# Patient Record
Sex: FEMALE | Race: WHITE | NOT HISPANIC OR LATINO | Employment: UNEMPLOYED | URBAN - METROPOLITAN AREA
[De-identification: names, ages, dates, MRNs, and addresses within clinical notes are randomized per-mention and may not be internally consistent; named-entity substitution may affect disease eponyms.]

---

## 2023-05-30 PROBLEM — G47.33 OSA (OBSTRUCTIVE SLEEP APNEA): Status: ACTIVE | Noted: 2023-05-30

## 2023-05-30 PROBLEM — E55.9 VITAMIN D DEFICIENCY: Status: ACTIVE | Noted: 2023-05-30

## 2023-05-30 PROBLEM — E03.9 HYPOTHYROIDISM: Status: ACTIVE | Noted: 2023-05-30

## 2023-05-30 PROBLEM — J44.9 COPD (CHRONIC OBSTRUCTIVE PULMONARY DISEASE) (MULTI): Status: ACTIVE | Noted: 2023-05-30

## 2023-05-30 PROBLEM — E78.00 HYPERCHOLESTEREMIA: Status: ACTIVE | Noted: 2023-05-30

## 2023-05-30 PROBLEM — I10 BENIGN ESSENTIAL HYPERTENSION: Status: ACTIVE | Noted: 2023-05-30

## 2023-05-30 PROBLEM — E87.6 HYPOKALEMIA: Status: ACTIVE | Noted: 2023-05-30

## 2023-05-30 PROBLEM — F41.8 DEPRESSION WITH ANXIETY: Status: ACTIVE | Noted: 2023-05-30

## 2023-05-30 NOTE — PROGRESS NOTES
Subjective   Patient ID:   Hortensia Rodriguez is a 57 y.o. female who presents for Establish Care.  HPI  New patient here today to establish care with myself.  Previous PCP: Denise Burnett  Last seen: Sep 2022  Last OV reviewed.    Has been out of all medications for the past 6 months.    Hypothyroidism:  Taking Synthroid.  Last checked Sep 2022.  DUE for labs.    COPD:  Taking albuterol and Symbicort.  Breathing is stable.    EMMA:  Does not use a CPAP.  States sleep is stable.    HTN:  Taking Metoprolol.  BP today is 174/103.  Denies dizziness, headaches.    HLD:  Taking Atorvastatin.  Last checked Sep 2022 and was high.  DUE for labs.    Depression/anxiety:  Taking Celexa.  Denies SI/HI.    Vit D deficiency:  Last checked Sep 2022.    Health maintenance:  Smoking: Former smoker.  Mammogram (40-75): DUE  Labs: Sep 2022. DUE for TSH, lipid  Colonoscopy (50-75): DUE - interested in cologuard.  Influenza:    Review of Systems  12 point review of systems negative unless stated above in HPI    Vitals:    06/01/23 1255   BP: (!) 174/103   Pulse: 101   Resp: 18   SpO2: 94%       Physical Exam  General: Alert and oriented, well nourished, no acute distress.  Lungs: Clear to auscultation, non-labored respiration.  Heart: Normal rate, regular rhythm, no murmur, gallop or edema.  Neurologic: Awake, alert, and oriented X3, CN II-XII intact.  Psychiatric: Cooperative, appropriate mood and affect.    Assessment/Plan   It was nice meeting you!  I have ordered some labs to be done as soon as you can.  We will call you with the results.  I have ordered you a mammogram to be done as soon as you are able.  We will call the results.  I have also ordered cologuard.  I have refilled your medications.  We will see what your BP is next visit.  Call with questions or concerns.    Fu 1 month  Diagnoses and all orders for this visit:  Benign essential hypertension  -     amLODIPine (Norvasc) 10 mg tablet; Take 1 tablet (10 mg) by mouth  once daily.  -     metoprolol succinate XL (Toprol-XL) 25 mg 24 hr tablet; Take 1 tablet (25 mg) by mouth once daily. Do not crush or chew.  Chronic obstructive pulmonary disease, unspecified COPD type (CMS/HCC)  -     Symbicort 160-4.5 mcg/actuation inhaler; Inhale 2 puffs 2 times a day.  Depression with anxiety  -     citalopram (CeleXA) 10 mg tablet; Take 1 tablet (10 mg) by mouth once daily.  Hypercholesteremia  -     Lipid Panel; Future  -     atorvastatin (Lipitor) 80 mg tablet; Take 1 tablet (80 mg) by mouth once daily.  Hypokalemia  Hypothyroidism, unspecified type  -     TSH with reflex to Free T4 if abnormal; Future  EMMA (obstructive sleep apnea)  Vitamin D deficiency  -     cholecalciferol (Vitamin D3) 25 MCG (1000 UT) tablet; Take 1 tablet (25 mcg) by mouth once daily.  Visit for screening mammogram  -     BI mammo bilateral screening tomosynthesis; Future  Colon cancer screening  -     Cologuard® colon cancer screening; Future

## 2023-06-01 ENCOUNTER — LAB (OUTPATIENT)
Dept: LAB | Facility: LAB | Age: 58
End: 2023-06-01
Payer: COMMERCIAL

## 2023-06-01 ENCOUNTER — OFFICE VISIT (OUTPATIENT)
Dept: PRIMARY CARE | Facility: CLINIC | Age: 58
End: 2023-06-01
Payer: COMMERCIAL

## 2023-06-01 VITALS
WEIGHT: 188.2 LBS | RESPIRATION RATE: 18 BRPM | BODY MASS INDEX: 30.25 KG/M2 | SYSTOLIC BLOOD PRESSURE: 174 MMHG | OXYGEN SATURATION: 94 % | HEIGHT: 66 IN | HEART RATE: 101 BPM | DIASTOLIC BLOOD PRESSURE: 103 MMHG

## 2023-06-01 DIAGNOSIS — Z12.31 VISIT FOR SCREENING MAMMOGRAM: ICD-10-CM

## 2023-06-01 DIAGNOSIS — E03.9 HYPOTHYROIDISM, UNSPECIFIED TYPE: ICD-10-CM

## 2023-06-01 DIAGNOSIS — Z12.11 COLON CANCER SCREENING: ICD-10-CM

## 2023-06-01 DIAGNOSIS — E78.00 HYPERCHOLESTEREMIA: ICD-10-CM

## 2023-06-01 DIAGNOSIS — G47.33 OSA (OBSTRUCTIVE SLEEP APNEA): ICD-10-CM

## 2023-06-01 DIAGNOSIS — E55.9 VITAMIN D DEFICIENCY: ICD-10-CM

## 2023-06-01 DIAGNOSIS — E87.6 HYPOKALEMIA: ICD-10-CM

## 2023-06-01 DIAGNOSIS — J44.9 CHRONIC OBSTRUCTIVE PULMONARY DISEASE, UNSPECIFIED COPD TYPE (MULTI): ICD-10-CM

## 2023-06-01 DIAGNOSIS — F41.8 DEPRESSION WITH ANXIETY: ICD-10-CM

## 2023-06-01 DIAGNOSIS — I10 BENIGN ESSENTIAL HYPERTENSION: Primary | ICD-10-CM

## 2023-06-01 LAB
CHOLESTEROL (MG/DL) IN SER/PLAS: 237 MG/DL (ref 0–199)
CHOLESTEROL IN HDL (MG/DL) IN SER/PLAS: 52.2 MG/DL
CHOLESTEROL/HDL RATIO: 4.5
LDL: 170 MG/DL (ref 0–99)
THYROTROPIN (MIU/L) IN SER/PLAS BY DETECTION LIMIT <= 0.05 MIU/L: 3.73 MIU/L (ref 0.44–3.98)
TRIGLYCERIDE (MG/DL) IN SER/PLAS: 75 MG/DL (ref 0–149)
VLDL: 15 MG/DL (ref 0–40)

## 2023-06-01 PROCEDURE — 3077F SYST BP >= 140 MM HG: CPT | Performed by: PHYSICIAN ASSISTANT

## 2023-06-01 PROCEDURE — 3080F DIAST BP >= 90 MM HG: CPT | Performed by: PHYSICIAN ASSISTANT

## 2023-06-01 PROCEDURE — 36415 COLL VENOUS BLD VENIPUNCTURE: CPT

## 2023-06-01 PROCEDURE — 84443 ASSAY THYROID STIM HORMONE: CPT

## 2023-06-01 PROCEDURE — 80061 LIPID PANEL: CPT

## 2023-06-01 PROCEDURE — 80053 COMPREHEN METABOLIC PANEL: CPT

## 2023-06-01 PROCEDURE — 1036F TOBACCO NON-USER: CPT | Performed by: PHYSICIAN ASSISTANT

## 2023-06-01 PROCEDURE — 99203 OFFICE O/P NEW LOW 30 MIN: CPT | Performed by: PHYSICIAN ASSISTANT

## 2023-06-01 RX ORDER — AMLODIPINE BESYLATE 10 MG/1
10 TABLET ORAL DAILY
Qty: 90 TABLET | Refills: 1 | Status: SHIPPED | OUTPATIENT
Start: 2023-06-01 | End: 2024-02-15 | Stop reason: SDUPTHER

## 2023-06-01 RX ORDER — BUDESONIDE AND FORMOTEROL FUMARATE DIHYDRATE 160; 4.5 UG/1; UG/1
2 AEROSOL RESPIRATORY (INHALATION) 2 TIMES DAILY
Qty: 1 EACH | Refills: 2 | Status: SHIPPED | OUTPATIENT
Start: 2023-06-01 | End: 2024-02-15 | Stop reason: SDUPTHER

## 2023-06-01 RX ORDER — METOPROLOL SUCCINATE 25 MG/1
25 TABLET, EXTENDED RELEASE ORAL DAILY
COMMUNITY
End: 2023-06-01 | Stop reason: SDUPTHER

## 2023-06-01 RX ORDER — CHOLECALCIFEROL (VITAMIN D3) 25 MCG
25 TABLET ORAL DAILY
Qty: 90 TABLET | Refills: 1 | Status: SHIPPED | OUTPATIENT
Start: 2023-06-01 | End: 2023-11-28

## 2023-06-01 RX ORDER — IBUPROFEN 600 MG/1
TABLET ORAL
COMMUNITY
Start: 2019-08-07

## 2023-06-01 RX ORDER — ATORVASTATIN CALCIUM 80 MG/1
80 TABLET, FILM COATED ORAL DAILY
Qty: 90 TABLET | Refills: 1 | Status: SHIPPED | OUTPATIENT
Start: 2023-06-01 | End: 2024-02-15 | Stop reason: SDUPTHER

## 2023-06-01 RX ORDER — ATORVASTATIN CALCIUM 80 MG/1
1 TABLET, FILM COATED ORAL DAILY
COMMUNITY
Start: 2015-08-31 | End: 2023-06-01 | Stop reason: SDUPTHER

## 2023-06-01 RX ORDER — AMLODIPINE BESYLATE 10 MG/1
10 TABLET ORAL DAILY
COMMUNITY
End: 2023-06-01 | Stop reason: SDUPTHER

## 2023-06-01 RX ORDER — CHOLECALCIFEROL (VITAMIN D3) 25 MCG
25 TABLET ORAL DAILY
COMMUNITY
End: 2023-06-01 | Stop reason: SDUPTHER

## 2023-06-01 RX ORDER — BUDESONIDE AND FORMOTEROL FUMARATE DIHYDRATE 160; 4.5 UG/1; UG/1
2 AEROSOL RESPIRATORY (INHALATION) 2 TIMES DAILY
COMMUNITY
Start: 2015-09-17 | End: 2023-06-01 | Stop reason: SDUPTHER

## 2023-06-01 RX ORDER — METOPROLOL SUCCINATE 25 MG/1
25 TABLET, EXTENDED RELEASE ORAL DAILY
Qty: 90 TABLET | Refills: 1 | Status: SHIPPED | OUTPATIENT
Start: 2023-06-01 | End: 2024-02-05 | Stop reason: HOSPADM

## 2023-06-01 RX ORDER — CITALOPRAM 10 MG/1
10 TABLET ORAL DAILY
Qty: 90 TABLET | Refills: 1 | Status: SHIPPED | OUTPATIENT
Start: 2023-06-01 | End: 2024-02-15 | Stop reason: SDUPTHER

## 2023-06-01 RX ORDER — CITALOPRAM 10 MG/1
1 TABLET ORAL DAILY
COMMUNITY
Start: 2016-03-16 | End: 2023-06-01 | Stop reason: SDUPTHER

## 2023-06-01 ASSESSMENT — PAIN SCALES - GENERAL: PAINLEVEL: 6

## 2023-06-02 DIAGNOSIS — E78.00 HYPERCHOLESTEREMIA: ICD-10-CM

## 2023-06-02 DIAGNOSIS — E87.6 HYPOKALEMIA: ICD-10-CM

## 2023-06-02 LAB
ALANINE AMINOTRANSFERASE (SGPT) (U/L) IN SER/PLAS: 6 U/L (ref 7–45)
ALBUMIN (G/DL) IN SER/PLAS: 4.5 G/DL (ref 3.4–5)
ALKALINE PHOSPHATASE (U/L) IN SER/PLAS: 94 U/L (ref 33–110)
ANION GAP IN SER/PLAS: 20 MMOL/L (ref 10–20)
ASPARTATE AMINOTRANSFERASE (SGOT) (U/L) IN SER/PLAS: 12 U/L (ref 9–39)
BILIRUBIN TOTAL (MG/DL) IN SER/PLAS: 0.5 MG/DL (ref 0–1.2)
CALCIUM (MG/DL) IN SER/PLAS: 9.3 MG/DL (ref 8.6–10.3)
CARBON DIOXIDE, TOTAL (MMOL/L) IN SER/PLAS: 22 MMOL/L (ref 21–32)
CHLORIDE (MMOL/L) IN SER/PLAS: 103 MMOL/L (ref 98–107)
CREATININE (MG/DL) IN SER/PLAS: 0.7 MG/DL (ref 0.5–1.05)
GFR FEMALE: >90 ML/MIN/1.73M2
GLUCOSE (MG/DL) IN SER/PLAS: 101 MG/DL (ref 74–99)
POTASSIUM (MMOL/L) IN SER/PLAS: 3 MMOL/L (ref 3.5–5.3)
PROTEIN TOTAL: 7 G/DL (ref 6.4–8.2)
SODIUM (MMOL/L) IN SER/PLAS: 142 MMOL/L (ref 136–145)
UREA NITROGEN (MG/DL) IN SER/PLAS: 9 MG/DL (ref 6–23)

## 2023-06-02 RX ORDER — EZETIMIBE 10 MG/1
10 TABLET ORAL DAILY
Qty: 90 TABLET | Refills: 0 | Status: SHIPPED | OUTPATIENT
Start: 2023-06-02 | End: 2024-02-05 | Stop reason: HOSPADM

## 2023-06-06 DIAGNOSIS — E87.6 HYPOKALEMIA: ICD-10-CM

## 2023-06-06 RX ORDER — POTASSIUM CHLORIDE 1500 MG/1
20 TABLET, EXTENDED RELEASE ORAL DAILY
Qty: 90 TABLET | Refills: 0 | Status: SHIPPED | OUTPATIENT
Start: 2023-06-06 | End: 2024-02-05 | Stop reason: HOSPADM

## 2023-06-28 NOTE — PROGRESS NOTES
Subjective   Patient ID:   Hortensia Rodriguez is a 57 y.o. female who presents for No chief complaint on file..  HPI  Had been out of all medications for the past 6 months last visit.    Hypothyroidism:  Taking Synthroid.  Last checked June 2023.    Hypokalemia:  Seen in June 2023.  Was started on a potassium supplement.  DUE for re-check.    COPD:  Taking albuterol and Symbicort.  Breathing is stable.    EMMA:  Does not use a CPAP.  States sleep is stable.    HTN:  Taking Metoprolol.  BP today is  Denies dizziness, headaches.    HLD:  Taking Atorvastatin now.  Last checked June 2023 and was high.    Depression/anxiety:  Taking Celexa.  Denies SI/HI.    Vit D deficiency:  Last checked Sep 2022.    Health maintenance:  Smoking: Former smoker.  Mammogram (40-75): DUE - ordered last visit.  Labs: June 2023. DUE for CMP  Colonoscopy (50-75): DUE - interested in cologuard - ordered last visit.  Influenza:    Review of Systems  12 point review of systems negative unless stated above in HPI    There were no vitals filed for this visit.      Physical Exam  General: Alert and oriented, well nourished, no acute distress.  Lungs: Clear to auscultation, non-labored respiration.  Heart: Normal rate, regular rhythm, no murmur, gallop or edema.  Neurologic: Awake, alert, and oriented X3, CN II-XII intact.  Psychiatric: Cooperative, appropriate mood and affect.    Assessment/Plan     I have ordered some labs to be done as soon as you can.  We will call you with the results.  I have ordered you a mammogram to be done as soon as you are able.  We will call the results.  I have also ordered cologuard.  I have refilled your medications.  We will see what your BP is next visit.    Diagnoses and all orders for this visit:  Benign essential hypertension  Chronic obstructive pulmonary disease, unspecified COPD type (CMS/HCC)  Depression with anxiety  Hypercholesteremia  Hypokalemia  Hypothyroidism, unspecified type  EMMA (obstructive sleep  apnea)  Vitamin D deficiency

## 2023-07-06 ENCOUNTER — DOCUMENTATION (OUTPATIENT)
Dept: PRIMARY CARE | Facility: CLINIC | Age: 58
End: 2023-07-06

## 2023-07-06 ENCOUNTER — PATIENT OUTREACH (OUTPATIENT)
Dept: PRIMARY CARE | Facility: CLINIC | Age: 58
End: 2023-07-06

## 2023-07-06 ENCOUNTER — APPOINTMENT (OUTPATIENT)
Dept: PRIMARY CARE | Facility: CLINIC | Age: 58
End: 2023-07-06
Payer: COMMERCIAL

## 2023-07-06 NOTE — PROGRESS NOTES
Discharge Facility: [facility]  Discharge Diagnosis:   Admission Date: 1 July 23  Discharge Date: 5 July 23    PCP Appointment Date: Tasked to PCP office for scheduling.   Specialist Appointment Date: Suggested pt follow up with Urology within 1 wk of discharge.   Hospital Encounter and Summary: Linked    No contact on 5 July 23 discharge. Will attempt contact again in 2 wks

## 2023-07-11 ENCOUNTER — TELEPHONE (OUTPATIENT)
Dept: PRIMARY CARE | Facility: CLINIC | Age: 58
End: 2023-07-11
Payer: COMMERCIAL

## 2023-07-11 NOTE — TELEPHONE ENCOUNTER
----- Message from Ryan Mitchell RN sent at 7/6/2023  1:14 PM EDT -----  Regarding: unsuccessful discharge outreach after 2 attempts.  Discharge facility: [facility] diagnosis: Pyelonephritis    Date of discharge: 5 July 23       Unsuccessful attempts x2 to reach patient for PCP Follow-up  Please have office staff reach out to patient and schedule an appointment within 7-13 days from discharge date.

## 2023-07-17 PROBLEM — N20.0 KIDNEY STONE: Status: ACTIVE | Noted: 2023-07-17

## 2023-07-17 PROBLEM — N12 PYELONEPHRITIS: Status: ACTIVE | Noted: 2023-07-17

## 2023-07-18 ENCOUNTER — PATIENT OUTREACH (OUTPATIENT)
Dept: PRIMARY CARE | Facility: CLINIC | Age: 58
End: 2023-07-18
Payer: COMMERCIAL

## 2023-07-24 ENCOUNTER — OFFICE VISIT (OUTPATIENT)
Dept: PRIMARY CARE | Facility: CLINIC | Age: 58
End: 2023-07-24
Payer: COMMERCIAL

## 2023-07-24 VITALS
RESPIRATION RATE: 18 BRPM | DIASTOLIC BLOOD PRESSURE: 92 MMHG | OXYGEN SATURATION: 96 % | SYSTOLIC BLOOD PRESSURE: 141 MMHG | HEART RATE: 111 BPM | WEIGHT: 171.8 LBS | BODY MASS INDEX: 27.61 KG/M2 | HEIGHT: 66 IN

## 2023-07-24 DIAGNOSIS — R11.0 NAUSEA: ICD-10-CM

## 2023-07-24 DIAGNOSIS — E03.9 HYPOTHYROIDISM, UNSPECIFIED TYPE: ICD-10-CM

## 2023-07-24 DIAGNOSIS — G47.33 OSA (OBSTRUCTIVE SLEEP APNEA): ICD-10-CM

## 2023-07-24 DIAGNOSIS — E87.6 HYPOKALEMIA: ICD-10-CM

## 2023-07-24 DIAGNOSIS — N12 PYELONEPHRITIS: Primary | ICD-10-CM

## 2023-07-24 DIAGNOSIS — R63.0 DECREASED APPETITE: ICD-10-CM

## 2023-07-24 DIAGNOSIS — J44.9 CHRONIC OBSTRUCTIVE PULMONARY DISEASE, UNSPECIFIED COPD TYPE (MULTI): ICD-10-CM

## 2023-07-24 DIAGNOSIS — N20.0 KIDNEY STONE: ICD-10-CM

## 2023-07-24 DIAGNOSIS — I10 BENIGN ESSENTIAL HYPERTENSION: ICD-10-CM

## 2023-07-24 DIAGNOSIS — E78.00 HYPERCHOLESTEREMIA: ICD-10-CM

## 2023-07-24 DIAGNOSIS — F41.8 DEPRESSION WITH ANXIETY: ICD-10-CM

## 2023-07-24 PROBLEM — D72.829 LEUKOCYTOSIS: Status: ACTIVE | Noted: 2023-07-24

## 2023-07-24 PROBLEM — J44.1 COPD EXACERBATION (MULTI): Status: ACTIVE | Noted: 2023-05-30

## 2023-07-24 PROBLEM — E83.42 HYPOMAGNESEMIA: Status: ACTIVE | Noted: 2023-07-24

## 2023-07-24 PROBLEM — A41.9 SEPSIS (MULTI): Status: ACTIVE | Noted: 2023-07-24

## 2023-07-24 PROBLEM — M16.9 OSTEOARTHRITIS OF HIP: Status: ACTIVE | Noted: 2023-07-24

## 2023-07-24 PROBLEM — R00.0 SINUS TACHYCARDIA: Status: ACTIVE | Noted: 2023-07-24

## 2023-07-24 PROBLEM — R73.9 HYPERGLYCEMIA: Status: ACTIVE | Noted: 2023-07-24

## 2023-07-24 PROBLEM — R03.0 ELEVATED BLOOD PRESSURE READING: Status: ACTIVE | Noted: 2023-07-24

## 2023-07-24 PROBLEM — K62.89 RECTAL MASS: Status: ACTIVE | Noted: 2023-07-24

## 2023-07-24 PROBLEM — R10.9 FLANK PAIN: Status: RESOLVED | Noted: 2023-07-24 | Resolved: 2023-07-24

## 2023-07-24 PROBLEM — R10.9 ABDOMINAL PAIN: Status: RESOLVED | Noted: 2023-07-24 | Resolved: 2023-07-24

## 2023-07-24 PROBLEM — R09.02 HYPOXIA: Status: ACTIVE | Noted: 2023-07-24

## 2023-07-24 PROBLEM — R06.02 EXERTIONAL SHORTNESS OF BREATH: Status: ACTIVE | Noted: 2023-07-24

## 2023-07-24 PROBLEM — M25.551 RIGHT HIP PAIN: Status: RESOLVED | Noted: 2023-07-24 | Resolved: 2023-07-24

## 2023-07-24 PROBLEM — N20.1 LEFT URETERAL STONE: Status: ACTIVE | Noted: 2023-07-24

## 2023-07-24 PROBLEM — R11.2 NAUSEA AND VOMITING: Status: ACTIVE | Noted: 2023-07-24

## 2023-07-24 PROBLEM — N39.0 COMPLICATED UTI (URINARY TRACT INFECTION): Status: RESOLVED | Noted: 2023-07-24 | Resolved: 2023-07-24

## 2023-07-24 PROBLEM — F51.04 CHRONIC INSOMNIA: Status: ACTIVE | Noted: 2023-07-24

## 2023-07-24 PROCEDURE — 3080F DIAST BP >= 90 MM HG: CPT | Performed by: PHYSICIAN ASSISTANT

## 2023-07-24 PROCEDURE — 3008F BODY MASS INDEX DOCD: CPT | Performed by: PHYSICIAN ASSISTANT

## 2023-07-24 PROCEDURE — 3077F SYST BP >= 140 MM HG: CPT | Performed by: PHYSICIAN ASSISTANT

## 2023-07-24 PROCEDURE — 99214 OFFICE O/P EST MOD 30 MIN: CPT | Performed by: PHYSICIAN ASSISTANT

## 2023-07-24 PROCEDURE — 1036F TOBACCO NON-USER: CPT | Performed by: PHYSICIAN ASSISTANT

## 2023-07-24 RX ORDER — ALBUTEROL SULFATE 90 UG/1
AEROSOL, METERED RESPIRATORY (INHALATION)
COMMUNITY
Start: 2022-03-18 | End: 2024-02-15 | Stop reason: SDUPTHER

## 2023-07-24 RX ORDER — ONDANSETRON 4 MG/1
4 TABLET, FILM COATED ORAL EVERY 8 HOURS PRN
Qty: 21 TABLET | Refills: 0 | Status: SHIPPED | OUTPATIENT
Start: 2023-07-24 | End: 2023-07-31

## 2023-07-24 ASSESSMENT — PAIN SCALES - GENERAL: PAINLEVEL: 0-NO PAIN

## 2023-07-28 ENCOUNTER — PATIENT OUTREACH (OUTPATIENT)
Dept: PRIMARY CARE | Facility: CLINIC | Age: 58
End: 2023-07-28
Payer: COMMERCIAL

## 2023-07-28 NOTE — PROGRESS NOTES
Discharge Facility: [facility]  Discharge Diagnosis: L flank pain, UTI, anorexia  Admission Date: 24 July 23  Discharge Date: 27 July 23    PCP Appointment Date: Tasked to PCP office for appt  Specialist Appointment Date: N/A  Hospital Encounter and Summary: Linked    No contact made on discharge outreach attempt. Tasked to office for scheduling. Will attempt outreach again in 2 wks.

## 2023-08-08 ENCOUNTER — PATIENT OUTREACH (OUTPATIENT)
Dept: PRIMARY CARE | Facility: CLINIC | Age: 58
End: 2023-08-08
Payer: COMMERCIAL

## 2023-09-25 ENCOUNTER — HOSPITAL ENCOUNTER (OUTPATIENT)
Dept: DATA CONVERSION | Facility: HOSPITAL | Age: 58
End: 2023-09-25
Attending: RADIOLOGY | Admitting: RADIOLOGY
Payer: COMMERCIAL

## 2023-09-25 DIAGNOSIS — K62.89 OTHER SPECIFIED DISEASES OF ANUS AND RECTUM: ICD-10-CM

## 2023-10-02 PROBLEM — M25.551 RIGHT HIP PAIN: Status: ACTIVE | Noted: 2023-07-24

## 2023-10-02 PROBLEM — M19.90 ARTHRITIS: Status: ACTIVE | Noted: 2023-10-02

## 2023-10-02 RX ORDER — LEVOTHYROXINE SODIUM 50 UG/1
50 TABLET ORAL DAILY
COMMUNITY
End: 2024-02-15 | Stop reason: ALTCHOICE

## 2023-10-04 NOTE — PROGRESS NOTES
No chief complaint on file.      History Of Present Illness    Subjective   Hortensia Rodriguez is a 57 yo female was referred by  Yani Beck CNP  for evaluation of a rectal mass. She was admitted 7/2023 for a kidney stone and at that time she had a stent placed. CT a/p was performed demonstrating persistent severe eccentric rectal wall thickening along the anterior wall of the rectum and distal sigmoid colon similar compared to prior. She then underwent colonoscopy which demonstrating a likely malignant mass in the distal rectum/anus. Area was tattooed and biopsies were obtained. Pathology demonstrating superficial fragments of tubulovillous adenoma. MRI rectum was performed and MRI based staging T3bN0.     She has lost about 23 pounds in the last few months. She lives closest to [facility]. Moving her bowels twice daily. Stools have been more loose in the last few months. Quit smoking years ago.     CEA 7/27/23: 2.4    CT chest 7/26/23: Multiple pulmonary nodules in bilateral lungs measuring in the range of 2-6 mm as described above. Most of these pulmonary nodules were identified on prior CT dating back to 2022 except the 6 mm subpleural pulmonary nodule in the right lower lobe (axial image [id number]). These are indeterminate. Recommend attention on follow-up imaging. 2. Mild-to-moderate atherosclerotic vascular calcifications. 3. Stable bilateral adrenal nodules.    CT a/p 7/2023: Left-sided ureteral stent noted in appropriate position. Mild left renal pelvic caliectasis with a 9 mm calculus in the left renal pelvis previously measuring 1.1 cm. Single additional 2 mm nonobstructing right renal calculus. 2. Persistent severe eccentric rectal wall thickening along the anterior wall of the rectum and distal sigmoid colon similar compared to prior. Findings presumably secondary to rectal carcinoma. Recommend further evaluation with colonoscopy. 3. Stable 4.5 cm slightly complex cyst in the anterior aspect of the  left kidney similar compared to prior.    MRI rectum 9/29/23: There is a circular/semi circular mass starting at the anorectal junction abutting the internal sphincter and appears 2.8 cm from the anal verge extending for about 6 cm in craniocaudal dimension and involving the muscularis propria with possible 3 mm 9 o'clock extramural/perirectal extension with at least 3 mm distance from the nearest mesorectal fascia. Questionable right perirectal extramural vascular involvement. Tumor is noted below the level of the peritoneal reflections. Findings are consistent with the known rectal neoplasm. 2. No enlarged mesorectal or pelvic suspicious lymph nodes (Few tiny mesorectal lymph nodes noted). No pelvic organ involvement. MRI based staging T3bN0.     Colonoscopy 7/26/2023 ([facility]): Palpable rectal mass found on digital rectal exam. - Likely malignant tumor in the distal rectum/anus. Biopsied. Tattooed. - One 6 mm polyp in the rectum, removed with a cold snare. Resected and retrieved. - Two 4 to 5 mm polyps in the proximal transverse colon, removed with a cold snare. Resected and retrieved. - One 5 mm polyp at the ileocecal valve, removed with a cold snare. Resected and retrieved.     Past Medical History  Past Medical History:   Diagnosis Date    Abdominal pain 07/24/2023    Abnormal electrocardiogram (ECG) (EKG) 12/16/2015    Abnormal ECG    Anxiety disorder, unspecified 08/31/2015    Anxiety    Complicated UTI (urinary tract infection) 07/24/2023    Encounter for preprocedural cardiovascular examination 10/25/2015    Pre-operative cardiovascular examination    Flank pain 07/24/2023    Hypomagnesemia 10/14/2015    Hypomagnesemia    Personal history of other diseases of the circulatory system 08/31/2015    History of chronic ischemic heart disease    Personal history of other diseases of the female genital tract 10/28/2015    History of ovarian cyst    Personal history of other diseases of urinary system 01/04/2016     History of hematuria    Personal history of other diseases of urinary system 10/16/2015    History of kidney disease    Personal history of other specified conditions 10/16/2015    History of pelvic mass    Personal history of other specified conditions 09/30/2015    History of fatigue    Personal history of pneumonia (recurrent) 02/26/2019    History of pneumonia    Right hip pain 07/24/2023    Strain of muscle, fascia and tendon of abdomen, initial encounter 09/17/2015    Abdominal muscle strain       Surgical History  Past Surgical History:   Procedure Laterality Date    APPENDECTOMY  08/31/2015    Appendectomy    FOOT SURGERY  08/31/2015    Foot Surgery        Social History  She reports that she has quit smoking. Her smoking use included cigarettes. She has never used smokeless tobacco. She reports that she does not drink alcohol and does not use drugs.    Family History  Family History   Problem Relation Name Age of Onset    No Known Problems Mother      No Known Problems Father          Allergies  Ace inhibitors and Lisinopril    Home Medications  Prior to Admission medications    Medication Sig Start Date End Date Taking? Authorizing Provider   acetaminophen (Tylenol) 325 mg tablet 2 TAB(S) ORALLY EVERY 4 HOURS, AS NEEDED, TEMP GREATER THAN OR EQUAL TO 38.0 C 7/5/23 7/4/24  Tracie Alejo MD   albuterol (ProAir HFA) 90 mcg/actuation inhaler Inhale. 3/18/22   Historical Provider, MD   amLODIPine (Norvasc) 10 mg tablet Take 1 tablet (10 mg) by mouth once daily. 6/1/23 11/28/23  Chris Pearce PA-C   atorvastatin (Lipitor) 80 mg tablet Take 1 tablet (80 mg) by mouth once daily. 6/1/23 11/28/23  Chris Pearce PA-C   cefuroxime (Ceftin) 250 mg tablet TAKE 2 TABLETS BY MOUTH ONCE DAILY 7/5/23 7/4/24  Tracie Alejo MD   cholecalciferol (Vitamin D3) 25 MCG (1000 UT) tablet Take 1 tablet (25 mcg) by mouth once daily. 6/1/23 11/28/23  Chris Pearce PA-C   citalopram (CeleXA) 10 mg tablet Take  1 tablet (10 mg) by mouth once daily. 6/1/23 11/28/23  Chris Pearce PA-C   ezetimibe (Zetia) 10 mg tablet Take 1 tablet (10 mg) by mouth once daily. 6/2/23 11/29/23  Ewelina Love MD   ibuprofen 600 mg tablet Take by mouth. 8/7/19   Historical Provider, MD   levothyroxine (Synthroid, Levoxyl) 50 mcg tablet Take 1 tablet (50 mcg) by mouth once daily.    Historical Provider, MD   metoprolol succinate XL (Toprol-XL) 25 mg 24 hr tablet Take 1 tablet (25 mg) by mouth once daily. Do not crush or chew. 6/1/23 11/28/23  Chris Pearce PA-C   potassium chloride CR (K-Tab) 20 mEq ER tablet Take 1 tablet (20 mEq) by mouth once daily. Do not crush, chew, or split. 6/6/23 6/5/24  Ewelina Love MD   Symbicort 160-4.5 mcg/actuation inhaler Inhale 2 puffs 2 times a day. 6/1/23   Chris Pearce PA-C   tamsulosin (Flomax) 0.4 mg 24 hr capsule TAKE 1 CAPSULE BY MOUTH ONCE DAILY 7/5/23 7/4/24  Tracie Alejo MD       Review of Systems   All other systems reviewed and are negative.       Physical Exam  Abdominal:      General: Abdomen is flat.      Tenderness: There is no abdominal tenderness.   Genitourinary:     Rectum: Mass present.     LUCRECIA - normal tone, mass anterior based, just above sphincter at about 3cm from verge; about 50% of wall, tethered    General    Date/Time: 10/5/2023 10:40 AM    Performed by: Sierra Flores MD  Authorized by: Sierra Flores MD    Consent:     Consent obtained:  Written    Consent given by:  Patient    Risks, benefits, and alternatives were discussed: yes      Risks discussed:  Bleeding, infection and pain  Universal protocol:     Procedure explained and questions answered to patient or proxy's satisfaction: yes      Patient identity confirmed:  Verbally with patient  Indications:     Indications:  Rectal mass  Sedation:     Sedation type:  None  Anesthesia:     Anesthesia method:  None  Post-procedure details:     Procedure completion:  Tolerated well, no  immediate complications         Last Recorded Vitals  There were no vitals taken for this visit.    Relevant Results                    Lab Review  Lab Results   Component Value Date    AST 10 07/27/2023    ALT 8 07/27/2023    ALKPHOS 71 07/27/2023    PROT 5.6 (L) 07/27/2023    ALBUMIN 3.1 (L) 07/27/2023       ASSESSMENT & PLAN    New diagnosis of likely rectal cancer -- we discussed that her biopsy of adenoma likely represents sampling error in the context of T3 disease on imaging and that an EUA with biopsy is recommended to further pathologically characterize the lesion.  She understands the rationale.   However, in the meantime, she will also be referred to medical oncology and radiation oncology in anticipation of a multidisciplinary approach.   Thank you for allowing me to participate in the care of this patient. Please do not hesitate to be in touch.     Best,  Sierra Nayak MD

## 2023-10-05 ENCOUNTER — OFFICE VISIT (OUTPATIENT)
Dept: SURGERY | Facility: CLINIC | Age: 58
End: 2023-10-05
Payer: COMMERCIAL

## 2023-10-05 VITALS
WEIGHT: 178 LBS | HEART RATE: 109 BPM | SYSTOLIC BLOOD PRESSURE: 127 MMHG | BODY MASS INDEX: 28.61 KG/M2 | HEIGHT: 66 IN | DIASTOLIC BLOOD PRESSURE: 87 MMHG

## 2023-10-05 DIAGNOSIS — K62.89 RECTAL MASS: Primary | ICD-10-CM

## 2023-10-05 PROCEDURE — 1036F TOBACCO NON-USER: CPT | Performed by: SURGERY

## 2023-10-05 PROCEDURE — 3008F BODY MASS INDEX DOCD: CPT | Performed by: SURGERY

## 2023-10-05 PROCEDURE — 99205 OFFICE O/P NEW HI 60 MIN: CPT | Performed by: SURGERY

## 2023-10-05 PROCEDURE — 3074F SYST BP LT 130 MM HG: CPT | Performed by: SURGERY

## 2023-10-05 PROCEDURE — 3079F DIAST BP 80-89 MM HG: CPT | Performed by: SURGERY

## 2023-10-23 ENCOUNTER — TELEPHONE (OUTPATIENT)
Dept: SURGERY | Facility: CLINIC | Age: 58
End: 2023-10-23
Payer: COMMERCIAL

## 2023-10-23 NOTE — TELEPHONE ENCOUNTER
I called and left the patient a third message asking her to call back to get scheduled for an EUA with Dr. Nayak. I reiterated in my message that it is very important. Message also sent to patient via [employer].

## 2023-12-21 ENCOUNTER — APPOINTMENT (OUTPATIENT)
Dept: SURGERY | Facility: CLINIC | Age: 58
End: 2023-12-21
Payer: COMMERCIAL

## 2024-01-05 ENCOUNTER — DOCUMENTATION (OUTPATIENT)
Dept: HEMATOLOGY/ONCOLOGY | Facility: HOSPITAL | Age: 59
End: 2024-01-05
Payer: COMMERCIAL

## 2024-01-05 NOTE — PROGRESS NOTES
In trying to track down patient to schedule an appointment with med onc, I have called all available phone numbers and left messages without return response. Dr. Nayak's office has also done so. Therefore, I have messaged patient's PCP office to see if they have any additional information. NAN Lowe

## 2024-01-11 ENCOUNTER — LAB (OUTPATIENT)
Dept: LAB | Facility: LAB | Age: 59
End: 2024-01-11
Payer: COMMERCIAL

## 2024-01-11 ENCOUNTER — OFFICE VISIT (OUTPATIENT)
Dept: SURGERY | Facility: CLINIC | Age: 59
End: 2024-01-11
Payer: COMMERCIAL

## 2024-01-11 ENCOUNTER — PREP FOR PROCEDURE (OUTPATIENT)
Dept: POSTOP/PACU | Facility: HOSPITAL | Age: 59
End: 2024-01-11

## 2024-01-11 VITALS
BODY MASS INDEX: 27.38 KG/M2 | HEART RATE: 130 BPM | RESPIRATION RATE: 16 BRPM | DIASTOLIC BLOOD PRESSURE: 121 MMHG | SYSTOLIC BLOOD PRESSURE: 166 MMHG | WEIGHT: 170.4 LBS | HEIGHT: 66 IN

## 2024-01-11 DIAGNOSIS — K62.89 RECTAL MASS: ICD-10-CM

## 2024-01-11 DIAGNOSIS — K62.89 RECTAL MASS: Primary | ICD-10-CM

## 2024-01-11 DIAGNOSIS — K57.92 DIVERTICULITIS: ICD-10-CM

## 2024-01-11 LAB
ALBUMIN SERPL BCP-MCNC: 4.3 G/DL (ref 3.4–5)
ANION GAP SERPL CALC-SCNC: 17 MMOL/L (ref 10–20)
APPEARANCE UR: ABNORMAL
BILIRUB UR STRIP.AUTO-MCNC: NEGATIVE MG/DL
BUN SERPL-MCNC: 12 MG/DL (ref 6–23)
CALCIUM SERPL-MCNC: 9.9 MG/DL (ref 8.6–10.6)
CHLORIDE SERPL-SCNC: 94 MMOL/L (ref 98–107)
CO2 SERPL-SCNC: 34 MMOL/L (ref 21–32)
COLOR UR: YELLOW
CREAT SERPL-MCNC: 0.85 MG/DL (ref 0.5–1.05)
EGFRCR SERPLBLD CKD-EPI 2021: 79 ML/MIN/1.73M*2
ERYTHROCYTE [DISTWIDTH] IN BLOOD BY AUTOMATED COUNT: 15.5 % (ref 11.5–14.5)
GLUCOSE SERPL-MCNC: 109 MG/DL (ref 74–99)
GLUCOSE UR STRIP.AUTO-MCNC: NEGATIVE MG/DL
HCT VFR BLD AUTO: 43 % (ref 36–46)
HGB BLD-MCNC: 14 G/DL (ref 12–16)
KETONES UR STRIP.AUTO-MCNC: NEGATIVE MG/DL
LEUKOCYTE ESTERASE UR QL STRIP.AUTO: ABNORMAL
MCH RBC QN AUTO: 28.2 PG (ref 26–34)
MCHC RBC AUTO-ENTMCNC: 32.6 G/DL (ref 32–36)
MCV RBC AUTO: 87 FL (ref 80–100)
NITRITE UR QL STRIP.AUTO: NEGATIVE
NRBC BLD-RTO: 0 /100 WBCS (ref 0–0)
PH UR STRIP.AUTO: 7 [PH]
PHOSPHATE SERPL-MCNC: 2.9 MG/DL (ref 2.5–4.9)
PLATELET # BLD AUTO: 555 X10*3/UL (ref 150–450)
POTASSIUM SERPL-SCNC: 3 MMOL/L (ref 3.5–5.3)
PROT UR STRIP.AUTO-MCNC: ABNORMAL MG/DL
RBC # BLD AUTO: 4.96 X10*6/UL (ref 4–5.2)
RBC # UR STRIP.AUTO: ABNORMAL /UL
RBC #/AREA URNS AUTO: >20 /HPF
SODIUM SERPL-SCNC: 142 MMOL/L (ref 136–145)
SP GR UR STRIP.AUTO: 1.02
UROBILINOGEN UR STRIP.AUTO-MCNC: <2 MG/DL
WBC # BLD AUTO: 13.2 X10*3/UL (ref 4.4–11.3)
WBC #/AREA URNS AUTO: >50 /HPF
WBC CLUMPS #/AREA URNS AUTO: ABNORMAL /HPF
YEAST BUDDING #/AREA UR COMP ASSIST: PRESENT /HPF

## 2024-01-11 PROCEDURE — 3077F SYST BP >= 140 MM HG: CPT | Performed by: SURGERY

## 2024-01-11 PROCEDURE — 3008F BODY MASS INDEX DOCD: CPT | Performed by: SURGERY

## 2024-01-11 PROCEDURE — 3080F DIAST BP >= 90 MM HG: CPT | Performed by: SURGERY

## 2024-01-11 PROCEDURE — 36415 COLL VENOUS BLD VENIPUNCTURE: CPT

## 2024-01-11 PROCEDURE — 85027 COMPLETE CBC AUTOMATED: CPT

## 2024-01-11 PROCEDURE — 87186 SC STD MICRODIL/AGAR DIL: CPT

## 2024-01-11 PROCEDURE — 1036F TOBACCO NON-USER: CPT | Performed by: SURGERY

## 2024-01-11 PROCEDURE — 81001 URINALYSIS AUTO W/SCOPE: CPT

## 2024-01-11 PROCEDURE — 87181 SC STD AGAR DILUTION PER AGT: CPT

## 2024-01-11 PROCEDURE — 87086 URINE CULTURE/COLONY COUNT: CPT

## 2024-01-11 PROCEDURE — 99214 OFFICE O/P EST MOD 30 MIN: CPT | Performed by: SURGERY

## 2024-01-11 PROCEDURE — 80069 RENAL FUNCTION PANEL: CPT

## 2024-01-11 NOTE — H&P (VIEW-ONLY)
No chief complaint on file.      History Of Present Illness  Hortensia Rodriguez is a 58 y.o. female presenting with newly diagnosed rectal mass.     Subjective   Hortensia Rodriguez was referred by Yani Beck CNP  for evaluation of a rectal mass. She was admitted 7/2023 for a kidney stone and at that time she had a stent placed. CT a/p was performed demonstrating persistent severe eccentric rectal wall thickening along the anterior wall of the rectum and distal sigmoid colon similar compared to prior. She then underwent colonoscopy which demonstrating a likely malignant mass in the distal rectum/anus. Area was tattooed and biopsies were obtained. Pathology demonstrating superficial fragments of tubulovillous adenoma. MRI rectum was performed and MRI based staging T3bN0.      She was last seen 10/5/23 and the plan was for EUA with biopsies of rectal mass. Operating room time was obtained and she was scheduled for 10/23/23. The office attempted to reach her multiple times and did not hear back from her. She was also not able to be reached by the oncologist and missed her appointment with them on 1/5/24.     She is having mucus drainage. She is taking tums as needed. She is having dysuria and frequency.     CEA 7/27/23: 2.4     CT chest 7/26/23: Multiple pulmonary nodules in bilateral lungs measuring in the range of 2-6 mm as described above. Most of these pulmonary nodules were identified on prior CT dating back to 2022 except the 6 mm subpleural pulmonary nodule in the right lower lobe (axial image [id number]). These are indeterminate. Recommend attention on follow-up imaging. 2. Mild-to-moderate atherosclerotic vascular calcifications. 3. Stable bilateral adrenal nodules.     CT a/p 7/2023: Left-sided ureteral stent noted in appropriate position. Mild left renal pelvic caliectasis with a 9 mm calculus in the left renal pelvis previously measuring 1.1 cm. Single additional 2 mm nonobstructing right renal  calculus. 2. Persistent severe eccentric rectal wall thickening along the anterior wall of the rectum and distal sigmoid colon similar compared to prior. Findings presumably secondary to rectal carcinoma. Recommend further evaluation with colonoscopy. 3. Stable 4.5 cm slightly complex cyst in the anterior aspect of the left kidney similar compared to prior.     MRI rectum 9/29/23: There is a circular/semi circular mass starting at the anorectal junction abutting the internal sphincter and appears 2.8 cm from the anal verge extending for about 6 cm in craniocaudal dimension and involving the muscularis propria with possible 3 mm 9 o'clock extramural/perirectal extension with at least 3 mm distance from the nearest mesorectal fascia. Questionable right perirectal extramural vascular involvement. Tumor is noted below the level of the peritoneal reflections. Findings are consistent with the known rectal neoplasm. 2. No enlarged mesorectal or pelvic suspicious lymph nodes (Few tiny mesorectal lymph nodes noted). No pelvic organ involvement. MRI based staging T3bN0.      Colonoscopy 7/26/2023 ([facility]): Palpable rectal mass found on digital rectal exam. - Likely malignant tumor in the distal rectum/anus. Biopsied. Tattooed. - One 6 mm polyp in the rectum, removed with a cold snare. Resected and retrieved. - Two 4 to 5 mm polyps in the proximal transverse colon, removed with a cold snare. Resected and retrieved. - One 5 mm polyp at the ileocecal valve, removed with a cold snare. Resected and retrieved.    Past Medical History  Past Medical History:   Diagnosis Date    Abdominal pain 07/24/2023    Abnormal electrocardiogram (ECG) (EKG) 12/16/2015    Abnormal ECG    Anxiety disorder, unspecified 08/31/2015    Anxiety    Complicated UTI (urinary tract infection) 07/24/2023    Encounter for preprocedural cardiovascular examination 10/25/2015    Pre-operative cardiovascular examination    Flank pain 07/24/2023     Hypomagnesemia 10/14/2015    Hypomagnesemia    Personal history of other diseases of the circulatory system 08/31/2015    History of chronic ischemic heart disease    Personal history of other diseases of the female genital tract 10/28/2015    History of ovarian cyst    Personal history of other diseases of urinary system 01/04/2016    History of hematuria    Personal history of other diseases of urinary system 10/16/2015    History of kidney disease    Personal history of other specified conditions 10/16/2015    History of pelvic mass    Personal history of other specified conditions 09/30/2015    History of fatigue    Personal history of pneumonia (recurrent) 02/26/2019    History of pneumonia    Right hip pain 07/24/2023    Strain of muscle, fascia and tendon of abdomen, initial encounter 09/17/2015    Abdominal muscle strain       Surgical History  Past Surgical History:   Procedure Laterality Date    APPENDECTOMY  08/31/2015    Appendectomy    FOOT SURGERY  08/31/2015    Foot Surgery        Social History  She reports that she has quit smoking. Her smoking use included cigarettes. She has never used smokeless tobacco. She reports that she does not drink alcohol and does not use drugs.    Family History  Family History   Problem Relation Name Age of Onset    No Known Problems Mother      No Known Problems Father          Allergies  Ace inhibitors and Lisinopril    Home Medications  Prior to Admission medications    Medication Sig Start Date End Date Taking? Authorizing Provider   acetaminophen (Tylenol) 325 mg tablet 2 TAB(S) ORALLY EVERY 4 HOURS, AS NEEDED, TEMP GREATER THAN OR EQUAL TO 38.0 C 7/5/23 7/4/24 Yes Tracie Alejo MD   albuterol (ProAir HFA) 90 mcg/actuation inhaler Inhale. 3/18/22  Yes Historical Provider, MD   ibuprofen 600 mg tablet Take by mouth. 8/7/19  Yes Historical Provider, MD   levothyroxine (Synthroid, Levoxyl) 50 mcg tablet Take 1 tablet (50 mcg) by mouth once daily.   Yes  Historical Provider, MD   potassium chloride CR (K-Tab) 20 mEq ER tablet Take 1 tablet (20 mEq) by mouth once daily. Do not crush, chew, or split. 6/6/23 6/5/24 Yes Ewelina Love MD   Symbicort 160-4.5 mcg/actuation inhaler Inhale 2 puffs 2 times a day. 6/1/23  Yes Chris Pearce PA-C   amLODIPine (Norvasc) 10 mg tablet Take 1 tablet (10 mg) by mouth once daily. 6/1/23 11/28/23  Chris Pearce PA-C   atorvastatin (Lipitor) 80 mg tablet Take 1 tablet (80 mg) by mouth once daily. 6/1/23 11/28/23  Chris Pearce PA-C   cefuroxime (Ceftin) 250 mg tablet TAKE 2 TABLETS BY MOUTH ONCE DAILY  Patient not taking: Reported on 10/5/2023 7/5/23 7/4/24  Tracie Alejo MD   citalopram (CeleXA) 10 mg tablet Take 1 tablet (10 mg) by mouth once daily. 6/1/23 11/28/23  Chris Pearce PA-C   ezetimibe (Zetia) 10 mg tablet Take 1 tablet (10 mg) by mouth once daily. 6/2/23 11/29/23  Ewelina Love MD   metoprolol succinate XL (Toprol-XL) 25 mg 24 hr tablet Take 1 tablet (25 mg) by mouth once daily. Do not crush or chew. 6/1/23 11/28/23  Chris Pearce PA-C   tamsulosin (Flomax) 0.4 mg 24 hr capsule TAKE 1 CAPSULE BY MOUTH ONCE DAILY  Patient not taking: Reported on 10/5/2023 7/5/23 7/4/24  Tracie Alejo MD       Review of Systems     Physical Exam    Abdomen soft, NT/ND     Last Recorded Vitals  There were no vitals taken for this visit.        ASSESSMENT & PLAN    We discussed barriers to care, reasons for not answering phone messages, etc    I want to repeat her CT scan given a change in bowel habits. We rescheduled her for the operating room on 2/5. Due to transportation concerns, she can only come on a Monday.     Labs today.     Discussed my concerns about delaying her care. We verified that we have the correct phone number and that she knows how to check her voice mail, and has our office number.     Also made a urology referral at her request.     We discussed the diagnosis and pathophysiology of rectal  cancer, the staging tests, and different treatment options (Surgery, chemotherapy, radiation or combinations of those three modalities) depending on the cancer stage.  We discussed locally advanced versus early rectal cancers and the role of neoadjuvant therapy, including the possibility of a complete clinical response. The case will be discussed at [facility] once workup is complete.  Patient education materials were provided. All questions were answered. We will speak again after the staging tests are completed.    The plan was reviewed with the patient and all questions were answered.     Thank you for allowing me to participate in the care of this patient. Please do not hesitate to be in touch.     Best,  Sierra Nayak MD

## 2024-01-12 ENCOUNTER — TELEPHONE (OUTPATIENT)
Dept: SURGERY | Facility: CLINIC | Age: 59
End: 2024-01-12
Payer: COMMERCIAL

## 2024-01-12 ENCOUNTER — SOCIAL WORK (OUTPATIENT)
Dept: CASE MANAGEMENT | Facility: HOSPITAL | Age: 59
End: 2024-01-12
Payer: COMMERCIAL

## 2024-01-12 DIAGNOSIS — N39.0 URINARY TRACT INFECTION WITHOUT HEMATURIA, SITE UNSPECIFIED: ICD-10-CM

## 2024-01-12 LAB — HOLD SPECIMEN: NORMAL

## 2024-01-12 RX ORDER — SULFAMETHOXAZOLE AND TRIMETHOPRIM 800; 160 MG/1; MG/1
1 TABLET ORAL 2 TIMES DAILY
Qty: 20 TABLET | Refills: 0 | Status: SHIPPED | OUTPATIENT
Start: 2024-01-12 | End: 2024-01-22

## 2024-01-12 NOTE — TELEPHONE ENCOUNTER
I again attempted to reach Hortensia but she did not answer. I left her a message asking her to give us a call back regarding her recent labs and I provided her with my direct number.

## 2024-01-12 NOTE — TELEPHONE ENCOUNTER
I attempted to reach Hortensia regarding her labs yesterday but she did not answer. I advised her in the message that her urinalysis did show that she has a UTI and that Dr. Nayak would like to treat her with antibiotics and I asked that she call back and provided her with my direct line.

## 2024-01-12 NOTE — PROGRESS NOTES
SW was consulted by ANAIS Cheatham RN with Dr. Nayak to assist patient with transportation. Patient is a 58 year old female with dx rectal mass. Patient lives in [place], [place] and identified her spouse is not supportive. Patient does not drive. After Chart review patient has [employer] insurance and will explore with patient if she has a transportation benefit. SW called patient and left message today to offer assistance.

## 2024-01-14 LAB
BACTERIA UR CULT: ABNORMAL
BACTERIA UR CULT: ABNORMAL

## 2024-01-15 ENCOUNTER — HOSPITAL ENCOUNTER (OUTPATIENT)
Dept: RADIOLOGY | Facility: HOSPITAL | Age: 59
Discharge: HOME | End: 2024-01-15
Payer: COMMERCIAL

## 2024-01-15 DIAGNOSIS — K62.89 RECTAL MASS: ICD-10-CM

## 2024-01-15 PROCEDURE — 2550000001 HC RX 255 CONTRASTS: Mod: SE | Performed by: SURGERY

## 2024-01-15 PROCEDURE — 74177 CT ABD & PELVIS W/CONTRAST: CPT | Performed by: RADIOLOGY

## 2024-01-15 PROCEDURE — 71260 CT THORAX DX C+: CPT | Performed by: RADIOLOGY

## 2024-01-15 PROCEDURE — 71260 CT THORAX DX C+: CPT

## 2024-01-15 RX ADMIN — IOHEXOL 75 ML: 350 INJECTION, SOLUTION INTRAVENOUS at 14:49

## 2024-01-16 ENCOUNTER — DOCUMENTATION (OUTPATIENT)
Dept: SURGERY | Facility: CLINIC | Age: 59
End: 2024-01-16
Payer: COMMERCIAL

## 2024-01-16 ENCOUNTER — TELEPHONE (OUTPATIENT)
Dept: SURGERY | Facility: CLINIC | Age: 59
End: 2024-01-16
Payer: COMMERCIAL

## 2024-01-16 NOTE — TELEPHONE ENCOUNTER
Left message to call back for CT scan results, to discuss lab findings, gilmar. Provided office number.     We attempted to reach her 3x last week and again today.

## 2024-01-31 ENCOUNTER — TELEPHONE (OUTPATIENT)
Dept: PRIMARY CARE | Facility: CLINIC | Age: 59
End: 2024-01-31
Payer: COMMERCIAL

## 2024-01-31 DIAGNOSIS — N39.0 URINARY TRACT INFECTION WITHOUT HEMATURIA, SITE UNSPECIFIED: ICD-10-CM

## 2024-01-31 RX ORDER — CIPROFLOXACIN 250 MG/1
250 TABLET, FILM COATED ORAL 2 TIMES DAILY
Qty: 20 TABLET | Refills: 0 | Status: SHIPPED | OUTPATIENT
Start: 2024-01-31 | End: 2024-02-15 | Stop reason: ALTCHOICE

## 2024-01-31 NOTE — TELEPHONE ENCOUNTER
Patient's heart rate this AM consistently 140s-150s, /79.  Dr. Smith made aware.  New rate for amiodarone drip ordered.   Pt called has uti and is going for a colon rectal surgery on Monday, was advised to see pcp for abx    Discussed with Dr. Peter start cipro 500mg bid x 10 days   Follow up in feb

## 2024-02-01 NOTE — PROGRESS NOTES
Subjective   Patient ID:   Hortensia Rodriguez is a 57 y.o. female who presents for Hospital Follow-up.  [facility] [place]  Hospital follow up:  Admitted from 7/1-7/5.  Discharge note reviewed.  Was diagnosed with pyelonephritis and kidney stone.  Underwent a cystoscopy and ureteral stent placement.  Was given Ceftin and will follow up with urology.  Not doing well still.  Having urinary frequency and is not able to make it to the bathroom.    Acute:  Symptoms x 1 week.  Nausea.  Decreased appetite.  Can hardly eat.  Weight loss.  Denies abdominal pain.    Hypothyroidism:  Taking Synthroid.  Last checked June 2023.    Hypokalemia:  Seen in June 2023.  Was started on a potassium supplement.    COPD:  Taking albuterol and Symbicort.  Breathing is stable.    EMMA:  Does not use a CPAP.  States sleep is stable.    HTN:  Taking Metoprolol.  BP today is 141/92.  Denies dizziness, headaches.    HLD:  Taking Atorvastatin now.  Last checked June 2023 and was high.    Depression/anxiety:  Taking Celexa.  Denies SI/HI.    Vit D deficiency:  Last checked Sep 2022.    Health maintenance:  Smoking: Former smoker.  Mammogram (40-75): DUE - ordered last visit.  Labs: June 2023.  Colonoscopy (50-75): DUE - interested in cologuard - ordered last visit.  Influenza:    Review of Systems  12 point review of systems negative unless stated above in HPI    Vitals:    07/24/23 1151   BP: (!) 141/92   Pulse: (!) 111   Resp: 18   SpO2: 96%     Physical Exam  General: Alert and oriented, well nourished, no acute distress.  Lungs: Clear to auscultation, non-labored respiration.  Heart: Normal rate, regular rhythm, no murmur, gallop or edema.  Neurologic: Awake, alert, and oriented X3, CN II-XII intact.  Psychiatric: Cooperative, appropriate mood and affect.    Assessment/Plan   After lengthy discussion with patient, we agreed it was best for her to go to the ED.  She most likely needs an abdominal CT and lab workup.  Pt prefers not to wait as an  outpatient.  I did send in Zofran for her for now.  We will follow up after the ED visit.  If symptoms persist or worsen despite current plan of care, please contact your healthcare provider for further evaluation.  Patient instructed to contact the office if there are any questions regarding their care or treatment.   [facility] [phone].  [facility] [phone].    Fu soon  Diagnoses and all orders for this visit:  Pyelonephritis  Kidney stone  Benign essential hypertension  Chronic obstructive pulmonary disease, unspecified COPD type (CMS/HCC)  Depression with anxiety  Hypercholesteremia  Hypothyroidism, unspecified type  Hypokalemia  EMMA (obstructive sleep apnea)  Decreased appetite  Nausea  -     ondansetron (Zofran) 4 mg tablet; Take 1 tablet (4 mg) by mouth every 8 hours if needed for nausea or vomiting for up to 7 days.     Bed in lowest position, wheels locked, appropriate side rails in place/Call bell, personal items and telephone in reach/Instruct patient to call for assistance before getting out of bed or chair/Non-slip footwear when patient is out of bed/[place] to call system/Physically safe environment - no spills, clutter or unnecessary equipment/Purposeful Proactive Rounding/Room/bathroom lighting operational, light cord in reach

## 2024-02-05 ENCOUNTER — ANESTHESIA EVENT (OUTPATIENT)
Dept: OPERATING ROOM | Facility: HOSPITAL | Age: 59
End: 2024-02-05
Payer: COMMERCIAL

## 2024-02-05 ENCOUNTER — ANESTHESIA (OUTPATIENT)
Dept: OPERATING ROOM | Facility: HOSPITAL | Age: 59
End: 2024-02-05
Payer: COMMERCIAL

## 2024-02-05 ENCOUNTER — HOSPITAL ENCOUNTER (OUTPATIENT)
Facility: HOSPITAL | Age: 59
Setting detail: SURGERY ADMIT
Discharge: HOME | End: 2024-02-05
Attending: SURGERY | Admitting: SURGERY
Payer: COMMERCIAL

## 2024-02-05 VITALS
HEIGHT: 66 IN | WEIGHT: 166.89 LBS | TEMPERATURE: 98.1 F | HEART RATE: 105 BPM | RESPIRATION RATE: 16 BRPM | BODY MASS INDEX: 26.82 KG/M2 | SYSTOLIC BLOOD PRESSURE: 138 MMHG | OXYGEN SATURATION: 95 % | DIASTOLIC BLOOD PRESSURE: 71 MMHG

## 2024-02-05 DIAGNOSIS — K62.89 RECTAL MASS: ICD-10-CM

## 2024-02-05 PROCEDURE — 3600000007 HC OR TIME - EACH INCREMENTAL 1 MINUTE - PROCEDURE LEVEL TWO: Performed by: SURGERY

## 2024-02-05 PROCEDURE — 7100000001 HC RECOVERY ROOM TIME - INITIAL BASE CHARGE: Performed by: SURGERY

## 2024-02-05 PROCEDURE — 2500000004 HC RX 250 GENERAL PHARMACY W/ HCPCS (ALT 636 FOR OP/ED): Mod: SE | Performed by: STUDENT IN AN ORGANIZED HEALTH CARE EDUCATION/TRAINING PROGRAM

## 2024-02-05 PROCEDURE — 88305 TISSUE EXAM BY PATHOLOGIST: CPT | Mod: TC,SUR | Performed by: SURGERY

## 2024-02-05 PROCEDURE — 88305 TISSUE EXAM BY PATHOLOGIST: CPT | Performed by: PATHOLOGY

## 2024-02-05 PROCEDURE — 7100000002 HC RECOVERY ROOM TIME - EACH INCREMENTAL 1 MINUTE: Performed by: SURGERY

## 2024-02-05 PROCEDURE — 3700000001 HC GENERAL ANESTHESIA TIME - INITIAL BASE CHARGE: Performed by: SURGERY

## 2024-02-05 PROCEDURE — 45990 SURG DX EXAM ANORECTAL: CPT | Performed by: SURGERY

## 2024-02-05 PROCEDURE — 2500000001 HC RX 250 WO HCPCS SELF ADMINISTERED DRUGS (ALT 637 FOR MEDICARE OP): Mod: SE | Performed by: STUDENT IN AN ORGANIZED HEALTH CARE EDUCATION/TRAINING PROGRAM

## 2024-02-05 PROCEDURE — 7100000009 HC PHASE TWO TIME - INITIAL BASE CHARGE: Performed by: SURGERY

## 2024-02-05 PROCEDURE — 7100000010 HC PHASE TWO TIME - EACH INCREMENTAL 1 MINUTE: Performed by: SURGERY

## 2024-02-05 PROCEDURE — 3600000002 HC OR TIME - INITIAL BASE CHARGE - PROCEDURE LEVEL TWO: Performed by: SURGERY

## 2024-02-05 PROCEDURE — A45330 PR SIGMOIDOSCOPY,DIAGNOSTIC: Performed by: ANESTHESIOLOGY

## 2024-02-05 PROCEDURE — 3700000002 HC GENERAL ANESTHESIA TIME - EACH INCREMENTAL 1 MINUTE: Performed by: SURGERY

## 2024-02-05 PROCEDURE — 2720000007 HC OR 272 NO HCPCS: Performed by: SURGERY

## 2024-02-05 RX ORDER — PROPOFOL 10 MG/ML
INJECTION, EMULSION INTRAVENOUS AS NEEDED
Status: DISCONTINUED | OUTPATIENT
Start: 2024-02-05 | End: 2024-02-05

## 2024-02-05 RX ORDER — OXYCODONE HYDROCHLORIDE 5 MG/1
5 TABLET ORAL EVERY 6 HOURS PRN
Qty: 15 TABLET | Refills: 0 | Status: SHIPPED | OUTPATIENT
Start: 2024-02-05

## 2024-02-05 RX ORDER — HYDROMORPHONE HYDROCHLORIDE 1 MG/ML
0.5 INJECTION, SOLUTION INTRAMUSCULAR; INTRAVENOUS; SUBCUTANEOUS EVERY 5 MIN PRN
Status: DISCONTINUED | OUTPATIENT
Start: 2024-02-05 | End: 2024-02-05 | Stop reason: HOSPADM

## 2024-02-05 RX ORDER — MIDAZOLAM HYDROCHLORIDE 1 MG/ML
INJECTION INTRAMUSCULAR; INTRAVENOUS CONTINUOUS PRN
Status: DISCONTINUED | OUTPATIENT
Start: 2024-02-05 | End: 2024-02-05

## 2024-02-05 RX ORDER — OXYCODONE HYDROCHLORIDE 5 MG/1
5 TABLET ORAL EVERY 4 HOURS PRN
Status: DISCONTINUED | OUTPATIENT
Start: 2024-02-05 | End: 2024-02-05 | Stop reason: HOSPADM

## 2024-02-05 RX ORDER — SODIUM CHLORIDE, SODIUM LACTATE, POTASSIUM CHLORIDE, CALCIUM CHLORIDE 600; 310; 30; 20 MG/100ML; MG/100ML; MG/100ML; MG/100ML
100 INJECTION, SOLUTION INTRAVENOUS CONTINUOUS
Status: DISCONTINUED | OUTPATIENT
Start: 2024-02-05 | End: 2024-02-05 | Stop reason: HOSPADM

## 2024-02-05 RX ORDER — FENTANYL CITRATE 50 UG/ML
INJECTION, SOLUTION INTRAMUSCULAR; INTRAVENOUS CONTINUOUS PRN
Status: DISCONTINUED | OUTPATIENT
Start: 2024-02-05 | End: 2024-02-05

## 2024-02-05 RX ORDER — ONDANSETRON HYDROCHLORIDE 2 MG/ML
4 INJECTION, SOLUTION INTRAVENOUS ONCE AS NEEDED
Status: COMPLETED | OUTPATIENT
Start: 2024-02-05 | End: 2024-02-05

## 2024-02-05 RX ORDER — FENTANYL CITRATE 50 UG/ML
INJECTION, SOLUTION INTRAMUSCULAR; INTRAVENOUS AS NEEDED
Status: DISCONTINUED | OUTPATIENT
Start: 2024-02-05 | End: 2024-02-05

## 2024-02-05 RX ORDER — HYDROMORPHONE HYDROCHLORIDE 1 MG/ML
0.2 INJECTION, SOLUTION INTRAMUSCULAR; INTRAVENOUS; SUBCUTANEOUS EVERY 5 MIN PRN
Status: DISCONTINUED | OUTPATIENT
Start: 2024-02-05 | End: 2024-02-05 | Stop reason: HOSPADM

## 2024-02-05 RX ORDER — SODIUM CHLORIDE, SODIUM LACTATE, POTASSIUM CHLORIDE, CALCIUM CHLORIDE 600; 310; 30; 20 MG/100ML; MG/100ML; MG/100ML; MG/100ML
INJECTION, SOLUTION INTRAVENOUS CONTINUOUS PRN
Status: DISCONTINUED | OUTPATIENT
Start: 2024-02-05 | End: 2024-02-05

## 2024-02-05 RX ORDER — MIDAZOLAM HYDROCHLORIDE 1 MG/ML
INJECTION, SOLUTION INTRAMUSCULAR; INTRAVENOUS AS NEEDED
Status: DISCONTINUED | OUTPATIENT
Start: 2024-02-05 | End: 2024-02-05

## 2024-02-05 RX ORDER — LIDOCAINE HYDROCHLORIDE 10 MG/ML
0.1 INJECTION INFILTRATION; PERINEURAL ONCE
Status: DISCONTINUED | OUTPATIENT
Start: 2024-02-05 | End: 2024-02-05 | Stop reason: HOSPADM

## 2024-02-05 RX ORDER — PROPOFOL 10 MG/ML
INJECTION, EMULSION INTRAVENOUS CONTINUOUS PRN
Status: DISCONTINUED | OUTPATIENT
Start: 2024-02-05 | End: 2024-02-05

## 2024-02-05 RX ADMIN — HYDROMORPHONE HYDROCHLORIDE 0.5 MG: 1 INJECTION, SOLUTION INTRAMUSCULAR; INTRAVENOUS; SUBCUTANEOUS at 15:32

## 2024-02-05 RX ADMIN — PROPOFOL 40 MG: 10 INJECTION, EMULSION INTRAVENOUS at 14:44

## 2024-02-05 RX ADMIN — SODIUM CHLORIDE, POTASSIUM CHLORIDE, SODIUM LACTATE AND CALCIUM CHLORIDE: 600; 310; 30; 20 INJECTION, SOLUTION INTRAVENOUS at 14:35

## 2024-02-05 RX ADMIN — OXYCODONE HYDROCHLORIDE 5 MG: 5 TABLET ORAL at 15:47

## 2024-02-05 RX ADMIN — MIDAZOLAM 1 MG: 1 INJECTION INTRAMUSCULAR; INTRAVENOUS at 14:34

## 2024-02-05 RX ADMIN — PROPOFOL 120 MCG/KG/MIN: 10 INJECTION, EMULSION INTRAVENOUS at 14:36

## 2024-02-05 RX ADMIN — HYDROMORPHONE HYDROCHLORIDE 0.5 MG: 1 INJECTION, SOLUTION INTRAMUSCULAR; INTRAVENOUS; SUBCUTANEOUS at 15:27

## 2024-02-05 RX ADMIN — FENTANYL CITRATE 100 MCG: 50 INJECTION, SOLUTION INTRAMUSCULAR; INTRAVENOUS at 14:45

## 2024-02-05 RX ADMIN — ONDANSETRON 4 MG: 2 INJECTION INTRAMUSCULAR; INTRAVENOUS at 17:19

## 2024-02-05 RX ADMIN — MIDAZOLAM 1 MG: 1 INJECTION INTRAMUSCULAR; INTRAVENOUS at 14:43

## 2024-02-05 RX ADMIN — PROPOFOL 40 MG: 10 INJECTION, EMULSION INTRAVENOUS at 14:36

## 2024-02-05 ASSESSMENT — PAIN - FUNCTIONAL ASSESSMENT
PAIN_FUNCTIONAL_ASSESSMENT: 0-10

## 2024-02-05 ASSESSMENT — PAIN SCALES - GENERAL
PAINLEVEL_OUTOF10: 10 - WORST POSSIBLE PAIN
PAINLEVEL_OUTOF10: 4
PAINLEVEL_OUTOF10: 3
PAINLEVEL_OUTOF10: 3
PAINLEVEL_OUTOF10: 10 - WORST POSSIBLE PAIN
PAINLEVEL_OUTOF10: 2
PAINLEVEL_OUTOF10: 8
PAINLEVEL_OUTOF10: 7
PAINLEVEL_OUTOF10: 3
PAINLEVEL_OUTOF10: 0 - NO PAIN
PAINLEVEL_OUTOF10: 4
PAINLEVEL_OUTOF10: 6

## 2024-02-05 ASSESSMENT — COLUMBIA-SUICIDE SEVERITY RATING SCALE - C-SSRS
2. HAVE YOU ACTUALLY HAD ANY THOUGHTS OF KILLING YOURSELF?: NO
6. HAVE YOU EVER DONE ANYTHING, STARTED TO DO ANYTHING, OR PREPARED TO DO ANYTHING TO END YOUR LIFE?: NO
1. IN THE PAST MONTH, HAVE YOU WISHED YOU WERE DEAD OR WISHED YOU COULD GO TO SLEEP AND NOT WAKE UP?: NO

## 2024-02-05 NOTE — DISCHARGE SUMMARY
Discharge Diagnosis  Rectal mass    Issues Requiring Follow-Up  Acute changes in clinical status.     Test Results Pending At Discharge  Pending Labs       Order Current Status    Surgical Pathology Exam In process            Hospital Course  Hortensia Rodriguez is a 58 y.o. female with newly diagnosed rectal mass. Scheduled today for sigmpidscopy to follow my joint case with Dr Miles. Patient to be discharged today 2/5/24.    Pertinent Physical Exam At Time of Discharge  Physical Exam  Abdominal:      General: Abdomen is flat.      Tenderness: There is no abdominal tenderness.   Genitourinary:     Rectum: Mass present.   Home Medications     Medication List      START taking these medications     oxyCODONE 5 mg immediate release tablet; Commonly known as: Roxicodone;   Take 1 tablet (5 mg) by mouth every 6 hours if needed for severe pain (7 -   10).     CONTINUE taking these medications     acetaminophen 325 mg tablet; Commonly known as: Tylenol; 2 TAB(S) ORALLY   EVERY 4 HOURS, AS NEEDED, TEMP GREATER THAN OR EQUAL TO 38.0 C   amLODIPine 10 mg tablet; Commonly known as: Norvasc; Take 1 tablet (10   mg) by mouth once daily.   atorvastatin 80 mg tablet; Commonly known as: Lipitor; Take 1 tablet (80   mg) by mouth once daily.   cefuroxime 250 mg tablet; Commonly known as: Ceftin; TAKE 2 TABLETS BY   MOUTH ONCE DAILY   ciprofloxacin 250 mg tablet; Commonly known as: Cipro; Take 1 tablet   (250 mg) by mouth 2 times a day for 10 days.   citalopram 10 mg tablet; Commonly known as: CeleXA; Take 1 tablet (10   mg) by mouth once daily.   ibuprofen 600 mg tablet   levothyroxine 50 mcg tablet; Commonly known as: Synthroid, Levoxyl   ProAir HFA 90 mcg/actuation inhaler; Generic drug: albuterol   Symbicort 160-4.5 mcg/actuation inhaler; Generic drug:   budesonide-formoteroL; Inhale 2 puffs 2 times a day.   tamsulosin 0.4 mg 24 hr capsule; Commonly known as: Flomax; TAKE 1   CAPSULE BY MOUTH ONCE DAILY     STOP taking these  medications     ezetimibe 10 mg tablet; Commonly known as: Zetia   metoprolol succinate XL 25 mg 24 hr tablet; Commonly known as: Toprol-XL   potassium chloride CR 20 mEq ER tablet; Commonly known as: Klor-Con M20       Outpatient Follow-Up  Future Appointments   Date Time Provider Department Center   2/15/2024 10:45 AM Ewelina Love MD [id number] [facility]       Miguel Sutherland MD

## 2024-02-05 NOTE — ANESTHESIA PREPROCEDURE EVALUATION
Patient: Hortensia Rodriguez    Procedure Information       Date/Time: 02/05/24 1510    Procedure: Sigmoidoscopy - To follow my joint case with Dr Miles - he and I discussed and no other cases that  day    Location: [facility] OR  / [facility] OR    Surgeons: Sierra Flores MD          ALLERGIES:  Allergies   Allergen Reactions    Ace Inhibitors Other    Lisinopril Other        MEDICAL HISTORY:  Past Medical History:   Diagnosis Date    Abdominal pain 07/24/2023    Abnormal electrocardiogram (ECG) (EKG) 12/16/2015    Abnormal ECG    Anxiety disorder, unspecified 08/31/2015    Anxiety    Complicated UTI (urinary tract infection) 07/24/2023    Encounter for preprocedural cardiovascular examination 10/25/2015    Pre-operative cardiovascular examination    Flank pain 07/24/2023    Hypomagnesemia 10/14/2015    Hypomagnesemia    Personal history of other diseases of the circulatory system 08/31/2015    History of chronic ischemic heart disease    Personal history of other diseases of the female genital tract 10/28/2015    History of ovarian cyst    Personal history of other diseases of urinary system 01/04/2016    History of hematuria    Personal history of other diseases of urinary system 10/16/2015    History of kidney disease    Personal history of other specified conditions 10/16/2015    History of pelvic mass    Personal history of other specified conditions 09/30/2015    History of fatigue    Personal history of pneumonia (recurrent) 02/26/2019    History of pneumonia    Right hip pain 07/24/2023    Strain of muscle, fascia and tendon of abdomen, initial encounter 09/17/2015    Abdominal muscle strain        Relevant Problems   Cardiovascular   (+) Benign essential hypertension   (+) Hypercholesteremia      Endocrine   (+) Hypothyroidism      /Renal   (+) Left nephrolithiasis   (+) Pyelonephritis      Neuro/Psych   (+) Depression with anxiety      Pulmonary   (+) COPD (chronic obstructive  "pulmonary disease) (CMS/HCC)   (+) Exertional shortness of breath   (+) EMMA (obstructive sleep apnea)      Musculoskeletal   (+) Osteoarthritis of hip      Infectious Disease   (+) Pyelonephritis      Other   (+) Arthritis        SURGICAL HISTORY:  Past Surgical History:   Procedure Laterality Date    APPENDECTOMY  08/31/2015    Appendectomy    FOOT SURGERY  08/31/2015    Foot Surgery        VITALS:      2/5/2024     2:15 PM 1/11/2024    11:02 AM 10/5/2023     9:49 AM   Vitals   Systolic 165 166 127   Diastolic 93 121 87   Heart Rate 123 130 109   Temp 36.1 °C (97 °F)     Resp 18 16    Height (in) 1.68 m (5' 6.14\") 1.676 m (5' 6\") 1.676 m (5' 6\")   Weight (lb) 166.89 170.4 178   BMI 26.82 kg/m2 27.5 kg/m2 28.73 kg/m2   BSA (m2) 1.88 m2 1.9 m2 1.94 m2   Visit Report  Report Report       LABS:   BMP   Lab Results   Component Value Date    GLUCOSE 109 (H) 01/11/2024    CALCIUM 9.9 01/11/2024     01/11/2024    K 3.0 (L) 01/11/2024    CO2 34 (H) 01/11/2024    CL 94 (L) 01/11/2024    BUN 12 01/11/2024    CREATININE 0.85 01/11/2024   , LFT   Lab Results   Component Value Date    ALT 8 07/27/2023    AST 10 07/27/2023    ALKPHOS 71 07/27/2023    BILITOT 0.4 07/27/2023   , CBC  Lab Results   Component Value Date    WBC 13.2 (H) 01/11/2024    HGB 14.0 01/11/2024    HCT 43.0 01/11/2024    MCV 87 01/11/2024     (H) 01/11/2024          , Coags   Lab Results   Component Value Date/Time    PROTIME 13.9 (H) 03/18/2022 1340    INR 1.2 (H) 03/18/2022 1340      , A1C No results found for: \"HGBA1C\"    IMAGES:  EKG        No results found for this or any previous visit (from the past 4464 hour(s)).   , ECHO       No results found for this or any previous visit from the past 730 days.    , CARDIAC CATH      [facility], CXR     No results found for this or any previous visit from the past 730 days.    , CT Head/Neck    [facility], CT Chest        CT chest abdomen pelvis w IV contrast 01/15/2024    Narrative  Interpreted By:  " Unique Colunga,  STUDY:  CT CHEST ABDOMEN PELVIS W IV CONTRAST;  1/15/2024 2:48 pm    INDICATION:  Signs/Symptoms:Rectal mass.    COMPARISON:  Chest dated 07/26/2023; CT abdomen pelvis dated 07/24/2023    ACCESSION NUMBER(S):  [id number]    ORDERING CLINICIAN:  EMMANUEL FOSTER    TECHNIQUE:  CT of the chest, abdomen, and pelvis was performed following the  intravenous administration 75 mL Omnipaque 350. Sagittal and coronal  reconstructions were generated.    FINDINGS:  CHEST:    LUNG/PLEURA/LARGE AIRWAYS:  There is a 15 mm left upper lobe nodule. This may be related to the  previous 2.5 mm nodule.    There is a 5 mm nodule at the right hemidiaphragm on image 217/305.  There was not obvious previously. Other 2 mm nodules are present  bilaterally.    There is no consolidation or pleural fluid.    VESSELS:  There are atherosclerotic changes of the aorta the ascending thoracic  aorta is slightly enlarged measuring 42 mm. The main pulmonary artery  and cava are unremarkable.    HEART:  The heart is normal in size. There are dense coronary artery  calcifications.    MEDIASTINUM AND DARRIAN:  There are small mediastinal lymph nodes.    CHEST WALL AND LOWER NECK:  The thyroid as visualized is not enlarged.    There is no gross bony abnormality. There is an 8th right rib  fracture.    ABDOMEN:    LIVER:  Unremarkable    BILE DUCTS:  Unremarkable    GALLBLADDER:  There are no obvious gallstones.    PANCREAS:  Unremarkable    SPLEEN:  Unremarkable    ADRENAL GLANDS:  The adrenals are nodular. There are similar to the prior exam    KIDNEYS AND URETERS:  Right kidney is normal in size and appearance. There is a 11 mm  calculus in the left kidney.    There is a double-J stent the left. There is hydronephrosis.    PELVIS:    BLADDER:  The bladder is nearly empty.    REPRODUCTIVE ORGANS:  The uterus and left ovary are apparent.    BOWEL:  There is no significant bowel distention.    There is a large rectal mass measuring 72 x  51 centimeters.    VESSELS:  There is a calcifications. The cava is unremarkable.    PERITONEUM/RETROPERITONEUM/LYMPH NODES:  There is no free air or free fluid.    There are prominent lymph nodes in the retroperitoneum on the left at  the level of the renal pelvis.    BONE AND SOFT TISSUE:  There are degenerative changes of the spine. There are degenerative  changes hips.    COMPARISON OF FINDINGS:  There appears to been an increase in the size of the rectal mass.    Impression  CHEST:  15 mm left upper lobe nodule that may have previously measured 2.5 mm.    5 mm nodule at the right lung base.    Coronary artery calcifications.    ABDOMEN-PELVIS:  Large rectal mass appears slightly larger than on the prior exam.    11 mm left renal calculus. It appears slightly larger on the prior  exam. It is changed in position.    Left retroperitoneal lymphadenopathy. Left hydronephrosis and  hydroureter with a double-J.    Signed by: Unique Colunga 1/15/2024 3:28 PM  Dictation workstation:   [id number]   , CT Abdomin       CT abdomen pelvis w IV contrast 07/01/2023    Narrative  Interpreted By:  ANABELLA HERBERT MD  MRN: [id number]  Patient Name: POLY PARSON    STUDY:  CT ABDOMEN AND PELVIS W IV CONTRAST;  7/1/2023 11:11 am    INDICATION:  Abdominal pain, leukocytosis, nausea and vomiting, left flank pain,  rule out diverticulitis and/or ureteral lithiasis and/or  pyelonephritis. .    COMPARISON:  CT abdomen pelvis dated 10/05/2015. CT PA dated 03/18/2022.    ACCESSION NUMBER(S):  [id number]    ORDERING CLINICIAN:  CLYDE STRICKLAND    TECHNIQUE:  CT of the abdomen and pelvis was performed following administration  of intravenous contrast medium. Standard contiguous axial images were  obtained at 3 mm slice thickness through the abdomen and pelvis.  Coronal and sagittal reconstructions at 3 mm slice thickness were  performed.    A total of 75 mL Omnipaque 350 intravenous contrast was administered.    FINDINGS:  LOWER  CHEST:  Lung bases are clear. Unchanged 2-3 mm subpleural nodules within the  right lower lobe (series 204, image 17 and 21). Additional unchanged  3 mm nodule within the right lower lobe (image 7).    ABDOMEN:    LIVER:  The liver is normal in size without evidence of focal liver lesions.    BILE DUCTS:  The intrahepatic and extrahepatic ducts are not dilated.    GALLBLADDER:  Questionable tiny dependent densities within the gallbladder lumen,  possibly representing tiny calculi. The gallbladder is otherwise  unremarkable.    PANCREAS:  The pancreas appears unremarkable without evidence of ductal  dilatation or masses.    SPLEEN:  The spleen is normal in size without focal lesions.    ADRENAL GLANDS:  Unchanged nodularity of the adrenal glands bilaterally with possible  small adrenal adenomas.    KIDNEYS AND URETERS:  There is mild asymmetric enlargement of the left kidney with delayed  nephrogram and 11 mm diameter obstructing left renal calculus at the  ureteropelvic junction. There is moderate to marked urothelial  thickening with moderate left pelvicaliectasis. Surrounding left  perinephric and periureteral fat stranding is evident. Exophytic 4.6  cm diameter cyst associated with the upper pole of the left kidney  again noted. The right kidney is normal in size and enhancement with  elements of cortical renal scarring. Suspected nonobstructing 2-3 mm  calculus within the lower pole the right kidney. No right-sided  hydronephrosis or hydroureter.    PELVIS:    BLADDER:  Within normal limits.    REPRODUCTIVE ORGANS:  Uterus and ovaries are within normal limits.    BOWEL:  There is concern for potential enhancing, exophytic mass lesion  associated with the rectum along the anterior aspect and extending  along the lateral side walls measuring up to 2.8 cm in thickness  (series 201, image 114). No evidence of bowel obstruction. Sigmoid  predominant colonic diverticulosis. Stomach is unremarkable. Prior  appendectomy  suspected with surgical clips within the pericecal  region.    VESSELS:  Normal caliber abdominal aorta. Moderate to severe atherosclerotic  calcification of the abdominal aorta and common iliac arteries.    PERITONEUM/RETROPERITONEUM/LYMPH NODES:  No ascites or free air, no fluid collection.  There are a few  reactive appearing left retroperitoneal lymph nodes suggested.    BONES AND ABDOMINAL WALL:  Diffusely decreased bone mineralization. No acute osseous  abnormality. Severe osteoarthrosis of the right greater than left  hips. Degenerative changes of the lower lumbar spine. The abdominal  wall soft tissues appear normal.    Impression  1. Obstructing 11 mm diameter left renal calculus at the  ureteropelvic junction with moderate left pelvicaliectasis and  associated urothelial thickening. Delayed left nephrogram with mild  asymmetric enlargement of the left kidney and surrounding fat  stranding.  2. Concern for potential exophytic enhancing mass associated with the  rectum measuring up to 2.8 cm in thickness. Recommend correlation  with direct visualization to exclude underlying colonic neoplasm.  3. Moderate to severe atherosclerosis.  4. Additional incidental/chronic findings as above.    SOCIAL:  Social History     Tobacco Use   Smoking Status Former    Types: Cigarettes   Smokeless Tobacco Never      Social History     Substance and Sexual Activity   Alcohol Use Never      Social History     Substance and Sexual Activity   Drug Use Never        NPO STATUS:  NPO/Void Status  Carbohydrate Drink Given Prior to Surgery? : N  Date of Last Liquid: 02/04/24  Time of Last Liquid: 1900  Date of Last Solid: 02/04/24  Time of Last Solid: 1900  Last Intake Type: Clear fluids  Time of Last Void: 1417        Clinical Areas Reviewed:   Tobacco  Allergies  Meds  Problems  Med Hx  Surg Hx  OB Status    Fam Hx  Soc Hx      PHYSICAL EXAM    Anesthesia Plan    History of general anesthesia?: yes  History of  complications of general anesthesia?: no    ASA 3     MAC       Plan discussed with resident.

## 2024-02-06 ASSESSMENT — PAIN SCALES - GENERAL: PAIN_LEVEL: 2

## 2024-02-06 NOTE — OP NOTE
Sigmoidoscopy Operative Note     Date: 2024  OR Location: [facility] OR    Name: Hortensia Rodriguez, : 1965, Age: 58 y.o., MRN: [id number], Sex: female    Diagnosis  Pre-op Diagnosis     * Rectal mass [K62.89] Post-op Diagnosis     * Rectal mass [K62.89]     Procedures  Sigmoidoscopy  34319 - FL SIGMOIDOSCOPY FLX DX W/COLLJ SPEC BR/WA IF PFRMD      Surgeons      * Sierra Flores - Primary    Resident/Fellow/Other Assistant:  Surgeon(s) and Role:    Procedure Summary  Anesthesia: General  ASA: III  Anesthesia Staff: Anesthesiologist: Syed Toledo MD  Anesthesia Resident: Maty Pichardo MD  Estimated Blood Loss: 20mL  Intra-op Medications:   Administrations occurring from 1510 to 1615 on 24:   Medication Name Total Dose   HYDROmorphone (Dilaudid) injection 0.5 mg 1 mg   oxyCODONE (Roxicodone) immediate release tablet 5 mg 5 mg              Anesthesia Record               Intraprocedure I/O Totals          Intake    Propofol Drip 0.00 mL    The total shown is the total volume documented since Anesthesia Start was filed.    LR infusion 200.00 mL    Total Intake 200 mL          Specimen:   ID Type Source Tests Collected by Time   1 : RECTAL LESION Tissue RECTUM BIOPSY SURGICAL PATHOLOGY EXAM Sierra Flores MD 2024 1456        Staff:   Circulator: Nick Hu RN  Relief Circulator: Jazmine Simpson RN  Relief Scrub: Sherman Tsai RN  Scrub Person: Nubia Mitchell RN         Drains and/or Catheters: * None in log *    Tourniquet Times:         Implants:     Findings: near circumferential rectal lesion extending from dentate line to approximately 8cm    Indications: Hortensia Rodriguez is an 58 y.o. female who is having surgery for Rectal mass [K62.89].     The patient was seen in the preoperative area. The risks, benefits, complications, treatment options, non-operative alternatives, expected recovery and outcomes were discussed with the patient. The  possibilities of reaction to medication, pulmonary aspiration, injury to surrounding structures, bleeding, recurrent infection, the need for additional procedures, failure to diagnose a condition, and creating a complication requiring transfusion or operation were discussed with the patient. The patient concurred with the proposed plan, giving informed consent.  The site of surgery was properly noted/marked if necessary per policy. The patient has been actively warmed in preoperative area. Preoperative antibiotics are not indicated. Venous thrombosis prophylaxis have been ordered including bilateral sequential compression devices    Procedure Details:   Procedure:   Informed consent was obtained including discussion of risks, benefits, and alternatives. The patient was brought to the operating room and placed supine. Sequential compression devices were placed. Huddle was completed in accordance with hospital procedures. Anesthesia was induced and LMA was placed. The patient was placed in lithotomy with all pressure points padded. The area was prepped and draped in the usual fashion. Time out was completed.     Perineal and digital rectal exam were performed. There was a mass that was nonobstructing, protruding, soft. It occupied about 75% of the wall, excluding the posterior midline. There was some tethering. The lesion spanned from the dentate line to at least 8cm from the anal verge. There was extensive thin, clear mucin that was suctioned. The flexible sigmoidoscope was introduced and confirmed the same. Above the lesion, the rectosigmoid was normal. The lesion was adenomatous appearing.   Lighted [place] Mixon was used. Several large biopsies were taken using electrocautery. Hemostasis was assured. Hemostatic packing was placed.     Complications:  None; patient tolerated the procedure well.    Disposition: PACU - hemodynamically stable.  Condition: stable         Additional Details:     Attending Attestation: MEGHNA  qualified resident physician was not available.    Sierra Flores  Phone Number: [phone]

## 2024-02-06 NOTE — ANESTHESIA POSTPROCEDURE EVALUATION
Patient: Hortensia Rodriguez    Procedure Summary       Date: 02/05/24 Room / Location: [facility] OR 22 / [facility]    Anesthesia Start: 1436 Anesthesia Stop: 1532    Procedure: Sigmoidoscopy Diagnosis:       Rectal mass      (Rectal mass [K62.89])    Surgeons: Sierra Flores MD Responsible Provider: Syed Toledo MD    Anesthesia Type: MAC ASA Status: 3            Anesthesia Type: MAC    Vitals Value Taken Time   /72 02/05/24 1615   Temp 36.4 °C (97.5 °F) 02/05/24 1630   Pulse 97 02/05/24 1630   Resp 10 02/05/24 1630   SpO2 96 % 02/05/24 1630       Anesthesia Post Evaluation    Patient location during evaluation: bedside  Patient participation: complete - patient participated  Level of consciousness: awake  Pain score: 2  Pain management: adequate  Airway patency: patent  Cardiovascular status: acceptable  Respiratory status: acceptable  Hydration status: acceptable  Postoperative Nausea and Vomiting: none        No notable events documented.

## 2024-02-12 LAB
LABORATORY COMMENT REPORT: NORMAL
PATH REPORT.FINAL DX SPEC: NORMAL
PATH REPORT.GROSS SPEC: NORMAL
PATH REPORT.RELEVANT HX SPEC: NORMAL
PATH REPORT.TOTAL CANCER: NORMAL

## 2024-02-15 ENCOUNTER — OFFICE VISIT (OUTPATIENT)
Dept: PRIMARY CARE | Facility: CLINIC | Age: 59
End: 2024-02-15
Payer: COMMERCIAL

## 2024-02-15 VITALS
SYSTOLIC BLOOD PRESSURE: 176 MMHG | WEIGHT: 167 LBS | OXYGEN SATURATION: 92 % | RESPIRATION RATE: 20 BRPM | BODY MASS INDEX: 26.84 KG/M2 | DIASTOLIC BLOOD PRESSURE: 115 MMHG | HEART RATE: 100 BPM | HEIGHT: 66 IN

## 2024-02-15 DIAGNOSIS — N95.1 POST MENOPAUSAL SYNDROME: ICD-10-CM

## 2024-02-15 DIAGNOSIS — J44.9 CHRONIC OBSTRUCTIVE PULMONARY DISEASE, UNSPECIFIED COPD TYPE (MULTI): ICD-10-CM

## 2024-02-15 DIAGNOSIS — I10 BENIGN ESSENTIAL HYPERTENSION: ICD-10-CM

## 2024-02-15 DIAGNOSIS — E78.00 HYPERCHOLESTEREMIA: ICD-10-CM

## 2024-02-15 DIAGNOSIS — Z00.00 HEALTHCARE MAINTENANCE: ICD-10-CM

## 2024-02-15 DIAGNOSIS — K62.89 RECTAL MASS: Primary | ICD-10-CM

## 2024-02-15 DIAGNOSIS — F41.8 DEPRESSION WITH ANXIETY: ICD-10-CM

## 2024-02-15 DIAGNOSIS — Z12.31 VISIT FOR SCREENING MAMMOGRAM: ICD-10-CM

## 2024-02-15 PROCEDURE — 3080F DIAST BP >= 90 MM HG: CPT | Performed by: INTERNAL MEDICINE

## 2024-02-15 PROCEDURE — 3008F BODY MASS INDEX DOCD: CPT | Performed by: INTERNAL MEDICINE

## 2024-02-15 PROCEDURE — 1036F TOBACCO NON-USER: CPT | Performed by: INTERNAL MEDICINE

## 2024-02-15 PROCEDURE — 3077F SYST BP >= 140 MM HG: CPT | Performed by: INTERNAL MEDICINE

## 2024-02-15 PROCEDURE — 99214 OFFICE O/P EST MOD 30 MIN: CPT | Performed by: INTERNAL MEDICINE

## 2024-02-15 RX ORDER — EZETIMIBE 10 MG/1
10 TABLET ORAL DAILY
COMMUNITY

## 2024-02-15 RX ORDER — AMLODIPINE BESYLATE 10 MG/1
10 TABLET ORAL DAILY
Qty: 90 TABLET | Refills: 1 | Status: SHIPPED | OUTPATIENT
Start: 2024-02-15 | End: 2024-08-13

## 2024-02-15 RX ORDER — ATORVASTATIN CALCIUM 80 MG/1
80 TABLET, FILM COATED ORAL DAILY
Qty: 90 TABLET | Refills: 0 | Status: SHIPPED | OUTPATIENT
Start: 2024-02-15 | End: 2024-08-13

## 2024-02-15 RX ORDER — EZETIMIBE 10 MG/1
10 TABLET ORAL DAILY
Status: CANCELLED | OUTPATIENT
Start: 2024-02-15

## 2024-02-15 RX ORDER — ALBUTEROL SULFATE 90 UG/1
1 AEROSOL, METERED RESPIRATORY (INHALATION) EVERY 6 HOURS PRN
Qty: 18 G | Refills: 0 | Status: SHIPPED | OUTPATIENT
Start: 2024-02-15

## 2024-02-15 RX ORDER — BUDESONIDE AND FORMOTEROL FUMARATE DIHYDRATE 160; 4.5 UG/1; UG/1
2 AEROSOL RESPIRATORY (INHALATION) 2 TIMES DAILY
Qty: 10.2 G | Refills: 1 | Status: SHIPPED | OUTPATIENT
Start: 2024-02-15 | End: 2024-02-19 | Stop reason: SDUPTHER

## 2024-02-15 RX ORDER — CITALOPRAM 10 MG/1
10 TABLET ORAL DAILY
Qty: 90 TABLET | Refills: 1 | Status: SHIPPED | OUTPATIENT
Start: 2024-02-15 | End: 2024-08-13

## 2024-02-15 ASSESSMENT — ENCOUNTER SYMPTOMS
NERVOUS/ANXIOUS: 1
CONSTITUTIONAL NEGATIVE: 1
RESPIRATORY NEGATIVE: 1
CARDIOVASCULAR NEGATIVE: 1

## 2024-02-15 ASSESSMENT — PATIENT HEALTH QUESTIONNAIRE - PHQ9
SUM OF ALL RESPONSES TO PHQ9 QUESTIONS 1 AND 2: 0
2. FEELING DOWN, DEPRESSED OR HOPELESS: NOT AT ALL
1. LITTLE INTEREST OR PLEASURE IN DOING THINGS: NOT AT ALL

## 2024-02-15 NOTE — PROGRESS NOTES
Patient ID:   Hortensia Rodriguez is a 58 y.o. female with PMH remarkable for Hypothyroidism, hypokalemia, COPD, EMMA, HLD, Depression/anxiety, VIT D deficiency, rectal mass s/p KS SIGMOIDOSCOPY FLX DX W/COLLJ SPEC BR/WA IF PFRMD who presents to the office today for follow-up after her sigmoidoscopy    HPI  Here for followup after colon surgery- she had outpatient surgery for a rectal mass and she is very anxious about her pathology results, she got mychart recently and does not understand the medical terminology.  Pathology results shows  INAL DIAGNOSIS   RECTAL LESION, BIOPSY:  - FRAGMENTS OF VILLOUS ADENOMA.   Bowl movements seem mucousy  She does have some urinary incontinence at times. Strong smell of urine in exam room.  Blood pressure elevated during visit- she did admit she had run out of medications so she has not been taking them. Advised compliance with medications  Follow-up with Dr. Nayak in regards to her recent surgery    HEALTH MAINTENANCE: FOLLOW UP  Smoking:  Mammogram (40-75): 2020- ordered 06/23 not scheduled- new order placed today  Pap smear (21-65): 2016?  Labs: CBC 01/24, BMP, TSH, 07/23 Lipid 06/23- labs ordered today  Colonoscopy (45-75): 07/2023- palpable rectal mass Sigmoidoscopy 02/24   Lung cancer screening (55-80 + 30 pack year + smoking/quit in last 15 years):   DEXA (65+, q 2 years): DUE- scheduled today.    SOCIAL HISTORY:  Social History     Tobacco Use    Smoking status: Former     Types: Cigarettes    Smokeless tobacco: Never   Vaping Use    Vaping Use: Never used   Substance Use Topics    Alcohol use: Never    Drug use: Never       IMMUNIZATIONS:  Immunization History   Administered Date(s) Administered    Flu vaccine (IIV4), preservative free *Check age/dose* 01/30/2019    Influenza, seasonal, injectable 09/14/2015, 10/18/2016    Influenza, seasonal, injectable, preservative free 09/11/2015    Pneumococcal polysaccharide vaccine, 23-valent, age 2 years and older (PNEUMOVAX  23) 08/08/2014, 09/11/2015, 09/14/2015, 01/30/2019       Social History     Tobacco Use    Smoking status: Former     Types: Cigarettes    Smokeless tobacco: Never   Substance Use Topics    Alcohol use: Never        Review of Systems   Constitutional: Negative.    HENT: Negative.     Respiratory: Negative.     Cardiovascular: Negative.    Psychiatric/Behavioral:  The patient is nervous/anxious.         Nervous/anxious about test results       VITAL SIGNS:  Vitals:    02/15/24 1052   BP: (!) 176/115   Pulse: 100   Resp: 20   SpO2: 92%       Visit Vitals  OB Status Postmenopausal   Smoking Status Former       ALLERGIES:  Allergies   Allergen Reactions    Ace Inhibitors Other    Lisinopril Other        Physical Exam  Constitutional:       Comments: Smells strongly of urine and appears unkempt   HENT:      Head: Normocephalic.   Cardiovascular:      Pulses: Normal pulses.      Heart sounds: Normal heart sounds.   Pulmonary:      Effort: Pulmonary effort is normal.      Breath sounds: Normal breath sounds.   Neurological:      General: No focal deficit present.      Mental Status: She is alert and oriented to person, place, and time.         MEDICATIONS:  Current Outpatient Medications   Medication Instructions    acetaminophen (Tylenol) 325 mg tablet 2 TAB(S) ORALLY EVERY 4 HOURS, AS NEEDED, TEMP GREATER THAN OR EQUAL TO 38.0 C    albuterol (ProAir HFA) 90 mcg/actuation inhaler inhalation    amLODIPine (NORVASC) 10 mg, oral, Daily    atorvastatin (LIPITOR) 80 mg, oral, Daily    cefuroxime (Ceftin) 250 mg tablet TAKE 2 TABLETS BY MOUTH ONCE DAILY    citalopram (CELEXA) 10 mg, oral, Daily    ibuprofen 600 mg tablet oral    levothyroxine (SYNTHROID, LEVOXYL) 50 mcg, oral, Daily    oxyCODONE (ROXICODONE) 5 mg, oral, Every 6 hours PRN    Symbicort 160-4.5 mcg/actuation inhaler 2 puffs, inhalation, 2 times daily    tamsulosin (Flomax) 0.4 mg 24 hr capsule TAKE 1 CAPSULE BY MOUTH ONCE DAILY        RECENT LABS:  Lab Results    Component Value Date    WBC 13.2 (H) 01/11/2024    HGB 14.0 01/11/2024    HCT 43.0 01/11/2024     (H) 01/11/2024    CHOL 237 (H) 06/01/2023    TRIG 75 06/01/2023    HDL 52.2 06/01/2023    ALT 8 07/27/2023    AST 10 07/27/2023     01/11/2024    K 3.0 (L) 01/11/2024    CL 94 (L) 01/11/2024    CREATININE 0.85 01/11/2024    BUN 12 01/11/2024    CO2 34 (H) 01/11/2024    TSH 2.61 07/25/2023    INR 1.2 (H) 03/18/2022       ASSESSMENT AND PLAN:  Assessment/Plan   Diagnoses and all orders for this visit:  Rectal mass  -Discussed pathology results with patient today but [facility]  -     Full code  -     CBC and Auto Differential; Future  -     Comprehensive metabolic panel; Future  -     Lipid panel; Future  -     Tsh With Reflex To Free T4 If Abnormal; Future  -     Vitamin B12; Future  -     Vitamin D 25-Hydroxy,Total (for eval of Vitamin D levels); Future  Benign essential hypertension  -     amLODIPine (Norvasc) 10 mg tablet; Take 1 tablet (10 mg) by mouth once daily.  -      BP in office today is 176/115  -      Advised compliance with medications.  Hypercholesteremia  -     atorvastatin (Lipitor) 80 mg tablet; Take 1 tablet (80 mg) by mouth once daily.  -Lipid panel ordered today.  Depression with anxiety  -     citalopram (CeleXA) 10 mg tablet; Take 1 tablet (10 mg) by mouth once daily.  Chronic obstructive pulmonary disease, unspecified COPD type (CMS/[facility])  -     albuterol (ProAir HFA) 90 mcg/actuation inhaler; Inhale 1 puff every 6 hours if needed for wheezing.  -     budesonide-formoteroL (Symbicort) 160-4.5 mcg/actuation inhaler; Inhale 2 puffs 2 times a day.  Visit for screening mammogram  -     BI mammo bilateral screening tomosynthesis; Future  Post menopausal syndrome  -     XR DEXA bone density; Future    Follow up in four months      --------------------  Written by Miranda Jacob LPN, acting as a scribe for Dr. Peter. This note  accurately reflects the work and decisions made by Dr. Peter.     I, Dr. Peter, attest all medical record entries made by the scribe were under my direction and were personally dictated by me. I have reviewed the chart and agree that the record accurately reflects my performance of the history, physical exam, and assessment and plan.

## 2024-02-15 NOTE — PATIENT INSTRUCTIONS
Hi it was nice seeing you today.    I have placed orders for your Dexa, Mammogram, and lab work. Please get these done and we will call you with your results.    Your blood pressure was elevated at your visit. It is important to be compliant with your medications. Please call the office when you need renewals. Please monitor your blood pressure and call the office if it is still elevated after restarting your medication.     Followup with the surgeon regarding your rectal mass.    Followup in office in four months.

## 2024-02-19 DIAGNOSIS — J44.9 CHRONIC OBSTRUCTIVE PULMONARY DISEASE, UNSPECIFIED COPD TYPE (MULTI): ICD-10-CM

## 2024-02-19 RX ORDER — BUDESONIDE AND FORMOTEROL FUMARATE DIHYDRATE 160; 4.5 UG/1; UG/1
2 AEROSOL RESPIRATORY (INHALATION) 2 TIMES DAILY
Qty: 10.2 G | Refills: 1 | Status: SHIPPED | OUTPATIENT
Start: 2024-02-19

## 2024-02-20 ENCOUNTER — TELEPHONE (OUTPATIENT)
Dept: SURGERY | Facility: HOSPITAL | Age: 59
End: 2024-02-20
Payer: COMMERCIAL

## 2024-02-20 DIAGNOSIS — K62.89 RECTAL MASS: ICD-10-CM

## 2024-02-21 ENCOUNTER — TUMOR BOARD CONFERENCE (OUTPATIENT)
Dept: HEMATOLOGY/ONCOLOGY | Facility: HOSPITAL | Age: 59
End: 2024-02-21
Payer: COMMERCIAL

## 2024-02-21 NOTE — TUMOR BOARD NOTE
Tumor Board Documentation    Hortensia Rodriguez was presented by VANITA Mario at our Tumor Board on 2/21/2024, which included representatives from  .    Hortensia currently presents as   with history of the following treatments:  .    Additionally, we reviewed previous medical and familial history, history of present illness, and recent lab results along with all available histopathologic and imaging studies. The tumor board considered available treatment options and made the following recommendations:       The following procedures/referrals were also placed: No orders of the defined types were placed in this encounter.        Clinical Trial Status:       National site-specific guidelines were discussed with respect to the case.     Background  Low rectal mass  Path shows adenoma but suspicious for cancer and would need an APR      Question- review MRI and recommendations.    T3bN0 with small extramural extension.    15 mm Left upper lobe nodule that is increased from July 2023.       Path  Villous adenoma  No high grade dysplasia    Plan  Biopsy the lung lesion  If negative, then go back to OR for better sample of rectal mass.

## 2024-02-22 ENCOUNTER — TELEPHONE (OUTPATIENT)
Dept: SURGERY | Facility: HOSPITAL | Age: 59
End: 2024-02-22
Payer: COMMERCIAL

## 2024-02-28 ENCOUNTER — TELEPHONE (OUTPATIENT)
Dept: SURGERY | Facility: HOSPITAL | Age: 59
End: 2024-02-28
Payer: COMMERCIAL

## 2024-02-28 NOTE — TELEPHONE ENCOUNTER
Left another message for Ms Rodriguez to call back, emphasizing importance of following up    Will send certified letter

## 2024-03-12 ENCOUNTER — APPOINTMENT (OUTPATIENT)
Dept: RADIOLOGY | Facility: HOSPITAL | Age: 59
End: 2024-03-12
Payer: COMMERCIAL

## 2024-03-22 ENCOUNTER — LAB (OUTPATIENT)
Dept: LAB | Facility: LAB | Age: 59
End: 2024-03-22
Payer: COMMERCIAL

## 2024-03-22 ENCOUNTER — TELEPHONE (OUTPATIENT)
Dept: PRIMARY CARE | Facility: CLINIC | Age: 59
End: 2024-03-22

## 2024-03-22 ENCOUNTER — HOSPITAL ENCOUNTER (OUTPATIENT)
Dept: RADIOLOGY | Facility: HOSPITAL | Age: 59
Discharge: HOME | End: 2024-03-22
Payer: COMMERCIAL

## 2024-03-22 VITALS — BODY MASS INDEX: 26.95 KG/M2 | HEIGHT: 66 IN

## 2024-03-22 DIAGNOSIS — M25.551 BILATERAL HIP PAIN: Primary | ICD-10-CM

## 2024-03-22 DIAGNOSIS — Z00.00 HEALTHCARE MAINTENANCE: ICD-10-CM

## 2024-03-22 DIAGNOSIS — M25.552 BILATERAL HIP PAIN: Primary | ICD-10-CM

## 2024-03-22 DIAGNOSIS — Z12.31 VISIT FOR SCREENING MAMMOGRAM: ICD-10-CM

## 2024-03-22 LAB
25(OH)D3 SERPL-MCNC: 29 NG/ML (ref 30–100)
ALBUMIN SERPL BCP-MCNC: 4.7 G/DL (ref 3.4–5)
ALP SERPL-CCNC: 118 U/L (ref 33–136)
ALT SERPL W P-5'-P-CCNC: 13 U/L (ref 7–45)
ANION GAP SERPL CALC-SCNC: 16 MMOL/L (ref 10–20)
AST SERPL W P-5'-P-CCNC: 14 U/L (ref 9–39)
BASOPHILS # BLD AUTO: 0.04 X10*3/UL (ref 0–0.1)
BASOPHILS NFR BLD AUTO: 0.4 %
BILIRUB SERPL-MCNC: 0.5 MG/DL (ref 0–1.2)
BUN SERPL-MCNC: 23 MG/DL (ref 6–23)
CALCIUM SERPL-MCNC: 10.3 MG/DL (ref 8.6–10.3)
CHLORIDE SERPL-SCNC: 99 MMOL/L (ref 98–107)
CHOLEST SERPL-MCNC: 144 MG/DL (ref 0–199)
CHOLESTEROL/HDL RATIO: 2.4
CO2 SERPL-SCNC: 29 MMOL/L (ref 21–32)
CREAT SERPL-MCNC: 0.78 MG/DL (ref 0.5–1.05)
EGFRCR SERPLBLD CKD-EPI 2021: 87 ML/MIN/1.73M*2
EOSINOPHIL # BLD AUTO: 0.06 X10*3/UL (ref 0–0.7)
EOSINOPHIL NFR BLD AUTO: 0.6 %
ERYTHROCYTE [DISTWIDTH] IN BLOOD BY AUTOMATED COUNT: 15.8 % (ref 11.5–14.5)
GLUCOSE SERPL-MCNC: 129 MG/DL (ref 74–99)
HCT VFR BLD AUTO: 45.7 % (ref 36–46)
HDLC SERPL-MCNC: 60.3 MG/DL
HGB BLD-MCNC: 15.2 G/DL (ref 12–16)
IMM GRANULOCYTES # BLD AUTO: 0.02 X10*3/UL (ref 0–0.7)
IMM GRANULOCYTES NFR BLD AUTO: 0.2 % (ref 0–0.9)
LDLC SERPL CALC-MCNC: 69 MG/DL
LYMPHOCYTES # BLD AUTO: 2.77 X10*3/UL (ref 1.2–4.8)
LYMPHOCYTES NFR BLD AUTO: 29.2 %
MCH RBC QN AUTO: 29.2 PG (ref 26–34)
MCHC RBC AUTO-ENTMCNC: 33.3 G/DL (ref 32–36)
MCV RBC AUTO: 88 FL (ref 80–100)
MONOCYTES # BLD AUTO: 0.56 X10*3/UL (ref 0.1–1)
MONOCYTES NFR BLD AUTO: 5.9 %
NEUTROPHILS # BLD AUTO: 6.03 X10*3/UL (ref 1.2–7.7)
NEUTROPHILS NFR BLD AUTO: 63.7 %
NON HDL CHOLESTEROL: 84 MG/DL (ref 0–149)
NRBC BLD-RTO: 0 /100 WBCS (ref 0–0)
PLATELET # BLD AUTO: 373 X10*3/UL (ref 150–450)
POTASSIUM SERPL-SCNC: 3 MMOL/L (ref 3.5–5.3)
PROT SERPL-MCNC: 8 G/DL (ref 6.4–8.2)
RBC # BLD AUTO: 5.21 X10*6/UL (ref 4–5.2)
SODIUM SERPL-SCNC: 141 MMOL/L (ref 136–145)
T4 FREE SERPL-MCNC: 1.03 NG/DL (ref 0.61–1.12)
TRIGL SERPL-MCNC: 72 MG/DL (ref 0–149)
TSH SERPL-ACNC: 6.26 MIU/L (ref 0.44–3.98)
VIT B12 SERPL-MCNC: 413 PG/ML (ref 211–911)
VLDL: 14 MG/DL (ref 0–40)
WBC # BLD AUTO: 9.5 X10*3/UL (ref 4.4–11.3)

## 2024-03-22 PROCEDURE — 84439 ASSAY OF FREE THYROXINE: CPT

## 2024-03-22 PROCEDURE — 84443 ASSAY THYROID STIM HORMONE: CPT

## 2024-03-22 PROCEDURE — 77067 SCR MAMMO BI INCL CAD: CPT | Performed by: RADIOLOGY

## 2024-03-22 PROCEDURE — 85025 COMPLETE CBC W/AUTO DIFF WBC: CPT

## 2024-03-22 PROCEDURE — 77063 BREAST TOMOSYNTHESIS BI: CPT | Performed by: RADIOLOGY

## 2024-03-22 PROCEDURE — 82306 VITAMIN D 25 HYDROXY: CPT

## 2024-03-22 PROCEDURE — 82607 VITAMIN B-12: CPT

## 2024-03-22 PROCEDURE — 80053 COMPREHEN METABOLIC PANEL: CPT

## 2024-03-22 PROCEDURE — 36415 COLL VENOUS BLD VENIPUNCTURE: CPT

## 2024-03-22 PROCEDURE — 80061 LIPID PANEL: CPT

## 2024-03-22 PROCEDURE — 77067 SCR MAMMO BI INCL CAD: CPT

## 2024-03-22 RX ORDER — PREDNISONE 20 MG/1
40 TABLET ORAL DAILY
Qty: 10 TABLET | Refills: 0 | Status: SHIPPED | OUTPATIENT
Start: 2024-03-22 | End: 2024-03-27

## 2024-03-22 RX ORDER — MELOXICAM 15 MG/1
15 TABLET ORAL DAILY
Qty: 30 TABLET | Refills: 0 | Status: SHIPPED | OUTPATIENT
Start: 2024-03-22 | End: 2025-03-22

## 2024-03-22 NOTE — TELEPHONE ENCOUNTER
Req pain medicine for hip and leg pain, djd   Difficulty walking     Allergy to lisinopril and ace inhibitors

## 2024-03-25 DIAGNOSIS — E03.9 HYPOTHYROIDISM, UNSPECIFIED TYPE: ICD-10-CM

## 2024-03-25 DIAGNOSIS — E87.6 HYPOKALEMIA: Primary | ICD-10-CM

## 2024-03-25 RX ORDER — LEVOTHYROXINE SODIUM 25 UG/1
25 TABLET ORAL DAILY
Qty: 90 TABLET | Refills: 0 | Status: SHIPPED | OUTPATIENT
Start: 2024-03-25 | End: 2025-03-25

## 2024-03-25 RX ORDER — POTASSIUM CHLORIDE 20 MEQ/1
20 TABLET, EXTENDED RELEASE ORAL EVERY OTHER DAY
Qty: 15 TABLET | Refills: 0 | Status: SHIPPED | OUTPATIENT
Start: 2024-03-25 | End: 2024-04-24

## 2024-06-18 ENCOUNTER — APPOINTMENT (OUTPATIENT)
Dept: PRIMARY CARE | Facility: CLINIC | Age: 59
End: 2024-06-18
Payer: COMMERCIAL

## 2024-06-28 DIAGNOSIS — E03.9 HYPOTHYROIDISM, UNSPECIFIED TYPE: ICD-10-CM

## 2024-07-01 RX ORDER — LEVOTHYROXINE SODIUM 25 UG/1
25 TABLET ORAL DAILY
Qty: 90 TABLET | Refills: 0 | Status: SHIPPED | OUTPATIENT
Start: 2024-07-01

## 2024-09-18 ENCOUNTER — APPOINTMENT (OUTPATIENT)
Dept: RADIOLOGY | Facility: HOSPITAL | Age: 59
End: 2024-09-18
Payer: COMMERCIAL

## 2024-09-18 ENCOUNTER — HOSPITAL ENCOUNTER (INPATIENT)
Facility: HOSPITAL | Age: 59
End: 2024-09-18
Attending: STUDENT IN AN ORGANIZED HEALTH CARE EDUCATION/TRAINING PROGRAM | Admitting: INTERNAL MEDICINE
Payer: COMMERCIAL

## 2024-09-18 ENCOUNTER — APPOINTMENT (OUTPATIENT)
Dept: CARDIOLOGY | Facility: HOSPITAL | Age: 59
End: 2024-09-18
Payer: COMMERCIAL

## 2024-09-18 DIAGNOSIS — R19.7 DIARRHEA, UNSPECIFIED TYPE: ICD-10-CM

## 2024-09-18 DIAGNOSIS — J96.01 ACUTE HYPOXIC RESPIRATORY FAILURE (MULTI): Primary | ICD-10-CM

## 2024-09-18 DIAGNOSIS — N17.9 AKI (ACUTE KIDNEY INJURY) (CMS-HCC): ICD-10-CM

## 2024-09-18 DIAGNOSIS — R63.4 UNINTENTIONAL WEIGHT LOSS: ICD-10-CM

## 2024-09-18 DIAGNOSIS — J96.01 ACUTE RESPIRATORY FAILURE WITH HYPOXIA (MULTI): ICD-10-CM

## 2024-09-18 DIAGNOSIS — E87.6 HYPOKALEMIA: ICD-10-CM

## 2024-09-18 DIAGNOSIS — J44.1 COPD EXACERBATION (MULTI): ICD-10-CM

## 2024-09-18 DIAGNOSIS — I10 BENIGN ESSENTIAL HYPERTENSION: ICD-10-CM

## 2024-09-18 DIAGNOSIS — U07.1 COVID: ICD-10-CM

## 2024-09-18 LAB
ALBUMIN SERPL BCP-MCNC: 4.7 G/DL (ref 3.4–5)
ALP SERPL-CCNC: 107 U/L (ref 33–136)
ALT SERPL W P-5'-P-CCNC: 9 U/L (ref 7–45)
ANION GAP BLDV CALCULATED.4IONS-SCNC: 23 MMOL/L (ref 10–25)
ANION GAP BLDV CALCULATED.4IONS-SCNC: 24 MMOL/L (ref 10–25)
ANION GAP SERPL CALC-SCNC: 32 MMOL/L (ref 10–20)
AST SERPL W P-5'-P-CCNC: 12 U/L (ref 9–39)
BASE EXCESS BLDV CALC-SCNC: -5.1 MMOL/L (ref -2–3)
BASE EXCESS BLDV CALC-SCNC: -5.4 MMOL/L (ref -2–3)
BASOPHILS # BLD AUTO: 0.01 X10*3/UL (ref 0–0.1)
BASOPHILS NFR BLD AUTO: 0.1 %
BILIRUB SERPL-MCNC: 0.8 MG/DL (ref 0–1.2)
BNP SERPL-MCNC: 38 PG/ML (ref 0–99)
BODY TEMPERATURE: ABNORMAL
BODY TEMPERATURE: ABNORMAL
BUN SERPL-MCNC: 106 MG/DL (ref 6–23)
CA-I BLDV-SCNC: 0.96 MMOL/L (ref 1.1–1.33)
CA-I BLDV-SCNC: 1 MMOL/L (ref 1.1–1.33)
CALCIUM SERPL-MCNC: 9.4 MG/DL (ref 8.6–10.3)
CARDIAC TROPONIN I PNL SERPL HS: 6 NG/L (ref 0–13)
CARDIAC TROPONIN I PNL SERPL HS: 7 NG/L (ref 0–13)
CHLORIDE BLDV-SCNC: 81 MMOL/L (ref 98–107)
CHLORIDE BLDV-SCNC: 82 MMOL/L (ref 98–107)
CHLORIDE SERPL-SCNC: 77 MMOL/L (ref 98–107)
CO2 SERPL-SCNC: 18 MMOL/L (ref 21–32)
CREAT SERPL-MCNC: 2.64 MG/DL (ref 0.5–1.05)
EGFRCR SERPLBLD CKD-EPI 2021: 20 ML/MIN/1.73M*2
EOSINOPHIL # BLD AUTO: 0 X10*3/UL (ref 0–0.7)
EOSINOPHIL NFR BLD AUTO: 0 %
ERYTHROCYTE [DISTWIDTH] IN BLOOD BY AUTOMATED COUNT: 13.5 % (ref 11.5–14.5)
GLUCOSE BLDV-MCNC: 115 MG/DL (ref 74–99)
GLUCOSE BLDV-MCNC: 117 MG/DL (ref 74–99)
GLUCOSE SERPL-MCNC: 112 MG/DL (ref 74–99)
HCO3 BLDV-SCNC: 17.1 MMOL/L (ref 22–26)
HCO3 BLDV-SCNC: 17.7 MMOL/L (ref 22–26)
HCT VFR BLD AUTO: 50.9 % (ref 36–46)
HCT VFR BLD EST: 53 % (ref 36–46)
HCT VFR BLD EST: 56 % (ref 36–46)
HGB BLD-MCNC: 18.3 G/DL (ref 12–16)
HGB BLDV-MCNC: 17.8 G/DL (ref 12–16)
HGB BLDV-MCNC: 18.6 G/DL (ref 12–16)
IMM GRANULOCYTES # BLD AUTO: 0.04 X10*3/UL (ref 0–0.7)
IMM GRANULOCYTES NFR BLD AUTO: 0.4 % (ref 0–0.9)
INHALED O2 CONCENTRATION: 44 %
INHALED O2 CONCENTRATION: 44 %
INR PPP: 1 (ref 0.9–1.1)
LACTATE BLDV-SCNC: 2.2 MMOL/L (ref 0.4–2)
LACTATE BLDV-SCNC: 2.5 MMOL/L (ref 0.4–2)
LACTATE BLDV-SCNC: 2.5 MMOL/L (ref 0.4–2)
LACTATE SERPL-SCNC: 1.9 MMOL/L (ref 0.4–2)
LYMPHOCYTES # BLD AUTO: 1.78 X10*3/UL (ref 1.2–4.8)
LYMPHOCYTES NFR BLD AUTO: 17.7 %
MAGNESIUM SERPL-MCNC: 3.24 MG/DL (ref 1.6–2.4)
MCH RBC QN AUTO: 30 PG (ref 26–34)
MCHC RBC AUTO-ENTMCNC: 36 G/DL (ref 32–36)
MCV RBC AUTO: 83 FL (ref 80–100)
MONOCYTES # BLD AUTO: 0.61 X10*3/UL (ref 0.1–1)
MONOCYTES NFR BLD AUTO: 6.1 %
NEUTROPHILS # BLD AUTO: 7.63 X10*3/UL (ref 1.2–7.7)
NEUTROPHILS NFR BLD AUTO: 75.7 %
NRBC BLD-RTO: 0 /100 WBCS (ref 0–0)
OXYHGB MFR BLDV: 73.1 % (ref 45–75)
OXYHGB MFR BLDV: 80.4 % (ref 45–75)
PCO2 BLDV: 27 MM HG (ref 41–51)
PCO2 BLDV: 28 MM HG (ref 41–51)
PH BLDV: 7.41 PH (ref 7.33–7.43)
PH BLDV: 7.41 PH (ref 7.33–7.43)
PLATELET # BLD AUTO: 340 X10*3/UL (ref 150–450)
PO2 BLDV: 47 MM HG (ref 35–45)
PO2 BLDV: 52 MM HG (ref 35–45)
POTASSIUM BLDV-SCNC: 2.4 MMOL/L (ref 3.5–5.3)
POTASSIUM BLDV-SCNC: 2.6 MMOL/L (ref 3.5–5.3)
POTASSIUM SERPL-SCNC: 2.8 MMOL/L (ref 3.5–5.3)
PROT SERPL-MCNC: 8.6 G/DL (ref 6.4–8.2)
PROTHROMBIN TIME: 11.2 SECONDS (ref 9.8–12.8)
RBC # BLD AUTO: 6.1 X10*6/UL (ref 4–5.2)
SAO2 % BLDV: 75 % (ref 45–75)
SAO2 % BLDV: 83 % (ref 45–75)
SARS-COV-2 RNA RESP QL NAA+PROBE: DETECTED
SODIUM BLDV-SCNC: 119 MMOL/L (ref 136–145)
SODIUM BLDV-SCNC: 120 MMOL/L (ref 136–145)
SODIUM SERPL-SCNC: 124 MMOL/L (ref 136–145)
WBC # BLD AUTO: 10.1 X10*3/UL (ref 4.4–11.3)

## 2024-09-18 PROCEDURE — 9420000001 HC RT PATIENT EDUCATION 5 MIN

## 2024-09-18 PROCEDURE — 36415 COLL VENOUS BLD VENIPUNCTURE: CPT | Performed by: HEALTH CARE PROVIDER

## 2024-09-18 PROCEDURE — 85610 PROTHROMBIN TIME: CPT | Performed by: HEALTH CARE PROVIDER

## 2024-09-18 PROCEDURE — 71250 CT THORAX DX C-: CPT

## 2024-09-18 PROCEDURE — 94640 AIRWAY INHALATION TREATMENT: CPT

## 2024-09-18 PROCEDURE — 2020000001 HC ICU ROOM DAILY: Mod: IPSPLIT

## 2024-09-18 PROCEDURE — 83605 ASSAY OF LACTIC ACID: CPT | Performed by: HEALTH CARE PROVIDER

## 2024-09-18 PROCEDURE — 84132 ASSAY OF SERUM POTASSIUM: CPT | Performed by: STUDENT IN AN ORGANIZED HEALTH CARE EDUCATION/TRAINING PROGRAM

## 2024-09-18 PROCEDURE — 84484 ASSAY OF TROPONIN QUANT: CPT | Performed by: HEALTH CARE PROVIDER

## 2024-09-18 PROCEDURE — 71045 X-RAY EXAM CHEST 1 VIEW: CPT | Mod: FOREIGN READ | Performed by: RADIOLOGY

## 2024-09-18 PROCEDURE — 83735 ASSAY OF MAGNESIUM: CPT | Performed by: HEALTH CARE PROVIDER

## 2024-09-18 PROCEDURE — 82010 KETONE BODYS QUAN: CPT | Mod: GENLAB | Performed by: STUDENT IN AN ORGANIZED HEALTH CARE EDUCATION/TRAINING PROGRAM

## 2024-09-18 PROCEDURE — 2500000004 HC RX 250 GENERAL PHARMACY W/ HCPCS (ALT 636 FOR OP/ED): Mod: IPSPLIT | Performed by: NURSE PRACTITIONER

## 2024-09-18 PROCEDURE — 2500000004 HC RX 250 GENERAL PHARMACY W/ HCPCS (ALT 636 FOR OP/ED): Mod: IPSPLIT | Performed by: STUDENT IN AN ORGANIZED HEALTH CARE EDUCATION/TRAINING PROGRAM

## 2024-09-18 PROCEDURE — 84132 ASSAY OF SERUM POTASSIUM: CPT | Performed by: HEALTH CARE PROVIDER

## 2024-09-18 PROCEDURE — 71045 X-RAY EXAM CHEST 1 VIEW: CPT

## 2024-09-18 PROCEDURE — 94760 N-INVAS EAR/PLS OXIMETRY 1: CPT

## 2024-09-18 PROCEDURE — 87635 SARS-COV-2 COVID-19 AMP PRB: CPT | Performed by: HEALTH CARE PROVIDER

## 2024-09-18 PROCEDURE — 2500000002 HC RX 250 W HCPCS SELF ADMINISTERED DRUGS (ALT 637 FOR MEDICARE OP, ALT 636 FOR OP/ED): Mod: SE | Performed by: HEALTH CARE PROVIDER

## 2024-09-18 PROCEDURE — 83880 ASSAY OF NATRIURETIC PEPTIDE: CPT | Performed by: HEALTH CARE PROVIDER

## 2024-09-18 PROCEDURE — 93005 ELECTROCARDIOGRAM TRACING: CPT

## 2024-09-18 PROCEDURE — 2500000001 HC RX 250 WO HCPCS SELF ADMINISTERED DRUGS (ALT 637 FOR MEDICARE OP): Mod: IPSPLIT | Performed by: NURSE PRACTITIONER

## 2024-09-18 PROCEDURE — 85025 COMPLETE CBC W/AUTO DIFF WBC: CPT | Performed by: HEALTH CARE PROVIDER

## 2024-09-18 PROCEDURE — 94664 DEMO&/EVAL PT USE INHALER: CPT

## 2024-09-18 PROCEDURE — 71250 CT THORAX DX C-: CPT | Performed by: RADIOLOGY

## 2024-09-18 PROCEDURE — 2500000005 HC RX 250 GENERAL PHARMACY W/O HCPCS: Mod: SE | Performed by: HEALTH CARE PROVIDER

## 2024-09-18 PROCEDURE — 99285 EMERGENCY DEPT VISIT HI MDM: CPT

## 2024-09-18 PROCEDURE — 2500000004 HC RX 250 GENERAL PHARMACY W/ HCPCS (ALT 636 FOR OP/ED): Mod: SE | Performed by: HEALTH CARE PROVIDER

## 2024-09-18 RX ORDER — POTASSIUM CHLORIDE 14.9 MG/ML
20 INJECTION INTRAVENOUS
Status: COMPLETED | OUTPATIENT
Start: 2024-09-18 | End: 2024-09-19

## 2024-09-18 RX ORDER — HEPARIN SODIUM 5000 [USP'U]/ML
5000 INJECTION, SOLUTION INTRAVENOUS; SUBCUTANEOUS EVERY 8 HOURS
Status: DISCONTINUED | OUTPATIENT
Start: 2024-09-18 | End: 2024-09-23 | Stop reason: HOSPADM

## 2024-09-18 RX ORDER — AMOXICILLIN 250 MG
1 CAPSULE ORAL 2 TIMES DAILY
Status: DISCONTINUED | OUTPATIENT
Start: 2024-09-18 | End: 2024-09-23 | Stop reason: HOSPADM

## 2024-09-18 RX ORDER — ENOXAPARIN SODIUM 100 MG/ML
30 INJECTION SUBCUTANEOUS EVERY 24 HOURS
Status: DISCONTINUED | OUTPATIENT
Start: 2024-09-18 | End: 2024-09-18

## 2024-09-18 RX ORDER — SODIUM CHLORIDE, SODIUM LACTATE, POTASSIUM CHLORIDE, CALCIUM CHLORIDE 600; 310; 30; 20 MG/100ML; MG/100ML; MG/100ML; MG/100ML
100 INJECTION, SOLUTION INTRAVENOUS CONTINUOUS
Status: DISCONTINUED | OUTPATIENT
Start: 2024-09-18 | End: 2024-09-19

## 2024-09-18 RX ORDER — ONDANSETRON 4 MG/1
4 TABLET, FILM COATED ORAL EVERY 8 HOURS PRN
Status: DISCONTINUED | OUTPATIENT
Start: 2024-09-18 | End: 2024-09-23 | Stop reason: HOSPADM

## 2024-09-18 RX ORDER — IPRATROPIUM BROMIDE AND ALBUTEROL SULFATE 2.5; .5 MG/3ML; MG/3ML
3 SOLUTION RESPIRATORY (INHALATION) ONCE
Status: COMPLETED | OUTPATIENT
Start: 2024-09-18 | End: 2024-09-18

## 2024-09-18 RX ORDER — ACETAMINOPHEN 325 MG/1
650 TABLET ORAL EVERY 4 HOURS PRN
Status: DISCONTINUED | OUTPATIENT
Start: 2024-09-18 | End: 2024-09-23 | Stop reason: HOSPADM

## 2024-09-18 RX ORDER — SODIUM CHLORIDE 9 MG/ML
100 INJECTION, SOLUTION INTRAVENOUS CONTINUOUS
Status: DISCONTINUED | OUTPATIENT
Start: 2024-09-18 | End: 2024-09-18

## 2024-09-18 RX ORDER — ONDANSETRON HYDROCHLORIDE 2 MG/ML
4 INJECTION, SOLUTION INTRAVENOUS EVERY 8 HOURS PRN
Status: DISCONTINUED | OUTPATIENT
Start: 2024-09-18 | End: 2024-09-23 | Stop reason: HOSPADM

## 2024-09-18 RX ORDER — ALBUTEROL SULFATE 0.83 MG/ML
2.5 SOLUTION RESPIRATORY (INHALATION) ONCE
Status: COMPLETED | OUTPATIENT
Start: 2024-09-18 | End: 2024-09-18

## 2024-09-18 RX ORDER — GUAIFENESIN 600 MG/1
600 TABLET, EXTENDED RELEASE ORAL EVERY 12 HOURS PRN
Status: DISCONTINUED | OUTPATIENT
Start: 2024-09-18 | End: 2024-09-23 | Stop reason: HOSPADM

## 2024-09-18 RX ORDER — ACETAMINOPHEN 500 MG
5 TABLET ORAL NIGHTLY PRN
Status: DISCONTINUED | OUTPATIENT
Start: 2024-09-18 | End: 2024-09-23 | Stop reason: HOSPADM

## 2024-09-18 RX ORDER — PANTOPRAZOLE SODIUM 40 MG/1
40 TABLET, DELAYED RELEASE ORAL
Status: DISCONTINUED | OUTPATIENT
Start: 2024-09-19 | End: 2024-09-23 | Stop reason: HOSPADM

## 2024-09-18 RX ORDER — PANTOPRAZOLE SODIUM 40 MG/10ML
40 INJECTION, POWDER, LYOPHILIZED, FOR SOLUTION INTRAVENOUS
Status: DISCONTINUED | OUTPATIENT
Start: 2024-09-19 | End: 2024-09-19

## 2024-09-18 RX ORDER — IPRATROPIUM BROMIDE AND ALBUTEROL SULFATE 2.5; .5 MG/3ML; MG/3ML
3 SOLUTION RESPIRATORY (INHALATION)
Status: DISCONTINUED | OUTPATIENT
Start: 2024-09-19 | End: 2024-09-19

## 2024-09-18 RX ORDER — GUAIFENESIN/DEXTROMETHORPHAN 100-10MG/5
5 SYRUP ORAL EVERY 4 HOURS PRN
Status: DISCONTINUED | OUTPATIENT
Start: 2024-09-18 | End: 2024-09-23 | Stop reason: HOSPADM

## 2024-09-18 ASSESSMENT — PAIN SCALES - GENERAL
PAINLEVEL_OUTOF10: 0 - NO PAIN
PAINLEVEL_OUTOF10: 0 - NO PAIN

## 2024-09-18 ASSESSMENT — PAIN - FUNCTIONAL ASSESSMENT: PAIN_FUNCTIONAL_ASSESSMENT: 0-10

## 2024-09-18 NOTE — ED PROVIDER NOTES
HPI   Chief Complaint   Patient presents with   • Shortness of Breath   • Weakness, Gen     Pt has had SOB, weakness, decreased appetite for over a week now. Pt has hx COPD       CC: Shortness of breath  HPI:   59-year-old female with history of tobacco use, COPD, asthma, rectal mass presents ED complaining of increased shortness of breath, patient reports using her Breo at home, she does symptoms started a couple weeks ago have been progressively getting worse denies any fevers no hemoptysis hematemesis hematochezia or melanotic stools.  She does not see a pulmonologist.  She reports feeling tightness in the chest, she is not oxygen dependent, she was 88% in triage and placed on 4 L via nasal cannula.    Additional Limitations to History:   External Records Reviewed: I reviewed recent and relevant outside records including   History Obtained From:     Past Medical History: Per HPI  Medications: Reviewed in EMR and with patient  Allergies:  Reviewed in EMR  Past Surgical History:   Social History:     ------------------------------------------------------------------------------------------------------  Physical Exam:  --Vital signs reviewed in nursing triage note, EMR flow sheets, and at patient's bedside  GEN:  A&Ox3, no acute distress, appears comfortable.  Conversational and appropriate.  No confusion or gross mental status changes.  EYES: EOMI, non-injected sclera.  ENT: Moist mucous membranes, no apparent injuries or lesions.   CARDIO: Normal rate and regular rhythm. No murmurs, rubs, or gallops.  2+ equal pulses of the distal extremities.   PULM: Decreased breath sounds bilaterally, faint expiratory wheezes.   GI: Soft, non-tender, non-distended. No rebound tenderness or guarding.  SKIN: Warm and dry, no rashes or lesions.  MSK: ROM intact the extremities without contractures.   EXT: No peripheral edema, contusions, or wounds.   NEURO: Cranial nerves II-XII grossly intact. Sensation to light touch intact and  equal bilaterally in upper and lower extremities.  Symmetric 5/5 strength in upper and lower extremities.  PSYCH: Appropriate mood and behavior, converses and responds appropriately during exam.  -------------------------------------------------------------------------------------------------------    Medical Decision Making:  Patient seen and evaluated by ED attending. On arrival the patient     Differential Diagnoses Considered:   Chronic Medical Conditions Significantly Affecting Care:   Diagnostic testing considered: [PERC, D-Dimer, PECARN, etc.]    - EKG interpreted by myself sinus tachycardia left axis deviation, incomplete right bundle branch block, prolonged QTc, ventricular rate 103 RI interval 140 normal QRS duration, no obvious ST elevation, depression or acute ischemic findings.  - I independently interpreted: [CXR, CT, POCUS, etc. including your interpretation]  - Labs notable for     Escalation of Care: Appropriate for  Social Determinants of Health Significantly Affecting Care: [Homelessness, lacking transportation, uninsured, unable to afford medications]  Prescription Drug Consideration: [Antibiotics, antivirals, pain medications, etc.]  Discussion of Management with Other Providers:  I discussed the patient/results with: [admitting team, consultant, radiologist, social work, EPAT, case management, PT/OT, RT, PCP, etc.]      Williams Charles PA-C              Patient History   Past Medical History:   Diagnosis Date   • Abdominal pain 07/24/2023   • Abnormal electrocardiogram (ECG) (EKG) 12/16/2015    Abnormal ECG   • Anxiety    • Anxiety disorder, unspecified 08/31/2015    Anxiety   • Asthma ([facility])    • Complicated UTI (urinary tract infection) 07/24/2023   • COPD (chronic obstructive pulmonary disease) (Multi)    • Delayed emergence from general anesthesia    • Disease of thyroid gland    • Encounter for preprocedural cardiovascular examination 10/25/2015    Pre-operative cardiovascular examination    • Flank pain 07/24/2023   • Hypertension    • Hypomagnesemia 10/14/2015    Hypomagnesemia   • Personal history of other diseases of the circulatory system 08/31/2015    History of chronic ischemic heart disease   • Personal history of other diseases of the female genital tract 10/28/2015    History of ovarian cyst   • Personal history of other diseases of urinary system 01/04/2016    History of hematuria   • Personal history of other diseases of urinary system 10/16/2015    History of kidney disease   • Personal history of other specified conditions 10/16/2015    History of pelvic mass   • Personal history of other specified conditions 09/30/2015    History of fatigue   • Personal history of pneumonia (recurrent) 02/26/2019    History of pneumonia   • Right hip pain 07/24/2023   • Strain of muscle, fascia and tendon of abdomen, initial encounter 09/17/2015    Abdominal muscle strain     Past Surgical History:   Procedure Laterality Date   • APPENDECTOMY  08/31/2015    Appendectomy   • FOOT SURGERY  08/31/2015    Foot Surgery   • ORIF ANKLE FRACTURE       Family History   Problem Relation Name Age of Onset   • No Known Problems Mother     • No Known Problems Father     • Breast cancer Sister       Social History     Tobacco Use   • Smoking status: Every Day     Current packs/day: 0.50     Types: Cigarettes   • Smokeless tobacco: Never   Vaping Use   • Vaping status: Never Used   Substance Use Topics   • Alcohol use: Yes     Comment: occasional   • Drug use: Never       Physical Exam   ED Triage Vitals [09/18/24 1803]   Temperature Heart Rate Respirations BP   35.9 °C (96.7 °F) (!) 109 (!) 22 127/88      Pulse Ox Temp Source Heart Rate Source Patient Position   (!) 89 % Temporal -- --      BP Location FiO2 (%)     -- --       Physical Exam      ED Course & [facility]   ED Course as of 09/18/24 2102   Wed Sep 18, 2024   1824 ECG 12 lead  EKG interpreted by me shows sinus tachycardia with rate of 103.  Left axis deviation.   Incomplete right bundle branch block.  Prolonged QT with QTc of 544.  There is an artifactual baseline.  No acute injury pattern [BT]      ED Course User Index  [BT] Casper Cristobal DO         Diagnoses as of 09/18/24 2102   COPD exacerbation (Multi)   Acute respiratory failure with hypoxia (Multi)   COVID   Hypokalemia   KARLY (acute kidney injury) ([facility])   Acute hypoxic respiratory failure (Multi)                 No data recorded     Eyal Coma Scale Score: 15 (09/18/24 1803 : Devi Hauser RN)                           Medical Decision Making  59-year-old female with history of COPD, heavy tobacco use with acute respiratory failure likely secondary to COPD exacerbation complicated by acute COVID and KARLY, abnormal electrolyte with potassium of 2.8, replacing, she is stable on 5 to 6 L via nasal cannula, IV fluid hydration, pending CT chest, patient will be placed in ICU, she was also given DuoNeb Solu-Medrol.        Procedure  Procedures     Williams Charles PA-C  09/18/24 1830       Williams Charles PA-C  09/18/24 2102

## 2024-09-19 PROBLEM — Z72.0 TOBACCO USE: Status: ACTIVE | Noted: 2024-09-19

## 2024-09-19 PROBLEM — U07.1 COVID-19: Status: ACTIVE | Noted: 2024-09-19

## 2024-09-19 PROBLEM — E87.1 HYPONATREMIA: Status: ACTIVE | Noted: 2024-09-19

## 2024-09-19 PROBLEM — N17.9 AKI (ACUTE KIDNEY INJURY) (CMS-HCC): Status: ACTIVE | Noted: 2024-09-19

## 2024-09-19 LAB
ANION GAP SERPL CALC-SCNC: 23 MMOL/L (ref 10–20)
APPEARANCE UR: ABNORMAL
ATRIAL RATE: 104 BPM
B-OH-BUTYR SERPL-SCNC: 2.94 MMOL/L (ref 0.02–0.27)
B-OH-BUTYR SERPL-SCNC: 5.94 MMOL/L (ref 0.02–0.27)
BILIRUB UR STRIP.AUTO-MCNC: NEGATIVE MG/DL
BUN SERPL-MCNC: 91 MG/DL (ref 6–23)
CALCIUM SERPL-MCNC: 8.9 MG/DL (ref 8.6–10.3)
CHLORIDE SERPL-SCNC: 86 MMOL/L (ref 98–107)
CO2 SERPL-SCNC: 20 MMOL/L (ref 21–32)
COLOR UR: ABNORMAL
CREAT SERPL-MCNC: 1.88 MG/DL (ref 0.5–1.05)
CRP SERPL-MCNC: 2.54 MG/DL
D DIMER PPP FEU-MCNC: 429 NG/ML FEU
EGFRCR SERPLBLD CKD-EPI 2021: 30 ML/MIN/1.73M*2
ERYTHROCYTE [DISTWIDTH] IN BLOOD BY AUTOMATED COUNT: 13.2 % (ref 11.5–14.5)
EST. AVERAGE GLUCOSE BLD GHB EST-MCNC: 120 MG/DL
FERRITIN SERPL-MCNC: 203 NG/ML (ref 8–150)
GLUCOSE SERPL-MCNC: 167 MG/DL (ref 74–99)
GLUCOSE UR STRIP.AUTO-MCNC: NORMAL MG/DL
HBA1C MFR BLD: 5.8 %
HCT VFR BLD AUTO: 44.8 % (ref 36–46)
HGB BLD-MCNC: 16 G/DL (ref 12–16)
HOLD SPECIMEN: NORMAL
KETONES UR STRIP.AUTO-MCNC: ABNORMAL MG/DL
LACTATE BLDV-SCNC: 1.5 MMOL/L (ref 0.4–2)
LEUKOCYTE ESTERASE UR QL STRIP.AUTO: ABNORMAL
MAGNESIUM SERPL-MCNC: 2.81 MG/DL (ref 1.6–2.4)
MCH RBC QN AUTO: 30.2 PG (ref 26–34)
MCHC RBC AUTO-ENTMCNC: 35.7 G/DL (ref 32–36)
MCV RBC AUTO: 85 FL (ref 80–100)
NITRITE UR QL STRIP.AUTO: NEGATIVE
NRBC BLD-RTO: 0 /100 WBCS (ref 0–0)
P AXIS: 72 DEGREES
P OFFSET: 189 MS
P ONSET: 138 MS
PH UR STRIP.AUTO: 7 [PH]
PHOSPHATE SERPL-MCNC: 3.2 MG/DL (ref 2.5–4.9)
PLATELET # BLD AUTO: 250 X10*3/UL (ref 150–450)
POTASSIUM SERPL-SCNC: 3 MMOL/L (ref 3.5–5.3)
PR INTERVAL: 140 MS
PROT UR STRIP.AUTO-MCNC: ABNORMAL MG/DL
Q ONSET: 208 MS
QRS COUNT: 17 BEATS
QRS DURATION: 98 MS
QT INTERVAL: 426 MS
QTC CALCULATION(BAZETT): 560 MS
QTC FREDERICIA: 511 MS
R AXIS: -68 DEGREES
RBC # BLD AUTO: 5.3 X10*6/UL (ref 4–5.2)
RBC # UR STRIP.AUTO: ABNORMAL /UL
RBC #/AREA URNS AUTO: >20 /HPF
SODIUM SERPL-SCNC: 126 MMOL/L (ref 136–145)
SP GR UR STRIP.AUTO: 1.01
SQUAMOUS #/AREA URNS AUTO: ABNORMAL /HPF
T AXIS: 69 DEGREES
T OFFSET: 421 MS
UROBILINOGEN UR STRIP.AUTO-MCNC: NORMAL MG/DL
VENTRICULAR RATE: 104 BPM
WBC # BLD AUTO: 4.8 X10*3/UL (ref 4.4–11.3)
WBC #/AREA URNS AUTO: >50 /HPF

## 2024-09-19 PROCEDURE — 94640 AIRWAY INHALATION TREATMENT: CPT | Mod: IPSPLIT

## 2024-09-19 PROCEDURE — 1200000002 HC GENERAL ROOM WITH TELEMETRY DAILY: Mod: IPSPLIT

## 2024-09-19 PROCEDURE — 85027 COMPLETE CBC AUTOMATED: CPT | Mod: IPSPLIT | Performed by: NURSE PRACTITIONER

## 2024-09-19 PROCEDURE — 2500000001 HC RX 250 WO HCPCS SELF ADMINISTERED DRUGS (ALT 637 FOR MEDICARE OP): Mod: IPSPLIT | Performed by: INTERNAL MEDICINE

## 2024-09-19 PROCEDURE — 3E0DX3Z INTRODUCTION OF ANTI-INFLAMMATORY INTO MOUTH AND PHARYNX, EXTERNAL APPROACH: ICD-10-PCS | Performed by: INTERNAL MEDICINE

## 2024-09-19 PROCEDURE — 87086 URINE CULTURE/COLONY COUNT: CPT | Mod: GENLAB | Performed by: STUDENT IN AN ORGANIZED HEALTH CARE EDUCATION/TRAINING PROGRAM

## 2024-09-19 PROCEDURE — 2500000001 HC RX 250 WO HCPCS SELF ADMINISTERED DRUGS (ALT 637 FOR MEDICARE OP): Mod: IPSPLIT

## 2024-09-19 PROCEDURE — 97165 OT EVAL LOW COMPLEX 30 MIN: CPT | Mod: GO,IPSPLIT | Performed by: OCCUPATIONAL THERAPIST

## 2024-09-19 PROCEDURE — 83605 ASSAY OF LACTIC ACID: CPT | Mod: IPSPLIT | Performed by: NURSE PRACTITIONER

## 2024-09-19 PROCEDURE — XW033E5 INTRODUCTION OF REMDESIVIR ANTI-INFECTIVE INTO PERIPHERAL VEIN, PERCUTANEOUS APPROACH, NEW TECHNOLOGY GROUP 5: ICD-10-PCS | Performed by: INTERNAL MEDICINE

## 2024-09-19 PROCEDURE — 2500000004 HC RX 250 GENERAL PHARMACY W/ HCPCS (ALT 636 FOR OP/ED): Mod: IPSPLIT | Performed by: NURSE PRACTITIONER

## 2024-09-19 PROCEDURE — 85379 FIBRIN DEGRADATION QUANT: CPT | Mod: IPSPLIT

## 2024-09-19 PROCEDURE — 2500000002 HC RX 250 W HCPCS SELF ADMINISTERED DRUGS (ALT 637 FOR MEDICARE OP, ALT 636 FOR OP/ED): Mod: IPSPLIT

## 2024-09-19 PROCEDURE — 84145 PROCALCITONIN (PCT): CPT | Mod: GENLAB

## 2024-09-19 PROCEDURE — 94640 AIRWAY INHALATION TREATMENT: CPT

## 2024-09-19 PROCEDURE — 94760 N-INVAS EAR/PLS OXIMETRY 1: CPT | Mod: IPSPLIT

## 2024-09-19 PROCEDURE — 2500000004 HC RX 250 GENERAL PHARMACY W/ HCPCS (ALT 636 FOR OP/ED): Mod: IPSPLIT

## 2024-09-19 PROCEDURE — 2500000002 HC RX 250 W HCPCS SELF ADMINISTERED DRUGS (ALT 637 FOR MEDICARE OP, ALT 636 FOR OP/ED): Mod: IPSPLIT | Performed by: STUDENT IN AN ORGANIZED HEALTH CARE EDUCATION/TRAINING PROGRAM

## 2024-09-19 PROCEDURE — 9420000001 HC RT PATIENT EDUCATION 5 MIN: Mod: IPSPLIT

## 2024-09-19 PROCEDURE — 81001 URINALYSIS AUTO W/SCOPE: CPT | Mod: IPSPLIT | Performed by: STUDENT IN AN ORGANIZED HEALTH CARE EDUCATION/TRAINING PROGRAM

## 2024-09-19 PROCEDURE — 80048 BASIC METABOLIC PNL TOTAL CA: CPT | Mod: IPSPLIT | Performed by: NURSE PRACTITIONER

## 2024-09-19 PROCEDURE — 83036 HEMOGLOBIN GLYCOSYLATED A1C: CPT | Mod: GENLAB

## 2024-09-19 PROCEDURE — 36415 COLL VENOUS BLD VENIPUNCTURE: CPT | Mod: IPSPLIT | Performed by: NURSE PRACTITIONER

## 2024-09-19 PROCEDURE — 86140 C-REACTIVE PROTEIN: CPT | Mod: IPSPLIT | Performed by: INTERNAL MEDICINE

## 2024-09-19 PROCEDURE — 83735 ASSAY OF MAGNESIUM: CPT | Mod: IPSPLIT | Performed by: STUDENT IN AN ORGANIZED HEALTH CARE EDUCATION/TRAINING PROGRAM

## 2024-09-19 PROCEDURE — 82728 ASSAY OF FERRITIN: CPT | Mod: IPSPLIT | Performed by: INTERNAL MEDICINE

## 2024-09-19 PROCEDURE — 2500000001 HC RX 250 WO HCPCS SELF ADMINISTERED DRUGS (ALT 637 FOR MEDICARE OP): Mod: IPSPLIT | Performed by: NURSE PRACTITIONER

## 2024-09-19 PROCEDURE — 82010 KETONE BODYS QUAN: CPT | Mod: GENLAB | Performed by: STUDENT IN AN ORGANIZED HEALTH CARE EDUCATION/TRAINING PROGRAM

## 2024-09-19 PROCEDURE — 99223 1ST HOSP IP/OBS HIGH 75: CPT

## 2024-09-19 PROCEDURE — 36415 COLL VENOUS BLD VENIPUNCTURE: CPT | Mod: IPSPLIT

## 2024-09-19 PROCEDURE — 97161 PT EVAL LOW COMPLEX 20 MIN: CPT | Mod: GP,IPSPLIT | Performed by: PHYSICAL THERAPIST

## 2024-09-19 PROCEDURE — 84100 ASSAY OF PHOSPHORUS: CPT | Mod: IPSPLIT | Performed by: STUDENT IN AN ORGANIZED HEALTH CARE EDUCATION/TRAINING PROGRAM

## 2024-09-19 PROCEDURE — 2500000005 HC RX 250 GENERAL PHARMACY W/O HCPCS: Mod: IPSPLIT | Performed by: NURSE PRACTITIONER

## 2024-09-19 RX ORDER — ALBUTEROL SULFATE 90 UG/1
2 INHALANT RESPIRATORY (INHALATION) EVERY 2 HOUR PRN
Status: DISCONTINUED | OUTPATIENT
Start: 2024-09-19 | End: 2024-09-23 | Stop reason: HOSPADM

## 2024-09-19 RX ORDER — CEFTRIAXONE 1 G/50ML
1 INJECTION, SOLUTION INTRAVENOUS EVERY 24 HOURS
Status: DISCONTINUED | OUTPATIENT
Start: 2024-09-19 | End: 2024-09-23 | Stop reason: HOSPADM

## 2024-09-19 RX ORDER — DEXAMETHASONE 6 MG/1
6 TABLET ORAL DAILY
Status: DISCONTINUED | OUTPATIENT
Start: 2024-09-19 | End: 2024-09-23 | Stop reason: HOSPADM

## 2024-09-19 RX ORDER — IPRATROPIUM BROMIDE AND ALBUTEROL SULFATE 2.5; .5 MG/3ML; MG/3ML
3 SOLUTION RESPIRATORY (INHALATION) 3 TIMES DAILY
Status: DISCONTINUED | OUTPATIENT
Start: 2024-09-19 | End: 2024-09-19

## 2024-09-19 RX ORDER — DEXAMETHASONE 6 MG/1
6 TABLET ORAL DAILY
Status: DISCONTINUED | OUTPATIENT
Start: 2024-09-19 | End: 2024-09-19

## 2024-09-19 RX ORDER — SODIUM CHLORIDE AND POTASSIUM CHLORIDE 150; 900 MG/100ML; MG/100ML
75 INJECTION, SOLUTION INTRAVENOUS CONTINUOUS
Status: DISCONTINUED | OUTPATIENT
Start: 2024-09-19 | End: 2024-09-22

## 2024-09-19 RX ORDER — LEVOTHYROXINE SODIUM 25 UG/1
25 TABLET ORAL DAILY
Status: DISCONTINUED | OUTPATIENT
Start: 2024-09-19 | End: 2024-09-23 | Stop reason: HOSPADM

## 2024-09-19 RX ORDER — POTASSIUM CHLORIDE 20 MEQ/1
20 TABLET, EXTENDED RELEASE ORAL ONCE
Status: COMPLETED | OUTPATIENT
Start: 2024-09-19 | End: 2024-09-19

## 2024-09-19 RX ORDER — MELOXICAM 7.5 MG/1
15 TABLET ORAL DAILY
Status: DISCONTINUED | OUTPATIENT
Start: 2024-09-19 | End: 2024-09-23 | Stop reason: HOSPADM

## 2024-09-19 RX ORDER — EZETIMIBE 10 MG/1
10 TABLET ORAL DAILY
Status: DISCONTINUED | OUTPATIENT
Start: 2024-09-19 | End: 2024-09-23 | Stop reason: HOSPADM

## 2024-09-19 RX ORDER — ALBUTEROL SULFATE 90 UG/1
2 INHALANT RESPIRATORY (INHALATION) 3 TIMES DAILY
Status: DISCONTINUED | OUTPATIENT
Start: 2024-09-19 | End: 2024-09-23 | Stop reason: HOSPADM

## 2024-09-19 RX ORDER — SODIUM CHLORIDE 9 MG/ML
10 INJECTION, SOLUTION INTRAVENOUS CONTINUOUS PRN
Status: DISCONTINUED | OUTPATIENT
Start: 2024-09-19 | End: 2024-09-23 | Stop reason: HOSPADM

## 2024-09-19 RX ORDER — FLUTICASONE FUROATE AND VILANTEROL 200; 25 UG/1; UG/1
1 POWDER RESPIRATORY (INHALATION)
Status: DISCONTINUED | OUTPATIENT
Start: 2024-09-19 | End: 2024-09-23 | Stop reason: HOSPADM

## 2024-09-19 SDOH — ECONOMIC STABILITY: INCOME INSECURITY
IN THE PAST 12 MONTHS, HAS THE ELECTRIC, GAS, OIL, OR WATER COMPANY THREATENED TO SHUT OFF SERVICE IN YOUR HOME?: PATIENT DECLINED

## 2024-09-19 SDOH — SOCIAL STABILITY: SOCIAL INSECURITY: DO YOU FEEL ANYONE HAS EXPLOITED OR TAKEN ADVANTAGE OF YOU FINANCIALLY OR OF YOUR PERSONAL PROPERTY?: NO

## 2024-09-19 SDOH — SOCIAL STABILITY: SOCIAL INSECURITY: ARE THERE ANY APPARENT SIGNS OF INJURIES/BEHAVIORS THAT COULD BE RELATED TO ABUSE/NEGLECT?: NO

## 2024-09-19 SDOH — SOCIAL STABILITY: SOCIAL NETWORK: HOW OFTEN DO YOU ATTEND CHURCH OR RELIGIOUS SERVICES?: 1 TO 4 TIMES PER YEAR

## 2024-09-19 SDOH — HEALTH STABILITY: MENTAL HEALTH
STRESS IS WHEN SOMEONE FEELS TENSE, NERVOUS, ANXIOUS, OR CAN'T SLEEP AT NIGHT BECAUSE THEIR MIND IS TROUBLED. HOW STRESSED ARE YOU?: ONLY A LITTLE

## 2024-09-19 SDOH — SOCIAL STABILITY: SOCIAL INSECURITY: WITHIN THE LAST YEAR, HAVE YOU BEEN AFRAID OF YOUR PARTNER OR EX-PARTNER?: NO

## 2024-09-19 SDOH — ECONOMIC STABILITY: FOOD INSECURITY: WITHIN THE PAST 12 MONTHS, YOU WORRIED THAT YOUR FOOD WOULD RUN OUT BEFORE YOU GOT MONEY TO BUY MORE.: SOMETIMES TRUE

## 2024-09-19 SDOH — ECONOMIC STABILITY: FOOD INSECURITY: WITHIN THE PAST 12 MONTHS, THE FOOD YOU BOUGHT JUST DIDN'T LAST AND YOU DIDN'T HAVE MONEY TO GET MORE.: SOMETIMES TRUE

## 2024-09-19 SDOH — SOCIAL STABILITY: SOCIAL INSECURITY: ABUSE: ADULT

## 2024-09-19 SDOH — SOCIAL STABILITY: SOCIAL INSECURITY: HAVE YOU HAD THOUGHTS OF HARMING ANYONE ELSE?: NO

## 2024-09-19 SDOH — SOCIAL STABILITY: SOCIAL NETWORK: HOW OFTEN DO YOU ATTENT MEETINGS OF THE CLUB OR ORGANIZATION YOU BELONG TO?: PATIENT DECLINED

## 2024-09-19 SDOH — SOCIAL STABILITY: SOCIAL INSECURITY: WITHIN THE LAST YEAR, HAVE YOU BEEN HUMILIATED OR EMOTIONALLY ABUSED IN OTHER WAYS BY YOUR PARTNER OR EX-PARTNER?: NO

## 2024-09-19 SDOH — SOCIAL STABILITY: SOCIAL INSECURITY: DO YOU FEEL UNSAFE GOING BACK TO THE PLACE WHERE YOU ARE LIVING?: NO

## 2024-09-19 SDOH — SOCIAL STABILITY: SOCIAL INSECURITY: ARE YOU OR HAVE YOU BEEN THREATENED OR ABUSED PHYSICALLY, EMOTIONALLY, OR SEXUALLY BY ANYONE?: NO

## 2024-09-19 SDOH — HEALTH STABILITY: MENTAL HEALTH
HOW OFTEN DO YOU NEED TO HAVE SOMEONE HELP YOU WHEN YOU READ INSTRUCTIONS, PAMPHLETS, OR OTHER WRITTEN MATERIAL FROM YOUR DOCTOR OR PHARMACY?: NEVER

## 2024-09-19 SDOH — SOCIAL STABILITY: SOCIAL INSECURITY
WITHIN THE LAST YEAR, HAVE YOU BEEN KICKED, HIT, SLAPPED, OR OTHERWISE PHYSICALLY HURT BY YOUR PARTNER OR EX-PARTNER?: NO

## 2024-09-19 SDOH — SOCIAL STABILITY: SOCIAL INSECURITY
WITHIN THE LAST YEAR, HAVE TO BEEN RAPED OR FORCED TO HAVE ANY KIND OF SEXUAL ACTIVITY BY YOUR PARTNER OR EX-PARTNER?: NO

## 2024-09-19 SDOH — HEALTH STABILITY: MENTAL HEALTH: HOW OFTEN DO YOU HAVE A DRINK CONTAINING ALCOHOL?: NEVER

## 2024-09-19 SDOH — SOCIAL STABILITY: SOCIAL INSECURITY: HAVE YOU HAD ANY THOUGHTS OF HARMING ANYONE ELSE?: NO

## 2024-09-19 SDOH — SOCIAL STABILITY: SOCIAL INSECURITY: DOES ANYONE TRY TO KEEP YOU FROM HAVING/CONTACTING OTHER FRIENDS OR DOING THINGS OUTSIDE YOUR HOME?: NO

## 2024-09-19 SDOH — HEALTH STABILITY: PHYSICAL HEALTH: ON AVERAGE, HOW MANY DAYS PER WEEK DO YOU ENGAGE IN MODERATE TO STRENUOUS EXERCISE (LIKE A BRISK WALK)?: 0 DAYS

## 2024-09-19 SDOH — HEALTH STABILITY: MENTAL HEALTH: HOW MANY STANDARD DRINKS CONTAINING ALCOHOL DO YOU HAVE ON A TYPICAL DAY?: PATIENT DOES NOT DRINK

## 2024-09-19 SDOH — SOCIAL STABILITY: SOCIAL NETWORK: ARE YOU MARRIED, WIDOWED, DIVORCED, SEPARATED, NEVER MARRIED, OR LIVING WITH A PARTNER?: MARRIED

## 2024-09-19 SDOH — SOCIAL STABILITY: SOCIAL NETWORK: IN A TYPICAL WEEK, HOW MANY TIMES DO YOU TALK ON THE PHONE WITH FAMILY, FRIENDS, OR NEIGHBORS?: TWICE A WEEK

## 2024-09-19 SDOH — SOCIAL STABILITY: SOCIAL INSECURITY: HAS ANYONE EVER THREATENED TO HURT YOUR FAMILY OR YOUR PETS?: NO

## 2024-09-19 SDOH — HEALTH STABILITY: MENTAL HEALTH: HOW OFTEN DO YOU HAVE 6 OR MORE DRINKS ON ONE OCCASION?: NEVER

## 2024-09-19 SDOH — SOCIAL STABILITY: SOCIAL NETWORK
DO YOU BELONG TO ANY CLUBS OR ORGANIZATIONS SUCH AS CHURCH GROUPS UNIONS, FRATERNAL OR ATHLETIC GROUPS, OR SCHOOL GROUPS?: PATIENT DECLINED

## 2024-09-19 SDOH — SOCIAL STABILITY: SOCIAL INSECURITY: WERE YOU ABLE TO COMPLETE ALL THE BEHAVIORAL HEALTH SCREENINGS?: YES

## 2024-09-19 SDOH — SOCIAL STABILITY: SOCIAL NETWORK: HOW OFTEN DO YOU GET TOGETHER WITH FRIENDS OR RELATIVES?: ONCE A WEEK

## 2024-09-19 ASSESSMENT — ACTIVITIES OF DAILY LIVING (ADL)
JUDGMENT_ADEQUATE_SAFELY_COMPLETE_DAILY_ACTIVITIES: YES
DRESSING YOURSELF: INDEPENDENT
LACK_OF_TRANSPORTATION: NO
ADEQUATE_TO_COMPLETE_ADL: YES
TOILETING: NEEDS ASSISTANCE
HEARING - RIGHT EAR: FUNCTIONAL
BATHING: INDEPENDENT
LACK_OF_TRANSPORTATION: NO
FEEDING YOURSELF: INDEPENDENT
GROOMING: INDEPENDENT
WALKS IN HOME: INDEPENDENT
HEARING - LEFT EAR: FUNCTIONAL
PATIENT'S MEMORY ADEQUATE TO SAFELY COMPLETE DAILY ACTIVITIES?: YES
LACK_OF_TRANSPORTATION: NO

## 2024-09-19 ASSESSMENT — COGNITIVE AND FUNCTIONAL STATUS - GENERAL
PATIENT BASELINE BEDBOUND: NO
DAILY ACTIVITIY SCORE: 22
WALKING IN HOSPITAL ROOM: A LITTLE
CLIMB 3 TO 5 STEPS WITH RAILING: A LITTLE
MOVING TO AND FROM BED TO CHAIR: A LITTLE
CLIMB 3 TO 5 STEPS WITH RAILING: A LOT
DAILY ACTIVITIY SCORE: 22
STANDING UP FROM CHAIR USING ARMS: A LITTLE
MOBILITY SCORE: 18
DRESSING REGULAR LOWER BODY CLOTHING: A LITTLE
STANDING UP FROM CHAIR USING ARMS: A LITTLE
MOVING TO AND FROM BED TO CHAIR: A LITTLE
DAILY ACTIVITIY SCORE: 24
TOILETING: A LITTLE
WALKING IN HOSPITAL ROOM: A LITTLE
MOBILITY SCORE: 20
WALKING IN HOSPITAL ROOM: A LITTLE
MOBILITY SCORE: 22
CLIMB 3 TO 5 STEPS WITH RAILING: A LITTLE
TURNING FROM BACK TO SIDE WHILE IN FLAT BAD: A LITTLE
HELP NEEDED FOR BATHING: A LITTLE
TOILETING: A LITTLE

## 2024-09-19 ASSESSMENT — LIFESTYLE VARIABLES
HOW MANY STANDARD DRINKS CONTAINING ALCOHOL DO YOU HAVE ON A TYPICAL DAY: PATIENT DOES NOT DRINK
PRESCIPTION_ABUSE_PAST_12_MONTHS: NO
SKIP TO QUESTIONS 9-10: 1
HOW OFTEN DO YOU HAVE 6 OR MORE DRINKS ON ONE OCCASION: NEVER
HOW OFTEN DO YOU HAVE A DRINK CONTAINING ALCOHOL: NEVER
SKIP TO QUESTIONS 9-10: 1
HOW MANY STANDARD DRINKS CONTAINING ALCOHOL DO YOU HAVE ON A TYPICAL DAY: PATIENT DOES NOT DRINK
SUBSTANCE_ABUSE_PAST_12_MONTHS: NO
AUDIT-C TOTAL SCORE: 0
SKIP TO QUESTIONS 9-10: 1
AUDIT-C TOTAL SCORE: 0
AUDIT-C TOTAL SCORE: 0
SUBSTANCE_ABUSE_PAST_12_MONTHS: NO
AUDIT-C TOTAL SCORE: 0
HOW OFTEN DO YOU HAVE A DRINK CONTAINING ALCOHOL: NEVER
HOW OFTEN DO YOU HAVE 6 OR MORE DRINKS ON ONE OCCASION: NEVER
AUDIT-C TOTAL SCORE: 0
PRESCIPTION_ABUSE_PAST_12_MONTHS: NO

## 2024-09-19 ASSESSMENT — PAIN SCALES - GENERAL
PAINLEVEL_OUTOF10: 0 - NO PAIN

## 2024-09-19 ASSESSMENT — PATIENT HEALTH QUESTIONNAIRE - PHQ9
2. FEELING DOWN, DEPRESSED OR HOPELESS: NOT AT ALL
2. FEELING DOWN, DEPRESSED OR HOPELESS: NOT AT ALL
SUM OF ALL RESPONSES TO PHQ9 QUESTIONS 1 & 2: 0
1. LITTLE INTEREST OR PLEASURE IN DOING THINGS: NOT AT ALL
1. LITTLE INTEREST OR PLEASURE IN DOING THINGS: NOT AT ALL
SUM OF ALL RESPONSES TO PHQ9 QUESTIONS 1 & 2: 0

## 2024-09-19 ASSESSMENT — PAIN - FUNCTIONAL ASSESSMENT
PAIN_FUNCTIONAL_ASSESSMENT: 0-10

## 2024-09-19 ASSESSMENT — ENCOUNTER SYMPTOMS
DYSURIA: 0
DIARRHEA: 0
VOMITING: 0
CHILLS: 0
COUGH: 1
SHORTNESS OF BREATH: 1
FEVER: 0
NAUSEA: 1
FATIGUE: 1
ACTIVITY CHANGE: 1
APPETITE CHANGE: 1

## 2024-09-19 NOTE — CONSULTS
Inpatient consult to Infectious Diseases  Consult performed by: Marco Antonio Smith MD  Consult ordered by: Shanell Ewing PA-C            Primary MD: Ewelina Love MD    Reason For Consult  Coronavirus infection    History Of Present Illness  Hortensia Rodriguez is a 59 y.o. female presenting with shortness of breath.  She has a background history of COPD, nicotine abuse.  Onset was a couple of days prior to presentation, gradual, constant, interval worsening.  She has nonproductive cough.  She came to the hospital for further evaluation and management.  She was found to be hypoxic and is on 4 L of oxygen.  CT of the chest was remarkable for bibasilar opacities and left upper lung nodule.  She was admitted to the ICU for further care.  She is on dexamethasone and remdesivir  She is also on ceftriaxone and azithromycin.    Past Medical History  She has a past medical history of Abdominal pain (07/24/2023), Abnormal electrocardiogram (ECG) (EKG) (12/16/2015), Anxiety, Anxiety disorder, unspecified (08/31/2015), Asthma ([facility] Complicated UTI (urinary tract infection) (07/24/2023), COPD (chronic obstructive pulmonary disease) (Multi), Delayed emergence from general anesthesia, Disease of thyroid gland, Encounter for preprocedural cardiovascular examination (10/25/2015), Flank pain (07/24/2023), Hypertension, Hypomagnesemia (10/14/2015), Personal history of other diseases of the circulatory system (08/31/2015), Personal history of other diseases of the female genital tract (10/28/2015), Personal history of other diseases of urinary system (01/04/2016), Personal history of other diseases of urinary system (10/16/2015), Personal history of other specified conditions (10/16/2015), Personal history of other specified conditions (09/30/2015), Personal history of pneumonia (recurrent) (02/26/2019), Right hip pain (07/24/2023), and Strain of muscle, fascia and tendon of abdomen, initial encounter  (09/17/2015).    Surgical History  She has a past surgical history that includes Foot surgery (08/31/2015); Appendectomy (08/31/2015); and ORIF ankle fracture.     Social History     Occupational History    Not on file   Tobacco Use    Smoking status: Every Day     Current packs/day: 0.50     Types: Cigarettes    Smokeless tobacco: Never   Vaping Use    Vaping status: Never Used   Substance and Sexual Activity    Alcohol use: Yes     Comment: occasional    Drug use: Never    Sexual activity: Defer     Travel History   Travel since 08/19/24    No documented travel since 08/19/24           Family History  Family History   Problem Relation Name Age of Onset    No Known Problems Mother      No Known Problems Father      Breast cancer Sister       Allergies  Ace inhibitors and Lisinopril     Immunization History   Administered Date(s) Administered    Flu vaccine (IIV4), preservative free *Check age/dose* 01/30/2019    Flu vaccine, trivalent, preservative free, age 6 months and greater (Fluarix/Fluzone/Flulaval) 09/11/2015    Influenza, Unspecified 01/30/2019    Influenza, seasonal, injectable 09/14/2015, 10/18/2016    Pneumococcal polysaccharide vaccine, 23-valent, age 2 years and older (PNEUMOVAX 23) 08/08/2014, 09/11/2015, 09/14/2015, 01/30/2019     Medications  Home medications:  Medications Prior to Admission   Medication Sig Dispense Refill Last Dose    albuterol (ProAir HFA) 90 mcg/actuation inhaler Inhale 1 puff every 6 hours if needed for wheezing. 18 g 0     amLODIPine (Norvasc) 10 mg tablet Take 1 tablet (10 mg) by mouth once daily. 90 tablet 1     atorvastatin (Lipitor) 80 mg tablet Take 1 tablet (80 mg) by mouth once daily. 90 tablet 0     citalopram (CeleXA) 10 mg tablet Take 1 tablet (10 mg) by mouth once daily. 90 tablet 1     ezetimibe (Zetia) 10 mg tablet Take 1 tablet (10 mg) by mouth once daily.       ibuprofen 600 mg tablet Take by mouth.       levothyroxine (Synthroid, Levoxyl) 25 mcg tablet TAKE ONE  "TABLET BY MOUTH EVERY DAY 90 tablet 0     meloxicam (Mobic) 15 mg tablet Take 1 tablet (15 mg) by mouth once daily. 30 tablet 0     oxyCODONE (Roxicodone) 5 mg immediate release tablet Take 1 tablet (5 mg) by mouth every 6 hours if needed for severe pain (7 - 10). 15 tablet 0     Symbicort 160-4.5 mcg/actuation inhaler Inhale 2 puffs 2 times a day. 10.2 g 1      Current medications:  Scheduled medications  albuterol, 2 puff, inhalation, TID  azithromycin, 500 mg, intravenous, q24h  cefTRIAXone, 1 g, intravenous, q24h  dexAMETHasone, 6 mg, oral, Daily  heparin, 5,000 Units, subcutaneous, q8h  oxygen, , inhalation, Continuous - Inhalation  oxygen, , inhalation, Continuous - Inhalation  pantoprazole, 40 mg, oral, Daily before breakfast   Or  pantoprazole, 40 mg, intravenous, Daily before breakfast  remdesivir, 200 mg, intravenous, Once   Followed by  [START ON 9/20/2024] remdesivir, 100 mg, intravenous, q24h  sennosides-docusate sodium, 1 tablet, oral, BID      Continuous medications  lactated Ringer's, 100 mL/hr, Last Rate: 100 mL/hr (09/19/24 0350)      PRN medications  PRN medications: acetaminophen, acetaminophen, albuterol, benzocaine-menthol, dextromethorphan-guaifenesin, guaiFENesin, melatonin, ondansetron **OR** ondansetron    Review of Systems   Constitutional:  Negative for chills and fever.   Respiratory:  Positive for cough and shortness of breath.    All other systems reviewed and are negative.       Objective  Range of Vitals (last 24 hours)  Heart Rate:  []   Temp:  [35.9 °C (96.7 °F)-36.2 °C (97.2 °F)]   Resp:  [13-22]   BP: (114-159)/()   Height:  [167.6 cm (5' 6\")]   Weight:  [65.9 kg (145 lb 4.5 oz)-83.9 kg (185 lb)]   SpO2:  [89 %-96 %]   Daily Weight  09/19/24 : 65.9 kg (145 lb 4.5 oz)    Body mass index is 23.45 kg/m².     Physical Exam  Constitutional:       Appearance: Normal appearance.   HENT:      Head: Normocephalic and atraumatic.      Nose: Nose normal.   Eyes:      General: No " "scleral icterus.     Extraocular Movements: Extraocular movements intact.      Conjunctiva/sclera: Conjunctivae normal.   Cardiovascular:      Rate and Rhythm: Normal rate and regular rhythm.      Heart sounds: Normal heart sounds.   Pulmonary:      Breath sounds: Decreased breath sounds present.   Abdominal:      General: Bowel sounds are normal.      Palpations: Abdomen is soft.      Tenderness: There is no abdominal tenderness.   Musculoskeletal:      Right lower leg: No edema.      Left lower leg: No edema.   Skin:     General: Skin is warm and dry.   Neurological:      Mental Status: She is alert.   Psychiatric:         Behavior: Behavior normal. Behavior is cooperative.          Relevant Results  Outside Hospital Results    Labs  Results from last 72 hours   Lab Units 09/19/24  0543 09/18/24  1836   WBC AUTO x10*3/uL 4.8 10.1   HEMOGLOBIN g/dL 16.0 18.3*   HEMATOCRIT % 44.8 50.9*   PLATELETS AUTO x10*3/uL 250 340   NEUTROS PCT AUTO %  --  75.7   LYMPHS PCT AUTO %  --  17.7   MONOS PCT AUTO %  --  6.1   EOS PCT AUTO %  --  0.0     Results from last 72 hours   Lab Units 09/18/24  1836   SODIUM mmol/L 124*   POTASSIUM mmol/L 2.8*   CHLORIDE mmol/L 77*   CO2 mmol/L 18*   BUN mg/dL 106*   CREATININE mg/dL 2.64*   GLUCOSE mg/dL 112*   CALCIUM mg/dL 9.4   ANION GAP mmol/L 32*   EGFR mL/min/1.73m*2 20*     Results from last 72 hours   Lab Units 09/18/24  1836   ALK PHOS U/L 107   BILIRUBIN TOTAL mg/dL 0.8   PROTEIN TOTAL g/dL 8.6*   ALT U/L 9   AST U/L 12   ALBUMIN g/dL 4.7     Estimated Creatinine Clearance: 20.9 mL/min (A) (by C-G formula based on SCr of 2.64 mg/dL (H)).  CRP   Date Value Ref Range Status   07/26/2023 8.55 (A) mg/dL Final     Comment:     REF VALUE  < 1.00       Sedimentation Rate   Date Value Ref Range Status   07/26/2023 62 (H) 0 - 30 mm/h Final     No results found for: \"HIV1X2\", \"HIVCONF\", \"GPHOKD3KO\"  No results found for: \"HEPCABINIT\", \"HEPCAB\", " "\"HCVPCRQUANT\"  Microbiology  Reviewed  Imaging  ECG 12 lead    Result Date: 9/19/2024  Sinus tachycardia Possible Left atrial enlargement Left axis deviation Incomplete right bundle branch block Prolonged QT Abnormal ECG When compared with ECG of 01-JUL-2023 11:24, Previous ECG has undetermined rhythm, needs review QRS axis Shifted left QT has lengthened    CT chest wo IV contrast    Result Date: 9/18/2024  Interpreted By:  Doyle Mccoy, STUDY: CT CHEST WO IV CONTRAST;  9/18/2024 9:15 pm   INDICATION: Signs/Symptoms:sob.     COMPARISON: None.   ACCESSION NUMBER(S): [id number]   ORDERING CLINICIAN: MARLON FELIZ   TECHNIQUE: Helical data acquisition of the chest was obtained  without IV contrast material.  Images were reformatted in axial, coronal, and sagittal planes.   FINDINGS: Limited study without contrast.   Heart size within normal limits. Moderate coronary artery calcification seen.   There is ascending aortic ectasia measuring 4.1 cm. Mild calcification aortic arch.   No significant adenopathy.   There is some mild emphysema suspected.   There is patchy airspace opacity seen both lung bases with some superimposed tree-in-bud opacities suspected. This may reflect an infectious or inflammatory process.   There is a slightly irregular nodule seen in the left upper lobe measuring 9 mm.   Multilevel mild spondylotic degeneration thoracic spine.   Partially visualized suspected left hydronephrosis. Similar to prior imaging. A previously present left ureteral stent not easily seen on this study. Correlate with history.       1.  Bibasilar patchy opacity may reflect infectious or inflammatory process as above. 2. 9 mm nodule left upper lobe with emphysema. Recommend short-term three-month follow-up to re-evaluate. Alternatively PET imaging could be considered.   MACRO: Critical Finding:  See findings. Notification was initiated on 9/18/2024 at 9:58 pm by  Doyle Mccoy.  (**-YCF-**) Instructions:   Signed by: Doyle " Erenmateo 9/18/2024 9:58 PM Dictation workstation:   [id number]    XR chest 1 view    Result Date: 9/18/2024  STUDY: Chest Radiograph;  09/18/2024 6:54 PM INDICATION: Chronic obstructive pulmonary disease exacerbation. COMPARISON: 07/01/2023 XR Chest. 03/18/2022 CTA Chest for PE. ACCESSION NUMBER(S): [id number] ORDERING CLINICIAN: MARLON FELIZ TECHNIQUE:  Frontal chest was obtained at 18:54 hours. FINDINGS: CARDIOMEDIASTINAL SILHOUETTE: Cardiomediastinal silhouette is normal in size and configuration.  LUNGS: There is some mild hyperinflation or chronic obstructive pulmonary disease but the lungs remain clear.  There is no edema or consolidation and no visible effusion or pneumothorax..  ABDOMEN: No remarkable upper abdominal findings.  BONES: No acute osseous changes.  Again seen is a mild scoliosis of the lower thoracic spine convex to the left.    Mild hyperinflation or deep inspiration suggesting some chronic obstructive pulmonary disease but the lungs otherwise are clear.. Signed by Patric Jara MD     Assessment/Plan   Acute hypoxic respiratory failure  Coronavirus infection  Pneumonia  Hyponatremia  Pyuria versus urinary tract infection  Resolving acute kidney injury    Continue ceftriaxone  Continue azithromycin  Continue dexamethasone  Continue remdesivir  Follow-up urine culture  Oxygen as needed  Supportive care  Monitor temperature and WBC  Marco Antonio Smith MD

## 2024-09-19 NOTE — PROGRESS NOTES
Occupational Therapy    Evaluation    Patient Name: Hortensia Rodriguez  MRN: [id number]  Department: [facility] ICU  Room: 04/04-A  Today's Date: 9/19/2024  Time Calculation  Start Time: 1540  Stop Time: 1600  Time Calculation (min): 20 min    Assessment  IP OT Assessment  OT Assessment: Pt is 58 y/o female admitted for hypoxic respiratory failure, covid +. At this time, endurance is the primary deficit and it is reasonable to expect that as she improves medically that she will return to PLOF without OT services.  Barriers to Discharge: None  Evaluation/Treatment Tolerance: Patient tolerated treatment well  End of Session Communication: Bedside nurse  End of Session Patient Position: Bed, 3 rail up, Alarm on  Plan:  No Skilled OT: No acute OT goals identified  OT Discharge Recommendations: No further acute OT, No OT needed after discharge  OT Recommended Transfer Status: Stand by assist  OT - OK to Discharge: Yes Based on completed evaluation and care plan recommendations, no barriers to discharge to next site of care       Subjective   Current Problem:  1. Acute hypoxic respiratory failure (Multi)        2. COPD exacerbation (Multi)        3. Acute respiratory failure with hypoxia (Multi)        4. COVID        5. Hypokalemia        6. KARLY (acute kidney injury) (CMS-HCC)          General:  General  Reason for Referral: acute respiratory failure, assess ADL's and safety  Referred By: Zoe Monaco, APRN-CNP  Past Medical History Relevant to Rehab: tobacco use, COPD, asthma, rectal mass, KARLY, hyponatremia, hypothyroid  Family/Caregiver Present: No  Prior to Session Communication: Bedside nurse  Patient Position Received: Alarm off, not on at start of session, Bed, 3 rail up  General Comment: BP cuff, purewick, IV, 4L 02, pulse ox, tele  Precautions:  Medical Precautions: Fall precautions, Infection precautions (COVID)    Vital Sign (Past 2hrs)        Date/Time Vitals Session Patient Position Pulse Resp SpO2 BP MAP  "(mmHg)    09/19/24 1540 --  --  --  --  96 %  --  --                   Pain:  Pain Assessment  Pain Assessment: 0-10  0-10 (Numeric) Pain Score: 0 - No pain    Objective   Cognition:  Overall Cognitive Status: Within Functional Limits     Home Living:  Type of Home: Mobile home  Lives With: Spouse (son and fiance)  Home Adaptive Equipment:  (canes and a WW)  Home Layout: One level  Home Access: Stairs to enter with rails  Entrance Stairs-Rails: Both  Bathroom Shower/Tub: Tub/shower unit  Bathroom Toilet: Standard   Prior Function:  Level of [place]: Independent with ADLs and functional transfers, Independent with homemaking with ambulation (family cooks)  Ambulatory Assistance: Independent (does report \"holding onto things\" when amb.)  Hand Dominance: Right  Prior Function Comments: (-) drives  IADL History:  Current License: No  ADL:  Eating Assistance: Modified independent (Device)  Grooming Assistance: Modified independent (Device)  UE Dressing Assistance: Stand by  UE Dressing Deficit: Setup  LE Dressing Assistance: Stand by  LE Dressing Deficit: Setup  Toileting Assistance with Device: Stand by  Activity Tolerance:  Endurance: Tolerates 10 - 20 min exercise with multiple rests  Bed Mobility/Transfers: Bed Mobility  Bed Mobility: Yes  Bed Mobility 1  Bed Mobility 1: Supine to sitting, Sitting to supine, Scooting  Level of Assistance 1: Modified independent    Transfers  Transfer: Yes  Transfer 1  Transfer From 1: Bed to  Transfer to 1: Chair with arms  Transfer Level of Assistance 1: Close supervision  Transfers 2  Transfer From 2: Chair with arms to  Transfer to 2: Bed  Transfer Level of Assistance 2: Close supervision    Functional Mobility:  Functional Mobility  Functional Mobility Performed: Yes  Functional Mobility 1  Surface 1: Level tile  Device 1: No device (steadied self on bed rail)  Assistance 1: Close supervision  Sitting Balance:  Static Sitting Balance  Static Sitting-Level of Assistance: " Modified Independent  Dynamic Sitting Balance  Dynamic Sitting-Level of Assistance: Modified independent  Standing Balance:  Static Standing Balance  Static Standing-Level of Assistance: Distant supervision  Dynamic Standing Balance  Dynamic Standing-Level of Assistance: Close supervision     IADL's:   Current License: No  Sensation:  Sensation Comment: intact     Coordination:  Movements are Fluid and Coordinated: Yes   Hand Function:  Hand Function  Gross Grasp: Functional  Coordination: Functional  Extremities: RUE   RUE : Within Functional Limits and LUE   LUE: Within Functional Limits    Outcome Measures: [facility] Daily Activity  Putting on and taking off regular lower body clothing: None  Bathing (including washing, rinsing, drying): A little  Putting on and taking off regular upper body clothing: None  Toileting, which includes using toilet, bedpan or urinal: A little  Taking care of personal grooming such as brushing teeth: None  Eating Meals: None  Daily Activity - Total Score: 22    Education Documentation  Body Mechanics, taught by Vasile Waldrop OT at 9/19/2024  4:12 PM.  Learner: Patient  Readiness: Acceptance  Method: Explanation, Demonstration  Response: Verbalizes Understanding, Demonstrated Understanding  Comment: proper breathing exercises and safety with transfers    Precautions, taught by Vasile Waldrop OT at 9/19/2024  4:12 PM.  Learner: Patient  Readiness: Acceptance  Method: Explanation, Demonstration  Response: Verbalizes Understanding, Demonstrated Understanding  Comment: proper breathing exercises and safety with transfers

## 2024-09-19 NOTE — PROGRESS NOTES
"Hortensia Rodriguez is a 59 y.o. female on day 0 of admission presenting with Acute hypoxic respiratory failure (Multi).    59-year-old female with history of tobacco use, COPD, asthma, rectal mass presents ED complaining of increased shortness of breath, patient reports using her Breo at home, she does symptoms started a couple weeks ago have been progressively getting worse denies any fevers no hemoptysis hematemesis hematochezia or melanotic stools. She does not see a pulmonologist. She reports feeling tightness in the chest, she is not oxygen dependent, she was 88% in triage and placed on 4 L via nasal cannula.     Last Recorded Vitals  Blood pressure (!) 124/91, pulse 96, temperature 35.9 °C (96.7 °F), temperature source Temporal, resp. rate 17, height 1.676 m (5' 6\"), weight 83.9 kg (185 lb), SpO2 94%.  Intake/Output last 3 Shifts:  No intake/output data recorded.             Assessment/Plan   Assessment & Plan    Hypoxic Respiratory Failure  CT chest shows bibasilar infiltrative process  -COVID-positive  -On 6 L  - on duonebs  - will see how kidney function does before starting remdesivir    Hyponatremia  Likely hypovolemic hyponatremia  -Sodium 124  -Hemoglobin 18( likely representing dehydration)  -LR 75ml/hr  - received 1 L LR and 1LR NS  - will check Na with AM labs    Hypokalemia  - k 2.8   - has been repleted  - will give an additional 40meq IV    KARLY  Cr 2.4  - baseline normal  -   -high anion gap 32 ( can be ketones or uremia)  - lactic clear  -checking ketones, glucose not that elevated  - getting IVF    Martir Barlow MD      "

## 2024-09-19 NOTE — NURSING NOTE
Patient able to sit in chair. Dyspnea noted with exertion & heart rate elevated to 107 during exertion but returned to below 100 bpm with rest.

## 2024-09-19 NOTE — CARE PLAN
The patient's goals for the shift include rest    The clinical goals for the shift include Decrease o2 demand

## 2024-09-19 NOTE — H&P
History Of Present Illness  Hortensia Rodriguez is a 59 y.o. female presenting with shortness of breath. Patient has a history of COPD and is a current smoker, does not wear home O2. She reports worsening shortness of breath and a dry cough at home, no chest pain. She has had a poor appetite and some nausea; denies vomiting or diarrhea. She has not had any fevers at home. She came to the ED for further evaluation. She was placed on 4L O2 via NC on arrival for SpO2 89% on room air. Labs were significant for Na 124, K 2.8, BUN/Cr 106/2.64, mag 3.24, H/H 18.3/50.9. VBG showed pH 7.41/pCO2 27/pO2 52. CT chest showed bibasilar patchy opacities and a 9 mm left upper lobe nodule with emphysema. Patient was found to be COVID positive. She was admitted to ICU for treatment of COVID-19 pneumonia with hypoxia.      Past Medical History  Past Medical History:   Diagnosis Date    Abdominal pain 07/24/2023    Abnormal electrocardiogram (ECG) (EKG) 12/16/2015    Abnormal ECG    Anxiety     Anxiety disorder, unspecified 08/31/2015    Anxiety    Asthma (HHS-HCC)     Complicated UTI (urinary tract infection) 07/24/2023    COPD (chronic obstructive pulmonary disease) (Multi)     Delayed emergence from general anesthesia     Disease of thyroid gland     Encounter for preprocedural cardiovascular examination 10/25/2015    Pre-operative cardiovascular examination    Flank pain 07/24/2023    Hypertension     Hypomagnesemia 10/14/2015    Hypomagnesemia    Personal history of other diseases of the circulatory system 08/31/2015    History of chronic ischemic heart disease    Personal history of other diseases of the female genital tract 10/28/2015    History of ovarian cyst    Personal history of other diseases of urinary system 01/04/2016    History of hematuria    Personal history of other diseases of urinary system 10/16/2015    History of kidney disease    Personal history of other specified conditions 10/16/2015    History of pelvic  mass    Personal history of other specified conditions 09/30/2015    History of fatigue    Personal history of pneumonia (recurrent) 02/26/2019    History of pneumonia    Right hip pain 07/24/2023    Strain of muscle, fascia and tendon of abdomen, initial encounter 09/17/2015    Abdominal muscle strain       Surgical History  Past Surgical History:   Procedure Laterality Date    APPENDECTOMY  08/31/2015    Appendectomy    FOOT SURGERY  08/31/2015    Foot Surgery    ORIF ANKLE FRACTURE          Social History  She reports that she has been smoking cigarettes. She has never used smokeless tobacco. She reports current alcohol use. She reports that she does not use drugs.    Family History  Family History   Problem Relation Name Age of Onset    No Known Problems Mother      No Known Problems Father      Breast cancer Sister          Allergies  Ace inhibitors and Lisinopril    Review of Systems   Constitutional:  Positive for activity change, appetite change and fatigue. Negative for chills and fever.   Respiratory:  Positive for cough (dry) and shortness of breath.    Cardiovascular:  Negative for chest pain and leg swelling.   Gastrointestinal:  Positive for nausea. Negative for diarrhea and vomiting.   Genitourinary:  Negative for dysuria.   All other systems reviewed and are negative.       Physical Exam  Constitutional:       General: She is not in acute distress.     Appearance: She is not toxic-appearing.   HENT:      Head: Normocephalic and atraumatic.      Mouth/Throat:      Mouth: Mucous membranes are moist.   Eyes:      Conjunctiva/sclera: Conjunctivae normal.   Cardiovascular:      Rate and Rhythm: Normal rate and regular rhythm.   Pulmonary:      Effort: No respiratory distress.      Breath sounds: Rales present. No wheezing.      Comments: On 4L O2 via NC  Abdominal:      General: There is no distension.      Palpations: Abdomen is soft.      Tenderness: There is no abdominal tenderness.   Musculoskeletal:  "     Right lower leg: No edema.      Left lower leg: No edema.   Skin:     General: Skin is warm and dry.   Neurological:      Mental Status: She is alert and oriented to person, place, and time.   Psychiatric:         Mood and Affect: Mood normal.         Behavior: Behavior normal.          Last Recorded Vitals  Blood pressure (!) 117/105, pulse 97, temperature 36.7 °C (98.1 °F), temperature source Temporal, resp. rate 23, height 1.676 m (5' 6\"), weight 65.9 kg (145 lb 4.5 oz), SpO2 94%.    Relevant Results        Scheduled medications  albuterol, 2 puff, inhalation, TID  azithromycin, 500 mg, intravenous, q24h  cefTRIAXone, 1 g, intravenous, q24h  dexAMETHasone, 6 mg, oral, Daily  heparin, 5,000 Units, subcutaneous, q8h  oxygen, , inhalation, Continuous - Inhalation  oxygen, , inhalation, Continuous - Inhalation  pantoprazole, 40 mg, oral, Daily before breakfast   Or  pantoprazole, 40 mg, intravenous, Daily before breakfast  [START ON 9/20/2024] remdesivir, 100 mg, intravenous, q24h  sennosides-docusate sodium, 1 tablet, oral, BID      Continuous medications  potassium chloride in 0.9%NaCl, 75 mL/hr, Last Rate: 75 mL/hr (09/19/24 0830)  sodium chloride 0.9%, 10 mL/hr      PRN medications  PRN medications: acetaminophen, acetaminophen, albuterol, benzocaine-menthol, dextromethorphan-guaifenesin, guaiFENesin, melatonin, ondansetron **OR** ondansetron, sodium chloride 0.9%    Results for orders placed or performed during the hospital encounter of 09/18/24 (from the past 24 hour(s))   CBC and Auto Differential   Result Value Ref Range    WBC 10.1 4.4 - 11.3 x10*3/uL    nRBC 0.0 0.0 - 0.0 /100 WBCs    RBC 6.10 (H) 4.00 - 5.20 x10*6/uL    Hemoglobin 18.3 (H) 12.0 - 16.0 g/dL    Hematocrit 50.9 (H) 36.0 - 46.0 %    MCV 83 80 - 100 fL    MCH 30.0 26.0 - 34.0 pg    MCHC 36.0 32.0 - 36.0 g/dL    RDW 13.5 11.5 - 14.5 %    Platelets 340 150 - 450 x10*3/uL    Neutrophils % 75.7 40.0 - 80.0 %    Immature Granulocytes %, " Automated 0.4 0.0 - 0.9 %    Lymphocytes % 17.7 13.0 - 44.0 %    Monocytes % 6.1 2.0 - 10.0 %    Eosinophils % 0.0 0.0 - 6.0 %    Basophils % 0.1 0.0 - 2.0 %    Neutrophils Absolute 7.63 1.20 - 7.70 x10*3/uL    Immature Granulocytes Absolute, Automated 0.04 0.00 - 0.70 x10*3/uL    Lymphocytes Absolute 1.78 1.20 - 4.80 x10*3/uL    Monocytes Absolute 0.61 0.10 - 1.00 x10*3/uL    Eosinophils Absolute 0.00 0.00 - 0.70 x10*3/uL    Basophils Absolute 0.01 0.00 - 0.10 x10*3/uL   Magnesium   Result Value Ref Range    Magnesium 3.24 (H) 1.60 - 2.40 mg/dL   Comprehensive metabolic panel   Result Value Ref Range    Glucose 112 (H) 74 - 99 mg/dL    Sodium 124 (L) 136 - 145 mmol/L    Potassium 2.8 (LL) 3.5 - 5.3 mmol/L    Chloride 77 (L) 98 - 107 mmol/L    Bicarbonate 18 (L) 21 - 32 mmol/L    Anion Gap 32 (H) 10 - 20 mmol/L    Urea Nitrogen 106 (HH) 6 - 23 mg/dL    Creatinine 2.64 (H) 0.50 - 1.05 mg/dL    eGFR 20 (L) >60 mL/min/1.73m*2    Calcium 9.4 8.6 - 10.3 mg/dL    Albumin 4.7 3.4 - 5.0 g/dL    Alkaline Phosphatase 107 33 - 136 U/L    Total Protein 8.6 (H) 6.4 - 8.2 g/dL    AST 12 9 - 39 U/L    Bilirubin, Total 0.8 0.0 - 1.2 mg/dL    ALT 9 7 - 45 U/L   Lactate   Result Value Ref Range    Lactate 1.9 0.4 - 2.0 mmol/L   Protime-INR   Result Value Ref Range    Protime 11.2 9.8 - 12.8 seconds    INR 1.0 0.9 - 1.1   B-Type Natriuretic Peptide   Result Value Ref Range    BNP 38 0 - 99 pg/mL   Blood Gas Venous Full Panel   Result Value Ref Range    POCT pH, Venous 7.41 7.33 - 7.43 pH    POCT pCO2, Venous 27 (L) 41 - 51 mm Hg    POCT pO2, Venous 52 (H) 35 - 45 mm Hg    POCT SO2, Venous 83 (H) 45 - 75 %    POCT Oxy Hemoglobin, Venous 80.4 (H) 45.0 - 75.0 %    POCT Hematocrit Calculated, Venous 56.0 (H) 36.0 - 46.0 %    POCT Sodium, Venous 119 (LL) 136 - 145 mmol/L    POCT Potassium, Venous 2.6 (LL) 3.5 - 5.3 mmol/L    POCT Chloride, Venous 81 (L) 98 - 107 mmol/L    POCT Ionized Calicum, Venous 0.96 (L) 1.10 - 1.33 mmol/L    POCT  Glucose, Venous 117 (H) 74 - 99 mg/dL    POCT Lactate, Venous 2.2 (H) 0.4 - 2.0 mmol/L    POCT Base Excess, Venous -5.4 (L) -2.0 - 3.0 mmol/L    POCT HCO3 Calculated, Venous 17.1 (L) 22.0 - 26.0 mmol/L    POCT Hemoglobin, Venous 18.6 (H) 12.0 - 16.0 g/dL    POCT Anion Gap, Venous 24.0 10.0 - 25.0 mmol/L    Patient Temperature      FiO2 44 %   Troponin I, High Sensitivity, Initial   Result Value Ref Range    Troponin I, High Sensitivity 7 0 - 13 ng/L   Sars-CoV-2 PCR   Result Value Ref Range    Coronavirus 2019, PCR Detected (A) Not Detected   Troponin, High Sensitivity, 1 Hour   Result Value Ref Range    Troponin I, High Sensitivity 6 0 - 13 ng/L   Blood Gas Lactic Acid, Venous   Result Value Ref Range    POCT Lactate, Venous 2.5 (H) 0.4 - 2.0 mmol/L   BLOOD GAS VENOUS FULL PANEL   Result Value Ref Range    POCT pH, Venous 7.41 7.33 - 7.43 pH    POCT pCO2, Venous 28 (L) 41 - 51 mm Hg    POCT pO2, Venous 47 (H) 35 - 45 mm Hg    POCT SO2, Venous 75 45 - 75 %    POCT Oxy Hemoglobin, Venous 73.1 45.0 - 75.0 %    POCT Hematocrit Calculated, Venous 53.0 (H) 36.0 - 46.0 %    POCT Sodium, Venous 120 (LL) 136 - 145 mmol/L    POCT Potassium, Venous 2.4 (LL) 3.5 - 5.3 mmol/L    POCT Chloride, Venous 82 (L) 98 - 107 mmol/L    POCT Ionized Calicum, Venous 1.00 (L) 1.10 - 1.33 mmol/L    POCT Glucose, Venous 115 (H) 74 - 99 mg/dL    POCT Lactate, Venous 2.5 (H) 0.4 - 2.0 mmol/L    POCT Base Excess, Venous -5.1 (L) -2.0 - 3.0 mmol/L    POCT HCO3 Calculated, Venous 17.7 (L) 22.0 - 26.0 mmol/L    POCT Hemoglobin, Venous 17.8 (H) 12.0 - 16.0 g/dL    POCT Anion Gap, Venous 23.0 10.0 - 25.0 mmol/L    Patient Temperature      FiO2 44 %   Beta Hydroxybutyrate   Result Value Ref Range    Beta-Hydroxybutyrate 5.94 (H) 0.02 - 0.27 mmol/L   Basic metabolic panel   Result Value Ref Range    Glucose 167 (H) 74 - 99 mg/dL    Sodium 126 (L) 136 - 145 mmol/L    Potassium 3.0 (L) 3.5 - 5.3 mmol/L    Chloride 86 (L) 98 - 107 mmol/L    Bicarbonate 20  (L) 21 - 32 mmol/L    Anion Gap 23 (H) 10 - 20 mmol/L    Urea Nitrogen 91 (HH) 6 - 23 mg/dL    Creatinine 1.88 (H) 0.50 - 1.05 mg/dL    eGFR 30 (L) >60 mL/min/1.73m*2    Calcium 8.9 8.6 - 10.3 mg/dL   CBC   Result Value Ref Range    WBC 4.8 4.4 - 11.3 x10*3/uL    nRBC 0.0 0.0 - 0.0 /100 WBCs    RBC 5.30 (H) 4.00 - 5.20 x10*6/uL    Hemoglobin 16.0 12.0 - 16.0 g/dL    Hematocrit 44.8 36.0 - 46.0 %    MCV 85 80 - 100 fL    MCH 30.2 26.0 - 34.0 pg    MCHC 35.7 32.0 - 36.0 g/dL    RDW 13.2 11.5 - 14.5 %    Platelets 250 150 - 450 x10*3/uL   Magnesium   Result Value Ref Range    Magnesium 2.81 (H) 1.60 - 2.40 mg/dL   Phosphorus   Result Value Ref Range    Phosphorus 3.2 2.5 - 4.9 mg/dL   SST TOP   Result Value Ref Range    Extra Tube Hold for add-ons.    D-Dimer, VTE Exclusion   Result Value Ref Range    D-Dimer, Quantitative VTE Exclusion 429 <=500 ng/mL FEU       CT chest wo IV contrast    Result Date: 9/18/2024  Interpreted By:  Doyle Mccoy, STUDY: CT CHEST WO IV CONTRAST;  9/18/2024 9:15 pm   INDICATION: Signs/Symptoms:sob.     COMPARISON: None.   ACCESSION NUMBER(S): [id number]   ORDERING CLINICIAN: MARLON FELIZ   TECHNIQUE: Helical data acquisition of the chest was obtained  without IV contrast material.  Images were reformatted in axial, coronal, and sagittal planes.   FINDINGS: Limited study without contrast.   Heart size within normal limits. Moderate coronary artery calcification seen.   There is ascending aortic ectasia measuring 4.1 cm. Mild calcification aortic arch.   No significant adenopathy.   There is some mild emphysema suspected.   There is patchy airspace opacity seen both lung bases with some superimposed tree-in-bud opacities suspected. This may reflect an infectious or inflammatory process.   There is a slightly irregular nodule seen in the left upper lobe measuring 9 mm.   Multilevel mild spondylotic degeneration thoracic spine.   Partially visualized suspected left hydronephrosis. Similar to  prior imaging. A previously present left ureteral stent not easily seen on this study. Correlate with history.       1.  Bibasilar patchy opacity may reflect infectious or inflammatory process as above. 2. 9 mm nodule left upper lobe with emphysema. Recommend short-term three-month follow-up to re-evaluate. Alternatively PET imaging could be considered.   MACRO: Critical Finding:  See findings. Notification was initiated on 9/18/2024 at 9:58 pm by  Doyle Mccoy.  (**-YCF-**) Instructions:   Signed by: Doyle Mccoy 9/18/2024 9:58 PM Dictation workstation:   [id number]    XR chest 1 view    Result Date: 9/18/2024  STUDY: Chest Radiograph;  09/18/2024 6:54 PM INDICATION: Chronic obstructive pulmonary disease exacerbation. COMPARISON: 07/01/2023 XR Chest. 03/18/2022 CTA Chest for PE. ACCESSION NUMBER(S): [id number] ORDERING CLINICIAN: MARLON FELIZ TECHNIQUE:  Frontal chest was obtained at 18:54 hours. FINDINGS: CARDIOMEDIASTINAL SILHOUETTE: Cardiomediastinal silhouette is normal in size and configuration.  LUNGS: There is some mild hyperinflation or chronic obstructive pulmonary disease but the lungs remain clear.  There is no edema or consolidation and no visible effusion or pneumothorax..  ABDOMEN: No remarkable upper abdominal findings.  BONES: No acute osseous changes.  Again seen is a mild scoliosis of the lower thoracic spine convex to the left.    Mild hyperinflation or deep inspiration suggesting some chronic obstructive pulmonary disease but the lungs otherwise are clear.. Signed by Patric Jara MD        Assessment/Plan   Assessment & Plan  Acute hypoxic respiratory failure (Multi)    COPD (chronic obstructive pulmonary disease) (Multi)    Hypothyroidism    Hypokalemia    Hypercholesteremia    Osteoarthritis of hip    Tobacco use    COVID-19    KARLY (acute kidney injury) (CMS-HCC)    Hyponatremia      #Acute hypoxic respiratory failure 2/2 COVID-19 pneumonia  #COPD, in acute exacerbation  #Asthma    #Tobacco use  - COVID Positive 9/18/24  - D-dimer 429   - Ferritin, CRP, procal pending   - CT chest: Bibasilar patchy opacity may reflect infectious or inflammatory process as above. 2. 9 mm nodule left upper lobe with emphysema. Recommend short-term three-month follow-up to re-evaluate. Alternatively PET imaging could be considered.   - Remdesivir day 1/5  - Dexamethasone day 2/10  - Start azithromycin and ceftriaxone for CAP coverage (day 1)   - Currently on 4L O2 via NC, wean as tolerated   - Home oxygen None   - RT to eval and treat; breathing treatments via MDI  - Breo ordered; takes Symbicort at home   - Isolation protocol for COVID-19  - Tylenol PRN for fever and pain   - Inflammatory markers q72h  - Telemetry monitoring   - Encourage tobacco cessation on discharge; patient declines nicotine patch   - ID consulted, appreciate recs     #Dehydration (improving)  #KARLY (improving)   #Hyponatremia  #Hypokalemia  - Labs on admission consistent with dehydration, patient reports poor oral intake x 1 week due to no appetite/nausea  - Received a total of 1.5L LR and 40 mEq KCl in the ED   - BUN/Cr 106/2.64 > 91/1.88 today  - Na 124 > 126  - K 2.8 > 3.0  - Mag 3.24 > 2.81   - Lactate 1.9 on admission   - IVF changed to NS with KCl @ 75 ml/hr  - Avoid nephrotoxins/renally dose meds- home Mobic held; resume as renal function permits   - Monitor UOP; strict I&Os ordered   - Encourage PO intake  - Daily BMP and mag level to monitor renal function and electrolytes    #HLD  - Trop 7 > 6 on admission   - BNP 38  - Continue ezetimibe 10 mg every day    #Hypothyroidism  - Continue levothyroxine 25 mcg every day    #Osteoarthritis    - Mobic held on admission d/t KARLY, resume as renal function permits  - Tylenol PRN for mild pain   - PT/OT evals     DVT PPx: Heparin   GI PPx: Protonix   Diet: Regular   Code Status: Full     Disposition: Patient requires inpatient management at this time.       Shanell Ewing PA-C

## 2024-09-19 NOTE — DISCHARGE INSTR - OTHER ORDERS
Thank you for choosing [facility] for your Health Care needs.  Also, thank you for allowing us to take you and your families preferences into account when determining your discharge plan.  Stay well!    Your Care Transitions Team Member:Roseanne Kilpatrick Robin and Concepcion [phone]    For questions about your medications listed on your discharge instructions, please call the Nurses Station at [phone].      211 is a free community service that provides information about social health and government resources 24 hours a day.  It provides Human services agencies, food and shelter providers,  resources, special services for senior and volunteer opportunities.  [email or web address]     Call [facility] - they have transportation services that can assist you with getting to appointments.

## 2024-09-19 NOTE — PROGRESS NOTES
Physical Therapy    Physical Therapy Evaluation    Patient Name: Hortensia Rodriguez  MRN: [id number]  Department: [facility] ICU  Room: 04/04-A  Today's Date: 9/19/2024   Time Calculation  Start Time: 1120  Stop Time: 1140  Time Calculation (min): 20 min    Assessment/Plan   PT Assessment  PT Assessment Results: Decreased strength, Decreased endurance, Impaired balance, Decreased mobility  Rehab Prognosis: Good  Barriers to Discharge: n/a  Evaluation/Treatment Tolerance: Patient limited by fatigue  Medical Staff Made Aware: Yes  Strengths: Ability to acquire knowledge  Barriers to Participation:  (n/a)  End of Session Communication: Bedside nurse  Assessment Comment: Pleasant 59 y.o presents with weakness and decreased activity tolerance. Pt. lives with family and is normally IND (does report holding onto things when amb.) Pt. currently requires CGA when amb. (with WW). Recommend cont. PT to address above noted limitations and prevent further decline. Anticipate low intensity once pt. starts to improve medically but recommend MOD intensity at this point as pt. will have difficulty managing at home at this level.  End of Session Patient Position: Up in chair, Alarm on  IP OR SWING BED PT PLAN  Inpatient or Swing Bed: Inpatient  PT Plan  Treatment/Interventions: Bed mobility, Transfer training, Gait training, Stair training, Balance training, Strengthening, Endurance training, Therapeutic exercise, Therapeutic activity  PT Plan: Ongoing PT  PT Frequency: 3 times per week  PT Discharge Recommendations:  (MOD vs. LOW intensity depending on progress)  PT Recommended Transfer Status: Assist x1  PT - OK to Discharge: Yes Based on completed evaluation and care plan recommendations, no barriers to discharge to next site of care        Subjective   General Visit Information:  General  Reason for Referral: impaired mobility, acute respiratory failure  Referred By: LAN Doran-CNP  Past Medical History Relevant to Rehab:  "tobacco use, COPD, asthma, rectal mass  Prior to Session Communication: Bedside nurse  Patient Position Received: Bed, 2 rail up, Alarm off, not on at start of session  General Comment: BP cuff, purewick, IV, 4L 02, pulse ox, tele  Home Living:  Home Living  Type of Home: Mobile home  Lives With: Spouse (son and fiance)  Home Adaptive Equipment:  (canes and a WW)  Home Layout: One level  Home Access: Stairs to enter with rails  Entrance Stairs-Rails: Both  Entrance Stairs-Number of Steps: 3  Bathroom Shower/Tub: Tub/shower unit  Bathroom Toilet: Standard  Prior Level of Function:  Prior Function Per Pt/Caregiver Report  Level of [place]: Independent with ADLs and functional transfers, Independent with homemaking with ambulation (family cooks)  Ambulatory Assistance: Independent (does report \"holding onto things\" when amb.)  Prior Function Comments: (-) drives  Precautions:  Precautions  Medical Precautions: Fall precautions, Infection precautions (COVID)    Vital Signs (Past 2hrs)        Date/Time Vitals Session Patient Position Pulse Resp SpO2 BP MAP (mmHg)    09/19/24 1120 --  --  94  --  97 %  136/84  --                         Objective   Pain:  Pain Assessment  Pain Assessment: 0-10  0-10 (Numeric) Pain Score: 0 - No pain  Cognition:  Cognition  Overall Cognitive Status: Within Functional Limits    General Assessments:     Strength  Strength Comments: BLE: grossly 4-/5  Coordination  Movements are Fluid and Coordinated: Yes    Dynamic Sitting Balance  Dynamic Sitting-Comments: normal    Static Standing Balance  Static Standing-Comment/Number of Minutes: good-  Dynamic Standing Balance  Dynamic Standing-Comments: good- (pt. reaching for things); recommend AD  Functional Assessments:  Bed Mobility  Bed Mobility: Yes  Bed Mobility 1  Bed Mobility 1: Supine to sitting  Level of Assistance 1: Close supervision    Transfers  Transfer: Yes  Transfer 1  Technique 1: Sit to stand, Stand to sit  Transfer Device 1:  " (no device first attempt; second attempt with WW)  Transfer Level of Assistance 1: Close supervision, Minimal verbal cues    Ambulation/Gait Training  Ambulation/Gait Training Performed: Yes  Ambulation/Gait Training 1  Assistance 1: Contact guard  Quality of Gait 1: Decreased step length (HR increased into 100's with amb. and pt. appeared easily  fatigued/SOB. SPO2 >92%. RPE: 5/10)  Comments/Distance (ft) 1: 5' x 2 (once with no AD; second trial with WW (recommend WW at this time))  Extremity/Trunk Assessments:  RLE   RLE : Within Functional Limits  LLE   LLE : Within Functional Limits  Outcome Measures:  [facility] Basic Mobility  Turning from your back to your side while in a flat bed without using bedrails: None  Moving from lying on your back to sitting on the side of a flat bed without using bedrails: A little  Moving to and from bed to chair (including a wheelchair): A little  Standing up from a chair using your arms (e.g. wheelchair or bedside chair): A little  To walk in hospital room: A little  Climbing 3-5 steps with railing: A lot  Basic Mobility - Total Score: 18    Encounter Problems       Encounter Problems (Active)       Balance       STG - Maintains dynamic standing balance without upper extremity support with good balance        Start:  09/19/24    Expected End:  10/03/24               Mobility       LTG - Patient will ambulate household distance IND with LRD       Start:  09/19/24    Expected End:  10/03/24            STG - Patient will ascend and descend four to six stairs with 2 rails IND       Start:  09/19/24    Expected End:  10/03/24               PT Transfers       STG - Patient will perform bed mobility IND       Start:  09/19/24    Expected End:  10/03/24            STG - Patient will transfer sit to and from stand DAVID with LRD        Start:  09/19/24    Expected End:  10/03/24               Pain - Adult              Education Documentation  Mobility Training, taught by Teresa Rodríguez, PT at  9/19/2024 12:41 PM.  Learner: Patient  Readiness: Acceptance  Method: Explanation  Response: Verbalizes Understanding  Comment: Educated pt. on PT POC    Education Comments  No comments found.

## 2024-09-19 NOTE — CONSULTS
Reason For Consult  COPD Navigator Consult    History Of Present Illness  Hortensia Rodriguez is a 59 y.o. female  CCU #4     Patient uses Symbicort inhaler at home. I gave her a spacer to use for home use and showed her proper technique to get the best results.  Patient rinses her mouth after each use and understands the importance of rinsing her mouth so she doesn't get thrush.   Patient was informed that she has copd aprox 5 yrs ago.  She denies having PFT's or 6 min walk tests done.   Patient does not have a pulmonary DR.  I gave her Dr Graham  information and encouraged her the importance of having a pulmonary doctor.   Patient is a current smoker.  She has smoked for 35 yrs. I gave her smoking cessation information and pulmonary rehab on upcoming classes.  I offered the patient to wear the nicotine patch while in hospital but she refused.   Her PCP is Dr Peter. She sees him twice a yr. Her last apt. Was aprox.  5 months ago.   Patient was given COPD booklet.  Patient was very receptive to all information given.                    Roxana Montilla, RRT

## 2024-09-19 NOTE — CARE PLAN
The patient's goals for the shift include be free of injury.Patient free of injury this shift.patient able to ambulate with use of walker to chair from bed.  Dyspnea noted with exertion but patient quickly recovers with rest.

## 2024-09-19 NOTE — PROGRESS NOTES
09/19/24 1201   Discharge Planning   Living Arrangements Spouse/significant other;Children;Family members  (spouse, son, daughter in law)   Support Systems Spouse/significant other;Children;Family members   Assistance Needed A & O x3; Independent with ADLS, IADLs, ambulation and does not drive.  Patient's daughter in law drives; Son and DIL do the shopping;  No DME in the home.   Type of Residence Private residence  (Trailer)   Number of Stairs to Enter Residence 3   Number of Stairs Within Residence 0   Do you have animals or pets at home? Yes   Type of Animals or Pets dog -2   Home or Post Acute Services None   Expected Discharge Disposition Home   Does the patient need discharge transport arranged? No   Financial Resource Strain   How hard is it for you to pay for the very basics like food, housing, medical care, and heating? Not hard   Housing Stability   In the last 12 months, was there a time when you were not able to pay the mortgage or rent on time? N   In the past 12 months, how many times have you moved where you were living? 0   At any time in the past 12 months, were you homeless or living in a shelter (including now)? N   Transportation Needs   In the past 12 months, has lack of transportation kept you from medical appointments or from getting medications? no   In the past 12 months, has lack of transportation kept you from meetings, work, or from getting things needed for daily living? No     TCC spoke with patient regarding discharge planning and home going needs. Patient lives  in a trailer with her  Ayden,  Son Meek and daughter in law Brissa.  Patient says she is usually independent with ADLS and she doesn't go out of the house often.  Confirmed with patient PCP is  .Dr. Ewelina Love and pharmacy is [facility] in [place].  Discharge Plan is for patient to return home and her daughter in law should be able to pick her up.  TCC will continue to follow for changes in discharge  planning needs.

## 2024-09-19 NOTE — NURSING NOTE
Positive UA reported to OLESYA Foster. No new orders due to proper antibiotic being ordered. Urine culture in progress.

## 2024-09-20 LAB
ALBUMIN SERPL BCP-MCNC: 3.6 G/DL (ref 3.4–5)
ALP SERPL-CCNC: 60 U/L (ref 33–136)
ALT SERPL W P-5'-P-CCNC: 6 U/L (ref 7–45)
ANION GAP SERPL CALC-SCNC: 12 MMOL/L (ref 10–20)
AST SERPL W P-5'-P-CCNC: 10 U/L (ref 9–39)
BILIRUB SERPL-MCNC: 0.3 MG/DL (ref 0–1.2)
BUN SERPL-MCNC: 61 MG/DL (ref 6–23)
CALCIUM SERPL-MCNC: 8.4 MG/DL (ref 8.6–10.3)
CHLORIDE SERPL-SCNC: 102 MMOL/L (ref 98–107)
CO2 SERPL-SCNC: 23 MMOL/L (ref 21–32)
CREAT SERPL-MCNC: 1.37 MG/DL (ref 0.5–1.05)
EGFRCR SERPLBLD CKD-EPI 2021: 44 ML/MIN/1.73M*2
ERYTHROCYTE [DISTWIDTH] IN BLOOD BY AUTOMATED COUNT: 13.8 % (ref 11.5–14.5)
GLUCOSE SERPL-MCNC: 129 MG/DL (ref 74–99)
HCT VFR BLD AUTO: 37.5 % (ref 36–46)
HGB BLD-MCNC: 13.2 G/DL (ref 12–16)
HOLD SPECIMEN: NORMAL
MAGNESIUM SERPL-MCNC: 2.29 MG/DL (ref 1.6–2.4)
MCH RBC QN AUTO: 30.2 PG (ref 26–34)
MCHC RBC AUTO-ENTMCNC: 35.2 G/DL (ref 32–36)
MCV RBC AUTO: 86 FL (ref 80–100)
NRBC BLD-RTO: 0 /100 WBCS (ref 0–0)
PLATELET # BLD AUTO: 213 X10*3/UL (ref 150–450)
POTASSIUM SERPL-SCNC: 3.1 MMOL/L (ref 3.5–5.3)
PROCALCITONIN SERPL-MCNC: 0.14 NG/ML
PROT SERPL-MCNC: 6.2 G/DL (ref 6.4–8.2)
RBC # BLD AUTO: 4.37 X10*6/UL (ref 4–5.2)
SODIUM SERPL-SCNC: 134 MMOL/L (ref 136–145)
WBC # BLD AUTO: 6.9 X10*3/UL (ref 4.4–11.3)

## 2024-09-20 PROCEDURE — 94640 AIRWAY INHALATION TREATMENT: CPT | Mod: IPSPLIT

## 2024-09-20 PROCEDURE — 36415 COLL VENOUS BLD VENIPUNCTURE: CPT | Mod: IPSPLIT | Performed by: NURSE PRACTITIONER

## 2024-09-20 PROCEDURE — 2500000004 HC RX 250 GENERAL PHARMACY W/ HCPCS (ALT 636 FOR OP/ED): Mod: IPSPLIT

## 2024-09-20 PROCEDURE — 2500000001 HC RX 250 WO HCPCS SELF ADMINISTERED DRUGS (ALT 637 FOR MEDICARE OP): Mod: IPSPLIT

## 2024-09-20 PROCEDURE — 85027 COMPLETE CBC AUTOMATED: CPT | Mod: IPSPLIT | Performed by: NURSE PRACTITIONER

## 2024-09-20 PROCEDURE — 99233 SBSQ HOSP IP/OBS HIGH 50: CPT

## 2024-09-20 PROCEDURE — 94640 AIRWAY INHALATION TREATMENT: CPT

## 2024-09-20 PROCEDURE — 2500000002 HC RX 250 W HCPCS SELF ADMINISTERED DRUGS (ALT 637 FOR MEDICARE OP, ALT 636 FOR OP/ED): Mod: IPSPLIT

## 2024-09-20 PROCEDURE — 1200000002 HC GENERAL ROOM WITH TELEMETRY DAILY: Mod: IPSPLIT

## 2024-09-20 PROCEDURE — 83735 ASSAY OF MAGNESIUM: CPT | Mod: IPSPLIT

## 2024-09-20 PROCEDURE — 94760 N-INVAS EAR/PLS OXIMETRY 1: CPT | Mod: IPSPLIT

## 2024-09-20 PROCEDURE — 2500000001 HC RX 250 WO HCPCS SELF ADMINISTERED DRUGS (ALT 637 FOR MEDICARE OP): Mod: IPSPLIT | Performed by: NURSE PRACTITIONER

## 2024-09-20 PROCEDURE — 94664 DEMO&/EVAL PT USE INHALER: CPT | Mod: IPSPLIT

## 2024-09-20 PROCEDURE — 80053 COMPREHEN METABOLIC PANEL: CPT | Mod: IPSPLIT

## 2024-09-20 PROCEDURE — 2500000005 HC RX 250 GENERAL PHARMACY W/O HCPCS: Mod: IPSPLIT | Performed by: INTERNAL MEDICINE

## 2024-09-20 RX ORDER — AZITHROMYCIN 250 MG/1
500 TABLET, FILM COATED ORAL
Status: COMPLETED | OUTPATIENT
Start: 2024-09-21 | End: 2024-09-23

## 2024-09-20 RX ORDER — POTASSIUM CHLORIDE 20 MEQ/1
40 TABLET, EXTENDED RELEASE ORAL ONCE
Status: COMPLETED | OUTPATIENT
Start: 2024-09-20 | End: 2024-09-20

## 2024-09-20 ASSESSMENT — PAIN SCALES - GENERAL
PAINLEVEL_OUTOF10: 0 - NO PAIN

## 2024-09-20 ASSESSMENT — COGNITIVE AND FUNCTIONAL STATUS - GENERAL
MOBILITY SCORE: 24
HELP NEEDED FOR BATHING: A LITTLE
TOILETING: A LITTLE
EATING MEALS: A LITTLE
DRESSING REGULAR UPPER BODY CLOTHING: A LITTLE
DAILY ACTIVITIY SCORE: 18
PERSONAL GROOMING: A LITTLE
DRESSING REGULAR LOWER BODY CLOTHING: A LITTLE

## 2024-09-20 ASSESSMENT — PAIN - FUNCTIONAL ASSESSMENT
PAIN_FUNCTIONAL_ASSESSMENT: 0-10

## 2024-09-20 NOTE — PROGRESS NOTES
"Hortensia Rodriguez is a 59 y.o. female on day 2 of admission presenting with Acute hypoxic respiratory failure (Multi).    Subjective     No overnight events reported.     Patient sitting up in bed eating breakfast on 3L O2 this morning. She continues to have a dry cough, no chest pain. She does not feel short of breath at rest. PO intake has been fair, she remains on NS with KCl @ 75 ml/hr. She reported one episode of loose stool overnight to RN; none reported today. Patient denies any abdominal pain, nausea, or vomiting. Will send stool sample for pathogens if patient continues to have loose stools. Plan of care discussed with patient, all questions answered.         Objective     Physical Exam  Constitutional:       General: She is not in acute distress.     Appearance: She is not toxic-appearing.   HENT:      Head: Normocephalic and atraumatic.      Mouth/Throat:      Mouth: Mucous membranes are moist.   Eyes:      Conjunctiva/sclera: Conjunctivae normal.   Cardiovascular:      Rate and Rhythm: Normal rate and regular rhythm.   Pulmonary:      Effort: No respiratory distress.      Breath sounds: Wheezing and rales present.      Comments: On 3L O2  Abdominal:      General: There is no distension.      Palpations: Abdomen is soft.      Tenderness: There is no abdominal tenderness.   Musculoskeletal:      Right lower leg: No edema.      Left lower leg: No edema.   Skin:     General: Skin is warm and dry.   Neurological:      Mental Status: She is alert and oriented to person, place, and time.   Psychiatric:         Mood and Affect: Mood normal.         Behavior: Behavior normal.         Last Recorded Vitals  Blood pressure 132/87, pulse 75, temperature 36.7 °C (98.1 °F), temperature source Temporal, resp. rate 15, height 1.676 m (5' 6\"), weight 67.6 kg (149 lb 0.5 oz), SpO2 100%.  Intake/Output last 3 Shifts:  I/O last 3 completed shifts:  In: 3678.3 (54.4 mL/kg) [P.O.:1120; I.V.:2008.3 (29.7 mL/kg); IV " Piggyback:550]  Out: 1600 (23.7 mL/kg) [Urine:1600 (0.7 mL/kg/hr)]  Weight: 67.6 kg     Relevant Results     This patient currently has cardiac telemetry ordered; if you would like to modify or discontinue the telemetry order, click here to go to the orders activity to modify/discontinue the order.      Scheduled medications  albuterol, 2 puff, inhalation, TID  azithromycin, 500 mg, intravenous, q24h  cefTRIAXone, 1 g, intravenous, q24h  dexAMETHasone, 6 mg, oral, Daily  ezetimibe, 10 mg, oral, Daily  fluticasone furoate-vilanteroL, 1 puff, inhalation, Daily  heparin, 5,000 Units, subcutaneous, q8h  levothyroxine, 25 mcg, oral, Daily  [Held by provider] meloxicam, 15 mg, oral, Daily  oxygen, , inhalation, Continuous - Inhalation  pantoprazole, 40 mg, oral, Daily before breakfast  remdesivir, 100 mg, intravenous, q24h  sennosides-docusate sodium, 1 tablet, oral, BID      Continuous medications  potassium chloride in 0.9%NaCl, 75 mL/hr, Last Rate: 75 mL/hr (09/20/24 1144)  sodium chloride 0.9%, 10 mL/hr      PRN medications  PRN medications: acetaminophen, acetaminophen, albuterol, benzocaine-menthol, dextromethorphan-guaifenesin, guaiFENesin, melatonin, ondansetron **OR** ondansetron, sodium chloride 0.9%    Results for orders placed or performed during the hospital encounter of 09/18/24 (from the past 24 hour(s))   Blood Gas Lactic Acid, Venous   Result Value Ref Range    POCT Lactate, Venous 1.5 0.4 - 2.0 mmol/L   CBC   Result Value Ref Range    WBC 6.9 4.4 - 11.3 x10*3/uL    nRBC 0.0 0.0 - 0.0 /100 WBCs    RBC 4.37 4.00 - 5.20 x10*6/uL    Hemoglobin 13.2 12.0 - 16.0 g/dL    Hematocrit 37.5 36.0 - 46.0 %    MCV 86 80 - 100 fL    MCH 30.2 26.0 - 34.0 pg    MCHC 35.2 32.0 - 36.0 g/dL    RDW 13.8 11.5 - 14.5 %    Platelets 213 150 - 450 x10*3/uL   Comprehensive Metabolic Panel   Result Value Ref Range    Glucose 129 (H) 74 - 99 mg/dL    Sodium 134 (L) 136 - 145 mmol/L    Potassium 3.1 (L) 3.5 - 5.3 mmol/L    Chloride  102 98 - 107 mmol/L    Bicarbonate 23 21 - 32 mmol/L    Anion Gap 12 10 - 20 mmol/L    Urea Nitrogen 61 (H) 6 - 23 mg/dL    Creatinine 1.37 (H) 0.50 - 1.05 mg/dL    eGFR 44 (L) >60 mL/min/1.73m*2    Calcium 8.4 (L) 8.6 - 10.3 mg/dL    Albumin 3.6 3.4 - 5.0 g/dL    Alkaline Phosphatase 60 33 - 136 U/L    Total Protein 6.2 (L) 6.4 - 8.2 g/dL    AST 10 9 - 39 U/L    Bilirubin, Total 0.3 0.0 - 1.2 mg/dL    ALT 6 (L) 7 - 45 U/L   Magnesium   Result Value Ref Range    Magnesium 2.29 1.60 - 2.40 mg/dL   SST TOP   Result Value Ref Range    Extra Tube Hold for add-ons.        ECG 12 lead    Result Date: 9/19/2024  Sinus tachycardia Possible Left atrial enlargement Left axis deviation Incomplete right bundle branch block Prolonged QT Abnormal ECG When compared with ECG of 01-JUL-2023 11:24, Previous ECG has undetermined rhythm, needs review QRS axis Shifted left QT has lengthened    CT chest wo IV contrast    Result Date: 9/18/2024  Interpreted By:  Doyle Mccoy, STUDY: CT CHEST WO IV CONTRAST;  9/18/2024 9:15 pm   INDICATION: Signs/Symptoms:sob.     COMPARISON: None.   ACCESSION NUMBER(S): [id number]   ORDERING CLINICIAN: MARLON FELIZ   TECHNIQUE: Helical data acquisition of the chest was obtained  without IV contrast material.  Images were reformatted in axial, coronal, and sagittal planes.   FINDINGS: Limited study without contrast.   Heart size within normal limits. Moderate coronary artery calcification seen.   There is ascending aortic ectasia measuring 4.1 cm. Mild calcification aortic arch.   No significant adenopathy.   There is some mild emphysema suspected.   There is patchy airspace opacity seen both lung bases with some superimposed tree-in-bud opacities suspected. This may reflect an infectious or inflammatory process.   There is a slightly irregular nodule seen in the left upper lobe measuring 9 mm.   Multilevel mild spondylotic degeneration thoracic spine.   Partially visualized suspected left  hydronephrosis. Similar to prior imaging. A previously present left ureteral stent not easily seen on this study. Correlate with history.       1.  Bibasilar patchy opacity may reflect infectious or inflammatory process as above. 2. 9 mm nodule left upper lobe with emphysema. Recommend short-term three-month follow-up to re-evaluate. Alternatively PET imaging could be considered.   MACRO: Critical Finding:  See findings. Notification was initiated on 9/18/2024 at 9:58 pm by  Doyle Mccoy.  (**-YCF-**) Instructions:   Signed by: Doyle Mccoy 9/18/2024 9:58 PM Dictation workstation:   [id number]    XR chest 1 view    Result Date: 9/18/2024  STUDY: Chest Radiograph;  09/18/2024 6:54 PM INDICATION: Chronic obstructive pulmonary disease exacerbation. COMPARISON: 07/01/2023 XR Chest. 03/18/2022 CTA Chest for PE. ACCESSION NUMBER(S): [id number] ORDERING CLINICIAN: MARLON FELIZ TECHNIQUE:  Frontal chest was obtained at 18:54 hours. FINDINGS: CARDIOMEDIASTINAL SILHOUETTE: Cardiomediastinal silhouette is normal in size and configuration.  LUNGS: There is some mild hyperinflation or chronic obstructive pulmonary disease but the lungs remain clear.  There is no edema or consolidation and no visible effusion or pneumothorax..  ABDOMEN: No remarkable upper abdominal findings.  BONES: No acute osseous changes.  Again seen is a mild scoliosis of the lower thoracic spine convex to the left.    Mild hyperinflation or deep inspiration suggesting some chronic obstructive pulmonary disease but the lungs otherwise are clear.. Signed by Patric Jara MD              Assessment/Plan   Assessment & Plan  Acute hypoxic respiratory failure (Multi)    COPD (chronic obstructive pulmonary disease) (Multi)    Hypothyroidism    Hypokalemia    Hypercholesteremia    Osteoarthritis of hip    Tobacco use    COVID-19    KARLY (acute kidney injury) (CMS-HCC)    Hyponatremia    #Acute hypoxic respiratory failure 2/2 COVID-19 pneumonia (improving)    #COPD, in acute exacerbation  #Asthma   #Tobacco use  - COVID Positive 9/18/24  - D-dimer 429   - Ferritin 203, CRP 2.54, procal 0.14   - CT chest: Bibasilar patchy opacity may reflect infectious or inflammatory process as above. 2. 9 mm nodule left upper lobe with emphysema. Recommend short-term three-month follow-up to re-evaluate. Alternatively PET imaging could be considered.   - Remdesivir day 2/5  - Dexamethasone day 3/10  - Continue azithromycin and ceftriaxone (day 2)   - Currently on 4L O2 via NC, wean as tolerated   - Home oxygen None   - RT to eval and treat; breathing treatments via MDI  - Breo ordered; takes Symbicort at home   - Isolation protocol for COVID-19  - Tylenol PRN for fever and pain   - Inflammatory markers q72h  - Telemetry monitoring   - Encourage tobacco cessation on discharge; patient declines nicotine patch   - ID consulted, appreciate recs      #Dehydration (improving)  #KRALY (improving)   #Hyponatremia  #Hypokalemia  - Labs on admission consistent with dehydration, patient reports poor oral intake x 1 week due to no appetite/nausea  - Received a total of 1.5L LR and 40 mEq KCl in the ED   - BUN/Cr 106/2.64 >> 61/1.37 today   - Na 124 > 126 > 134   - K 2.8 > 3.0 > 3.1, repleted per protocol   - Mag 3.24 > 2.81 > 2.29   - Lactate 1.9 on admission   - Continue NS with KCl @ 75 ml/hr while PO intake is poor  - Avoid nephrotoxins/renally dose meds- home Mobic held; resume as renal function permits   - Monitor UOP; strict I&Os ordered   - Encourage PO intake  - Daily BMP and mag level to monitor renal function and electrolytes     #UTI  - UA: 500 leuk esterase, > 50 WBCs, > 20 RBCs  - UCx pending  - Continue ceftriaxone (day 2)  - Adjust ATBs pending final urine culture results    #HLD  - Trop 7 > 6 on admission   - BNP 38  - Continue ezetimibe 10 mg every day     #Hypothyroidism  - Continue levothyroxine 25 mcg every day     #Osteoarthritis    - Mobic held on admission d/t KARLY, resume as  renal function permits  - Tylenol PRN for mild pain   - PT/OT evals      DVT PPx: Heparin   GI PPx: Protonix   Diet: Regular   Code Status: Full      Disposition: Patient requires inpatient management at this time.           Shanell Ewing PA-C

## 2024-09-20 NOTE — NURSING NOTE
Patient incontinent of large amount of tan, mucus stool with undigested food particles noted in stool. Carmen/rectal care provided & fungal cream applied to carmen/rectal area.

## 2024-09-20 NOTE — CARE PLAN
The patient's goals for the shift include be free of injury. Patient did not wish to get out of bed today due to being sleepy from lack of sleep last night.    The clinical goals for the shift include patient will remain injury free this shift and will able to tolerate oxygen weaining today. Occasional dry cough noted.

## 2024-09-20 NOTE — PROGRESS NOTES
09/20/24 1328   Discharge Planning   Assistance Needed A & O x3; Independent with ADLS, IADLs, ambulation and does not drive. Patient's daughter in law drives; Son and DIL do the shopping; No DME in the home.   Home or Post Acute Services None   Expected Discharge Disposition Home   Does the patient need discharge transport arranged? No     Spoke with patient regarding therapies MOD/18 [facility] recommendation.  Patient declining need for therapy either for rehab or home care.  She said she has been tired and also received Melatonin.  She said she will be good going home with no additional therapy.  Updated bedside nurse.     DC PLAN:  Home

## 2024-09-20 NOTE — PROGRESS NOTES
Physical Therapy                 Therapy Communication Note    Patient Name: Hortensia Rodriguez  MRN: [id number]  Department: [facility] ICU  Room: 04/04-A  Today's Date: 9/20/2024     Discipline: Physical Therapy    Missed Visit Reason: Missed Visit Reason: Patient refused (Attempted x 3 to see patient (740 getting bathed, 0933 declined due to fatigue, 1106 declined due to fatigue).Provided encouragement, however, patient did not feel able to tolerate.  Will inform supervising therapist.)    Missed Time: Attempt 1106

## 2024-09-20 NOTE — PROGRESS NOTES
Hortensia Rodriguez is a 59 y.o. female on day 2 of admission presenting with Acute hypoxic respiratory failure (Multi).    Subjective   Interval History:   Afebrile, no chills  Nonproductive cough  No chest pain  No shortness of breath at rest    Review of Systems   All other systems reviewed and are negative.      Objective   Range of Vitals (last 24 hours)  Heart Rate:  []   Temp:  [36.3 °C (97.3 °F)-36.6 °C (97.9 °F)]   Resp:  [13-23]   BP: (113-145)/(74-94)   Weight:  [67.6 kg (149 lb 0.5 oz)]   SpO2:  [92 %-99 %]   Daily Weight  09/20/24 : 67.6 kg (149 lb 0.5 oz)    Body mass index is 24.05 kg/m².    Physical Exam  Constitutional:       Appearance: Normal appearance.   HENT:      Head: Normocephalic and atraumatic.      Nose: Nose normal.   Eyes:      General: No scleral icterus.     Extraocular Movements: Extraocular movements intact.      Conjunctiva/sclera: Conjunctivae normal.   Cardiovascular:      Rate and Rhythm: Normal rate and regular rhythm.      Heart sounds: Normal heart sounds.   Pulmonary:      Breath sounds: Decreased breath sounds present.   Abdominal:      General: Bowel sounds are normal.      Palpations: Abdomen is soft.      Tenderness: There is no abdominal tenderness.   Musculoskeletal:      Right lower leg: No edema.      Left lower leg: No edema.   Skin:     General: Skin is warm and dry.   Neurological:      Mental Status: She is alert.   Psychiatric:         Behavior: Behavior normal. Behavior is cooperative.     Antibiotics  azithromycin - 500 mg/250 mL  cefTRIAXone - 1 gram/50 mL    Relevant Results  Labs  Results from last 72 hours   Lab Units 09/20/24  0543 09/19/24  0543 09/18/24  1836   WBC AUTO x10*3/uL 6.9 4.8 10.1   HEMOGLOBIN g/dL 13.2 16.0 18.3*   HEMATOCRIT % 37.5 44.8 50.9*   PLATELETS AUTO x10*3/uL 213 250 340   NEUTROS PCT AUTO %  --   --  75.7   LYMPHS PCT AUTO %  --   --  17.7   MONOS PCT AUTO %  --   --  6.1   EOS PCT AUTO %  --   --  0.0     Results from last  72 hours   Lab Units 09/20/24  0543 09/19/24  0543 09/18/24  1836   SODIUM mmol/L 134* 126* 124*   POTASSIUM mmol/L 3.1* 3.0* 2.8*   CHLORIDE mmol/L 102 86* 77*   CO2 mmol/L 23 20* 18*   BUN mg/dL 61* 91* 106*   CREATININE mg/dL 1.37* 1.88* 2.64*   GLUCOSE mg/dL 129* 167* 112*   CALCIUM mg/dL 8.4* 8.9 9.4   ANION GAP mmol/L 12 23* 32*   EGFR mL/min/1.73m*2 44* 30* 20*   PHOSPHORUS mg/dL  --  3.2  --      Results from last 72 hours   Lab Units 09/20/24  0543 09/18/24  1836   ALK PHOS U/L 60 107   BILIRUBIN TOTAL mg/dL 0.3 0.8   PROTEIN TOTAL g/dL 6.2* 8.6*   ALT U/L 6* 9   AST U/L 10 12   ALBUMIN g/dL 3.6 4.7     Estimated Creatinine Clearance: 40.4 mL/min (A) (by C-G formula based on SCr of 1.37 mg/dL (H)).  C-Reactive Protein   Date Value Ref Range Status   09/19/2024 2.54 (H) <1.00 mg/dL Final     CRP   Date Value Ref Range Status   07/26/2023 8.55 (A) mg/dL Final     Comment:     REF VALUE  < 1.00       Microbiology  Reviewed  Imaging  ECG 12 lead    Result Date: 9/19/2024  Sinus tachycardia Possible Left atrial enlargement Left axis deviation Incomplete right bundle branch block Prolonged QT Abnormal ECG When compared with ECG of 01-JUL-2023 11:24, Previous ECG has undetermined rhythm, needs review QRS axis Shifted left QT has lengthened    CT chest wo IV contrast    Result Date: 9/18/2024  Interpreted By:  Doyle Mccoy, STUDY: CT CHEST WO IV CONTRAST;  9/18/2024 9:15 pm   INDICATION: Signs/Symptoms:sob.     COMPARISON: None.   ACCESSION NUMBER(S): [id number]   ORDERING CLINICIAN: MARLON FELIZ   TECHNIQUE: Helical data acquisition of the chest was obtained  without IV contrast material.  Images were reformatted in axial, coronal, and sagittal planes.   FINDINGS: Limited study without contrast.   Heart size within normal limits. Moderate coronary artery calcification seen.   There is ascending aortic ectasia measuring 4.1 cm. Mild calcification aortic arch.   No significant adenopathy.   There is some mild  emphysema suspected.   There is patchy airspace opacity seen both lung bases with some superimposed tree-in-bud opacities suspected. This may reflect an infectious or inflammatory process.   There is a slightly irregular nodule seen in the left upper lobe measuring 9 mm.   Multilevel mild spondylotic degeneration thoracic spine.   Partially visualized suspected left hydronephrosis. Similar to prior imaging. A previously present left ureteral stent not easily seen on this study. Correlate with history.       1.  Bibasilar patchy opacity may reflect infectious or inflammatory process as above. 2. 9 mm nodule left upper lobe with emphysema. Recommend short-term three-month follow-up to re-evaluate. Alternatively PET imaging could be considered.   MACRO: Critical Finding:  See findings. Notification was initiated on 9/18/2024 at 9:58 pm by  Doyle Mccoy.  (**-YCF-**) Instructions:   Signed by: Doyle Mccoy 9/18/2024 9:58 PM Dictation workstation:   [id number]    XR chest 1 view    Result Date: 9/18/2024  STUDY: Chest Radiograph;  09/18/2024 6:54 PM INDICATION: Chronic obstructive pulmonary disease exacerbation. COMPARISON: 07/01/2023 XR Chest. 03/18/2022 CTA Chest for PE. ACCESSION NUMBER(S): [id number] ORDERING CLINICIAN: MARLON FELIZ TECHNIQUE:  Frontal chest was obtained at 18:54 hours. FINDINGS: CARDIOMEDIASTINAL SILHOUETTE: Cardiomediastinal silhouette is normal in size and configuration.  LUNGS: There is some mild hyperinflation or chronic obstructive pulmonary disease but the lungs remain clear.  There is no edema or consolidation and no visible effusion or pneumothorax..  ABDOMEN: No remarkable upper abdominal findings.  BONES: No acute osseous changes.  Again seen is a mild scoliosis of the lower thoracic spine convex to the left.    Mild hyperinflation or deep inspiration suggesting some chronic obstructive pulmonary disease but the lungs otherwise are clear.. Signed by Patric Jara MD      Assessment/Plan   Acute hypoxic respiratory failure, resolved  Coronavirus infection  Pneumonia  Hyponatremia  Pyuria versus urinary tract infection  Resolving acute kidney injury     Continue ceftriaxone  Continue azithromycin  Continue dexamethasone  Continue remdesivir  Follow-up urine culture  Oxygen as needed  Supportive care  Monitor temperature and WBC    Marco Antonio Smith MD

## 2024-09-20 NOTE — CARE PLAN
The patient's goals for the shift include be free of injury    The clinical goals for the shift include pt will remain safe from injury during shift    Over the shift, the patient remained free from injury. Received IV fluids and IV antibiotics. Denied pain. On 02 per NC at baseline. Patient has purewic on draining red cloudy purulent urine. Patient states she is incontinent of stool and will let nurse know when she has gone. Patient has moist congestive cough but refused mucinex and cough syrup. Remains in droplet precautions for Covid.

## 2024-09-21 LAB
ALBUMIN SERPL BCP-MCNC: 3.3 G/DL (ref 3.4–5)
ALP SERPL-CCNC: 51 U/L (ref 33–136)
ALT SERPL W P-5'-P-CCNC: 7 U/L (ref 7–45)
ANION GAP SERPL CALC-SCNC: 7 MMOL/L (ref 10–20)
AST SERPL W P-5'-P-CCNC: 10 U/L (ref 9–39)
BACTERIA UR CULT: ABNORMAL
BILIRUB SERPL-MCNC: 0.3 MG/DL (ref 0–1.2)
BUN SERPL-MCNC: 44 MG/DL (ref 6–23)
CALCIUM SERPL-MCNC: 8.2 MG/DL (ref 8.6–10.3)
CHLORIDE SERPL-SCNC: 110 MMOL/L (ref 98–107)
CO2 SERPL-SCNC: 22 MMOL/L (ref 21–32)
CREAT SERPL-MCNC: 1.14 MG/DL (ref 0.5–1.05)
EGFRCR SERPLBLD CKD-EPI 2021: 55 ML/MIN/1.73M*2
ERYTHROCYTE [DISTWIDTH] IN BLOOD BY AUTOMATED COUNT: 14.6 % (ref 11.5–14.5)
GLUCOSE SERPL-MCNC: 106 MG/DL (ref 74–99)
HCT VFR BLD AUTO: 35.5 % (ref 36–46)
HGB BLD-MCNC: 12.1 G/DL (ref 12–16)
MAGNESIUM SERPL-MCNC: 1.82 MG/DL (ref 1.6–2.4)
MCH RBC QN AUTO: 30.3 PG (ref 26–34)
MCHC RBC AUTO-ENTMCNC: 34.1 G/DL (ref 32–36)
MCV RBC AUTO: 89 FL (ref 80–100)
NRBC BLD-RTO: 0 /100 WBCS (ref 0–0)
PLATELET # BLD AUTO: 204 X10*3/UL (ref 150–450)
POTASSIUM SERPL-SCNC: 3.3 MMOL/L (ref 3.5–5.3)
PROT SERPL-MCNC: 5.5 G/DL (ref 6.4–8.2)
RBC # BLD AUTO: 4 X10*6/UL (ref 4–5.2)
SODIUM SERPL-SCNC: 136 MMOL/L (ref 136–145)
WBC # BLD AUTO: 7.2 X10*3/UL (ref 4.4–11.3)

## 2024-09-21 PROCEDURE — 1200000002 HC GENERAL ROOM WITH TELEMETRY DAILY: Mod: IPSPLIT

## 2024-09-21 PROCEDURE — 2500000001 HC RX 250 WO HCPCS SELF ADMINISTERED DRUGS (ALT 637 FOR MEDICARE OP): Mod: IPSPLIT

## 2024-09-21 PROCEDURE — 87506 IADNA-DNA/RNA PROBE TQ 6-11: CPT | Mod: GENLAB | Performed by: NURSE PRACTITIONER

## 2024-09-21 PROCEDURE — 2500000001 HC RX 250 WO HCPCS SELF ADMINISTERED DRUGS (ALT 637 FOR MEDICARE OP): Mod: IPSPLIT | Performed by: NURSE PRACTITIONER

## 2024-09-21 PROCEDURE — 2500000002 HC RX 250 W HCPCS SELF ADMINISTERED DRUGS (ALT 637 FOR MEDICARE OP, ALT 636 FOR OP/ED): Mod: IPSPLIT

## 2024-09-21 PROCEDURE — 2500000004 HC RX 250 GENERAL PHARMACY W/ HCPCS (ALT 636 FOR OP/ED): Mod: IPSPLIT

## 2024-09-21 PROCEDURE — 2500000005 HC RX 250 GENERAL PHARMACY W/O HCPCS: Mod: IPSPLIT | Performed by: INTERNAL MEDICINE

## 2024-09-21 PROCEDURE — 94640 AIRWAY INHALATION TREATMENT: CPT

## 2024-09-21 PROCEDURE — 2500000004 HC RX 250 GENERAL PHARMACY W/ HCPCS (ALT 636 FOR OP/ED): Mod: JZ,IPSPLIT

## 2024-09-21 PROCEDURE — 2500000002 HC RX 250 W HCPCS SELF ADMINISTERED DRUGS (ALT 637 FOR MEDICARE OP, ALT 636 FOR OP/ED): Mod: IPSPLIT | Performed by: NURSE PRACTITIONER

## 2024-09-21 PROCEDURE — 94669 MECHANICAL CHEST WALL OSCILL: CPT | Mod: IPSPLIT

## 2024-09-21 PROCEDURE — 83735 ASSAY OF MAGNESIUM: CPT | Mod: IPSPLIT

## 2024-09-21 PROCEDURE — 99232 SBSQ HOSP IP/OBS MODERATE 35: CPT | Performed by: NURSE PRACTITIONER

## 2024-09-21 PROCEDURE — 94760 N-INVAS EAR/PLS OXIMETRY 1: CPT | Mod: IPSPLIT

## 2024-09-21 PROCEDURE — 84075 ASSAY ALKALINE PHOSPHATASE: CPT | Mod: IPSPLIT

## 2024-09-21 PROCEDURE — 36415 COLL VENOUS BLD VENIPUNCTURE: CPT | Mod: IPSPLIT

## 2024-09-21 PROCEDURE — 85027 COMPLETE CBC AUTOMATED: CPT | Mod: IPSPLIT

## 2024-09-21 RX ORDER — POTASSIUM CHLORIDE 20 MEQ/1
40 TABLET, EXTENDED RELEASE ORAL ONCE
Status: COMPLETED | OUTPATIENT
Start: 2024-09-21 | End: 2024-09-21

## 2024-09-21 ASSESSMENT — PAIN - FUNCTIONAL ASSESSMENT
PAIN_FUNCTIONAL_ASSESSMENT: 0-10

## 2024-09-21 ASSESSMENT — COGNITIVE AND FUNCTIONAL STATUS - GENERAL
CLIMB 3 TO 5 STEPS WITH RAILING: A LITTLE
DRESSING REGULAR UPPER BODY CLOTHING: A LITTLE
MOBILITY SCORE: 18
HELP NEEDED FOR BATHING: A LITTLE
DRESSING REGULAR LOWER BODY CLOTHING: A LITTLE
TOILETING: A LITTLE
MOVING TO AND FROM BED TO CHAIR: A LITTLE
TURNING FROM BACK TO SIDE WHILE IN FLAT BAD: A LITTLE
WALKING IN HOSPITAL ROOM: A LITTLE
MOVING FROM LYING ON BACK TO SITTING ON SIDE OF FLAT BED WITH BEDRAILS: A LITTLE
PERSONAL GROOMING: A LITTLE
STANDING UP FROM CHAIR USING ARMS: A LITTLE
DAILY ACTIVITIY SCORE: 19

## 2024-09-21 ASSESSMENT — PAIN SCALES - GENERAL
PAINLEVEL_OUTOF10: 0 - NO PAIN

## 2024-09-21 ASSESSMENT — ENCOUNTER SYMPTOMS: DIARRHEA: 1

## 2024-09-21 NOTE — PROGRESS NOTES
Hortensia Rodriguez is a 59 y.o. female on day 3 of admission presenting with Acute hypoxic respiratory failure (Multi).      Subjective   Patient assessed at bedside; sitting up in bed. She was a little emotional today; she thought she was going home. She complained about diarrhea, hacky cough, and not feeling well.  She was assured she was not going home today. She denies SOB, fever, chills. She is on RA       Objective     Last Recorded Vitals  BP (!) 116/91 (BP Location: Right arm)   Pulse 82   Temp 37.3 °C (99.1 °F) (Temporal)   Resp 15   Wt 72 kg (158 lb 11.7 oz)   SpO2 100%   Intake/Output last 3 Shifts:    Intake/Output Summary (Last 24 hours) at 9/21/2024 0954  Last data filed at 9/21/2024 0936  Gross per 24 hour   Intake 1550 ml   Output 740 ml   Net 810 ml       Admission Weight  Weight: 83.9 kg (185 lb) (09/18/24 1803)    Daily Weight  09/21/24 : 72 kg (158 lb 11.7 oz)    Image Results  ECG 12 lead  Sinus tachycardia  Possible Left atrial enlargement  Left axis deviation  Incomplete right bundle branch block  Prolonged QT  Abnormal ECG  When compared with ECG of 01-JUL-2023 11:24,  Previous ECG has undetermined rhythm, needs review  QRS axis Shifted left  QT has lengthened      Physical Exam  Vitals reviewed.   Constitutional:       Appearance: Normal appearance. She is normal weight.   HENT:      Head: Normocephalic and atraumatic.      Right Ear: External ear normal.      Left Ear: External ear normal.      Nose: Nose normal.      Mouth/Throat:      Mouth: Mucous membranes are moist.      Pharynx: Oropharynx is clear.   Eyes:      Conjunctiva/sclera: Conjunctivae normal.      Pupils: Pupils are equal, round, and reactive to light.   Cardiovascular:      Rate and Rhythm: Normal rate and regular rhythm.      Pulses: Normal pulses.      Heart sounds: Normal heart sounds.   Pulmonary:      Effort: Pulmonary effort is normal.      Breath sounds: Normal breath sounds.   Abdominal:      General: Bowel  sounds are normal.      Palpations: Abdomen is soft.   Musculoskeletal:         General: Normal range of motion.      Cervical back: Normal range of motion and neck supple.   Skin:     General: Skin is warm and dry.   Neurological:      General: No focal deficit present.      Mental Status: She is alert and oriented to person, place, and time.   Psychiatric:         Mood and Affect: Mood normal.         Behavior: Behavior normal.         Relevant Results    Scheduled medications  albuterol, 2 puff, inhalation, TID  azithromycin, 500 mg, oral, q24h ENEDINA  cefTRIAXone, 1 g, intravenous, q24h  dexAMETHasone, 6 mg, oral, Daily  ezetimibe, 10 mg, oral, Daily  fluticasone furoate-vilanteroL, 1 puff, inhalation, Daily  heparin, 5,000 Units, subcutaneous, q8h  levothyroxine, 25 mcg, oral, Daily  [Held by provider] meloxicam, 15 mg, oral, Daily  oxygen, , inhalation, Continuous - Inhalation  pantoprazole, 40 mg, oral, Daily before breakfast  potassium chloride CR, 40 mEq, oral, Once  remdesivir, 100 mg, intravenous, q24h  sennosides-docusate sodium, 1 tablet, oral, BID      Continuous medications  potassium chloride in 0.9%NaCl, 75 mL/hr, Last Rate: 75 mL/hr (09/21/24 0145)  sodium chloride 0.9%, 10 mL/hr      PRN medications  PRN medications: acetaminophen, acetaminophen, albuterol, benzocaine-menthol, dextromethorphan-guaifenesin, guaiFENesin, melatonin, ondansetron **OR** ondansetron, sodium chloride 0.9%    Results for orders placed or performed during the hospital encounter of 09/18/24 (from the past 24 hour(s))   Comprehensive Metabolic Panel   Result Value Ref Range    Glucose 106 (H) 74 - 99 mg/dL    Sodium 136 136 - 145 mmol/L    Potassium 3.3 (L) 3.5 - 5.3 mmol/L    Chloride 110 (H) 98 - 107 mmol/L    Bicarbonate 22 21 - 32 mmol/L    Anion Gap 7 (L) 10 - 20 mmol/L    Urea Nitrogen 44 (H) 6 - 23 mg/dL    Creatinine 1.14 (H) 0.50 - 1.05 mg/dL    eGFR 55 (L) >60 mL/min/1.73m*2    Calcium 8.2 (L) 8.6 - 10.3 mg/dL     Albumin 3.3 (L) 3.4 - 5.0 g/dL    Alkaline Phosphatase 51 33 - 136 U/L    Total Protein 5.5 (L) 6.4 - 8.2 g/dL    AST 10 9 - 39 U/L    Bilirubin, Total 0.3 0.0 - 1.2 mg/dL    ALT 7 7 - 45 U/L   Magnesium   Result Value Ref Range    Magnesium 1.82 1.60 - 2.40 mg/dL   CBC   Result Value Ref Range    WBC 7.2 4.4 - 11.3 x10*3/uL    nRBC 0.0 0.0 - 0.0 /100 WBCs    RBC 4.00 4.00 - 5.20 x10*6/uL    Hemoglobin 12.1 12.0 - 16.0 g/dL    Hematocrit 35.5 (L) 36.0 - 46.0 %    MCV 89 80 - 100 fL    MCH 30.3 26.0 - 34.0 pg    MCHC 34.1 32.0 - 36.0 g/dL    RDW 14.6 (H) 11.5 - 14.5 %    Platelets 204 150 - 450 x10*3/uL                   Assessment/Plan   This patient currently has cardiac telemetry ordered; if you would like to modify or discontinue the telemetry order, click here to go to the orders activity to modify/discontinue the order.    Assessment & Plan  Acute hypoxic respiratory failure (Multi)    COPD (chronic obstructive pulmonary disease) (Multi)    Hypothyroidism    Hypokalemia    Hypercholesteremia    Osteoarthritis of hip    Tobacco use    COVID-19    KARLY (acute kidney injury) (CMS-HCC)    Hyponatremia      Acute hypoxic respiratory failure   COVID-19 pneumonia (improving)   COPD, in acute exacerbation  Asthma   Tobacco use  - COVID Positive 9/18/24  - D-dimer 429   - Ferritin 203, CRP 2.54, procal 0.14   - CT chest: Bibasilar patchy opacity may reflect infectious or inflammatory process as above. 2. 9 mm nodule left upper lobe with emphysema. Recommend short-term three-month follow-up to re-evaluate. Alternatively PET imaging could be considered.   - Remdesivir day 3/5  - Dexamethasone day 4/10  - Continue azithromycin and ceftriaxone (day 3)   - Currently on RA  - Home oxygen None   - RT to eval and treat; breathing treatments via MDI  - Breo ordered; takes Symbicort at home   - Isolation protocol for COVID-19  - Tylenol PRN for fever and pain   - Inflammatory markers q72h  - Telemetry monitoring   - Encourage  tobacco cessation on discharge; patient declines nicotine patch   - ID consulted, appreciate recs      Dehydration (improving)  Acute Renal failure (improving)   Hyponatremia, resolved  Hypokalemia  - Labs on admission consistent with dehydration, patient reports poor oral intake x 1 week due to no appetite/nausea  - Received a total of 1.5L LR and 40 mEq KCl in the ED   - BUN/Cr 106/2.64 >> 61/1.37 today   - Na 124 > 126 > 134 > 136  - K 2.8 > 3.0 > 3.1 > 3.3, repleted per protocol   - Mag 3.24 > 2.81 > 2.29   - Lactate 1.9 on admission   - Continue NS with KCl @ 75 ml/hr while PO intake is poor  - Avoid nephrotoxins/renally dose meds- home Mobic held; resume as renal function permits   - Monitor UOP; strict I&Os ordered   - Encourage PO intake  - Daily BMP and mag level to monitor renal function and electrolytes     Acute cystitis without hematuria  - UA: 500 leuk esterase, > 50 WBCs, > 20 RBCs  - UCx multiple organism; probably contaminated  - Continue ceftriaxone (day 3)  - Adjust ATBs pending final urine culture results     HLD  - Trop 7 > 6 on admission   - BNP 38  - Continue ezetimibe 10 mg every day     Hypothyroidism  - Continue levothyroxine 25 mcg every day     Osteoarthritis    - Mobic held on admission d/t KARLY, resume as renal function permits  - Tylenol PRN for mild pain   - PT/OT evaluation; recommending low level therapy     DVT PPx:   - continue Heparin 5,000 unit q8h    GI PPx:   - continue pantoprazole 40 mg daily     Code Status: Full      Disposition: Patient requires inpatient management at this time.             Sierra Kat, APRN-CNP

## 2024-09-21 NOTE — CARE PLAN
The patient's goals for the shift include be free of injury    The clinical goals for the shift include pt will remain in droplet precautions during shift    Over the shift, the patient remained stable and free from injuries. Pt was awake most of the night watching tv. Pleasant and tolerating IV fluids and ATB with no adverse reactions. Pt was incontinent of stool twice.

## 2024-09-21 NOTE — CARE PLAN
The patient's goals for the shift include be free of injury    The clinical goals for the shift include patient will have a decrease in loose stool today    Patient continues with loose mucous stool. Out of bed for 2 meals and using the bathroom to void. Vital signs stable, on room air. Iv fluids infusing. Will continue to monitor

## 2024-09-22 VITALS
HEART RATE: 79 BPM | BODY MASS INDEX: 25.9 KG/M2 | TEMPERATURE: 97.3 F | WEIGHT: 161.16 LBS | RESPIRATION RATE: 18 BRPM | HEIGHT: 66 IN | SYSTOLIC BLOOD PRESSURE: 135 MMHG | DIASTOLIC BLOOD PRESSURE: 96 MMHG | OXYGEN SATURATION: 99 %

## 2024-09-22 PROBLEM — R19.7 DIARRHEA: Status: ACTIVE | Noted: 2024-09-22

## 2024-09-22 PROBLEM — I10 BENIGN ESSENTIAL HTN: Status: ACTIVE | Noted: 2024-09-22

## 2024-09-22 PROBLEM — R63.4 UNINTENTIONAL WEIGHT LOSS: Status: ACTIVE | Noted: 2024-09-22

## 2024-09-22 PROBLEM — N17.9 ACUTE KIDNEY INJURY (CMS-HCC): Status: ACTIVE | Noted: 2024-09-22

## 2024-09-22 LAB
ALBUMIN SERPL BCP-MCNC: 3.3 G/DL (ref 3.4–5)
ALP SERPL-CCNC: 48 U/L (ref 33–136)
ALT SERPL W P-5'-P-CCNC: 11 U/L (ref 7–45)
ANION GAP SERPL CALC-SCNC: 9 MMOL/L (ref 10–20)
AST SERPL W P-5'-P-CCNC: 13 U/L (ref 9–39)
BILIRUB SERPL-MCNC: 0.3 MG/DL (ref 0–1.2)
BUN SERPL-MCNC: 32 MG/DL (ref 6–23)
CALCIUM SERPL-MCNC: 7.9 MG/DL (ref 8.6–10.3)
CHLORIDE SERPL-SCNC: 110 MMOL/L (ref 98–107)
CO2 SERPL-SCNC: 23 MMOL/L (ref 21–32)
CREAT SERPL-MCNC: 0.88 MG/DL (ref 0.5–1.05)
EGFRCR SERPLBLD CKD-EPI 2021: 75 ML/MIN/1.73M*2
ERYTHROCYTE [DISTWIDTH] IN BLOOD BY AUTOMATED COUNT: 14.9 % (ref 11.5–14.5)
GLUCOSE SERPL-MCNC: 102 MG/DL (ref 74–99)
HCT VFR BLD AUTO: 36.2 % (ref 36–46)
HGB BLD-MCNC: 12.1 G/DL (ref 12–16)
MAGNESIUM SERPL-MCNC: 1.47 MG/DL (ref 1.6–2.4)
MCH RBC QN AUTO: 30 PG (ref 26–34)
MCHC RBC AUTO-ENTMCNC: 33.4 G/DL (ref 32–36)
MCV RBC AUTO: 90 FL (ref 80–100)
NRBC BLD-RTO: 0 /100 WBCS (ref 0–0)
PLATELET # BLD AUTO: 230 X10*3/UL (ref 150–450)
POTASSIUM SERPL-SCNC: 3.6 MMOL/L (ref 3.5–5.3)
PROT SERPL-MCNC: 5.3 G/DL (ref 6.4–8.2)
RBC # BLD AUTO: 4.03 X10*6/UL (ref 4–5.2)
SODIUM SERPL-SCNC: 138 MMOL/L (ref 136–145)
WBC # BLD AUTO: 6.9 X10*3/UL (ref 4.4–11.3)

## 2024-09-22 PROCEDURE — 1100000001 HC PRIVATE ROOM DAILY: Mod: IPSPLIT

## 2024-09-22 PROCEDURE — 87493 C DIFF AMPLIFIED PROBE: CPT | Mod: GENLAB | Performed by: INTERNAL MEDICINE

## 2024-09-22 PROCEDURE — 85027 COMPLETE CBC AUTOMATED: CPT | Mod: IPSPLIT

## 2024-09-22 PROCEDURE — 2500000002 HC RX 250 W HCPCS SELF ADMINISTERED DRUGS (ALT 637 FOR MEDICARE OP, ALT 636 FOR OP/ED): Mod: IPSPLIT

## 2024-09-22 PROCEDURE — 80053 COMPREHEN METABOLIC PANEL: CPT | Mod: IPSPLIT | Performed by: NURSE PRACTITIONER

## 2024-09-22 PROCEDURE — 94760 N-INVAS EAR/PLS OXIMETRY 1: CPT | Mod: IPSPLIT

## 2024-09-22 PROCEDURE — 2500000001 HC RX 250 WO HCPCS SELF ADMINISTERED DRUGS (ALT 637 FOR MEDICARE OP): Mod: IPSPLIT | Performed by: NURSE PRACTITIONER

## 2024-09-22 PROCEDURE — 94669 MECHANICAL CHEST WALL OSCILL: CPT | Mod: IPSPLIT

## 2024-09-22 PROCEDURE — 2500000002 HC RX 250 W HCPCS SELF ADMINISTERED DRUGS (ALT 637 FOR MEDICARE OP, ALT 636 FOR OP/ED): Mod: IPSPLIT | Performed by: NURSE PRACTITIONER

## 2024-09-22 PROCEDURE — 83735 ASSAY OF MAGNESIUM: CPT | Mod: IPSPLIT | Performed by: NURSE PRACTITIONER

## 2024-09-22 PROCEDURE — 94640 AIRWAY INHALATION TREATMENT: CPT

## 2024-09-22 PROCEDURE — 36415 COLL VENOUS BLD VENIPUNCTURE: CPT | Mod: IPSPLIT

## 2024-09-22 PROCEDURE — 2500000004 HC RX 250 GENERAL PHARMACY W/ HCPCS (ALT 636 FOR OP/ED): Mod: JZ,IPSPLIT

## 2024-09-22 PROCEDURE — 2500000005 HC RX 250 GENERAL PHARMACY W/O HCPCS: Mod: IPSPLIT | Performed by: INTERNAL MEDICINE

## 2024-09-22 PROCEDURE — 2500000004 HC RX 250 GENERAL PHARMACY W/ HCPCS (ALT 636 FOR OP/ED): Mod: IPSPLIT | Performed by: NURSE PRACTITIONER

## 2024-09-22 PROCEDURE — 2500000004 HC RX 250 GENERAL PHARMACY W/ HCPCS (ALT 636 FOR OP/ED): Mod: IPSPLIT

## 2024-09-22 PROCEDURE — 99232 SBSQ HOSP IP/OBS MODERATE 35: CPT | Performed by: NURSE PRACTITIONER

## 2024-09-22 PROCEDURE — 9420000001 HC RT PATIENT EDUCATION 5 MIN: Mod: IPSPLIT

## 2024-09-22 PROCEDURE — 2500000001 HC RX 250 WO HCPCS SELF ADMINISTERED DRUGS (ALT 637 FOR MEDICARE OP): Mod: IPSPLIT

## 2024-09-22 RX ORDER — MAGNESIUM SULFATE HEPTAHYDRATE 40 MG/ML
4 INJECTION, SOLUTION INTRAVENOUS ONCE
Status: COMPLETED | OUTPATIENT
Start: 2024-09-22 | End: 2024-09-22

## 2024-09-22 RX ORDER — POTASSIUM CHLORIDE 20 MEQ/1
40 TABLET, EXTENDED RELEASE ORAL ONCE
Status: COMPLETED | OUTPATIENT
Start: 2024-09-22 | End: 2024-09-22

## 2024-09-22 RX ORDER — AMLODIPINE BESYLATE 10 MG/1
10 TABLET ORAL DAILY
Status: DISCONTINUED | OUTPATIENT
Start: 2024-09-22 | End: 2024-09-23 | Stop reason: HOSPADM

## 2024-09-22 ASSESSMENT — COGNITIVE AND FUNCTIONAL STATUS - GENERAL
DAILY ACTIVITIY SCORE: 19
MOVING FROM LYING ON BACK TO SITTING ON SIDE OF FLAT BED WITH BEDRAILS: A LITTLE
STANDING UP FROM CHAIR USING ARMS: A LITTLE
MOBILITY SCORE: 18
PERSONAL GROOMING: A LITTLE
DRESSING REGULAR UPPER BODY CLOTHING: A LITTLE
TURNING FROM BACK TO SIDE WHILE IN FLAT BAD: A LITTLE
CLIMB 3 TO 5 STEPS WITH RAILING: A LITTLE
WALKING IN HOSPITAL ROOM: A LITTLE
TOILETING: A LITTLE
HELP NEEDED FOR BATHING: A LITTLE
MOVING TO AND FROM BED TO CHAIR: A LITTLE
DRESSING REGULAR LOWER BODY CLOTHING: A LITTLE

## 2024-09-22 ASSESSMENT — PAIN SCALES - GENERAL
PAINLEVEL_OUTOF10: 0 - NO PAIN

## 2024-09-22 ASSESSMENT — PAIN - FUNCTIONAL ASSESSMENT
PAIN_FUNCTIONAL_ASSESSMENT: 0-10

## 2024-09-22 NOTE — CARE PLAN
The patient's goals for the shift include be free of injury    The clinical goals for the shift include patient will be out of bed for meals    Patient out of bed for all meals. Remains incontinent of bowel and bladder. Elevated blood pressure this am. Restarted on norvasc with good results. Room air. Remains in isolation. Will continue to monitor

## 2024-09-22 NOTE — PROGRESS NOTES
Hortensia Rodriguez is a 59 y.o. female on day 3 of admission presenting with Acute hypoxic respiratory failure (Multi).    Subjective   Interval History:   Room not entered-limit exposure  Patient observed, on room air, awake, alert  Afebrile  Interval development of diarrhea        Review of Systems   Gastrointestinal:  Positive for diarrhea.       Objective   Range of Vitals (last 24 hours)  Heart Rate:  [74-85]   Temp:  [37 °C (98.6 °F)-37.3 °C (99.1 °F)]   Resp:  [14-21]   BP: (116-152)/()   Weight:  [72 kg (158 lb 11.7 oz)]   SpO2:  [96 %-100 %]   Daily Weight  09/21/24 : 72 kg (158 lb 11.7 oz)    Body mass index is 25.62 kg/m².    Physical Exam  Awake, alert  Stable vitals    Antibiotics  azithromycin - 250 mg  cefTRIAXone - 1 gram/50 mL    Relevant Results  Labs  Results from last 72 hours   Lab Units 09/21/24  0552 09/20/24  0543 09/19/24  0543   WBC AUTO x10*3/uL 7.2 6.9 4.8   HEMOGLOBIN g/dL 12.1 13.2 16.0   HEMATOCRIT % 35.5* 37.5 44.8   PLATELETS AUTO x10*3/uL 204 213 250     Results from last 72 hours   Lab Units 09/21/24  0552 09/20/24  0543 09/19/24  0543   SODIUM mmol/L 136 134* 126*   POTASSIUM mmol/L 3.3* 3.1* 3.0*   CHLORIDE mmol/L 110* 102 86*   CO2 mmol/L 22 23 20*   BUN mg/dL 44* 61* 91*   CREATININE mg/dL 1.14* 1.37* 1.88*   GLUCOSE mg/dL 106* 129* 167*   CALCIUM mg/dL 8.2* 8.4* 8.9   ANION GAP mmol/L 7* 12 23*   EGFR mL/min/1.73m*2 55* 44* 30*   PHOSPHORUS mg/dL  --   --  3.2     Results from last 72 hours   Lab Units 09/21/24  0552 09/20/24  0543   ALK PHOS U/L 51 60   BILIRUBIN TOTAL mg/dL 0.3 0.3   PROTEIN TOTAL g/dL 5.5* 6.2*   ALT U/L 7 6*   AST U/L 10 10   ALBUMIN g/dL 3.3* 3.6     Estimated Creatinine Clearance: 52.7 mL/min (A) (by C-G formula based on SCr of 1.14 mg/dL (H)).  C-Reactive Protein   Date Value Ref Range Status   09/19/2024 2.54 (H) <1.00 mg/dL Final     CRP   Date Value Ref Range Status   07/26/2023 8.55 (A) mg/dL Final     Comment:     REF VALUE  < 1.00        Microbiology  Reviewed  Imaging  ECG 12 lead    Result Date: 9/19/2024  Sinus tachycardia Possible Left atrial enlargement Left axis deviation Incomplete right bundle branch block Prolonged QT Abnormal ECG When compared with ECG of 01-JUL-2023 11:24, Previous ECG has undetermined rhythm, needs review QRS axis Shifted left QT has lengthened    CT chest wo IV contrast    Result Date: 9/18/2024  Interpreted By:  Doyle Mccoy, STUDY: CT CHEST WO IV CONTRAST;  9/18/2024 9:15 pm   INDICATION: Signs/Symptoms:sob.     COMPARISON: None.   ACCESSION NUMBER(S): [id number]   ORDERING CLINICIAN: MARLON FELIZ   TECHNIQUE: Helical data acquisition of the chest was obtained  without IV contrast material.  Images were reformatted in axial, coronal, and sagittal planes.   FINDINGS: Limited study without contrast.   Heart size within normal limits. Moderate coronary artery calcification seen.   There is ascending aortic ectasia measuring 4.1 cm. Mild calcification aortic arch.   No significant adenopathy.   There is some mild emphysema suspected.   There is patchy airspace opacity seen both lung bases with some superimposed tree-in-bud opacities suspected. This may reflect an infectious or inflammatory process.   There is a slightly irregular nodule seen in the left upper lobe measuring 9 mm.   Multilevel mild spondylotic degeneration thoracic spine.   Partially visualized suspected left hydronephrosis. Similar to prior imaging. A previously present left ureteral stent not easily seen on this study. Correlate with history.       1.  Bibasilar patchy opacity may reflect infectious or inflammatory process as above. 2. 9 mm nodule left upper lobe with emphysema. Recommend short-term three-month follow-up to re-evaluate. Alternatively PET imaging could be considered.   MACRO: Critical Finding:  See findings. Notification was initiated on 9/18/2024 at 9:58 pm by  Doyle Mccoy.  (**-YCF-**) Instructions:   Signed by: Doyle Mccoy  9/18/2024 9:58 PM Dictation workstation:   [id number]    XR chest 1 view    Result Date: 9/18/2024  STUDY: Chest Radiograph;  09/18/2024 6:54 PM INDICATION: Chronic obstructive pulmonary disease exacerbation. COMPARISON: 07/01/2023 XR Chest. 03/18/2022 CTA Chest for PE. ACCESSION NUMBER(S): [id number] ORDERING CLINICIAN: MARLON FELIZ TECHNIQUE:  Frontal chest was obtained at 18:54 hours. FINDINGS: CARDIOMEDIASTINAL SILHOUETTE: Cardiomediastinal silhouette is normal in size and configuration.  LUNGS: There is some mild hyperinflation or chronic obstructive pulmonary disease but the lungs remain clear.  There is no edema or consolidation and no visible effusion or pneumothorax..  ABDOMEN: No remarkable upper abdominal findings.  BONES: No acute osseous changes.  Again seen is a mild scoliosis of the lower thoracic spine convex to the left.    Mild hyperinflation or deep inspiration suggesting some chronic obstructive pulmonary disease but the lungs otherwise are clear.. Signed by Patric Jara MD     Assessment/Plan   Acute hypoxic respiratory failure, resolved  Coronavirus infection  Pneumonia  Hyponatremia  Pyuria versus urinary tract infection  Resolving acute kidney injury  Diarrhea-rule out C. difficile     Continue ceftriaxone  Continue azithromycin  Continue dexamethasone  Continue remdesivir  Stool for C. difficile PCR  Monitor stool  Oxygen as needed  Supportive care  Monitor temperature and WBC         Marco Antonio Smith MD

## 2024-09-22 NOTE — PROGRESS NOTES
Hortensia Rodriguez is a 59 y.o. female on day 4 of admission presenting with Acute hypoxic respiratory failure (Multi).      Subjective   Patient assessed at bedside; sitting up in a chair. She was concerned with her diarrhea. After chart review, patient had a large mass in her colon back in 01/2024. She reports she did not follow up because she does not have transportation to [place].       Objective     Last Recorded Vitals  BP (!) 191/119   Pulse 79   Temp 36.9 °C (98.4 °F) (Temporal)   Resp 24   Wt 73.1 kg (161 lb 2.5 oz)   SpO2 98%   Intake/Output last 3 Shifts:    Intake/Output Summary (Last 24 hours) at 9/22/2024 1038  Last data filed at 9/22/2024 0922  Gross per 24 hour   Intake 4637.25 ml   Output 501 ml   Net 4136.25 ml       Admission Weight  Weight: 83.9 kg (185 lb) (09/18/24 1803)    Daily Weight  09/22/24 : 73.1 kg (161 lb 2.5 oz)    Image Results  ECG 12 lead  Sinus tachycardia  Possible Left atrial enlargement  Left axis deviation  Incomplete right bundle branch block  Prolonged QT  Abnormal ECG  When compared with ECG of 01-JUL-2023 11:24,  Previous ECG has undetermined rhythm, needs review  QRS axis Shifted left  QT has lengthened      Physical Exam  Vitals reviewed.   Constitutional:       Appearance: Normal appearance. She is normal weight.   HENT:      Head: Normocephalic and atraumatic.      Right Ear: External ear normal.      Left Ear: External ear normal.      Nose: Nose normal.      Mouth/Throat:      Mouth: Mucous membranes are moist.      Pharynx: Oropharynx is clear.   Eyes:      Conjunctiva/sclera: Conjunctivae normal.      Pupils: Pupils are equal, round, and reactive to light.   Cardiovascular:      Rate and Rhythm: Normal rate and regular rhythm.      Pulses: Normal pulses.      Heart sounds: Normal heart sounds.   Pulmonary:      Effort: Pulmonary effort is normal.      Breath sounds: Normal breath sounds.   Abdominal:      General: Bowel sounds are normal.      Palpations:  Abdomen is soft.   Musculoskeletal:         General: Normal range of motion.      Cervical back: Normal range of motion and neck supple.   Skin:     General: Skin is warm and dry.   Neurological:      General: No focal deficit present.      Mental Status: She is alert and oriented to person, place, and time.   Psychiatric:         Mood and Affect: Mood normal.         Behavior: Behavior normal.         Relevant Results    Scheduled medications  albuterol, 2 puff, inhalation, TID  amLODIPine, 10 mg, oral, Daily  azithromycin, 500 mg, oral, q24h ENEDINA  cefTRIAXone, 1 g, intravenous, q24h  dexAMETHasone, 6 mg, oral, Daily  ezetimibe, 10 mg, oral, Daily  fluticasone furoate-vilanteroL, 1 puff, inhalation, Daily  heparin, 5,000 Units, subcutaneous, q8h  levothyroxine, 25 mcg, oral, Daily  magnesium sulfate, 4 g, intravenous, Once  [Held by provider] meloxicam, 15 mg, oral, Daily  oxygen, , inhalation, Continuous - Inhalation  pantoprazole, 40 mg, oral, Daily before breakfast  remdesivir, 100 mg, intravenous, q24h  sennosides-docusate sodium, 1 tablet, oral, BID      Continuous medications  potassium chloride in 0.9%NaCl, 75 mL/hr, Last Rate: 75 mL/hr (09/22/24 0438)  sodium chloride 0.9%, 10 mL/hr      PRN medications  PRN medications: acetaminophen, acetaminophen, albuterol, benzocaine-menthol, dextromethorphan-guaifenesin, guaiFENesin, melatonin, ondansetron **OR** ondansetron, sodium chloride 0.9%    Results for orders placed or performed during the hospital encounter of 09/18/24 (from the past 24 hour(s))   CBC   Result Value Ref Range    WBC 6.9 4.4 - 11.3 x10*3/uL    nRBC 0.0 0.0 - 0.0 /100 WBCs    RBC 4.03 4.00 - 5.20 x10*6/uL    Hemoglobin 12.1 12.0 - 16.0 g/dL    Hematocrit 36.2 36.0 - 46.0 %    MCV 90 80 - 100 fL    MCH 30.0 26.0 - 34.0 pg    MCHC 33.4 32.0 - 36.0 g/dL    RDW 14.9 (H) 11.5 - 14.5 %    Platelets 230 150 - 450 x10*3/uL   Magnesium   Result Value Ref Range    Magnesium 1.47 (L) 1.60 - 2.40 mg/dL    Comprehensive Metabolic Panel   Result Value Ref Range    Glucose 102 (H) 74 - 99 mg/dL    Sodium 138 136 - 145 mmol/L    Potassium 3.6 3.5 - 5.3 mmol/L    Chloride 110 (H) 98 - 107 mmol/L    Bicarbonate 23 21 - 32 mmol/L    Anion Gap 9 (L) 10 - 20 mmol/L    Urea Nitrogen 32 (H) 6 - 23 mg/dL    Creatinine 0.88 0.50 - 1.05 mg/dL    eGFR 75 >60 mL/min/1.73m*2    Calcium 7.9 (L) 8.6 - 10.3 mg/dL    Albumin 3.3 (L) 3.4 - 5.0 g/dL    Alkaline Phosphatase 48 33 - 136 U/L    Total Protein 5.3 (L) 6.4 - 8.2 g/dL    AST 13 9 - 39 U/L    Bilirubin, Total 0.3 0.0 - 1.2 mg/dL    ALT 11 7 - 45 U/L                   Assessment/Plan        Assessment & Plan  Acute hypoxic respiratory failure (Multi)    COPD (chronic obstructive pulmonary disease) (Multi)    Hypothyroidism    Hypokalemia    Hypercholesteremia    Osteoarthritis of hip    Tobacco use    COVID-19    KARLY (acute kidney injury) (CMS-HCC)    Hyponatremia    Diarrhea    Rectal mass    Unintentional weight loss    Benign essential HTN    Hypomagnesemia    Acute kidney injury (CMS-HCC)    Acute hypoxic respiratory failure   COVID-19 pneumonia (improving)   COPD, in acute exacerbation  Asthma   Tobacco use  - COVID Positive 9/18/24  - D-dimer 429   - Ferritin 203, CRP 2.54, procal 0.14   - CT chest: Bibasilar patchy opacity may reflect infectious or inflammatory process as above. 2. 9 mm nodule left upper lobe with emphysema. Recommend short-term three-month follow-up to re-evaluate. Alternatively PET imaging could be considered.   - Remdesivir day 4/5  - Dexamethasone day 4510  - Continue azithromycin and ceftriaxone (day 4)   - Currently on RA  - Home oxygen None   - RT to eval and treat; breathing treatments via MDI  - Breo ordered; takes Symbicort at home   - Isolation protocol for COVID-19  - Tylenol PRN for fever and pain   - Inflammatory markers q72h  - Telemetry monitoring   - Encourage tobacco cessation on discharge; patient declines nicotine patch   - ID  consulted, appreciate recs      Dehydration (improving)  Acute Renal failure (improving)   Hyponatremia, resolved  Hypokalemia  Hypomagnesia  - Labs on admission consistent with dehydration, patient reports poor oral intake x 1 week due to no appetite/nausea  - Received a total of 1.5L LR and 40 mEq KCl in the ED   - BUN/Cr 106/2.64 >> 61/1.37 today   - Na 124 > 126 > 134 > 136  - K 2.8 > 3.0 > 3.1 > 3.3> 3.6, repleted per protocol   - Mag 3.24 > 2.81 > 2.29 > 1.47  - Lactate 1.9 on admission   - Continue NS with KCl @ 75 ml/hr while PO intake is poor  - Avoid nephrotoxins/renally dose meds- home Mobic held; resume as renal function permits   - Monitor UOP; strict I&Os ordered   - Encourage PO intake  - Daily BMP and mag level to monitor renal function and electrolytes    Diarrhea  Rectal Mass presumed neoplasm  Unintentional weight loss  - 7/26/23: Palpable rectal mass found on digital rectal exam. 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  - 10/23/23: Operating room time was obtained and she was scheduled.  The office attempted to reach her multiple times and did not hear back from her. She was also not able to be reached by the oncologist and missed her appointment with them on 1/5/24.   - Has not follow up since; states she does not have transportation to get to her appointments.  - Stool for PCR pending  - Stool for C. Diff pending     Acute cystitis without hematuria  Acute renal failure  - baseline creatine 0.8  - creatine on admission 2.64; today 0.88  - UA: 500 leuk esterase, > 50 WBCs, > 20 RBCs  - UCx multiple organism; probably contaminated  - Continue ceftriaxone (day 4)  - Adjust ATBs pending final urine culture results     HLD  Essential HTN  - Trop 7 > 6 on admission   - BNP 38  - Continue ezetimibe 10 mg every day  - resumed amolidpine 10 mg daily     Hypothyroidism  - Continue levothyroxine 25 mcg every day      Osteoarthritis    - Mobic held on admission d/t KARLY, resume as renal function permits  - Tylenol PRN for mild pain   - PT/OT evaluation; recommending low level therapy     DVT PPx:   - continue Heparin 5,000 unit q8h     GI PPx:   - continue pantoprazole 40 mg daily     Code Status: Full      Disposition: Patient requires inpatient management at this time.               Sierra Kat, APRN-CNP

## 2024-09-23 ENCOUNTER — PHARMACY VISIT (OUTPATIENT)
Dept: PHARMACY | Facility: CLINIC | Age: 59
End: 2024-09-23
Payer: MEDICAID

## 2024-09-23 VITALS
DIASTOLIC BLOOD PRESSURE: 97 MMHG | WEIGHT: 157 LBS | TEMPERATURE: 98.1 F | HEART RATE: 80 BPM | HEIGHT: 66 IN | RESPIRATION RATE: 16 BRPM | BODY MASS INDEX: 25.23 KG/M2 | OXYGEN SATURATION: 97 % | SYSTOLIC BLOOD PRESSURE: 153 MMHG

## 2024-09-23 LAB
ALBUMIN SERPL BCP-MCNC: 3.4 G/DL (ref 3.4–5)
ALP SERPL-CCNC: 48 U/L (ref 33–136)
ALT SERPL W P-5'-P-CCNC: 15 U/L (ref 7–45)
ANION GAP SERPL CALC-SCNC: <7 MMOL/L (ref 10–20)
AST SERPL W P-5'-P-CCNC: 14 U/L (ref 9–39)
ATRIAL RATE: 104 BPM
BILIRUB SERPL-MCNC: 0.4 MG/DL (ref 0–1.2)
BUN SERPL-MCNC: 26 MG/DL (ref 6–23)
C COLI+JEJ+UPSA DNA STL QL NAA+PROBE: NOT DETECTED
C DIF TOX TCDA+TCDB STL QL NAA+PROBE: NOT DETECTED
CALCIUM SERPL-MCNC: 8.1 MG/DL (ref 8.6–10.3)
CHLORIDE SERPL-SCNC: 106 MMOL/L (ref 98–107)
CO2 SERPL-SCNC: 27 MMOL/L (ref 21–32)
CREAT SERPL-MCNC: 0.81 MG/DL (ref 0.5–1.05)
EC STX1 GENE STL QL NAA+PROBE: NOT DETECTED
EC STX2 GENE STL QL NAA+PROBE: NOT DETECTED
EGFRCR SERPLBLD CKD-EPI 2021: 83 ML/MIN/1.73M*2
ERYTHROCYTE [DISTWIDTH] IN BLOOD BY AUTOMATED COUNT: 14.6 % (ref 11.5–14.5)
GLUCOSE SERPL-MCNC: 136 MG/DL (ref 74–99)
HCT VFR BLD AUTO: 36.7 % (ref 36–46)
HGB BLD-MCNC: 12.8 G/DL (ref 12–16)
MAGNESIUM SERPL-MCNC: 1.81 MG/DL (ref 1.6–2.4)
MCH RBC QN AUTO: 30.4 PG (ref 26–34)
MCHC RBC AUTO-ENTMCNC: 34.9 G/DL (ref 32–36)
MCV RBC AUTO: 87 FL (ref 80–100)
NOROVIRUS GI + GII RNA STL NAA+PROBE: NOT DETECTED
NRBC BLD-RTO: 0 /100 WBCS (ref 0–0)
P AXIS: 72 DEGREES
P OFFSET: 189 MS
P ONSET: 138 MS
PLATELET # BLD AUTO: 265 X10*3/UL (ref 150–450)
POTASSIUM SERPL-SCNC: 3.3 MMOL/L (ref 3.5–5.3)
PR INTERVAL: 140 MS
PROT SERPL-MCNC: 5.6 G/DL (ref 6.4–8.2)
Q ONSET: 208 MS
QRS COUNT: 17 BEATS
QRS DURATION: 98 MS
QT INTERVAL: 426 MS
QTC CALCULATION(BAZETT): 560 MS
QTC FREDERICIA: 511 MS
R AXIS: -68 DEGREES
RBC # BLD AUTO: 4.21 X10*6/UL (ref 4–5.2)
RV RNA STL NAA+PROBE: NOT DETECTED
SALMONELLA DNA STL QL NAA+PROBE: NOT DETECTED
SHIGELLA DNA SPEC QL NAA+PROBE: NOT DETECTED
SODIUM SERPL-SCNC: 136 MMOL/L (ref 136–145)
T AXIS: 69 DEGREES
T OFFSET: 421 MS
V CHOLERAE DNA STL QL NAA+PROBE: NOT DETECTED
VENTRICULAR RATE: 104 BPM
WBC # BLD AUTO: 8.5 X10*3/UL (ref 4.4–11.3)
Y ENTEROCOL DNA STL QL NAA+PROBE: NOT DETECTED

## 2024-09-23 PROCEDURE — 2500000004 HC RX 250 GENERAL PHARMACY W/ HCPCS (ALT 636 FOR OP/ED): Mod: IPSPLIT | Performed by: INTERNAL MEDICINE

## 2024-09-23 PROCEDURE — 97110 THERAPEUTIC EXERCISES: CPT | Mod: GP,CQ,IPSPLIT

## 2024-09-23 PROCEDURE — 80053 COMPREHEN METABOLIC PANEL: CPT | Mod: IPSPLIT | Performed by: NURSE PRACTITIONER

## 2024-09-23 PROCEDURE — 2500000002 HC RX 250 W HCPCS SELF ADMINISTERED DRUGS (ALT 637 FOR MEDICARE OP, ALT 636 FOR OP/ED): Mod: IPSPLIT

## 2024-09-23 PROCEDURE — 2500000001 HC RX 250 WO HCPCS SELF ADMINISTERED DRUGS (ALT 637 FOR MEDICARE OP): Mod: IPSPLIT | Performed by: NURSE PRACTITIONER

## 2024-09-23 PROCEDURE — 85027 COMPLETE CBC AUTOMATED: CPT | Mod: IPSPLIT

## 2024-09-23 PROCEDURE — 2500000001 HC RX 250 WO HCPCS SELF ADMINISTERED DRUGS (ALT 637 FOR MEDICARE OP): Mod: IPSPLIT

## 2024-09-23 PROCEDURE — 94640 AIRWAY INHALATION TREATMENT: CPT | Mod: IPSPLIT

## 2024-09-23 PROCEDURE — 97116 GAIT TRAINING THERAPY: CPT | Mod: GP,CQ,IPSPLIT

## 2024-09-23 PROCEDURE — 2500000004 HC RX 250 GENERAL PHARMACY W/ HCPCS (ALT 636 FOR OP/ED): Mod: IPSPLIT

## 2024-09-23 PROCEDURE — RXMED WILLOW AMBULATORY MEDICATION CHARGE

## 2024-09-23 PROCEDURE — 36415 COLL VENOUS BLD VENIPUNCTURE: CPT | Mod: IPSPLIT

## 2024-09-23 PROCEDURE — 83735 ASSAY OF MAGNESIUM: CPT | Mod: IPSPLIT | Performed by: NURSE PRACTITIONER

## 2024-09-23 PROCEDURE — 99239 HOSP IP/OBS DSCHRG MGMT >30: CPT

## 2024-09-23 RX ORDER — DEXAMETHASONE 6 MG/1
6 TABLET ORAL DAILY
Qty: 5 TABLET | Refills: 0 | Status: SHIPPED | OUTPATIENT
Start: 2024-09-24 | End: 2024-09-29

## 2024-09-23 RX ORDER — POTASSIUM CHLORIDE 20 MEQ/1
40 TABLET, EXTENDED RELEASE ORAL ONCE
Status: COMPLETED | OUTPATIENT
Start: 2024-09-23 | End: 2024-09-23

## 2024-09-23 RX ORDER — AMLODIPINE BESYLATE 10 MG/1
10 TABLET ORAL DAILY
Qty: 30 TABLET | Refills: 0 | Status: SHIPPED | OUTPATIENT
Start: 2024-09-23 | End: 2024-10-23

## 2024-09-23 ASSESSMENT — PAIN - FUNCTIONAL ASSESSMENT
PAIN_FUNCTIONAL_ASSESSMENT: 0-10
PAIN_FUNCTIONAL_ASSESSMENT: CPOT (CRITICAL CARE PAIN OBSERVATION TOOL)

## 2024-09-23 ASSESSMENT — PAIN SCALES - GENERAL
PAINLEVEL_OUTOF10: 0 - NO PAIN
PAINLEVEL_OUTOF10: 0 - NO PAIN

## 2024-09-23 ASSESSMENT — COGNITIVE AND FUNCTIONAL STATUS - GENERAL
DAILY ACTIVITIY SCORE: 18
STANDING UP FROM CHAIR USING ARMS: A LITTLE
CLIMB 3 TO 5 STEPS WITH RAILING: A LOT
DRESSING REGULAR UPPER BODY CLOTHING: A LITTLE
MOBILITY SCORE: 23
CLIMB 3 TO 5 STEPS WITH RAILING: A LITTLE
MOVING TO AND FROM BED TO CHAIR: A LITTLE
PERSONAL GROOMING: A LITTLE
HELP NEEDED FOR BATHING: A LITTLE
DRESSING REGULAR LOWER BODY CLOTHING: A LITTLE
MOBILITY SCORE: 19
WALKING IN HOSPITAL ROOM: A LITTLE
TOILETING: A LITTLE
EATING MEALS: A LITTLE

## 2024-09-23 NOTE — PROGRESS NOTES
Physical Therapy    Physical Therapy Treatment    Patient Name: Hortensia Rodriguez  MRN: [id number]  Department: [facility] ICU  Room: 04/04-A  Today's Date: 9/23/2024  Time Calculation  Start Time: 0946  Stop Time: 1025  Time Calculation (min): 39 min     Assessment/Plan   PT Assessment  PT Assessment Results: Decreased strength, Decreased endurance, Decreased mobility  Rehab Prognosis: Good  Barriers to Discharge: n/a  Barriers to Participation:  (none)  End of Session Communication: Bedside nurse  Assessment Comment: Pleasant 59 y.o presents with weakness and decreased activity tolerance. Pt. lives with family and is normally IND (does report holding onto things when amb.) Pt. currently requires CGA when amb. (with /without WW). Recommend cont. PT to address above noted limitations and prevent further decline. Anticipate low intensity once pt. starts to improve medically but recommend MOD intensity at this point as pt. will have difficulty managing at home at this level.  End of Session Patient Position: Bed, 2 rail up, Alarm on  PT Plan  Inpatient/Swing Bed or Outpatient: Inpatient  PT Plan  Treatment/Interventions: Bed mobility, Transfer training, Gait training, Therapeutic exercise  PT Plan: Ongoing PT  PT Frequency: 3 times per week  PT Discharge Recommendations:  (low vs mod depending on progress)  PT Recommended Transfer Status: Assist x1  PT - OK to Discharge: Yes    General Visit Information:   PT  Visit  PT Received On: 09/23/24  General  Prior to Session Communication: Bedside nurse  Patient Position Received: Up in chair, Alarm off, not on at start of session    Subjective   Patient pleasant and agreeable to session.  Precautions:  Precautions  Medical Precautions: Fall precautions, Infection precautions    Objective   Pain:  Pain Assessment  Pain Assessment: 0-10  0-10 (Numeric) Pain Score: 0 - No pain  Cognition:  Cognition  Overall Cognitive Status: Within Functional Limits  Coordination:  Movements are  Fluid and Coordinated: Yes     Treatments:  Therapeutic Exercise  Therapeutic Exercise Performed: Yes  Therapeutic Exercise Activity 1: laqs x 20  Therapeutic Exercise Activity 2: hip flexion seated x 20 with vcs required to use full available ROM  Therapeutic Exercise Activity 3: iso hip abduction/adduction x 20  Therapeutic Exercise Activity 4: toe/heel raises x 20  Therapeutic Exercise Activity 5: sit to stand x 10    Bed Mobility  Bed Mobility: Yes  Bed Mobility 1  Bed Mobility 1: Sitting to supine  Level of Assistance 1:  (light use of rails)    Ambulation/Gait Training 1  Surface 1: Level tile  Device 1: No device  Gait Support Devices: Gait belt  Assistance 1: Contact guard, Minimal verbal cues (Vcs required to maintain erect posture and for directional changes.)  Quality of Gait 1: Decreased step length, Forward flexed posture, Antalgic  Comments/Distance (ft) 1: 10x3  Transfers  Transfer: Yes  Transfer 1  Technique 1: Sit to stand, Stand to sit  Transfer Device 1: Gait belt  Transfer Level of Assistance 1: Close supervision    Outcome Measures:  [facility] Basic Mobility  Turning from your back to your side while in a flat bed without using bedrails: None  Moving from lying on your back to sitting on the side of a flat bed without using bedrails: None  Moving to and from bed to chair (including a wheelchair): A little  Standing up from a chair using your arms (e.g. wheelchair or bedside chair): A little  To walk in hospital room: A little  Climbing 3-5 steps with railing: A lot  Basic Mobility - Total Score: 19    Education Documentation  Mobility Training, taught by Hillary Roth PTA at 9/23/2024 11:49 AM.  Learner: Patient  Readiness: Acceptance  Method: Explanation  Response: Demonstrated Understanding  Comment: Educated patient on maintaining erect posture with ambulation..    Education Comments  No comments found.      Encounter Problems       Encounter Problems (Active)       Balance       STG -  Maintains dynamic standing balance without upper extremity support with good balance  (Progressing)       Start:  09/19/24    Expected End:  10/03/24               Mobility       LTG - Patient will ambulate household distance IND with LRD (Progressing)       Start:  09/19/24    Expected End:  10/03/24            STG - Patient will ascend and descend four to six stairs with 2 rails IND       Start:  09/19/24    Expected End:  10/03/24               Pain - Adult             Encounter Problems (Resolved)       PT Transfers       STG - Patient will perform bed mobility IND (Met)       Start:  09/19/24    Expected End:  10/03/24    Resolved:  09/23/24         STG - Patient will transfer sit to and from stand DAVID with LRD  (Met)       Start:  09/19/24    Expected End:  10/03/24    Resolved:  09/23/24

## 2024-09-23 NOTE — PROGRESS NOTES
09/23/24 1105   Discharge Planning   Assistance Needed A & O x3; Independent with ADLS, IADLs, ambulation and does not drive. Patient's daughter in law drives; Son and DIL do the shopping; No DME in the home.   Home or Post Acute Services None   Expected Discharge Disposition Home   Does the patient need discharge transport arranged? No  (Her son to  after 4 pm)     Patient medically ready for discharge.  Patient follow up information on discharge instructions.  Patient will be returning home.  She declines Low/Mod recommendation from therapy.  She said once she's home, she will be ok.  Patient is in agreement with discharge plan.      DC PLAN:  Home

## 2024-09-23 NOTE — NURSING NOTE
Assisted patient up to bathroom and she urinated and passed brown mucoid stool. Depends also saturated prior to sitting on toilet.

## 2024-09-23 NOTE — DISCHARGE SUMMARY
Discharge Diagnosis  Acute hypoxic respiratory failure (Multi)    Issues Requiring Follow-Up    Follow up with colorectal surgery at [facility] [place] - referral provided     Discharge Meds     Medication List      START taking these medications     dexAMETHasone 6 mg tablet; Commonly known as: Decadron; Take 1 tablet (6   mg) by mouth once daily for 5 doses. Take with food; Start taking on:   September 24, 2024     CONTINUE taking these medications     albuterol 90 mcg/actuation inhaler; Commonly known as: ProAir HFA;   Inhale 1 puff every 6 hours if needed for wheezing.   amLODIPine 10 mg tablet; Commonly known as: Norvasc; Take 1 tablet (10   mg) by mouth once daily.   ezetimibe 10 mg tablet; Commonly known as: Zetia   levothyroxine 25 mcg tablet; Commonly known as: Synthroid, Levoxyl; TAKE   ONE TABLET BY MOUTH EVERY DAY   meloxicam 15 mg tablet; Commonly known as: Mobic; Take 1 tablet (15 mg)   by mouth once daily.   oxyCODONE 5 mg immediate release tablet; Commonly known as: Roxicodone;   Take 1 tablet (5 mg) by mouth every 6 hours if needed for severe pain (7 -   10).   Symbicort 160-4.5 mcg/actuation inhaler; Generic drug:   budesonide-formoteroL; Inhale 2 puffs 2 times a day.     STOP taking these medications     atorvastatin 80 mg tablet; Commonly known as: Lipitor   citalopram 10 mg tablet; Commonly known as: CeleXA   ibuprofen 600 mg tablet       Test Results Pending At Discharge  Pending Labs       Order Current Status    Extra Urine Diaz Tube Collected (09/18/24 1904)    Extra Urine Gray Tube Collected (09/19/24 1131)    Urinalysis with Reflex Culture and Microscopic Collected (09/18/24 1904)    Urinalysis with Reflex Culture and Microscopic In process          History Of Present Illness  Hortensia Rodriguez is a 59 y.o. female presenting with shortness of breath. Patient has a history of COPD and is a current smoker, does not wear home O2. She reports worsening shortness of breath and a dry cough at home, no chest  pain. She has had a poor appetite and some nausea; denies vomiting or diarrhea. She has not had any fevers at home. She came to the ED for further evaluation. She was placed on 4L O2 via NC on arrival for SpO2 89% on room air. Labs were significant for Na 124, K 2.8, BUN/Cr 106/2.64, mag 3.24, H/H 18.3/50.9. VBG showed pH 7.41/pCO2 27/pO2 52. CT chest showed bibasilar patchy opacities and a 9 mm left upper lobe nodule with emphysema. Patient was found to be COVID positive. She was admitted to ICU for treatment of COVID-19 pneumonia with hypoxia.     Hospital Course   Acute hypoxic respiratory failure (resolved)   COVID-19 pneumonia (improving)   COPD, in acute exacerbation  Asthma   Tobacco use  - COVID Positive 9/18/24  - D-dimer 429   - Ferritin 203, CRP 2.54, procal 0.14   - CT chest: Bibasilar patchy opacity may reflect infectious or inflammatory process as above. 2. 9 mm nodule left upper lobe with emphysema. Recommend short-term three-month follow-up to re-evaluate. Alternatively PET imaging could be considered.   - Remdesivir day 5/5  - Dexamethasone day 5/10  - Continue azithromycin and ceftriaxone (day 5)   - Currently on RA  - Home oxygen None   - RT to eval and treat; breathing treatments via MDI  - Breo ordered; takes Symbicort at home   - Isolation protocol for COVID-19  - Tylenol PRN for fever and pain   - Inflammatory markers q72h  - Telemetry monitoring   - Encourage tobacco cessation on discharge; patient declines nicotine patch   - ID consulted, appreciate recs      Dehydration (improving)  Acute Renal failure (resolved)   Hyponatremia, resolved  Hypokalemia, improving   Hypomagnesemia (resolved)   - Labs on admission consistent with dehydration, patient reports poor oral intake x 1 week due to no appetite/nausea  - Received a total of 1.5L LR and 40 mEq KCl in the ED   - BUN/Cr 106/2.64 >> 26/0.81  today   - Na 124 > 126 > 134 > 136 > 138 > 136   - K 2.8 > 3.0 > 3.1 > 3.3, repleted per protocol    - Mag 3.24 > 2.81 > 2.29 > 1.47 > 1.81   - Lactate 1.9 on admission   - Continue NS with KCl @ 75 ml/hr while PO intake is poor  - Avoid nephrotoxins/renally dose meds- home Mobic held; resume as renal function permits   - Monitor UOP; strict I&Os ordered   - Encourage PO intake  - Daily BMP and mag level to monitor renal function and electrolytes     Diarrhea- resolved  Rectal Mass presumed neoplasm  Unintentional weight loss  - 7/26/23: Palpable rectal mass found on digital rectal exam. 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  - 10/23/23: Operating room time was obtained and she was scheduled.  The office attempted to reach her multiple times and did not hear back from her. She was also not able to be reached by the oncologist and missed her appointment with them on 1/5/24.   - Has not follow up since; states she does not have transportation to get to her appointments.  - Stool pathogen panel/C. Diff PCR negative  -Outpatient follow up with colorectal surgery      Acute cystitis without hematuria  - UA: 500 leuk esterase, > 50 WBCs, > 20 RBCs  - UCx multiple organism; probably contaminated  - Completed 5 days of ATBs     HLD  Essential HTN  - Trop 7 > 6 on admission   - BNP 38  - Continue ezetimibe 10 mg every day  - resumed amolidpine 10 mg daily     Hypothyroidism  - Continue levothyroxine 25 mcg every day     Osteoarthritis    - Mobic held on admission d/t KARLY, resume as renal function permits  - Tylenol PRN for mild pain   - PT/OT evaluation; recommending low level therapy     DVT PPx:   - continue Heparin 5,000 unit q8h     GI PPx:   - continue pantoprazole 40 mg daily     Code Status: Full      Disposition: patient stable for discharge home. She remains on room air with no respiratory distress. She received 5 days of Remdesivir and is discharged on dexamethasone to complete a total of 10 days of steroids. She will follow up with  her PCP and colorectal surgery on discharge. Resources for transportation to future appointments provided by [facility].      Pertinent Physical Exam At Time of Discharge  Physical Exam  Constitutional:       General: She is not in acute distress.     Appearance: She is not toxic-appearing.   HENT:      Head: Normocephalic and atraumatic.      Mouth/Throat:      Mouth: Mucous membranes are moist.   Eyes:      Conjunctiva/sclera: Conjunctivae normal.   Cardiovascular:      Rate and Rhythm: Normal rate and regular rhythm.   Pulmonary:      Effort: No respiratory distress.      Breath sounds: No wheezing.      Comments: On room air, diminished bases bilaterally  Abdominal:      General: There is no distension.      Palpations: Abdomen is soft.      Tenderness: There is no abdominal tenderness.   Musculoskeletal:      Right lower leg: No edema.      Left lower leg: No edema.   Skin:     General: Skin is warm and dry.   Neurological:      Mental Status: She is alert and oriented to person, place, and time.   Psychiatric:         Mood and Affect: Mood normal.         Behavior: Behavior normal.         Outpatient Follow-Up  No future appointments.      Shanell Ewing PA-C

## 2024-09-23 NOTE — NURSING NOTE
Patient sitting up in chair and tolerating it well. Patient. States sheis feeling better at present.

## 2024-09-23 NOTE — NURSING NOTE
"Ambulated up to bathroom. Depends heavy with a mucoid substance. Defecated some mucoid brown stool, non formed. Previously encouraged to call if depends is wet. Attempted to support patient but wants to hold onto chair. Encouraged the walker but she states, \"its too late now\".   "

## 2024-09-23 NOTE — CARE PLAN
The patient's goals for the shift include be free of injury.Patient able to get self up to BSC without injury today.     The clinical goals for the shift include patient will get up to use the toilet every 4 hours of more this shift. Patient got self up to toilet today without requiring staff assistance.     Recommendations to address these barriers include not complying with further care as needed to promote good health. Verbal information addressed to be sure of patient understanding and information reinforced.

## 2024-09-23 NOTE — NURSING NOTE
Patient able to get self to BSC without nursing assist. Patient aware of pending discharge but not able to get a ride home until after 1600 today.

## 2024-09-23 NOTE — NURSING NOTE
Home going instructions reinforce with good understanding. Patient able to get self up to BSC & dress self before discharge. Patient and all possessions to waiting vehicle.

## 2024-09-24 ENCOUNTER — PATIENT OUTREACH (OUTPATIENT)
Dept: PRIMARY CARE | Facility: CLINIC | Age: 59
End: 2024-09-24
Payer: COMMERCIAL

## 2024-09-25 ENCOUNTER — PATIENT OUTREACH (OUTPATIENT)
Dept: PRIMARY CARE | Facility: CLINIC | Age: 59
End: 2024-09-25
Payer: COMMERCIAL

## 2024-09-25 NOTE — PROGRESS NOTES
Discharge Facility: [facility]  Discharge Diagnosis: Acute hypoxic respiratory failure   Admission Date: 18 Sep 24  Discharge Date: 23 Sep 24    PCP Appointment Date: Tasked to office  Specialist Appointment Date: None  Hospital Encounter and Summary Linked: Yes    No contact on discharge outreach after 2 attempts. Tasked to office for scheduling. Will attempt outreach again in 2 wks.

## 2024-09-30 DIAGNOSIS — J44.1 CHRONIC OBSTRUCTIVE PULMONARY DISEASE WITH ACUTE EXACERBATION (MULTI): Primary | ICD-10-CM

## 2024-10-04 ENCOUNTER — APPOINTMENT (OUTPATIENT)
Dept: PRIMARY CARE | Facility: CLINIC | Age: 59
End: 2024-10-04
Payer: COMMERCIAL

## 2024-10-04 ENCOUNTER — OFFICE VISIT (OUTPATIENT)
Dept: PRIMARY CARE | Facility: CLINIC | Age: 59
End: 2024-10-04
Payer: COMMERCIAL

## 2024-10-04 VITALS
SYSTOLIC BLOOD PRESSURE: 136 MMHG | HEART RATE: 114 BPM | HEIGHT: 66 IN | RESPIRATION RATE: 18 BRPM | OXYGEN SATURATION: 92 % | WEIGHT: 165 LBS | DIASTOLIC BLOOD PRESSURE: 84 MMHG | BODY MASS INDEX: 26.52 KG/M2

## 2024-10-04 DIAGNOSIS — M25.552 BILATERAL HIP PAIN: ICD-10-CM

## 2024-10-04 DIAGNOSIS — R91.1 LUNG NODULE: ICD-10-CM

## 2024-10-04 DIAGNOSIS — I71.21 ANEURYSM OF ASCENDING AORTA WITHOUT RUPTURE (CMS-HCC): ICD-10-CM

## 2024-10-04 DIAGNOSIS — Z72.0 TOBACCO USE: ICD-10-CM

## 2024-10-04 DIAGNOSIS — I10 BENIGN ESSENTIAL HYPERTENSION: ICD-10-CM

## 2024-10-04 DIAGNOSIS — Z09 HOSPITAL DISCHARGE FOLLOW-UP: ICD-10-CM

## 2024-10-04 DIAGNOSIS — E78.00 HYPERCHOLESTEREMIA: ICD-10-CM

## 2024-10-04 DIAGNOSIS — K62.89 RECTAL MASS: ICD-10-CM

## 2024-10-04 DIAGNOSIS — L03.115 CELLULITIS OF RIGHT LOWER EXTREMITY: ICD-10-CM

## 2024-10-04 DIAGNOSIS — R15.9 FULL INCONTINENCE OF FECES: ICD-10-CM

## 2024-10-04 DIAGNOSIS — E03.9 HYPOTHYROIDISM, UNSPECIFIED TYPE: ICD-10-CM

## 2024-10-04 DIAGNOSIS — M25.551 BILATERAL HIP PAIN: ICD-10-CM

## 2024-10-04 DIAGNOSIS — J44.9 CHRONIC OBSTRUCTIVE PULMONARY DISEASE, UNSPECIFIED COPD TYPE (MULTI): ICD-10-CM

## 2024-10-04 PROCEDURE — 3008F BODY MASS INDEX DOCD: CPT | Performed by: INTERNAL MEDICINE

## 2024-10-04 PROCEDURE — 99495 TRANSJ CARE MGMT MOD F2F 14D: CPT | Performed by: INTERNAL MEDICINE

## 2024-10-04 PROCEDURE — 3079F DIAST BP 80-89 MM HG: CPT | Performed by: INTERNAL MEDICINE

## 2024-10-04 PROCEDURE — 3075F SYST BP GE 130 - 139MM HG: CPT | Performed by: INTERNAL MEDICINE

## 2024-10-04 RX ORDER — AMLODIPINE BESYLATE 10 MG/1
10 TABLET ORAL DAILY
Qty: 90 TABLET | Refills: 1 | Status: SHIPPED | OUTPATIENT
Start: 2024-10-04

## 2024-10-04 RX ORDER — MELOXICAM 15 MG/1
15 TABLET ORAL DAILY
Qty: 30 TABLET | Refills: 0 | Status: SHIPPED | OUTPATIENT
Start: 2024-10-04

## 2024-10-04 RX ORDER — ALBUTEROL SULFATE 90 UG/1
1 INHALANT RESPIRATORY (INHALATION) EVERY 6 HOURS PRN
Qty: 18 G | Refills: 1 | Status: SHIPPED | OUTPATIENT
Start: 2024-10-04

## 2024-10-04 RX ORDER — LEVOTHYROXINE SODIUM 25 UG/1
25 TABLET ORAL DAILY
Qty: 90 TABLET | Refills: 1 | Status: SHIPPED | OUTPATIENT
Start: 2024-10-04

## 2024-10-04 RX ORDER — CEPHALEXIN 500 MG/1
500 CAPSULE ORAL 4 TIMES DAILY
Qty: 28 CAPSULE | Refills: 0 | Status: SHIPPED | OUTPATIENT
Start: 2024-10-04 | End: 2024-10-11

## 2024-10-04 RX ORDER — EZETIMIBE 10 MG/1
10 TABLET ORAL DAILY
Qty: 90 TABLET | Refills: 1 | Status: SHIPPED | OUTPATIENT
Start: 2024-10-04

## 2024-10-04 RX ORDER — BUDESONIDE AND FORMOTEROL FUMARATE DIHYDRATE 160; 4.5 UG/1; UG/1
2 AEROSOL RESPIRATORY (INHALATION) 2 TIMES DAILY
Qty: 10.2 G | Refills: 1 | Status: SHIPPED | OUTPATIENT
Start: 2024-10-04

## 2024-10-04 RX ORDER — DIAPER,BRIEF,ADULT, DISPOSABLE
EACH MISCELLANEOUS
Qty: 12 EACH | Refills: 0 | Status: SHIPPED | OUTPATIENT
Start: 2024-10-04

## 2024-10-04 ASSESSMENT — PATIENT HEALTH QUESTIONNAIRE - PHQ9
SUM OF ALL RESPONSES TO PHQ9 QUESTIONS 1 AND 2: 2
10. IF YOU CHECKED OFF ANY PROBLEMS, HOW DIFFICULT HAVE THESE PROBLEMS MADE IT FOR YOU TO DO YOUR WORK, TAKE CARE OF THINGS AT HOME, OR GET ALONG WITH OTHER PEOPLE: SOMEWHAT DIFFICULT
2. FEELING DOWN, DEPRESSED OR HOPELESS: SEVERAL DAYS
1. LITTLE INTEREST OR PLEASURE IN DOING THINGS: SEVERAL DAYS

## 2024-10-04 ASSESSMENT — PAIN SCALES - GENERAL: PAINLEVEL: 6

## 2024-10-04 NOTE — PROGRESS NOTES
"Patient ID:   Hortensia Rodriguez is a 59 y.o. female with PMH remarkable for COPD, tobacco dependence, hypothyroidism, lung nodule, left kidney stone s/p stent, ascending aorta aneurysm, depression, anxiety, HTN, HLD, who presents to the office today for Hospital Follow-up.    Hospitalization Discharge Follow Up:  Date(s): 9/18 to 9/23/2024  Location: [facility]  Reason Presented to ER: SOB  Synopsis: Patient has a history of COPD and is a current smoker, does not wear home O2. She reports worsening shortness of breath and a dry cough at home, no chest pain. She has had a poor appetite and some nausea; denies vomiting or diarrhea. She has not had any fevers at home. She came to the ED for further evaluation. She was placed on 4L O2 via NC on arrival for SpO2 89% on room air. Labs were significant for Na 124, K 2.8, BUN/Cr 106/2.64, mag 3.24, H/H 18.3/50.9. VBG showed pH 7.41/pCO2 27/pO2 52. CT chest showed bibasilar patchy opacities and a 9 mm left upper lobe nodule with emphysema. Patient was found to be COVID positive. She was admitted to ICU for treatment of COVID-19 pneumonia with hypoxia. She received 5 days of Remdesivir and is discharged on dexamethasone to complete a total of 10 days of steroids. She will follow up with her PCP and colorectal surgery on discharge. Resources for transportation to future appointments provided by [facility].      Reports that she is seeing colorectal surgery soon.   Appears to be cellulitis of RLE on exam. Unsure the point of entry. Start on keflex 500mg QID x7d.  Call if no improvement.     \"large rectal mass measuring 72 x 51 centimeters.\" Noted on CT scan of a/p, chest from 1/11/2024.  - advised that she needs to see the colorectal team as they advised. The tumor board met on 2/21/2024 with a plan to biopsy the lung lesion and if -ve go back to the OR for a better sample of the rectal mass.  - has an appt on 11/21/2024 with Dr Sierra Flores MD    Lung nodule noted on " "1/15/2024 scan showing 5mm in right hemidiaphragms, 15mm in BRIANA.   - also seen on more recent imaging from 9/18/2024 ct scan showing There is a slightly irregular nodule seen in the left upper lobe measuring 9 mm.    Social History     Tobacco Use    Smoking status: Every Day     Current packs/day: 0.50     Types: Cigarettes    Smokeless tobacco: Never   Vaping Use    Vaping status: Never Used   Substance Use Topics    Alcohol use: Yes     Comment: occasional    Drug use: Never     Review of Systems   Constitutional: Negative.    HENT: Negative.     Eyes: Negative.    Respiratory:  Positive for cough and shortness of breath.    Cardiovascular: Negative.    Gastrointestinal:  Positive for abdominal pain.   Endocrine: Negative.    Genitourinary: Negative.    Musculoskeletal: Negative.    Skin: Negative.    Neurological: Negative.    Psychiatric/Behavioral: Negative.     All other systems reviewed and are negative.    Visit Vitals  /84   Pulse (!) 114   Resp 18   Ht 1.676 m (5' 6\")   Wt 74.8 kg (165 lb)   SpO2 92%   BMI 26.63 kg/m²   OB Status Postmenopausal   Smoking Status Every Day   BSA 1.87 m²     Allergies   Allergen Reactions    Ace Inhibitors Other    Lisinopril Other      Physical Exam  Vitals reviewed. Exam conducted with a chaperone present.   Constitutional:       Appearance: Normal appearance. She is well-developed.   HENT:      Head: Normocephalic.      Right Ear: External ear normal. Decreased hearing noted.      Left Ear: External ear normal. Decreased hearing noted.      Nose: Nose normal.      Mouth/Throat:      Lips: Pink.      Mouth: Mucous membranes are dry.   Eyes:      General: Lids are normal.      Pupils: Pupils are equal, round, and reactive to light.   Neck:      Trachea: Trachea normal.   Cardiovascular:      Rate and Rhythm: Normal rate and regular rhythm.      Heart sounds: Normal heart sounds.   Pulmonary:      Effort: Pulmonary effort is normal.      Breath sounds: Decreased breath " sounds and rhonchi present.   Abdominal:      General: Bowel sounds are normal.      Palpations: Abdomen is soft.      Tenderness: There is abdominal tenderness.   Musculoskeletal:      Cervical back: Full passive range of motion without pain.   Skin:     General: Skin is warm and moist.   Neurological:      General: No focal deficit present.      Mental Status: She is alert and oriented to person, place, and time. Mental status is at baseline.   Psychiatric:         Mood and Affect: Mood normal.         Speech: Speech is rapid and pressured.         Behavior: Behavior is hyperactive. Behavior is cooperative.         Thought Content: Thought content normal.         Cognition and Memory: Cognition normal.         Judgment: Judgment is impulsive.       Current Outpatient Medications   Medication Instructions    albuterol (ProAir HFA) 90 mcg/actuation inhaler 1 puff, inhalation, Every 6 hours PRN    amLODIPine (NORVASC) 10 mg, oral, Daily    cephalexin (KEFLEX) 500 mg, oral, 4 times daily    diaper,brief,adult,disposable (Select Disposable Briefs) misc Disposable pull up underwear. Uses approximately 5 per day.    ezetimibe (ZETIA) 10 mg, oral, Daily    levothyroxine (SYNTHROID, LEVOXYL) 25 mcg, oral, Daily    meloxicam (MOBIC) 15 mg, oral, Daily, Avoid all other NSAID's while on this medication.    Symbicort 160-4.5 mcg/actuation inhaler 2 puffs, inhalation, 2 times daily        Lab Results   Component Value Date    WBC 8.5 09/23/2024    HGB 12.8 09/23/2024    HCT 36.7 09/23/2024     09/23/2024    CHOL 144 03/22/2024    TRIG 72 03/22/2024    HDL 60.3 03/22/2024    ALT 15 09/23/2024    AST 14 09/23/2024     09/23/2024    K 3.3 (L) 09/23/2024     09/23/2024    CREATININE 0.81 09/23/2024    BUN 26 (H) 09/23/2024    CO2 27 09/23/2024    TSH 6.26 (H) 03/22/2024    INR 1.0 09/18/2024    HGBA1C 5.8 (H) 09/19/2024     Problem List Items Addressed This Visit             ICD-10-CM    COPD (chronic obstructive  "pulmonary disease) (Multi) J44.9     - c/w symbicort, PRN proair         Relevant Medications    albuterol (ProAir HFA) 90 mcg/actuation inhaler    Symbicort 160-4.5 mcg/actuation inhaler    Benign essential hypertension I10     - c/w amlodipine 10mg QD         Relevant Medications    amLODIPine (Norvasc) 10 mg tablet    Hypothyroidism E03.9     - c/w synthroid  - will recheck TSH and free T4 next lab draw         Relevant Medications    levothyroxine (Synthroid, Levoxyl) 25 mcg tablet    Hypercholesteremia E78.00     - c/w zetia         Relevant Medications    ezetimibe (Zetia) 10 mg tablet    Rectal mass K62.89     \"large rectal mass measuring 72 x 51 centimeters.\" Noted on CT scan of a/p, chest from 1/11/2024.  - advised that she needs to see the colorectal team as they advised.   - The tumor board met on 2/21/2024 with a plan to biopsy the lung lesion and if -ve go back to the OR for a better sample of the rectal mass.  - has an appt on 11/21/2024 with Dr Sierra Flores MD         Bilateral hip pain M25.551, M25.552    Relevant Medications    meloxicam (Mobic) 15 mg tablet    Tobacco use Z72.0     - no interest in quitting         Hospital discharge follow-up Z09     Synopsis: PMH of COPD and is a current smoker, does not wear home O2. She reports worsening shortness of breath and a dry cough at home, no chest pain. She has had a poor appetite and some nausea; denies vomiting or diarrhea. She has not had any fevers at home. She came to the ED for further evaluation. She was placed on 4L O2 via NC on arrival for SpO2 89% on room air. Labs were significant for Na 124, K 2.8, BUN/Cr 106/2.64, mag 3.24, H/H 18.3/50.9. VBG showed pH 7.41/pCO2 27/pO2 52. CT chest showed bibasilar patchy opacities and a 9 mm left upper lobe nodule with emphysema. Patient was found to be COVID positive. She was admitted to ICU for treatment of COVID-19 pneumonia with hypoxia. She received 5 days of Remdesivir and is discharged " on dexamethasone to complete a total of 10 days of steroids. She will follow up with her PCP and colorectal surgery on discharge. Resources for transportation to future appointments provided by [facility].           Full incontinence of feces R15.9     - provided script for adult pull ups         Relevant Medications    diaper,brief,adult,disposable (Select Disposable Briefs) misc    Cellulitis of right lower extremity L03.115     Reports that she is seeing colorectal surgery soon.   Appears to be cellulitis of RLE on exam. Unsure the point of entry. Start on keflex 500mg QID x7d.  Call if no improvement.          Relevant Medications    cephalexin (Keflex) 500 mg capsule    Aneurysm of ascending aorta without rupture (CMS-HCC) I71.21     - 4.1cm on scan from 9/18/2024 scan of chest done in the hospital  - will FU on this when acute issues improve         Lung nodule R91.1     Lung nodule noted on 1/15/2024 scan showing 5mm in right hemidiaphragms, 15mm in BRIANA.   - also seen on more recent imaging from 9/18/2024 ct scan showing There is a slightly irregular nodule seen in the left upper lobe measuring 9 mm.  - needs to FU for lung biopsy, understands the importance of this            --------------------  Written by Yojana Florez RN, acting as a scribe for Dr. Peter. This note accurately reflects the work and decisions made by Dr. Peter.     I, Dr. Peter, attest all medical record entries made by the scribe were under my direction and were personally dictated by me. I have reviewed the chart and agree that the record accurately reflects my performance of the history, physical exam, and assessment and plan.

## 2024-10-04 NOTE — Clinical Note
This patient came to see Dr Peter on 10/4/2024 for hospitalization FU.   She has an appt with you on 11/21/2024. She stated that she understood the importance of following up with you for the lung nodule and large rectal mass. We wanted to send you this to keep her in mind if you get a cancellation and can get her in sooner.   Thank you for your time.

## 2024-10-06 PROBLEM — J96.01 ACUTE HYPOXIC RESPIRATORY FAILURE (MULTI): Status: RESOLVED | Noted: 2024-09-18 | Resolved: 2024-10-06

## 2024-10-06 PROBLEM — N17.9 AKI (ACUTE KIDNEY INJURY) (CMS-HCC): Status: RESOLVED | Noted: 2024-09-19 | Resolved: 2024-10-06

## 2024-10-06 PROBLEM — U07.1 COVID-19: Status: RESOLVED | Noted: 2024-09-19 | Resolved: 2024-10-06

## 2024-10-06 PROBLEM — R91.1 LUNG NODULE: Status: ACTIVE | Noted: 2024-10-06

## 2024-10-06 PROBLEM — R15.9 FULL INCONTINENCE OF FECES: Status: ACTIVE | Noted: 2024-10-06

## 2024-10-06 PROBLEM — I71.21 ANEURYSM OF ASCENDING AORTA WITHOUT RUPTURE (CMS-HCC): Status: ACTIVE | Noted: 2024-10-06

## 2024-10-06 PROBLEM — E83.42 HYPOMAGNESEMIA: Status: RESOLVED | Noted: 2023-07-24 | Resolved: 2024-10-06

## 2024-10-06 PROBLEM — N12 PYELONEPHRITIS: Status: RESOLVED | Noted: 2023-07-17 | Resolved: 2024-10-06

## 2024-10-06 PROBLEM — Z09 HOSPITAL DISCHARGE FOLLOW-UP: Status: ACTIVE | Noted: 2024-10-06

## 2024-10-06 PROBLEM — M25.552 BILATERAL HIP PAIN: Status: ACTIVE | Noted: 2023-07-24

## 2024-10-06 PROBLEM — A41.9 SEPSIS (MULTI): Status: RESOLVED | Noted: 2023-07-24 | Resolved: 2024-10-06

## 2024-10-06 PROBLEM — L03.115 CELLULITIS OF RIGHT LOWER EXTREMITY: Status: ACTIVE | Noted: 2024-10-06

## 2024-10-06 PROBLEM — N17.9 ACUTE KIDNEY INJURY (CMS-HCC): Status: RESOLVED | Noted: 2024-09-22 | Resolved: 2024-10-06

## 2024-10-06 ASSESSMENT — ENCOUNTER SYMPTOMS
MUSCULOSKELETAL NEGATIVE: 1
EYES NEGATIVE: 1
PSYCHIATRIC NEGATIVE: 1
SHORTNESS OF BREATH: 1
NEUROLOGICAL NEGATIVE: 1
ABDOMINAL PAIN: 1
COUGH: 1
ENDOCRINE NEGATIVE: 1
CARDIOVASCULAR NEGATIVE: 1
CONSTITUTIONAL NEGATIVE: 1

## 2024-10-06 NOTE — ASSESSMENT & PLAN NOTE
Lung nodule noted on 1/15/2024 scan showing 5mm in right hemidiaphragms, 15mm in BRIANA.   - also seen on more recent imaging from 9/18/2024 ct scan showing There is a slightly irregular nodule seen in the left upper lobe measuring 9 mm.  - needs to FU for lung biopsy, understands the importance of this

## 2024-10-06 NOTE — ASSESSMENT & PLAN NOTE
"\"large rectal mass measuring 72 x 51 centimeters.\" Noted on CT scan of a/p, chest from 1/11/2024.  - advised that she needs to see the colorectal team as they advised.   - The tumor board met on 2/21/2024 with a plan to biopsy the lung lesion and if -ve go back to the OR for a better sample of the rectal mass.  - has an appt on 11/21/2024 with Dr Sierra Flores MD  "

## 2024-10-06 NOTE — ASSESSMENT & PLAN NOTE
- 4.1cm on scan from 9/18/2024 scan of chest done in the hospital  - will FU on this when acute issues improve

## 2024-10-06 NOTE — ASSESSMENT & PLAN NOTE
Synopsis: PMH of COPD and is a current smoker, does not wear home O2. She reports worsening shortness of breath and a dry cough at home, no chest pain. She has had a poor appetite and some nausea; denies vomiting or diarrhea. She has not had any fevers at home. She came to the ED for further evaluation. She was placed on 4L O2 via NC on arrival for SpO2 89% on room air. Labs were significant for Na 124, K 2.8, BUN/Cr 106/2.64, mag 3.24, H/H 18.3/50.9. VBG showed pH 7.41/pCO2 27/pO2 52. CT chest showed bibasilar patchy opacities and a 9 mm left upper lobe nodule with emphysema. Patient was found to be COVID positive. She was admitted to ICU for treatment of COVID-19 pneumonia with hypoxia. She received 5 days of Remdesivir and is discharged on dexamethasone to complete a total of 10 days of steroids. She will follow up with her PCP and colorectal surgery on discharge. Resources for transportation to future appointments provided by [facility].

## 2024-10-06 NOTE — ASSESSMENT & PLAN NOTE
Reports that she is seeing colorectal surgery soon.   Appears to be cellulitis of RLE on exam. Unsure the point of entry. Start on keflex 500mg QID x7d.  Call if no improvement.

## 2024-10-09 ENCOUNTER — PATIENT OUTREACH (OUTPATIENT)
Dept: PRIMARY CARE | Facility: CLINIC | Age: 59
End: 2024-10-09
Payer: COMMERCIAL

## 2024-10-09 NOTE — PROGRESS NOTES
Unable to reach patient for call back after recent PCP appt.   LVM with call back number for patient to call if needed   If no voicemail available call attempts x 2 were made to contact the patient to assist with any questions or concerns patient may have.

## 2024-10-13 NOTE — PROGRESS NOTES
[facility] EMERGENCY DEPT  EMERGENCY DEPARTMENT ENCOUNTER       Pt Name: Lindsey Abraham  MRN: [id number]  Birthdate 1965  Date of evaluation: 10/12/2024  Provider: Phan Lynn MD   PCP: Dima Landa MD  Note Started: 11:55 PM EDT 10/12/24     CHIEF COMPLAINT       Chief Complaint   Patient presents with    Hypertension     Patient reports home blood pressure reading of 218/109, called cardiologist they told her to take 5mg of amlodipine and to come to the ER    Headache        HISTORY OF PRESENT ILLNESS: 1 or more elements      History From: patient, History limited by: none     Lindsey Abraham is a 59 y.o. 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  She reports that she has been having intermittent headache and when she checked her blood pressure at home she had a reading of 220/110.  She denies chest pain or shortness of breath.  No numbness, tingling, or weakness.  She called her cardiology team and was told to take an extra amlodipine and present to the hospital.  She had a cardiac catheterization 72 hours ago - she was noted to be hypertensive at this time.    Please See MDM for Additional Details of the HPI/PMH  Nursing Notes were all reviewed and agreed with or any disagreements were addressed in the HPI.     REVIEW OF SYSTEMS      Positives and Pertinent negatives as per HPI.    PAST HISTORY     Past Medical History:  Past Medical History:   Diagnosis Date    Chronic left shoulder pain 07/10/2024    Diverticular disease     Gastrointestinal disorder     diverticulitis     Hypercholesterolemia     Hypertension     Stroke (HCC) 1990    TIA    TIA (transient ischemic attack)        Past Surgical History:  Past Surgical History:   Procedure Laterality Date    CARDIAC PROCEDURE N/A 10/9/2024    Left heart cath / coronary angiography w  No chief complaint on file.      History Of Present Illness    Subjective   Hortensia Rodriguez is a 57 yo female was referred by  Yani Beck CNP  for evaluation of a rectal mass. She was admitted 7/2023 for a kidney stone and at that time she had a stent placed. CT a/p was performed demonstrating persistent severe eccentric rectal wall thickening along the anterior wall of the rectum and distal sigmoid colon similar compared to prior. She then underwent colonoscopy which demonstrating a likely malignant mass in the distal rectum/anus. Area was tattooed and biopsies were obtained. Pathology demonstrating superficial fragments of tubulovillous adenoma. MRI rectum was performed and MRI based staging T3bN0.     She was last seen 10/5/23 and the plan was for EUA with biopsies of rectal mass. Operating room time was obtained and she was scheduled for 10/23/23. The office attempted to reach her multiple times and did not hear back from her.     CEA 7/27/23: 2.4    CT chest 7/26/23: Multiple pulmonary nodules in bilateral lungs measuring in the range of 2-6 mm as described above. Most of these pulmonary nodules were identified on prior CT dating back to 2022 except the 6 mm subpleural pulmonary nodule in the right lower lobe (axial image 201/284). These are indeterminate. Recommend attention on follow-up imaging. 2. Mild-to-moderate atherosclerotic vascular calcifications. 3. Stable bilateral adrenal nodules.    CT a/p 7/2023: Left-sided ureteral stent noted in appropriate position. Mild left renal pelvic caliectasis with a 9 mm calculus in the left renal pelvis previously measuring 1.1 cm. Single additional 2 mm nonobstructing right renal calculus. 2. Persistent severe eccentric rectal wall thickening along the anterior wall of the rectum and distal sigmoid colon similar compared to prior. Findings presumably secondary to rectal carcinoma. Recommend further evaluation with colonoscopy. 3. Stable 4.5 cm slightly  complex cyst in the anterior aspect of the left kidney similar compared to prior.    MRI rectum 9/29/23: There is a circular/semi circular mass starting at the anorectal junction abutting the internal sphincter and appears 2.8 cm from the anal verge extending for about 6 cm in craniocaudal dimension and involving the muscularis propria with possible 3 mm 9 o'clock extramural/perirectal extension with at least 3 mm distance from the nearest mesorectal fascia. Questionable right perirectal extramural vascular involvement. Tumor is noted below the level of the peritoneal reflections. Findings are consistent with the known rectal neoplasm. 2. No enlarged mesorectal or pelvic suspicious lymph nodes (Few tiny mesorectal lymph nodes noted). No pelvic organ involvement. MRI based staging T3bN0.     Colonoscopy 7/26/2023 ([facility]): Palpable rectal mass found on digital rectal exam. - Likely malignant tumor in the distal rectum/anus. Biopsied. Tattooed. - One 6 mm polyp in the rectum, removed with a cold snare. Resected and retrieved. - Two 4 to 5 mm polyps in the proximal transverse colon, removed with a cold snare. Resected and retrieved. - One 5 mm polyp at the ileocecal valve, removed with a cold snare. Resected and retrieved.     Past Medical History  Past Medical History:   Diagnosis Date    Abdominal pain 07/24/2023    Abnormal electrocardiogram (ECG) (EKG) 12/16/2015    Abnormal ECG    Anxiety disorder, unspecified 08/31/2015    Anxiety    Complicated UTI (urinary tract infection) 07/24/2023    Encounter for preprocedural cardiovascular examination 10/25/2015    Pre-operative cardiovascular examination    Flank pain 07/24/2023    Hypomagnesemia 10/14/2015    Hypomagnesemia    Personal history of other diseases of the circulatory system 08/31/2015    History of chronic ischemic heart disease    Personal history of other diseases of the female genital tract 10/28/2015    History of ovarian cyst    Personal history of  other diseases of urinary system 01/04/2016    History of hematuria    Personal history of other diseases of urinary system 10/16/2015    History of kidney disease    Personal history of other specified conditions 10/16/2015    History of pelvic mass    Personal history of other specified conditions 09/30/2015    History of fatigue    Personal history of pneumonia (recurrent) 02/26/2019    History of pneumonia    Right hip pain 07/24/2023    Strain of muscle, fascia and tendon of abdomen, initial encounter 09/17/2015    Abdominal muscle strain       Surgical History  Past Surgical History:   Procedure Laterality Date    APPENDECTOMY  08/31/2015    Appendectomy    FOOT SURGERY  08/31/2015    Foot Surgery        Social History  She reports that she has quit smoking. Her smoking use included cigarettes. She has never used smokeless tobacco. She reports that she does not drink alcohol and does not use drugs.    Family History  Family History   Problem Relation Name Age of Onset    No Known Problems Mother      No Known Problems Father          Allergies  Ace inhibitors and Lisinopril    Home Medications  Prior to Admission medications    Medication Sig Start Date End Date Taking? Authorizing Provider   acetaminophen (Tylenol) 325 mg tablet 2 TAB(S) ORALLY EVERY 4 HOURS, AS NEEDED, TEMP GREATER THAN OR EQUAL TO 38.0 C 7/5/23 7/4/24  Tracie Alejo MD   albuterol (ProAir HFA) 90 mcg/actuation inhaler Inhale. 3/18/22   Historical Provider, MD   amLODIPine (Norvasc) 10 mg tablet Take 1 tablet (10 mg) by mouth once daily. 6/1/23 11/28/23  Chris Pearce PA-C   atorvastatin (Lipitor) 80 mg tablet Take 1 tablet (80 mg) by mouth once daily. 6/1/23 11/28/23  Chris Pearce PA-C   cefuroxime (Ceftin) 250 mg tablet TAKE 2 TABLETS BY MOUTH ONCE DAILY 7/5/23 7/4/24  Tracie Alejo MD   cholecalciferol (Vitamin D3) 25 MCG (1000 UT) tablet Take 1 tablet (25 mcg) by mouth once daily. 6/1/23 11/28/23  Chris Pearce  KENADL   citalopram (CeleXA) 10 mg tablet Take 1 tablet (10 mg) by mouth once daily. 6/1/23 11/28/23  Chris Pearce PA-C   ezetimibe (Zetia) 10 mg tablet Take 1 tablet (10 mg) by mouth once daily. 6/2/23 11/29/23  Ewelina Love MD   ibuprofen 600 mg tablet Take by mouth. 8/7/19   Historical Provider, MD   levothyroxine (Synthroid, Levoxyl) 50 mcg tablet Take 1 tablet (50 mcg) by mouth once daily.    Historical Provider, MD   metoprolol succinate XL (Toprol-XL) 25 mg 24 hr tablet Take 1 tablet (25 mg) by mouth once daily. Do not crush or chew. 6/1/23 11/28/23  Chris Pearce PA-C   potassium chloride CR (K-Tab) 20 mEq ER tablet Take 1 tablet (20 mEq) by mouth once daily. Do not crush, chew, or split. 6/6/23 6/5/24  Ewelina Love MD   Symbicort 160-4.5 mcg/actuation inhaler Inhale 2 puffs 2 times a day. 6/1/23   Chris Pearce PA-C   tamsulosin (Flomax) 0.4 mg 24 hr capsule TAKE 1 CAPSULE BY MOUTH ONCE DAILY 7/5/23 7/4/24  Tracie Alejo MD       Review of Systems   All other systems reviewed and are negative.       Physical Exam  Abdominal:      General: Abdomen is flat.      Tenderness: There is no abdominal tenderness.   Genitourinary:     Rectum: Mass present.     LUCRECIA:     Procedures       Last Recorded Vitals  There were no vitals taken for this visit.    Relevant Results  {If you would like to pull in Medications, type .meds     If you would like to pull in Lab results for the last 24 hours, type .[id number]    If you would like to pull in Imaging results, type .imgrslt :99}                  Lab Review  Lab Results   Component Value Date    AST 10 07/27/2023    ALT 8 07/27/2023    ALKPHOS 71 07/27/2023    PROT 5.6 (L) 07/27/2023    ALBUMIN 3.1 (L) 07/27/2023       ASSESSMENT & PLAN

## 2024-10-17 DIAGNOSIS — J44.1 CHRONIC OBSTRUCTIVE PULMONARY DISEASE WITH ACUTE EXACERBATION (MULTI): Primary | ICD-10-CM

## 2024-10-25 ENCOUNTER — PATIENT OUTREACH (OUTPATIENT)
Dept: PRIMARY CARE | Facility: CLINIC | Age: 59
End: 2024-10-25
Payer: COMMERCIAL

## 2024-10-29 ENCOUNTER — LAB (OUTPATIENT)
Dept: LAB | Facility: LAB | Age: 59
End: 2024-10-29
Payer: COMMERCIAL

## 2024-10-29 ENCOUNTER — APPOINTMENT (OUTPATIENT)
Dept: SURGERY | Facility: CLINIC | Age: 59
End: 2024-10-29
Payer: COMMERCIAL

## 2024-10-29 VITALS
HEART RATE: 115 BPM | SYSTOLIC BLOOD PRESSURE: 174 MMHG | BODY MASS INDEX: 27.12 KG/M2 | WEIGHT: 168 LBS | DIASTOLIC BLOOD PRESSURE: 71 MMHG

## 2024-10-29 DIAGNOSIS — R63.4 UNINTENTIONAL WEIGHT LOSS: ICD-10-CM

## 2024-10-29 DIAGNOSIS — R19.7 DIARRHEA, UNSPECIFIED TYPE: ICD-10-CM

## 2024-10-29 DIAGNOSIS — R91.1 LUNG NODULE: ICD-10-CM

## 2024-10-29 DIAGNOSIS — K62.89 RECTAL MASS: ICD-10-CM

## 2024-10-29 PROCEDURE — 36415 COLL VENOUS BLD VENIPUNCTURE: CPT

## 2024-10-29 PROCEDURE — 82378 CARCINOEMBRYONIC ANTIGEN: CPT

## 2024-10-29 PROCEDURE — 99215 OFFICE O/P EST HI 40 MIN: CPT | Performed by: SURGERY

## 2024-10-29 PROCEDURE — 3077F SYST BP >= 140 MM HG: CPT | Performed by: SURGERY

## 2024-10-29 PROCEDURE — 80053 COMPREHEN METABOLIC PANEL: CPT

## 2024-10-29 PROCEDURE — 3078F DIAST BP <80 MM HG: CPT | Performed by: SURGERY

## 2024-10-30 LAB
ALBUMIN SERPL BCP-MCNC: 4.5 G/DL (ref 3.4–5)
ALP SERPL-CCNC: 101 U/L (ref 33–136)
ALT SERPL W P-5'-P-CCNC: 12 U/L (ref 7–45)
ANION GAP SERPL CALC-SCNC: 15 MMOL/L (ref 10–20)
AST SERPL W P-5'-P-CCNC: 15 U/L (ref 9–39)
BILIRUB SERPL-MCNC: 0.6 MG/DL (ref 0–1.2)
BUN SERPL-MCNC: 13 MG/DL (ref 6–23)
CALCIUM SERPL-MCNC: 9.3 MG/DL (ref 8.6–10.6)
CEA SERPL-MCNC: 3.5 UG/L
CHLORIDE SERPL-SCNC: 102 MMOL/L (ref 98–107)
CO2 SERPL-SCNC: 29 MMOL/L (ref 21–32)
CREAT SERPL-MCNC: 0.83 MG/DL (ref 0.5–1.05)
EGFRCR SERPLBLD CKD-EPI 2021: 80 ML/MIN/1.73M*2
GLUCOSE SERPL-MCNC: 96 MG/DL (ref 74–99)
POTASSIUM SERPL-SCNC: 3 MMOL/L (ref 3.5–5.3)
PROT SERPL-MCNC: 7 G/DL (ref 6.4–8.2)
SODIUM SERPL-SCNC: 143 MMOL/L (ref 136–145)

## 2024-11-21 ENCOUNTER — APPOINTMENT (OUTPATIENT)
Dept: SURGERY | Facility: CLINIC | Age: 59
End: 2024-11-21
Payer: COMMERCIAL

## 2024-11-26 ENCOUNTER — HOSPITAL ENCOUNTER (OUTPATIENT)
Dept: RADIOLOGY | Facility: HOSPITAL | Age: 59
Discharge: HOME | End: 2024-11-26
Payer: COMMERCIAL

## 2024-12-27 ENCOUNTER — HOSPITAL ENCOUNTER (INPATIENT)
Facility: HOSPITAL | Age: 59
End: 2024-12-27
Attending: EMERGENCY MEDICINE | Admitting: INTERNAL MEDICINE
Payer: COMMERCIAL

## 2024-12-27 ENCOUNTER — APPOINTMENT (OUTPATIENT)
Dept: RADIOLOGY | Facility: HOSPITAL | Age: 59
End: 2024-12-27
Payer: COMMERCIAL

## 2024-12-27 ENCOUNTER — APPOINTMENT (OUTPATIENT)
Dept: CARDIOLOGY | Facility: HOSPITAL | Age: 59
End: 2024-12-27
Payer: COMMERCIAL

## 2024-12-27 DIAGNOSIS — I10 BENIGN ESSENTIAL HYPERTENSION: ICD-10-CM

## 2024-12-27 DIAGNOSIS — J15.9 COMMUNITY ACQUIRED BACTERIAL PNEUMONIA: ICD-10-CM

## 2024-12-27 DIAGNOSIS — R09.02 HYPOXIA: ICD-10-CM

## 2024-12-27 DIAGNOSIS — J44.1 COPD EXACERBATION (MULTI): ICD-10-CM

## 2024-12-27 DIAGNOSIS — J20.9 ACUTE BRONCHITIS, UNSPECIFIED ORGANISM: ICD-10-CM

## 2024-12-27 DIAGNOSIS — J18.9 PNEUMONIA DUE TO INFECTIOUS ORGANISM, UNSPECIFIED LATERALITY, UNSPECIFIED PART OF LUNG: Primary | ICD-10-CM

## 2024-12-27 LAB
ALBUMIN SERPL BCP-MCNC: 4.5 G/DL (ref 3.4–5)
ALP SERPL-CCNC: 109 U/L (ref 33–136)
ALT SERPL W P-5'-P-CCNC: 7 U/L (ref 7–45)
ANION GAP SERPL CALC-SCNC: 16 MMOL/L (ref 10–20)
AST SERPL W P-5'-P-CCNC: 11 U/L (ref 9–39)
BASOPHILS # BLD AUTO: 0.02 X10*3/UL (ref 0–0.1)
BASOPHILS NFR BLD AUTO: 0.3 %
BILIRUB SERPL-MCNC: 0.5 MG/DL (ref 0–1.2)
BNP SERPL-MCNC: 28 PG/ML (ref 0–99)
BUN SERPL-MCNC: 12 MG/DL (ref 6–23)
CALCIUM SERPL-MCNC: 9.6 MG/DL (ref 8.6–10.3)
CARDIAC TROPONIN I PNL SERPL HS: 5 NG/L (ref 0–13)
CARDIAC TROPONIN I PNL SERPL HS: 5 NG/L (ref 0–13)
CHLORIDE SERPL-SCNC: 96 MMOL/L (ref 98–107)
CO2 SERPL-SCNC: 29 MMOL/L (ref 21–32)
CREAT SERPL-MCNC: 0.85 MG/DL (ref 0.5–1.05)
EGFRCR SERPLBLD CKD-EPI 2021: 78 ML/MIN/1.73M*2
EOSINOPHIL # BLD AUTO: 0.01 X10*3/UL (ref 0–0.7)
EOSINOPHIL NFR BLD AUTO: 0.1 %
ERYTHROCYTE [DISTWIDTH] IN BLOOD BY AUTOMATED COUNT: 13.5 % (ref 11.5–14.5)
FLUAV RNA RESP QL NAA+PROBE: NOT DETECTED
FLUAV RNA RESP QL NAA+PROBE: NOT DETECTED
FLUBV RNA RESP QL NAA+PROBE: NOT DETECTED
FLUBV RNA RESP QL NAA+PROBE: NOT DETECTED
GLUCOSE SERPL-MCNC: 117 MG/DL (ref 74–99)
HCT VFR BLD AUTO: 43.3 % (ref 36–46)
HGB BLD-MCNC: 15 G/DL (ref 12–16)
IMM GRANULOCYTES # BLD AUTO: 0.02 X10*3/UL (ref 0–0.7)
IMM GRANULOCYTES NFR BLD AUTO: 0.3 % (ref 0–0.9)
LACTATE SERPL-SCNC: 1.6 MMOL/L (ref 0.4–2)
LYMPHOCYTES # BLD AUTO: 1.12 X10*3/UL (ref 1.2–4.8)
LYMPHOCYTES NFR BLD AUTO: 15.8 %
MCH RBC QN AUTO: 30.6 PG (ref 26–34)
MCHC RBC AUTO-ENTMCNC: 34.6 G/DL (ref 32–36)
MCV RBC AUTO: 88 FL (ref 80–100)
MONOCYTES # BLD AUTO: 0.69 X10*3/UL (ref 0.1–1)
MONOCYTES NFR BLD AUTO: 9.7 %
NEUTROPHILS # BLD AUTO: 5.24 X10*3/UL (ref 1.2–7.7)
NEUTROPHILS NFR BLD AUTO: 73.8 %
NRBC BLD-RTO: 0 /100 WBCS (ref 0–0)
PLATELET # BLD AUTO: 266 X10*3/UL (ref 150–450)
POTASSIUM SERPL-SCNC: 2.4 MMOL/L (ref 3.5–5.3)
PROT SERPL-MCNC: 7.8 G/DL (ref 6.4–8.2)
RBC # BLD AUTO: 4.9 X10*6/UL (ref 4–5.2)
SARS-COV-2 RNA RESP QL NAA+PROBE: NOT DETECTED
SARS-COV-2 RNA RESP QL NAA+PROBE: NOT DETECTED
SODIUM SERPL-SCNC: 139 MMOL/L (ref 136–145)
WBC # BLD AUTO: 7.1 X10*3/UL (ref 4.4–11.3)

## 2024-12-27 PROCEDURE — 36415 COLL VENOUS BLD VENIPUNCTURE: CPT | Performed by: EMERGENCY MEDICINE

## 2024-12-27 PROCEDURE — 2550000001 HC RX 255 CONTRASTS: Mod: SE | Performed by: EMERGENCY MEDICINE

## 2024-12-27 PROCEDURE — 80053 COMPREHEN METABOLIC PANEL: CPT | Performed by: EMERGENCY MEDICINE

## 2024-12-27 PROCEDURE — 84484 ASSAY OF TROPONIN QUANT: CPT | Performed by: EMERGENCY MEDICINE

## 2024-12-27 PROCEDURE — 87637 SARSCOV2&INF A&B&RSV AMP PRB: CPT | Performed by: EMERGENCY MEDICINE

## 2024-12-27 PROCEDURE — 94760 N-INVAS EAR/PLS OXIMETRY 1: CPT | Mod: IPSPLIT

## 2024-12-27 PROCEDURE — 71045 X-RAY EXAM CHEST 1 VIEW: CPT

## 2024-12-27 PROCEDURE — 94640 AIRWAY INHALATION TREATMENT: CPT | Mod: IPSPLIT

## 2024-12-27 PROCEDURE — 93005 ELECTROCARDIOGRAM TRACING: CPT

## 2024-12-27 PROCEDURE — 85025 COMPLETE CBC W/AUTO DIFF WBC: CPT | Performed by: EMERGENCY MEDICINE

## 2024-12-27 PROCEDURE — 71045 X-RAY EXAM CHEST 1 VIEW: CPT | Performed by: RADIOLOGY

## 2024-12-27 PROCEDURE — 99285 EMERGENCY DEPT VISIT HI MDM: CPT | Mod: 25 | Performed by: EMERGENCY MEDICINE

## 2024-12-27 PROCEDURE — 1100000001 HC PRIVATE ROOM DAILY: Mod: IPSPLIT

## 2024-12-27 PROCEDURE — 87040 BLOOD CULTURE FOR BACTERIA: CPT | Mod: GENLAB | Performed by: EMERGENCY MEDICINE

## 2024-12-27 PROCEDURE — 2500000004 HC RX 250 GENERAL PHARMACY W/ HCPCS (ALT 636 FOR OP/ED): Mod: IPSPLIT | Performed by: NURSE PRACTITIONER

## 2024-12-27 PROCEDURE — 2500000005 HC RX 250 GENERAL PHARMACY W/O HCPCS: Mod: IPSPLIT | Performed by: NURSE PRACTITIONER

## 2024-12-27 PROCEDURE — 94664 DEMO&/EVAL PT USE INHALER: CPT | Mod: IPSPLIT

## 2024-12-27 PROCEDURE — 83605 ASSAY OF LACTIC ACID: CPT | Performed by: EMERGENCY MEDICINE

## 2024-12-27 PROCEDURE — 71275 CT ANGIOGRAPHY CHEST: CPT

## 2024-12-27 PROCEDURE — 2500000004 HC RX 250 GENERAL PHARMACY W/ HCPCS (ALT 636 FOR OP/ED): Mod: SE | Performed by: EMERGENCY MEDICINE

## 2024-12-27 PROCEDURE — 2500000002 HC RX 250 W HCPCS SELF ADMINISTERED DRUGS (ALT 637 FOR MEDICARE OP, ALT 636 FOR OP/ED): Mod: IPSPLIT | Performed by: NURSE PRACTITIONER

## 2024-12-27 PROCEDURE — 2500000005 HC RX 250 GENERAL PHARMACY W/O HCPCS: Mod: SE | Performed by: EMERGENCY MEDICINE

## 2024-12-27 PROCEDURE — 83880 ASSAY OF NATRIURETIC PEPTIDE: CPT | Performed by: EMERGENCY MEDICINE

## 2024-12-27 PROCEDURE — 71275 CT ANGIOGRAPHY CHEST: CPT | Performed by: RADIOLOGY

## 2024-12-27 PROCEDURE — 2500000002 HC RX 250 W HCPCS SELF ADMINISTERED DRUGS (ALT 637 FOR MEDICARE OP, ALT 636 FOR OP/ED): Mod: IPSPLIT

## 2024-12-27 PROCEDURE — 9420000001 HC RT PATIENT EDUCATION 5 MIN: Mod: IPSPLIT

## 2024-12-27 RX ORDER — POTASSIUM CHLORIDE 20 MEQ/1
40 TABLET, EXTENDED RELEASE ORAL 3 TIMES DAILY
Status: COMPLETED | OUTPATIENT
Start: 2024-12-27 | End: 2024-12-28

## 2024-12-27 RX ORDER — CEFTRIAXONE 2 G/50ML
2 INJECTION, SOLUTION INTRAVENOUS EVERY 24 HOURS
Status: DISCONTINUED | OUTPATIENT
Start: 2024-12-27 | End: 2024-12-27

## 2024-12-27 RX ORDER — POTASSIUM CHLORIDE 14.9 MG/ML
20 INJECTION INTRAVENOUS
Status: COMPLETED | OUTPATIENT
Start: 2024-12-27 | End: 2024-12-27

## 2024-12-27 RX ORDER — ENOXAPARIN SODIUM 100 MG/ML
40 INJECTION SUBCUTANEOUS EVERY 24 HOURS
Status: DISCONTINUED | OUTPATIENT
Start: 2024-12-27 | End: 2025-01-02 | Stop reason: HOSPADM

## 2024-12-27 RX ORDER — EZETIMIBE 10 MG/1
10 TABLET ORAL DAILY
Status: DISCONTINUED | OUTPATIENT
Start: 2024-12-28 | End: 2025-01-02 | Stop reason: HOSPADM

## 2024-12-27 RX ORDER — IPRATROPIUM BROMIDE AND ALBUTEROL SULFATE 2.5; .5 MG/3ML; MG/3ML
3 SOLUTION RESPIRATORY (INHALATION)
Status: DISCONTINUED | OUTPATIENT
Start: 2024-12-28 | End: 2024-12-27

## 2024-12-27 RX ORDER — POLYETHYLENE GLYCOL 3350 17 G/17G
17 POWDER, FOR SOLUTION ORAL DAILY PRN
Status: DISCONTINUED | OUTPATIENT
Start: 2024-12-27 | End: 2025-01-02 | Stop reason: HOSPADM

## 2024-12-27 RX ORDER — ONDANSETRON HYDROCHLORIDE 2 MG/ML
4 INJECTION, SOLUTION INTRAVENOUS EVERY 8 HOURS PRN
Status: DISCONTINUED | OUTPATIENT
Start: 2024-12-27 | End: 2025-01-02 | Stop reason: HOSPADM

## 2024-12-27 RX ORDER — IPRATROPIUM BROMIDE AND ALBUTEROL SULFATE 2.5; .5 MG/3ML; MG/3ML
3 SOLUTION RESPIRATORY (INHALATION) 3 TIMES DAILY
Status: DISCONTINUED | OUTPATIENT
Start: 2024-12-27 | End: 2025-01-02 | Stop reason: HOSPADM

## 2024-12-27 RX ORDER — ACETAMINOPHEN 325 MG/1
650 TABLET ORAL EVERY 4 HOURS PRN
Status: DISCONTINUED | OUTPATIENT
Start: 2024-12-27 | End: 2025-01-02 | Stop reason: HOSPADM

## 2024-12-27 RX ORDER — CEFTRIAXONE 2 G/50ML
2 INJECTION, SOLUTION INTRAVENOUS EVERY 24 HOURS
Status: DISCONTINUED | OUTPATIENT
Start: 2024-12-28 | End: 2025-01-02 | Stop reason: HOSPADM

## 2024-12-27 RX ORDER — MELOXICAM 7.5 MG/1
15 TABLET ORAL DAILY
Status: DISCONTINUED | OUTPATIENT
Start: 2024-12-28 | End: 2025-01-02 | Stop reason: HOSPADM

## 2024-12-27 RX ORDER — FLUTICASONE FUROATE AND VILANTEROL 200; 25 UG/1; UG/1
1 POWDER RESPIRATORY (INHALATION)
Status: DISCONTINUED | OUTPATIENT
Start: 2024-12-28 | End: 2025-01-02 | Stop reason: HOSPADM

## 2024-12-27 RX ORDER — AMLODIPINE BESYLATE 10 MG/1
10 TABLET ORAL DAILY
Status: DISCONTINUED | OUTPATIENT
Start: 2024-12-28 | End: 2025-01-02 | Stop reason: HOSPADM

## 2024-12-27 RX ORDER — AZITHROMYCIN 250 MG/1
500 TABLET, FILM COATED ORAL
Status: COMPLETED | OUTPATIENT
Start: 2024-12-28 | End: 2024-12-31

## 2024-12-27 RX ORDER — IPRATROPIUM BROMIDE AND ALBUTEROL SULFATE 2.5; .5 MG/3ML; MG/3ML
3 SOLUTION RESPIRATORY (INHALATION) 3 TIMES DAILY
Status: DISCONTINUED | OUTPATIENT
Start: 2024-12-27 | End: 2024-12-27

## 2024-12-27 RX ORDER — ALBUTEROL SULFATE 0.83 MG/ML
2.5 SOLUTION RESPIRATORY (INHALATION) EVERY 2 HOUR PRN
Status: DISCONTINUED | OUTPATIENT
Start: 2024-12-27 | End: 2025-01-02 | Stop reason: HOSPADM

## 2024-12-27 RX ORDER — PANTOPRAZOLE SODIUM 40 MG/1
40 TABLET, DELAYED RELEASE ORAL
Status: DISCONTINUED | OUTPATIENT
Start: 2024-12-28 | End: 2025-01-02 | Stop reason: HOSPADM

## 2024-12-27 RX ORDER — IPRATROPIUM BROMIDE AND ALBUTEROL SULFATE 2.5; .5 MG/3ML; MG/3ML
SOLUTION RESPIRATORY (INHALATION)
Status: COMPLETED
Start: 2024-12-27 | End: 2024-12-27

## 2024-12-27 RX ORDER — AZITHROMYCIN MONOHYDRATE 500 MG/5ML
INJECTION, POWDER, LYOPHILIZED, FOR SOLUTION INTRAVENOUS
Status: COMPLETED
Start: 2024-12-27 | End: 2024-12-27

## 2024-12-27 RX ORDER — LEVOTHYROXINE SODIUM 25 UG/1
25 TABLET ORAL DAILY
Status: DISCONTINUED | OUTPATIENT
Start: 2024-12-28 | End: 2025-01-02 | Stop reason: HOSPADM

## 2024-12-27 RX ADMIN — ENOXAPARIN SODIUM 40 MG: 40 INJECTION SUBCUTANEOUS at 22:32

## 2024-12-27 RX ADMIN — IPRATROPIUM BROMIDE AND ALBUTEROL SULFATE 3 ML: 2.5; .5 SOLUTION RESPIRATORY (INHALATION) at 22:15

## 2024-12-27 RX ADMIN — Medication 3 L/MIN: at 16:38

## 2024-12-27 RX ADMIN — AZITHROMYCIN 500 MG: 500 INJECTION, POWDER, LYOPHILIZED, FOR SOLUTION INTRAVENOUS at 19:26

## 2024-12-27 RX ADMIN — IPRATROPIUM BROMIDE AND ALBUTEROL SULFATE 3 ML: .5; 3 SOLUTION RESPIRATORY (INHALATION) at 22:15

## 2024-12-27 RX ADMIN — IOHEXOL 75 ML: 350 INJECTION, SOLUTION INTRAVENOUS at 16:58

## 2024-12-27 RX ADMIN — POTASSIUM CHLORIDE 20 MEQ: 14.9 INJECTION, SOLUTION INTRAVENOUS at 17:17

## 2024-12-27 RX ADMIN — METHYLPREDNISOLONE SODIUM SUCCINATE 125 MG: 125 INJECTION, POWDER, FOR SOLUTION INTRAMUSCULAR; INTRAVENOUS at 18:44

## 2024-12-27 RX ADMIN — POTASSIUM CHLORIDE 20 MEQ: 14.9 INJECTION, SOLUTION INTRAVENOUS at 19:25

## 2024-12-27 RX ADMIN — CEFTRIAXONE 2 G: 2 INJECTION, SOLUTION INTRAVENOUS at 18:45

## 2024-12-27 RX ADMIN — POTASSIUM CHLORIDE 40 MEQ: 1500 TABLET, EXTENDED RELEASE ORAL at 22:30

## 2024-12-27 RX ADMIN — Medication 2 L/MIN: at 22:15

## 2024-12-27 RX ADMIN — Medication 3 L/MIN: at 19:01

## 2024-12-27 RX ADMIN — SODIUM CHLORIDE, SODIUM LACTATE, POTASSIUM CHLORIDE, AND CALCIUM CHLORIDE 1000 ML: .6; .31; .03; .02 INJECTION, SOLUTION INTRAVENOUS at 16:45

## 2024-12-27 SDOH — ECONOMIC STABILITY: FOOD INSECURITY: WITHIN THE PAST 12 MONTHS, THE FOOD YOU BOUGHT JUST DIDN'T LAST AND YOU DIDN'T HAVE MONEY TO GET MORE.: NEVER TRUE

## 2024-12-27 SDOH — ECONOMIC STABILITY: FOOD INSECURITY: WITHIN THE PAST 12 MONTHS, YOU WORRIED THAT YOUR FOOD WOULD RUN OUT BEFORE YOU GOT THE MONEY TO BUY MORE.: NEVER TRUE

## 2024-12-27 SDOH — SOCIAL STABILITY: SOCIAL INSECURITY: ABUSE: ADULT

## 2024-12-27 SDOH — SOCIAL STABILITY: SOCIAL NETWORK
DO YOU BELONG TO ANY CLUBS OR ORGANIZATIONS SUCH AS CHURCH GROUPS, UNIONS, FRATERNAL OR ATHLETIC GROUPS, OR SCHOOL GROUPS?: NO

## 2024-12-27 SDOH — SOCIAL STABILITY: SOCIAL NETWORK: HOW OFTEN DO YOU GET TOGETHER WITH FRIENDS OR RELATIVES?: ONCE A WEEK

## 2024-12-27 SDOH — HEALTH STABILITY: PHYSICAL HEALTH: ON AVERAGE, HOW MANY MINUTES DO YOU ENGAGE IN EXERCISE AT THIS LEVEL?: 0 MIN

## 2024-12-27 SDOH — ECONOMIC STABILITY: INCOME INSECURITY: IN THE PAST 12 MONTHS HAS THE ELECTRIC, GAS, OIL, OR WATER COMPANY THREATENED TO SHUT OFF SERVICES IN YOUR HOME?: NO

## 2024-12-27 SDOH — HEALTH STABILITY: MENTAL HEALTH
DO YOU FEEL STRESS - TENSE, RESTLESS, NERVOUS, OR ANXIOUS, OR UNABLE TO SLEEP AT NIGHT BECAUSE YOUR MIND IS TROUBLED ALL THE TIME - THESE DAYS?: NOT AT ALL

## 2024-12-27 SDOH — SOCIAL STABILITY: SOCIAL INSECURITY: HAVE YOU HAD ANY THOUGHTS OF HARMING ANYONE ELSE?: NO

## 2024-12-27 SDOH — SOCIAL STABILITY: SOCIAL INSECURITY: WITHIN THE LAST YEAR, HAVE YOU BEEN AFRAID OF YOUR PARTNER OR EX-PARTNER?: NO

## 2024-12-27 SDOH — SOCIAL STABILITY: SOCIAL INSECURITY: DOES ANYONE TRY TO KEEP YOU FROM HAVING/CONTACTING OTHER FRIENDS OR DOING THINGS OUTSIDE YOUR HOME?: NO

## 2024-12-27 SDOH — SOCIAL STABILITY: SOCIAL INSECURITY
WITHIN THE LAST YEAR, HAVE YOU BEEN RAPED OR FORCED TO HAVE ANY KIND OF SEXUAL ACTIVITY BY YOUR PARTNER OR EX-PARTNER?: NO

## 2024-12-27 SDOH — SOCIAL STABILITY: SOCIAL INSECURITY: DO YOU FEEL UNSAFE GOING BACK TO THE PLACE WHERE YOU ARE LIVING?: NO

## 2024-12-27 SDOH — SOCIAL STABILITY: SOCIAL NETWORK: HOW OFTEN DO YOU ATTEND MEETINGS OF THE CLUBS OR ORGANIZATIONS YOU BELONG TO?: NEVER

## 2024-12-27 SDOH — SOCIAL STABILITY: SOCIAL INSECURITY: ARE YOU MARRIED, WIDOWED, DIVORCED, SEPARATED, NEVER MARRIED, OR LIVING WITH A PARTNER?: MARRIED

## 2024-12-27 SDOH — ECONOMIC STABILITY: HOUSING INSECURITY: IN THE LAST 12 MONTHS, WAS THERE A TIME WHEN YOU WERE NOT ABLE TO PAY THE MORTGAGE OR RENT ON TIME?: NO

## 2024-12-27 SDOH — ECONOMIC STABILITY: FOOD INSECURITY: HOW HARD IS IT FOR YOU TO PAY FOR THE VERY BASICS LIKE FOOD, HOUSING, MEDICAL CARE, AND HEATING?: NOT HARD AT ALL

## 2024-12-27 SDOH — ECONOMIC STABILITY: TRANSPORTATION INSECURITY: IN THE PAST 12 MONTHS, HAS LACK OF TRANSPORTATION KEPT YOU FROM MEDICAL APPOINTMENTS OR FROM GETTING MEDICATIONS?: NO

## 2024-12-27 SDOH — HEALTH STABILITY: PHYSICAL HEALTH: ON AVERAGE, HOW MANY DAYS PER WEEK DO YOU ENGAGE IN MODERATE TO STRENUOUS EXERCISE (LIKE A BRISK WALK)?: 0 DAYS

## 2024-12-27 SDOH — SOCIAL STABILITY: SOCIAL INSECURITY: WERE YOU ABLE TO COMPLETE ALL THE BEHAVIORAL HEALTH SCREENINGS?: YES

## 2024-12-27 SDOH — SOCIAL STABILITY: SOCIAL INSECURITY: ARE YOU OR HAVE YOU BEEN THREATENED OR ABUSED PHYSICALLY, EMOTIONALLY, OR SEXUALLY BY ANYONE?: NO

## 2024-12-27 SDOH — SOCIAL STABILITY: SOCIAL INSECURITY: WITHIN THE LAST YEAR, HAVE YOU BEEN HUMILIATED OR EMOTIONALLY ABUSED IN OTHER WAYS BY YOUR PARTNER OR EX-PARTNER?: NO

## 2024-12-27 SDOH — SOCIAL STABILITY: SOCIAL INSECURITY: HAVE YOU HAD THOUGHTS OF HARMING ANYONE ELSE?: NO

## 2024-12-27 SDOH — SOCIAL STABILITY: SOCIAL INSECURITY: ARE THERE ANY APPARENT SIGNS OF INJURIES/BEHAVIORS THAT COULD BE RELATED TO ABUSE/NEGLECT?: NO

## 2024-12-27 SDOH — SOCIAL STABILITY: SOCIAL NETWORK: HOW OFTEN DO YOU ATTEND CHURCH OR RELIGIOUS SERVICES?: NEVER

## 2024-12-27 SDOH — SOCIAL STABILITY: SOCIAL NETWORK: IN A TYPICAL WEEK, HOW MANY TIMES DO YOU TALK ON THE PHONE WITH FAMILY, FRIENDS, OR NEIGHBORS?: TWICE A WEEK

## 2024-12-27 SDOH — ECONOMIC STABILITY: HOUSING INSECURITY: AT ANY TIME IN THE PAST 12 MONTHS, WERE YOU HOMELESS OR LIVING IN A SHELTER (INCLUDING NOW)?: NO

## 2024-12-27 SDOH — SOCIAL STABILITY: SOCIAL INSECURITY: DO YOU FEEL ANYONE HAS EXPLOITED OR TAKEN ADVANTAGE OF YOU FINANCIALLY OR OF YOUR PERSONAL PROPERTY?: NO

## 2024-12-27 SDOH — SOCIAL STABILITY: SOCIAL INSECURITY: HAS ANYONE EVER THREATENED TO HURT YOUR FAMILY OR YOUR PETS?: NO

## 2024-12-27 SDOH — ECONOMIC STABILITY: HOUSING INSECURITY: IN THE PAST 12 MONTHS, HOW MANY TIMES HAVE YOU MOVED WHERE YOU WERE LIVING?: 0

## 2024-12-27 ASSESSMENT — LIFESTYLE VARIABLES
PRESCIPTION_ABUSE_PAST_12_MONTHS: NO
SUBSTANCE_ABUSE_PAST_12_MONTHS: NO
HOW MANY STANDARD DRINKS CONTAINING ALCOHOL DO YOU HAVE ON A TYPICAL DAY: 1 OR 2
HOW OFTEN DO YOU HAVE 6 OR MORE DRINKS ON ONE OCCASION: NEVER
AUDIT-C TOTAL SCORE: 1
AUDIT-C TOTAL SCORE: 1
HOW OFTEN DO YOU HAVE A DRINK CONTAINING ALCOHOL: MONTHLY OR LESS
SKIP TO QUESTIONS 9-10: 1

## 2024-12-27 ASSESSMENT — COLUMBIA-SUICIDE SEVERITY RATING SCALE - C-SSRS
1. IN THE PAST MONTH, HAVE YOU WISHED YOU WERE DEAD OR WISHED YOU COULD GO TO SLEEP AND NOT WAKE UP?: NO
2. HAVE YOU ACTUALLY HAD ANY THOUGHTS OF KILLING YOURSELF?: NO
6. HAVE YOU EVER DONE ANYTHING, STARTED TO DO ANYTHING, OR PREPARED TO DO ANYTHING TO END YOUR LIFE?: NO

## 2024-12-27 ASSESSMENT — PAIN DESCRIPTION - PAIN TYPE: TYPE: ACUTE PAIN

## 2024-12-27 ASSESSMENT — ACTIVITIES OF DAILY LIVING (ADL)
FEEDING YOURSELF: INDEPENDENT
PATIENT'S MEMORY ADEQUATE TO SAFELY COMPLETE DAILY ACTIVITIES?: YES
HEARING - RIGHT EAR: FUNCTIONAL
TOILETING: INDEPENDENT
LACK_OF_TRANSPORTATION: NO
DRESSING YOURSELF: INDEPENDENT
ADEQUATE_TO_COMPLETE_ADL: YES
WALKS IN HOME: INDEPENDENT
BATHING: INDEPENDENT
LACK_OF_TRANSPORTATION: NO
GROOMING: INDEPENDENT
JUDGMENT_ADEQUATE_SAFELY_COMPLETE_DAILY_ACTIVITIES: YES
HEARING - LEFT EAR: FUNCTIONAL

## 2024-12-27 ASSESSMENT — PAIN SCALES - GENERAL: PAINLEVEL_OUTOF10: 2

## 2024-12-27 ASSESSMENT — COGNITIVE AND FUNCTIONAL STATUS - GENERAL
MOBILITY SCORE: 24
PATIENT BASELINE BEDBOUND: NO
DAILY ACTIVITIY SCORE: 24

## 2024-12-27 ASSESSMENT — PAIN DESCRIPTION - LOCATION: LOCATION: THROAT

## 2024-12-27 ASSESSMENT — PATIENT HEALTH QUESTIONNAIRE - PHQ9
1. LITTLE INTEREST OR PLEASURE IN DOING THINGS: NOT AT ALL
2. FEELING DOWN, DEPRESSED OR HOPELESS: NOT AT ALL
SUM OF ALL RESPONSES TO PHQ9 QUESTIONS 1 & 2: 0

## 2024-12-27 ASSESSMENT — PAIN - FUNCTIONAL ASSESSMENT: PAIN_FUNCTIONAL_ASSESSMENT: 0-10

## 2024-12-27 NOTE — ED NOTES
RT to bedside per RN request. SpO2 93% on 3L NC. Mild WOB which she stated started this morning. Pt takes Symbicort daily and Proair PRN. Pt can speak in full sentences. RT awaiting orders.

## 2024-12-27 NOTE — ED TRIAGE NOTES
Pt arrives  ambulatory to [facility] presenting with shortness of breath, flu-like symptoms, and decreased appetite that has been ongoing for a few days. Pt states she has a history of COPD, SOB beginning today. Pt denies any CP, N/V/D. Pt febrile, denies any tylenol or ibuprofen. Pt A&Ox3, resp labored, pt tachypneic, RT paged. EKG obtained, IV access established. Pt placed on cardiac monitor. Pt hypoxic on RA, placed on 3L NC.

## 2024-12-28 ENCOUNTER — APPOINTMENT (OUTPATIENT)
Dept: CARDIOLOGY | Facility: HOSPITAL | Age: 59
End: 2024-12-28
Payer: COMMERCIAL

## 2024-12-28 LAB
ANION GAP SERPL CALC-SCNC: 14 MMOL/L (ref 10–20)
APPEARANCE UR: ABNORMAL
BACTERIA #/AREA URNS AUTO: ABNORMAL /HPF
BILIRUB UR STRIP.AUTO-MCNC: NEGATIVE MG/DL
BUN SERPL-MCNC: 13 MG/DL (ref 6–23)
CALCIUM SERPL-MCNC: 9.5 MG/DL (ref 8.6–10.3)
CHLORIDE SERPL-SCNC: 98 MMOL/L (ref 98–107)
CO2 SERPL-SCNC: 29 MMOL/L (ref 21–32)
COLOR UR: ABNORMAL
CREAT SERPL-MCNC: 0.79 MG/DL (ref 0.5–1.05)
EGFRCR SERPLBLD CKD-EPI 2021: 85 ML/MIN/1.73M*2
ERYTHROCYTE [DISTWIDTH] IN BLOOD BY AUTOMATED COUNT: 13.5 % (ref 11.5–14.5)
GLUCOSE SERPL-MCNC: 154 MG/DL (ref 74–99)
GLUCOSE UR STRIP.AUTO-MCNC: NORMAL MG/DL
HCT VFR BLD AUTO: 43.1 % (ref 36–46)
HGB BLD-MCNC: 14.4 G/DL (ref 12–16)
KETONES UR STRIP.AUTO-MCNC: ABNORMAL MG/DL
LEUKOCYTE ESTERASE UR QL STRIP.AUTO: ABNORMAL
MCH RBC QN AUTO: 30 PG (ref 26–34)
MCHC RBC AUTO-ENTMCNC: 33.4 G/DL (ref 32–36)
MCV RBC AUTO: 90 FL (ref 80–100)
MUCOUS THREADS #/AREA URNS AUTO: ABNORMAL /LPF
NITRITE UR QL STRIP.AUTO: ABNORMAL
NRBC BLD-RTO: 0 /100 WBCS (ref 0–0)
PH UR STRIP.AUTO: 6.5 [PH]
PLATELET # BLD AUTO: 269 X10*3/UL (ref 150–450)
POTASSIUM SERPL-SCNC: 3.3 MMOL/L (ref 3.5–5.3)
PROT UR STRIP.AUTO-MCNC: ABNORMAL MG/DL
RBC # BLD AUTO: 4.8 X10*6/UL (ref 4–5.2)
RBC # UR STRIP.AUTO: ABNORMAL /UL
RBC #/AREA URNS AUTO: >20 /HPF
SODIUM SERPL-SCNC: 138 MMOL/L (ref 136–145)
SP GR UR STRIP.AUTO: 1.03
SQUAMOUS #/AREA URNS AUTO: ABNORMAL /HPF
UROBILINOGEN UR STRIP.AUTO-MCNC: NORMAL MG/DL
WBC # BLD AUTO: 3.7 X10*3/UL (ref 4.4–11.3)
WBC #/AREA URNS AUTO: >50 /HPF

## 2024-12-28 PROCEDURE — 81001 URINALYSIS AUTO W/SCOPE: CPT | Mod: IPSPLIT | Performed by: EMERGENCY MEDICINE

## 2024-12-28 PROCEDURE — 2500000002 HC RX 250 W HCPCS SELF ADMINISTERED DRUGS (ALT 637 FOR MEDICARE OP, ALT 636 FOR OP/ED): Mod: IPSPLIT | Performed by: STUDENT IN AN ORGANIZED HEALTH CARE EDUCATION/TRAINING PROGRAM

## 2024-12-28 PROCEDURE — 80048 BASIC METABOLIC PNL TOTAL CA: CPT | Mod: IPSPLIT | Performed by: NURSE PRACTITIONER

## 2024-12-28 PROCEDURE — 2500000001 HC RX 250 WO HCPCS SELF ADMINISTERED DRUGS (ALT 637 FOR MEDICARE OP): Mod: IPSPLIT | Performed by: STUDENT IN AN ORGANIZED HEALTH CARE EDUCATION/TRAINING PROGRAM

## 2024-12-28 PROCEDURE — 94640 AIRWAY INHALATION TREATMENT: CPT

## 2024-12-28 PROCEDURE — 94760 N-INVAS EAR/PLS OXIMETRY 1: CPT | Mod: IPSPLIT

## 2024-12-28 PROCEDURE — 2500000001 HC RX 250 WO HCPCS SELF ADMINISTERED DRUGS (ALT 637 FOR MEDICARE OP): Mod: IPSPLIT | Performed by: NURSE PRACTITIONER

## 2024-12-28 PROCEDURE — 1100000001 HC PRIVATE ROOM DAILY: Mod: IPSPLIT

## 2024-12-28 PROCEDURE — 2500000004 HC RX 250 GENERAL PHARMACY W/ HCPCS (ALT 636 FOR OP/ED): Mod: IPSPLIT | Performed by: NURSE PRACTITIONER

## 2024-12-28 PROCEDURE — 93005 ELECTROCARDIOGRAM TRACING: CPT | Mod: IPSPLIT

## 2024-12-28 PROCEDURE — 94640 AIRWAY INHALATION TREATMENT: CPT | Mod: IPSPLIT

## 2024-12-28 PROCEDURE — 36415 COLL VENOUS BLD VENIPUNCTURE: CPT | Mod: IPSPLIT | Performed by: NURSE PRACTITIONER

## 2024-12-28 PROCEDURE — 99222 1ST HOSP IP/OBS MODERATE 55: CPT | Performed by: NURSE PRACTITIONER

## 2024-12-28 PROCEDURE — 85027 COMPLETE CBC AUTOMATED: CPT | Mod: IPSPLIT | Performed by: NURSE PRACTITIONER

## 2024-12-28 PROCEDURE — 87086 URINE CULTURE/COLONY COUNT: CPT | Mod: GENLAB | Performed by: EMERGENCY MEDICINE

## 2024-12-28 PROCEDURE — 2500000002 HC RX 250 W HCPCS SELF ADMINISTERED DRUGS (ALT 637 FOR MEDICARE OP, ALT 636 FOR OP/ED): Mod: IPSPLIT | Performed by: NURSE PRACTITIONER

## 2024-12-28 PROCEDURE — 2500000005 HC RX 250 GENERAL PHARMACY W/O HCPCS: Mod: IPSPLIT | Performed by: NURSE PRACTITIONER

## 2024-12-28 RX ORDER — METOPROLOL TARTRATE 1 MG/ML
5 INJECTION, SOLUTION INTRAVENOUS EVERY 6 HOURS PRN
Status: DISCONTINUED | OUTPATIENT
Start: 2024-12-28 | End: 2025-01-02 | Stop reason: HOSPADM

## 2024-12-28 RX ORDER — TALC
6 POWDER (GRAM) TOPICAL NIGHTLY PRN
Status: DISCONTINUED | OUTPATIENT
Start: 2024-12-28 | End: 2025-01-02 | Stop reason: HOSPADM

## 2024-12-28 RX ORDER — POTASSIUM CHLORIDE 20 MEQ/1
40 TABLET, EXTENDED RELEASE ORAL 2 TIMES DAILY
Status: DISCONTINUED | OUTPATIENT
Start: 2024-12-28 | End: 2024-12-28

## 2024-12-28 RX ADMIN — IPRATROPIUM BROMIDE AND ALBUTEROL SULFATE 3 ML: 2.5; .5 SOLUTION RESPIRATORY (INHALATION) at 14:29

## 2024-12-28 RX ADMIN — AZITHROMYCIN DIHYDRATE 500 MG: 250 TABLET, FILM COATED ORAL at 08:27

## 2024-12-28 RX ADMIN — LEVOTHYROXINE SODIUM 25 MCG: 25 TABLET ORAL at 08:27

## 2024-12-28 RX ADMIN — IPRATROPIUM BROMIDE AND ALBUTEROL SULFATE 3 ML: 2.5; .5 SOLUTION RESPIRATORY (INHALATION) at 09:23

## 2024-12-28 RX ADMIN — FLUTICASONE FUROATE AND VILANTEROL TRIFENATATE 1 PUFF: 200; 25 POWDER RESPIRATORY (INHALATION) at 09:47

## 2024-12-28 RX ADMIN — PANTOPRAZOLE SODIUM 40 MG: 40 TABLET, DELAYED RELEASE ORAL at 06:19

## 2024-12-28 RX ADMIN — POTASSIUM CHLORIDE 40 MEQ: 1500 TABLET, EXTENDED RELEASE ORAL at 08:26

## 2024-12-28 RX ADMIN — METHYLPREDNISOLONE SODIUM SUCCINATE 40 MG: 40 INJECTION, POWDER, FOR SOLUTION INTRAMUSCULAR; INTRAVENOUS at 22:15

## 2024-12-28 RX ADMIN — POTASSIUM CHLORIDE 40 MEQ: 1500 TABLET, EXTENDED RELEASE ORAL at 14:35

## 2024-12-28 RX ADMIN — METHYLPREDNISOLONE SODIUM SUCCINATE 40 MG: 40 INJECTION, POWDER, FOR SOLUTION INTRAMUSCULAR; INTRAVENOUS at 14:34

## 2024-12-28 RX ADMIN — Medication 3 L/MIN: at 22:01

## 2024-12-28 RX ADMIN — MELOXICAM 15 MG: 7.5 TABLET ORAL at 08:27

## 2024-12-28 RX ADMIN — METHYLPREDNISOLONE SODIUM SUCCINATE 40 MG: 40 INJECTION, POWDER, FOR SOLUTION INTRAMUSCULAR; INTRAVENOUS at 06:19

## 2024-12-28 RX ADMIN — IPRATROPIUM BROMIDE AND ALBUTEROL SULFATE 3 ML: 2.5; .5 SOLUTION RESPIRATORY (INHALATION) at 22:01

## 2024-12-28 RX ADMIN — ENOXAPARIN SODIUM 40 MG: 40 INJECTION SUBCUTANEOUS at 22:15

## 2024-12-28 RX ADMIN — Medication 3 L/MIN: at 09:27

## 2024-12-28 RX ADMIN — CEFTRIAXONE 2 G: 2 INJECTION, SOLUTION INTRAVENOUS at 17:01

## 2024-12-28 RX ADMIN — EZETIMIBE 10 MG: 10 TABLET ORAL at 08:27

## 2024-12-28 RX ADMIN — AMLODIPINE BESYLATE 10 MG: 10 TABLET ORAL at 08:27

## 2024-12-28 ASSESSMENT — ENCOUNTER SYMPTOMS
APPETITE CHANGE: 1
FEVER: 0
GASTROINTESTINAL NEGATIVE: 1
COUGH: 1
EYES NEGATIVE: 1
UNEXPECTED WEIGHT CHANGE: 0
FATIGUE: 1
WEAKNESS: 1
PSYCHIATRIC NEGATIVE: 1
WHEEZING: 1
CARDIOVASCULAR NEGATIVE: 1
ACTIVITY CHANGE: 1
DIAPHORESIS: 0
CHILLS: 1
MUSCULOSKELETAL NEGATIVE: 1
SHORTNESS OF BREATH: 1

## 2024-12-28 ASSESSMENT — COGNITIVE AND FUNCTIONAL STATUS - GENERAL
WALKING IN HOSPITAL ROOM: A LITTLE
MOBILITY SCORE: 24
MOBILITY SCORE: 22
DAILY ACTIVITIY SCORE: 24
CLIMB 3 TO 5 STEPS WITH RAILING: A LITTLE
DAILY ACTIVITIY SCORE: 24

## 2024-12-28 ASSESSMENT — PAIN SCALES - GENERAL
PAINLEVEL_OUTOF10: 0 - NO PAIN

## 2024-12-28 ASSESSMENT — PAIN - FUNCTIONAL ASSESSMENT
PAIN_FUNCTIONAL_ASSESSMENT: 0-10

## 2024-12-28 ASSESSMENT — PAIN SCALES - WONG BAKER: WONGBAKER_NUMERICALRESPONSE: NO HURT

## 2024-12-28 NOTE — PROGRESS NOTES
Sig Event:     Pt heart rate sustained >120 bpm for greater than 2h post breathing treatment (Duoneb).    EKG reviewed with Sinus tachycardia no atrial fibrillation/atrial flutter present.   Will continue to monitor.   PRN Beta blocker added for symptomatic sustained HR.     Reviewed and approved by ROMAN CONTRERAS on 12/28/24 at 4:36 PM.

## 2024-12-28 NOTE — CARE PLAN
Problem: Pain - Adult  Goal: Verbalizes/displays adequate comfort level or baseline comfort level  Outcome: Progressing     Problem: Safety - Adult  Goal: Free from fall injury  Outcome: Progressing     Problem: Discharge Planning  Goal: Discharge to home or other facility with appropriate resources  Outcome: Progressing     Problem: Chronic Conditions and Co-morbidities  Goal: Patient's chronic conditions and co-morbidity symptoms are monitored and maintained or improved  Outcome: Progressing     Problem: Respiratory  Goal: Clear secretions with interventions this shift  Outcome: Progressing  Goal: Minimize anxiety/maximize coping throughout shift  Outcome: Progressing  Goal: Minimal/no exertional discomfort or dyspnea this shift  Outcome: Progressing  Goal: No signs of respiratory distress (eg. Use of accessory muscles. Peds grunting)  Outcome: Progressing  Goal: Patent airway maintained this shift  Outcome: Progressing  Goal: Tolerate pulmonary toileting this shift  Outcome: Progressing  Goal: Verbalize decreased shortness of breath this shift  Outcome: Progressing  Goal: Wean oxygen to maintain O2 saturation per order/standard this shift  Outcome: Progressing  Goal: Increase self care and/or family involvement in next 24 hours  Outcome: Progressing   The patient's goals for the shift include Respiratory status to remain stable or improove    The clinical goals for the shift include Pt will maintain 90% SPO2 or greater on supplemental O2 throughout the shift    1611: Pt has had elevated heart rates throughout the day mostly, after breathing tx (duonebs) . Pt had a breathing tx around 2pm and I had watched the HR go up, this time a little higher than it has been and it has sustained  for a longer period of time (HR >120) . I notified NP and an EKG was taken and new orders in place. Will continue to monitor.     Besides HR being elevated pt has had no pain throughout the shift. O2 has remained on 3 L  supplemental. Pt has had some trouble with breathing while exerting herself, especially when going to the restroom.     1748:  As of now, Pt is stable, HR has lowered, no pain is stated, pt is as comfortable as she can be, and call light is in reach. No needs at this time.

## 2024-12-28 NOTE — ED PROVIDER NOTES
HPI   Chief Complaint   Patient presents with    Shortness of Breath    Cough       HPI        Patient History   Past Medical History:   Diagnosis Date    Abdominal pain 07/24/2023    Abnormal electrocardiogram (ECG) (EKG) 12/16/2015    Abnormal ECG    KARLY (acute kidney injury) (CMS-HCC) 09/19/2024    Anxiety     Anxiety disorder, unspecified 08/31/2015    Anxiety    Asthma     Complicated UTI (urinary tract infection) 07/24/2023    COPD (chronic obstructive pulmonary disease) (Multi)     Delayed emergence from general anesthesia     Disease of thyroid gland     Encounter for preprocedural cardiovascular examination 10/25/2015    Pre-operative cardiovascular examination    Flank pain 07/24/2023    Hypertension     Hypomagnesemia 10/14/2015    Hypomagnesemia    Personal history of other diseases of the circulatory system 08/31/2015    History of chronic ischemic heart disease    Personal history of other diseases of the female genital tract 10/28/2015    History of ovarian cyst    Personal history of other diseases of urinary system 01/04/2016    History of hematuria    Personal history of other diseases of urinary system 10/16/2015    History of kidney disease    Personal history of other specified conditions 10/16/2015    History of pelvic mass    Personal history of other specified conditions 09/30/2015    History of fatigue    Personal history of pneumonia (recurrent) 02/26/2019    History of pneumonia    Right hip pain 07/24/2023    Strain of muscle, fascia and tendon of abdomen, initial encounter 09/17/2015    Abdominal muscle strain     Past Surgical History:   Procedure Laterality Date    APPENDECTOMY  08/31/2015    Appendectomy    FOOT SURGERY  08/31/2015    Foot Surgery    ORIF ANKLE FRACTURE       Family History   Problem Relation Name Age of Onset    No Known Problems Mother      No Known Problems Father      Breast cancer Sister       Social History     Tobacco Use    Smoking status: Every Day      Current packs/day: 0.50     Types: Cigarettes    Smokeless tobacco: Never   Vaping Use    Vaping status: Never Used   Substance Use Topics    Alcohol use: Yes     Comment: occasional    Drug use: Never       Physical Exam   ED Triage Vitals [12/27/24 1553]   Temperature Heart Rate Respirations BP   (!) 38.2 °C (100.8 °F) (!) 124 (!) 26 (!) 160/131      Pulse Ox Temp Source Heart Rate Source Patient Position   (!) 88 % Temporal Monitor --      BP Location FiO2 (%)     -- --       Physical Exam  Vitals and nursing note reviewed.   Constitutional:       General: She is not in acute distress.     Appearance: She is well-developed.   HENT:      Head: Normocephalic and atraumatic.   Eyes:      Conjunctiva/sclera: Conjunctivae normal.   Cardiovascular:      Rate and Rhythm: Normal rate and regular rhythm.      Heart sounds: No murmur heard.  Pulmonary:      Effort: Pulmonary effort is normal. No respiratory distress.      Breath sounds: Decreased breath sounds and rhonchi present.   Abdominal:      Palpations: Abdomen is soft.      Tenderness: There is no abdominal tenderness.   Musculoskeletal:         General: No swelling.      Cervical back: Neck supple.   Skin:     General: Skin is warm and dry.      Capillary Refill: Capillary refill takes less than 2 seconds.   Neurological:      Mental Status: She is alert.   Psychiatric:         Mood and Affect: Mood normal.           ED Course & MDM   ED Course as of 12/27/24 1904   Fri Dec 27, 2024   1552 KG performed at 1552 showing sinus tachycardia at a ventricular rate of 124 no indication of a STEMI at this time no ST elevation depression incomplete right bundle branch block interpreted by me. [KA]   1735 EKG performed at 1735 showing sinus tachycardia ventricular rate of 103 no ST elevation or depression no indication of STEMI at this time interpreted by me. [KA]      ED Course User Index  [KA] Nikhil Wen DO         Diagnoses as of 12/27/24 1904   Pneumonia due to  infectious organism, unspecified laterality, unspecified part of lung   COPD exacerbation (Multi)   Acute bronchitis, unspecified organism   Hypoxia                 No data recorded     [place] Coma Scale Score: 15 (12/27/24 1606 : Alejo Garrett RN)                           Medical Decision Making  59-year-old female longstanding history of smoking history of COPD presents to the ER with chief complaint of cough congestion past 3 days get progressively worse to the point she came to the ED.  Clinically patient appears to have pneumonia.  There is mucous plugging showing up on the CT for this reason we will be cautious start her on antibiotics.  Patient is hypoxic.  COVID influenza negative discussed with inpatient team patient be admitted here at [facility] for further evaluation treatment.        Procedure  Procedures     Nikhil Wen, DO  12/27/24 4735

## 2024-12-28 NOTE — H&P
History Of Present Illness  Hortensia Rodriguez is a 59 y.o. female presenting with a complaint of SOB.She has had a cough, congestion and SOB for the past 3 days that has been getting progressively worse. She complained was weakness, fatigue, productive cough, congestion, and chills.She denies fever, N/V/D/C, or chest pain.    In the ED:  Labs: Glu 117; Na 139; K 2.4; BUN 12; Cr 0.85; WBC 7.1; Hb 15.0; Hcrt 43.3;  Radiology: CTA chest No evidence of PE. Peribronchial thickening and mucous plugging. 9 mm left upper lung nodule.  Medications: solumedrol, 1000 ml IVF bolus, KCL  Disposition: admitted to med/surg tele     Past Medical History  Past Medical History:   Diagnosis Date    Abdominal pain 07/24/2023    Abnormal electrocardiogram (ECG) (EKG) 12/16/2015    Abnormal ECG    KARLY (acute kidney injury) (CMS-HCC) 09/19/2024    Anxiety     Anxiety disorder, unspecified 08/31/2015    Anxiety    Asthma     Complicated UTI (urinary tract infection) 07/24/2023    COPD (chronic obstructive pulmonary disease) (Multi)     Delayed emergence from general anesthesia     Disease of thyroid gland     Encounter for preprocedural cardiovascular examination 10/25/2015    Pre-operative cardiovascular examination    Flank pain 07/24/2023    Hypertension     Hypomagnesemia 10/14/2015    Hypomagnesemia    Personal history of other diseases of the circulatory system 08/31/2015    History of chronic ischemic heart disease    Personal history of other diseases of the female genital tract 10/28/2015    History of ovarian cyst    Personal history of other diseases of urinary system 01/04/2016    History of hematuria    Personal history of other diseases of urinary system 10/16/2015    History of kidney disease    Personal history of other specified conditions 10/16/2015    History of pelvic mass    Personal history of other specified conditions 09/30/2015    History of fatigue    Personal history of pneumonia (recurrent) 02/26/2019     History of pneumonia    Right hip pain 07/24/2023    Strain of muscle, fascia and tendon of abdomen, initial encounter 09/17/2015    Abdominal muscle strain     Surgical History  Past Surgical History:   Procedure Laterality Date    APPENDECTOMY  08/31/2015    Appendectomy    FOOT SURGERY  08/31/2015    Foot Surgery    ORIF ANKLE FRACTURE        Social History  She reports that she has been smoking cigarettes. She has never used smokeless tobacco. She reports current alcohol use. She reports that she does not use drugs.    Family History  Family History   Problem Relation Name Age of Onset    No Known Problems Mother      No Known Problems Father      Breast cancer Sister          Allergies  Ace inhibitors and Lisinopril    Review of Systems   Constitutional:  Positive for activity change, appetite change, chills and fatigue. Negative for diaphoresis, fever and unexpected weight change.   HENT:  Positive for congestion.    Eyes: Negative.    Respiratory:  Positive for cough, shortness of breath and wheezing.    Cardiovascular: Negative.    Gastrointestinal: Negative.    Genitourinary: Negative.    Musculoskeletal: Negative.    Skin: Negative.    Neurological:  Positive for weakness.   Psychiatric/Behavioral: Negative.       Physical Exam  Vitals reviewed.   Constitutional:       Appearance: Normal appearance. She is normal weight.   HENT:      Head: Normocephalic and atraumatic.      Right Ear: External ear normal.      Left Ear: External ear normal.      Nose: Nose normal.      Mouth/Throat:      Mouth: Mucous membranes are moist.      Pharynx: Oropharynx is clear.   Eyes:      Conjunctiva/sclera: Conjunctivae normal.      Pupils: Pupils are equal, round, and reactive to light.   Cardiovascular:      Rate and Rhythm: Normal rate and regular rhythm.      Pulses: Normal pulses.      Heart sounds: Normal heart sounds.   Pulmonary:      Effort: Pulmonary effort is normal.      Breath sounds: Wheezing and rhonchi  "present.      Comments: Diminished, tight breath sounds, conversational dyspnea, accessory muscle use  Abdominal:      General: Bowel sounds are normal.      Palpations: Abdomen is soft.   Musculoskeletal:         General: Normal range of motion.      Cervical back: Normal range of motion and neck supple.   Skin:     General: Skin is warm and dry.   Neurological:      General: No focal deficit present.      Mental Status: She is alert and oriented to person, place, and time.   Psychiatric:         Mood and Affect: Mood normal.         Behavior: Behavior normal.     Last Recorded Vitals  Blood pressure 142/75, pulse 87, temperature 36 °C (96.8 °F), temperature source Temporal, resp. rate (!) 27, height 1.676 m (5' 6\"), weight 81.6 kg (180 lb), SpO2 95%.    Relevant Results    Scheduled medications  amLODIPine, 10 mg, oral, Daily  azithromycin, 500 mg, oral, q24h ENEDINA  cefTRIAXone, 2 g, intravenous, q24h  enoxaparin, 40 mg, subcutaneous, q24h  ezetimibe, 10 mg, oral, Daily  fluticasone furoate-vilanteroL, 1 puff, inhalation, Daily  ipratropium-albuteroL, 3 mL, nebulization, TID  levothyroxine, 25 mcg, oral, Daily  meloxicam, 15 mg, oral, Daily  methylPREDNISolone sodium succinate (PF), 40 mg, intravenous, q8h [facility]  oxygen, , inhalation, Continuous - Inhalation  pantoprazole, 40 mg, oral, Daily before breakfast  potassium chloride CR, 40 mEq, oral, TID    Continuous medications     PRN medications  PRN medications: acetaminophen, albuterol, ondansetron, polyethylene glycol    Results for orders placed or performed during the hospital encounter of 12/27/24 (from the past 24 hours)   CBC with Differential   Result Value Ref Range    WBC 7.1 4.4 - 11.3 x10*3/uL    nRBC 0.0 0.0 - 0.0 /100 WBCs    RBC 4.90 4.00 - 5.20 x10*6/uL    Hemoglobin 15.0 12.0 - 16.0 g/dL    Hematocrit 43.3 36.0 - 46.0 %    MCV 88 80 - 100 fL    MCH 30.6 26.0 - 34.0 pg    MCHC 34.6 32.0 - 36.0 g/dL    RDW 13.5 11.5 - 14.5 %    Platelets 266 150 - 450 " x10*3/uL    Neutrophils % 73.8 40.0 - 80.0 %    Immature Granulocytes %, Automated 0.3 0.0 - 0.9 %    Lymphocytes % 15.8 13.0 - 44.0 %    Monocytes % 9.7 2.0 - 10.0 %    Eosinophils % 0.1 0.0 - 6.0 %    Basophils % 0.3 0.0 - 2.0 %    Neutrophils Absolute 5.24 1.20 - 7.70 x10*3/uL    Immature Granulocytes Absolute, Automated 0.02 0.00 - 0.70 x10*3/uL    Lymphocytes Absolute 1.12 (L) 1.20 - 4.80 x10*3/uL    Monocytes Absolute 0.69 0.10 - 1.00 x10*3/uL    Eosinophils Absolute 0.01 0.00 - 0.70 x10*3/uL    Basophils Absolute 0.02 0.00 - 0.10 x10*3/uL   Comprehensive Metabolic Panel   Result Value Ref Range    Glucose 117 (H) 74 - 99 mg/dL    Sodium 139 136 - 145 mmol/L    Potassium 2.4 (LL) 3.5 - 5.3 mmol/L    Chloride 96 (L) 98 - 107 mmol/L    Bicarbonate 29 21 - 32 mmol/L    Anion Gap 16 10 - 20 mmol/L    Urea Nitrogen 12 6 - 23 mg/dL    Creatinine 0.85 0.50 - 1.05 mg/dL    eGFR 78 >60 mL/min/1.73m*2    Calcium 9.6 8.6 - 10.3 mg/dL    Albumin 4.5 3.4 - 5.0 g/dL    Alkaline Phosphatase 109 33 - 136 U/L    Total Protein 7.8 6.4 - 8.2 g/dL    AST 11 9 - 39 U/L    Bilirubin, Total 0.5 0.0 - 1.2 mg/dL    ALT 7 7 - 45 U/L   Brain Natriuretic Peptide   Result Value Ref Range    BNP 28 0 - 99 pg/mL   Sars-CoV-2 PCR   Result Value Ref Range    Coronavirus 2019, PCR Not Detected Not Detected   Influenza A, and B PCR   Result Value Ref Range    Flu A Result Not Detected Not Detected    Flu B Result Not Detected Not Detected   Troponin I, High Sensitivity, Initial   Result Value Ref Range    Troponin I, High Sensitivity 5 0 - 13 ng/L   Sars-CoV-2 and Influenza A/B PCR   Result Value Ref Range    Flu A Result Not Detected Not Detected    Flu B Result Not Detected Not Detected    Coronavirus 2019, PCR Not Detected Not Detected   Troponin, High Sensitivity, 1 Hour   Result Value Ref Range    Troponin I, High Sensitivity 5 0 - 13 ng/L   Lactate   Result Value Ref Range    Lactate 1.6 0.4 - 2.0 mmol/L   Blood Culture    Specimen:  Peripheral Venipuncture; Blood culture   Result Value Ref Range    Blood Culture Loaded on Instrument - Culture in progress    Blood Culture    Specimen: Peripheral Venipuncture; Blood culture   Result Value Ref Range    Blood Culture Loaded on Instrument - Culture in progress    CBC   Result Value Ref Range    WBC 3.7 (L) 4.4 - 11.3 x10*3/uL    nRBC 0.0 0.0 - 0.0 /100 WBCs    RBC 4.80 4.00 - 5.20 x10*6/uL    Hemoglobin 14.4 12.0 - 16.0 g/dL    Hematocrit 43.1 36.0 - 46.0 %    MCV 90 80 - 100 fL    MCH 30.0 26.0 - 34.0 pg    MCHC 33.4 32.0 - 36.0 g/dL    RDW 13.5 11.5 - 14.5 %    Platelets 269 150 - 450 x10*3/uL   Basic metabolic panel   Result Value Ref Range    Glucose 154 (H) 74 - 99 mg/dL    Sodium 138 136 - 145 mmol/L    Potassium 3.3 (L) 3.5 - 5.3 mmol/L    Chloride 98 98 - 107 mmol/L    Bicarbonate 29 21 - 32 mmol/L    Anion Gap 14 10 - 20 mmol/L    Urea Nitrogen 13 6 - 23 mg/dL    Creatinine 0.79 0.50 - 1.05 mg/dL    eGFR 85 >60 mL/min/1.73m*2    Calcium 9.5 8.6 - 10.3 mg/dL      Assessment/Plan   Assessment & Plan  Community acquired bacterial pneumonia    Pneumonia due to infectious organism, unspecified laterality, unspecified part of lung    Depression with anxiety    Benign essential hypertension    Hypothyroidism    Hypokalemia    Hypercholesteremia    EMMA (obstructive sleep apnea)    Vitamin D deficiency    Acute respiratory failure with hypoxia (Multi)    Community Acquired bacterial pneumonia  Acute respiratory failure with hypoxia  - SpO2 88% on RA  - supplemental oxygen to keep SpO2 > 90%  - currently on 3lpm via NC  - No oxygen at home  - started azithromycin 500 mg daily and ceftriaxone 2 g daily; day 2  - started solumedrol 40 mg q8h  - started duoneb q6h  - continue Breo 200-25 mg daily  - RT for bronchial hygiene  - blood culture pending  - procalcitonin pending  - CTA chest: No evidence of PE. Peribronchial thickening and mucous plugging. 9 mm left upper lung nodule.  - Discussed lung nodule  with patient; follow up with pulmonology outpatient    Hypokalemia  - K 2.4 on admission; today 3.3  - replaced with KCL  - monitor BMP    Hypercholesteremia  - continue ezetimibe 10 mg daily    Hypothyroidism  - continue levothyroxine 25 mcg daily    Chronic pain  - continue meloxecam 15 mg daily    PUD ppx  - started pantoprazole 40 mg daily    DVT ppx  - started enoxaparin 40 mg daily    Code status: Full    Disposition: Patient requires more than 2 inpatient days.    Sierra Kat APRN-CNP    Attending Attestation:    I was present with the APRN-CNP who participated in the documentation of this note. I have personally seen and re-examined the patient and performed the medical decision-making components (assessment and plan of care). I have reviewed the documentation and verified the findings in the note as written with additions or exceptions as stated in the body of this note.    59 year old female presented for shortness of breath. Patient also has congestion, productive cough and is getting progressively worse from past 3 days.  Labs in the ED were  Glu 117; Na 139; K 2.4; BUN 12; Cr 0.85; WBC 7.1; Hb 15.0; Hcrt 43.3;.  CTA chest is negative for PE. Peribronchial thickening and mucous plugging along with 9 mm left upper lung nodule.    Patient has been started on Solumedrol and duo neb and Breo. Patient started on ceftriaxone and azithromycin and patient is on 3 LPM of nasal cannula oxygen.  Hypokalemia- K is 2.4 on admission and replaced with KCL.    Isma Berman MD  Internal Medicine.

## 2024-12-29 VITALS
RESPIRATION RATE: 20 BRPM | DIASTOLIC BLOOD PRESSURE: 90 MMHG | OXYGEN SATURATION: 90 % | HEIGHT: 66 IN | HEART RATE: 114 BPM | TEMPERATURE: 99 F | BODY MASS INDEX: 25.9 KG/M2 | SYSTOLIC BLOOD PRESSURE: 170 MMHG | WEIGHT: 161.16 LBS

## 2024-12-29 LAB
ANION GAP SERPL CALC-SCNC: 14 MMOL/L (ref 10–20)
BACTERIA BLD CULT: NORMAL
BACTERIA BLD CULT: NORMAL
BACTERIA UR CULT: NO GROWTH
BUN SERPL-MCNC: 25 MG/DL (ref 6–23)
CALCIUM SERPL-MCNC: 9.2 MG/DL (ref 8.6–10.3)
CHLORIDE SERPL-SCNC: 102 MMOL/L (ref 98–107)
CO2 SERPL-SCNC: 26 MMOL/L (ref 21–32)
CREAT SERPL-MCNC: 0.82 MG/DL (ref 0.5–1.05)
EGFRCR SERPLBLD CKD-EPI 2021: 81 ML/MIN/1.73M*2
GLUCOSE SERPL-MCNC: 216 MG/DL (ref 74–99)
HOLD SPECIMEN: NORMAL
HOLD SPECIMEN: NORMAL
POTASSIUM SERPL-SCNC: 3 MMOL/L (ref 3.5–5.3)
SODIUM SERPL-SCNC: 139 MMOL/L (ref 136–145)

## 2024-12-29 PROCEDURE — 2500000001 HC RX 250 WO HCPCS SELF ADMINISTERED DRUGS (ALT 637 FOR MEDICARE OP): Mod: IPSPLIT | Performed by: STUDENT IN AN ORGANIZED HEALTH CARE EDUCATION/TRAINING PROGRAM

## 2024-12-29 PROCEDURE — 99232 SBSQ HOSP IP/OBS MODERATE 35: CPT | Performed by: NURSE PRACTITIONER

## 2024-12-29 PROCEDURE — 2500000002 HC RX 250 W HCPCS SELF ADMINISTERED DRUGS (ALT 637 FOR MEDICARE OP, ALT 636 FOR OP/ED): Mod: IPSPLIT | Performed by: STUDENT IN AN ORGANIZED HEALTH CARE EDUCATION/TRAINING PROGRAM

## 2024-12-29 PROCEDURE — 1100000001 HC PRIVATE ROOM DAILY: Mod: IPSPLIT

## 2024-12-29 PROCEDURE — 80048 BASIC METABOLIC PNL TOTAL CA: CPT | Mod: IPSPLIT | Performed by: NURSE PRACTITIONER

## 2024-12-29 PROCEDURE — 94760 N-INVAS EAR/PLS OXIMETRY 1: CPT | Mod: IPSPLIT

## 2024-12-29 PROCEDURE — 2500000002 HC RX 250 W HCPCS SELF ADMINISTERED DRUGS (ALT 637 FOR MEDICARE OP, ALT 636 FOR OP/ED): Mod: IPSPLIT | Performed by: NURSE PRACTITIONER

## 2024-12-29 PROCEDURE — 2500000004 HC RX 250 GENERAL PHARMACY W/ HCPCS (ALT 636 FOR OP/ED): Mod: IPSPLIT | Performed by: NURSE PRACTITIONER

## 2024-12-29 PROCEDURE — 94640 AIRWAY INHALATION TREATMENT: CPT | Mod: IPSPLIT

## 2024-12-29 PROCEDURE — 2500000005 HC RX 250 GENERAL PHARMACY W/O HCPCS: Mod: IPSPLIT | Performed by: NURSE PRACTITIONER

## 2024-12-29 PROCEDURE — 2500000005 HC RX 250 GENERAL PHARMACY W/O HCPCS: Mod: IPSPLIT | Performed by: PHYSICIAN ASSISTANT

## 2024-12-29 RX ORDER — POTASSIUM CHLORIDE 20 MEQ/1
40 TABLET, EXTENDED RELEASE ORAL 3 TIMES DAILY
Status: ACTIVE | OUTPATIENT
Start: 2024-12-29 | End: 2024-12-29

## 2024-12-29 RX ADMIN — METHYLPREDNISOLONE SODIUM SUCCINATE 40 MG: 40 INJECTION, POWDER, FOR SOLUTION INTRAMUSCULAR; INTRAVENOUS at 05:30

## 2024-12-29 RX ADMIN — FLUTICASONE FUROATE AND VILANTEROL TRIFENATATE 1 PUFF: 200; 25 POWDER RESPIRATORY (INHALATION) at 09:18

## 2024-12-29 RX ADMIN — IPRATROPIUM BROMIDE AND ALBUTEROL SULFATE 3 ML: 2.5; .5 SOLUTION RESPIRATORY (INHALATION) at 15:24

## 2024-12-29 RX ADMIN — LEVOTHYROXINE SODIUM 25 MCG: 25 TABLET ORAL at 08:08

## 2024-12-29 RX ADMIN — AMLODIPINE BESYLATE 10 MG: 10 TABLET ORAL at 08:08

## 2024-12-29 RX ADMIN — Medication 6 MG: at 00:32

## 2024-12-29 RX ADMIN — AZITHROMYCIN DIHYDRATE 500 MG: 250 TABLET, FILM COATED ORAL at 08:08

## 2024-12-29 RX ADMIN — Medication 2 L/MIN: at 21:21

## 2024-12-29 RX ADMIN — METHYLPREDNISOLONE SODIUM SUCCINATE 40 MG: 40 INJECTION, POWDER, FOR SOLUTION INTRAMUSCULAR; INTRAVENOUS at 21:02

## 2024-12-29 RX ADMIN — METHYLPREDNISOLONE SODIUM SUCCINATE 40 MG: 40 INJECTION, POWDER, FOR SOLUTION INTRAMUSCULAR; INTRAVENOUS at 14:54

## 2024-12-29 RX ADMIN — EZETIMIBE 10 MG: 10 TABLET ORAL at 08:07

## 2024-12-29 RX ADMIN — Medication 2 L/MIN: at 15:27

## 2024-12-29 RX ADMIN — CEFTRIAXONE 2 G: 2 INJECTION, SOLUTION INTRAVENOUS at 18:41

## 2024-12-29 RX ADMIN — Medication 6 MG: at 21:02

## 2024-12-29 RX ADMIN — MELOXICAM 15 MG: 7.5 TABLET ORAL at 08:08

## 2024-12-29 RX ADMIN — IPRATROPIUM BROMIDE AND ALBUTEROL SULFATE 3 ML: 2.5; .5 SOLUTION RESPIRATORY (INHALATION) at 09:10

## 2024-12-29 RX ADMIN — ENOXAPARIN SODIUM 40 MG: 40 INJECTION SUBCUTANEOUS at 21:02

## 2024-12-29 ASSESSMENT — COGNITIVE AND FUNCTIONAL STATUS - GENERAL
DAILY ACTIVITIY SCORE: 23
MOBILITY SCORE: 22
HELP NEEDED FOR BATHING: A LITTLE
WALKING IN HOSPITAL ROOM: A LITTLE
CLIMB 3 TO 5 STEPS WITH RAILING: A LITTLE

## 2024-12-29 ASSESSMENT — PAIN SCALES - GENERAL
PAINLEVEL_OUTOF10: 0 - NO PAIN
PAINLEVEL_OUTOF10: 0 - NO PAIN

## 2024-12-29 NOTE — CARE PLAN
The patient's goals for the shift include rest    The clinical goals for the shift include pt will tolerate RA this shift    Over the shift, the patient did not make progress toward the following goals. Pt placed on 2L NC this shift d/t SpO2 <90% while placed on RA. SpO2 now 92%. Productive cough. Up to chair for meals.      Problem: Pain - Adult  Goal: Verbalizes/displays adequate comfort level or baseline comfort level  Outcome: Met     Problem: Safety - Adult  Goal: Free from fall injury  Outcome: Met     Problem: Discharge Planning  Goal: Discharge to home or other facility with appropriate resources  Outcome: Progressing     Problem: Chronic Conditions and Co-morbidities  Goal: Patient's chronic conditions and co-morbidity symptoms are monitored and maintained or improved  Outcome: Progressing     Problem: Respiratory  Goal: Clear secretions with interventions this shift  Outcome: Progressing  Goal: Minimize anxiety/maximize coping throughout shift  Outcome: Progressing  Goal: Minimal/no exertional discomfort or dyspnea this shift  Outcome: Progressing  Goal: No signs of respiratory distress (eg. Use of accessory muscles. Peds grunting)  Outcome: Progressing  Goal: Patent airway maintained this shift  Outcome: Progressing  Goal: Tolerate pulmonary toileting this shift  Outcome: Progressing  Goal: Verbalize decreased shortness of breath this shift  Outcome: Progressing  Goal: Wean oxygen to maintain O2 saturation per order/standard this shift  Outcome: Progressing  Goal: Increase self care and/or family involvement in next 24 hours  Outcome: Progressing

## 2024-12-29 NOTE — CARE PLAN
The patient's goals for the shift include Respiratory status to remain stable or improove    The clinical goals for the shift include pt will remain safe and use call light for assistance during this shift.    Over the shift, the patient was transferred from ICU. Pt reports shortness of breath at times when walking back and forth to bathroom. Pt remains on 3LNC. Pt slept minimally during this shift. Call light within reach for assistance.

## 2024-12-29 NOTE — PROGRESS NOTES
"Hortensia Rodriguez is a 59 y.o. female on day 2 of admission presenting with Community acquired bacterial pneumonia.    Subjective   Patient is seen in her room in no acute distress. Continue with z-pack, rocephin, solumedrol for UTI and URI.   K+ 3.3 replaced.       Objective     Physical Exam  Vitals reviewed.   Constitutional:       Appearance: Normal appearance. She is normal weight.   HENT:      Head: Normocephalic and atraumatic.      Right Ear: External ear normal.      Left Ear: External ear normal.      Nose: Nose normal.      Mouth/Throat:      Mouth: Mucous membranes are moist.      Pharynx: Oropharynx is clear.   Eyes:      Conjunctiva/sclera: Conjunctivae normal.      Pupils: Pupils are equal, round, and reactive to light.   Cardiovascular:      Rate and Rhythm: Normal rate and regular rhythm.      Pulses: Normal pulses.      Heart sounds: Normal heart sounds.   Pulmonary:      Effort: Pulmonary effort is normal.      Breath sounds: Wheezing and rhonchi present.      Comments: Diminished, tight breath sounds, conversational dyspnea, accessory muscle use  Abdominal:      General: Bowel sounds are normal.      Palpations: Abdomen is soft.   Musculoskeletal:         General: Normal range of motion.      Cervical back: Normal range of motion and neck supple.   Skin:     General: Skin is warm and dry.   Neurological:      General: No focal deficit present.      Mental Status: She is alert and oriented to person, place, and time.   Psychiatric:         Mood and Affect: Mood normal.         Behavior: Behavior normal.      Last Recorded Vitals  Blood pressure (!) 150/100, pulse 92, temperature 37.1 °C (98.8 °F), temperature source Temporal, resp. rate 22, height 1.676 m (5' 6\"), weight 73.1 kg (161 lb 2.5 oz), SpO2 98%.  Intake/Output last 3 Shifts:  I/O last 3 completed shifts:  In: 3419 (46.8 mL/kg) [P.O.:1920; IV Piggyback:1499]  Out: 900 (12.3 mL/kg) [Urine:900 (0.3 mL/kg/hr)]  Weight: 73.1 kg "     Scheduled medications  amLODIPine, 10 mg, oral, Daily  azithromycin, 500 mg, oral, q24h ENEDINA  cefTRIAXone, 2 g, intravenous, q24h  enoxaparin, 40 mg, subcutaneous, q24h  ezetimibe, 10 mg, oral, Daily  fluticasone furoate-vilanteroL, 1 puff, inhalation, Daily  ipratropium-albuteroL, 3 mL, nebulization, TID  levothyroxine, 25 mcg, oral, Daily  meloxicam, 15 mg, oral, Daily  methylPREDNISolone sodium succinate (PF), 40 mg, intravenous, q8h ENEDINA  oxygen, , inhalation, Continuous - Inhalation  pantoprazole, 40 mg, oral, Daily before breakfast  [Held by provider] potassium chloride CR, 40 mEq, oral, TID     PRN medications  PRN medications: acetaminophen, albuterol, melatonin, metoprolol, ondansetron, polyethylene glycol    Relevant Results  CT angio chest for pulmonary embolism  Result Date: 12/27/2024  No evidence of PE.   There is motion artifact.   Peribronchial thickening and mucous plugging.   9 mm left upper lung nodule.   Signed by: Unique Colunga 12/27/2024 5:15 PM Dictation workstation:   [id number]    XR chest 1 view  Result Date: 12/27/2024  No evidence of acute intrathoracic abnormality.   Signed by: Cash Colunga 12/27/2024 4:53 PM Dictation workstation:   [id number]      Latest Reference Range & Units 12/27/24 16:05 12/27/24 16:31 12/28/24 05:29   GLUCOSE 74 - 99 mg/dL 117 (H)  154 (H)   SODIUM 136 - 145 mmol/L 139  138   POTASSIUM 3.5 - 5.3 mmol/L 2.4 (LL)  3.3 (L)   CHLORIDE 98 - 107 mmol/L 96 (L)  98   Bicarbonate 21 - 32 mmol/L 29  29   Anion Gap 10 - 20 mmol/L 16  14   Blood Urea Nitrogen 6 - 23 mg/dL 12  13   Creatinine 0.50 - 1.05 mg/dL 0.85  0.79   EGFR >60 mL/min/1.73m*2 78  85   Calcium 8.6 - 10.3 mg/dL 9.6  9.5   Albumin 3.4 - 5.0 g/dL 4.5     Alkaline Phosphatase 33 - 136 U/L 109     ALT 7 - 45 U/L 7     AST 9 - 39 U/L 11     Bilirubin Total 0.0 - 1.2 mg/dL 0.5     Total Protein 6.4 - 8.2 g/dL 7.8     Lactate 0.4 - 2.0 mmol/L  1.6    BNP 0 - 99 pg/mL 28     Troponin I, High Sensitivity 0 - 13  ng/L 5 5       Latest Reference Range & Units 12/27/24 16:05 12/28/24 05:29   WBC 4.4 - 11.3 x10*3/uL 7.1 3.7 (L)   nRBC 0.0 - 0.0 /100 WBCs 0.0 0.0   RBC 4.00 - 5.20 x10*6/uL 4.90 4.80   HEMOGLOBIN 12.0 - 16.0 g/dL 15.0 14.4   HEMATOCRIT 36.0 - 46.0 % 43.3 43.1   MCV 80 - 100 fL 88 90   MCH 26.0 - 34.0 pg 30.6 30.0   MCHC 32.0 - 36.0 g/dL 34.6 33.4   RED CELL DISTRIBUTION WIDTH 11.5 - 14.5 % 13.5 13.5   Platelets 150 - 450 x10*3/uL 266 269     Assessment/Plan   Assessment & Plan  Community acquired bacterial pneumonia    Pneumonia due to infectious organism, unspecified laterality, unspecified part of lung    Depression with anxiety    Benign essential hypertension    Hypothyroidism    Hypokalemia    Hypercholesteremia    EMMA (obstructive sleep apnea)    Vitamin D deficiency    Acute respiratory failure with hypoxia (Multi)      Community Acquired bacterial pneumonia  Acute respiratory failure with hypoxia  - SpO2 88% on RA  - supplemental oxygen to keep SpO2 > 90%  - currently on 3lpm via NC  - No oxygen at home  - started azithromycin 500 mg daily and ceftriaxone 2 g daily; day 2  - started solumedrol 40 mg q8h  - started duoneb q6h  - continue Breo 200-25 mg daily  - RT for bronchial hygiene  - blood culture pending  - procalcitonin pending  - CTA chest: No evidence of PE. Peribronchial thickening and mucous plugging. 9 mm left upper lung nodule.  - Discussed lung nodule with patient; follow up with pulmonology outpatient     Hypokalemia  - K 2.4 on admission; today 3.3  - replaced with KCL  - monitor BMP     Hypercholesteremia  - continue ezetimibe 10 mg daily     Hypothyroidism  - continue levothyroxine 25 mcg daily     Chronic pain  - continue meloxecam 15 mg daily     PUD ppx- started pantoprazole 40 mg daily   DVT ppx- started enoxaparin 40 mg daily   Code status: Full   Disposition: Patient requires more than 2 inpatient days.

## 2024-12-30 LAB
ANION GAP SERPL CALC-SCNC: 12 MMOL/L (ref 10–20)
BUN SERPL-MCNC: 30 MG/DL (ref 6–23)
CALCIUM SERPL-MCNC: 9.3 MG/DL (ref 8.6–10.3)
CHLORIDE SERPL-SCNC: 103 MMOL/L (ref 98–107)
CO2 SERPL-SCNC: 30 MMOL/L (ref 21–32)
CREAT SERPL-MCNC: 0.92 MG/DL (ref 0.5–1.05)
EGFRCR SERPLBLD CKD-EPI 2021: 71 ML/MIN/1.73M*2
ERYTHROCYTE [DISTWIDTH] IN BLOOD BY AUTOMATED COUNT: 14 % (ref 11.5–14.5)
GLUCOSE SERPL-MCNC: 166 MG/DL (ref 74–99)
HCT VFR BLD AUTO: 41.3 % (ref 36–46)
HGB BLD-MCNC: 13.8 G/DL (ref 12–16)
MCH RBC QN AUTO: 30.7 PG (ref 26–34)
MCHC RBC AUTO-ENTMCNC: 33.4 G/DL (ref 32–36)
MCV RBC AUTO: 92 FL (ref 80–100)
NRBC BLD-RTO: 0 /100 WBCS (ref 0–0)
PLATELET # BLD AUTO: 260 X10*3/UL (ref 150–450)
POTASSIUM SERPL-SCNC: 3 MMOL/L (ref 3.5–5.3)
RBC # BLD AUTO: 4.5 X10*6/UL (ref 4–5.2)
SODIUM SERPL-SCNC: 142 MMOL/L (ref 136–145)
WBC # BLD AUTO: 7.5 X10*3/UL (ref 4.4–11.3)

## 2024-12-30 PROCEDURE — 1100000001 HC PRIVATE ROOM DAILY: Mod: IPSPLIT

## 2024-12-30 PROCEDURE — 80048 BASIC METABOLIC PNL TOTAL CA: CPT | Mod: IPSPLIT

## 2024-12-30 PROCEDURE — 94640 AIRWAY INHALATION TREATMENT: CPT | Mod: IPSPLIT

## 2024-12-30 PROCEDURE — 94640 AIRWAY INHALATION TREATMENT: CPT

## 2024-12-30 PROCEDURE — 85027 COMPLETE CBC AUTOMATED: CPT | Mod: IPSPLIT

## 2024-12-30 PROCEDURE — 2500000005 HC RX 250 GENERAL PHARMACY W/O HCPCS: Mod: IPSPLIT | Performed by: PHYSICIAN ASSISTANT

## 2024-12-30 PROCEDURE — 2500000002 HC RX 250 W HCPCS SELF ADMINISTERED DRUGS (ALT 637 FOR MEDICARE OP, ALT 636 FOR OP/ED): Mod: IPSPLIT | Performed by: STUDENT IN AN ORGANIZED HEALTH CARE EDUCATION/TRAINING PROGRAM

## 2024-12-30 PROCEDURE — 99232 SBSQ HOSP IP/OBS MODERATE 35: CPT

## 2024-12-30 PROCEDURE — 2500000002 HC RX 250 W HCPCS SELF ADMINISTERED DRUGS (ALT 637 FOR MEDICARE OP, ALT 636 FOR OP/ED): Mod: IPSPLIT | Performed by: NURSE PRACTITIONER

## 2024-12-30 PROCEDURE — 2500000001 HC RX 250 WO HCPCS SELF ADMINISTERED DRUGS (ALT 637 FOR MEDICARE OP): Mod: IPSPLIT | Performed by: HOSPITALIST

## 2024-12-30 PROCEDURE — 94664 DEMO&/EVAL PT USE INHALER: CPT | Mod: IPSPLIT

## 2024-12-30 PROCEDURE — 94760 N-INVAS EAR/PLS OXIMETRY 1: CPT | Mod: IPSPLIT

## 2024-12-30 PROCEDURE — 2500000005 HC RX 250 GENERAL PHARMACY W/O HCPCS: Mod: IPSPLIT | Performed by: NURSE PRACTITIONER

## 2024-12-30 PROCEDURE — 9420000001 HC RT PATIENT EDUCATION 5 MIN: Mod: IPSPLIT

## 2024-12-30 PROCEDURE — 2500000004 HC RX 250 GENERAL PHARMACY W/ HCPCS (ALT 636 FOR OP/ED): Mod: IPSPLIT | Performed by: NURSE PRACTITIONER

## 2024-12-30 PROCEDURE — 2500000002 HC RX 250 W HCPCS SELF ADMINISTERED DRUGS (ALT 637 FOR MEDICARE OP, ALT 636 FOR OP/ED): Mod: IPSPLIT

## 2024-12-30 PROCEDURE — 2500000001 HC RX 250 WO HCPCS SELF ADMINISTERED DRUGS (ALT 637 FOR MEDICARE OP): Mod: IPSPLIT | Performed by: STUDENT IN AN ORGANIZED HEALTH CARE EDUCATION/TRAINING PROGRAM

## 2024-12-30 PROCEDURE — 36415 COLL VENOUS BLD VENIPUNCTURE: CPT | Mod: IPSPLIT

## 2024-12-30 RX ORDER — POTASSIUM CHLORIDE 20 MEQ/1
40 TABLET, EXTENDED RELEASE ORAL
Status: COMPLETED | OUTPATIENT
Start: 2024-12-30 | End: 2024-12-30

## 2024-12-30 RX ADMIN — IPRATROPIUM BROMIDE AND ALBUTEROL SULFATE 3 ML: 2.5; .5 SOLUTION RESPIRATORY (INHALATION) at 09:33

## 2024-12-30 RX ADMIN — Medication 6 MG: at 22:25

## 2024-12-30 RX ADMIN — POTASSIUM CHLORIDE 40 MEQ: 1500 TABLET, EXTENDED RELEASE ORAL at 18:31

## 2024-12-30 RX ADMIN — IPRATROPIUM BROMIDE AND ALBUTEROL SULFATE 3 ML: 2.5; .5 SOLUTION RESPIRATORY (INHALATION) at 16:15

## 2024-12-30 RX ADMIN — AZITHROMYCIN DIHYDRATE 500 MG: 250 TABLET, FILM COATED ORAL at 10:54

## 2024-12-30 RX ADMIN — CEFTRIAXONE 2 G: 2 INJECTION, SOLUTION INTRAVENOUS at 17:47

## 2024-12-30 RX ADMIN — METHYLPREDNISOLONE SODIUM SUCCINATE 40 MG: 40 INJECTION, POWDER, FOR SOLUTION INTRAMUSCULAR; INTRAVENOUS at 13:23

## 2024-12-30 RX ADMIN — METHYLPREDNISOLONE SODIUM SUCCINATE 40 MG: 40 INJECTION, POWDER, FOR SOLUTION INTRAMUSCULAR; INTRAVENOUS at 06:10

## 2024-12-30 RX ADMIN — Medication 21 PERCENT: at 08:00

## 2024-12-30 RX ADMIN — ENOXAPARIN SODIUM 40 MG: 40 INJECTION SUBCUTANEOUS at 21:23

## 2024-12-30 RX ADMIN — MELOXICAM 15 MG: 7.5 TABLET ORAL at 10:54

## 2024-12-30 RX ADMIN — IPRATROPIUM BROMIDE AND ALBUTEROL SULFATE 3 ML: 2.5; .5 SOLUTION RESPIRATORY (INHALATION) at 21:43

## 2024-12-30 RX ADMIN — LEVOTHYROXINE SODIUM 25 MCG: 25 TABLET ORAL at 10:55

## 2024-12-30 RX ADMIN — AMLODIPINE BESYLATE 10 MG: 10 TABLET ORAL at 10:54

## 2024-12-30 RX ADMIN — METHYLPREDNISOLONE SODIUM SUCCINATE 40 MG: 40 INJECTION, POWDER, FOR SOLUTION INTRAMUSCULAR; INTRAVENOUS at 21:09

## 2024-12-30 RX ADMIN — POTASSIUM CHLORIDE 40 MEQ: 1500 TABLET, EXTENDED RELEASE ORAL at 16:51

## 2024-12-30 RX ADMIN — Medication 21 PERCENT: at 21:45

## 2024-12-30 RX ADMIN — BENZOCAINE AND MENTHOL 1 LOZENGE: 15; 3.6 LOZENGE ORAL at 22:59

## 2024-12-30 RX ADMIN — EZETIMIBE 10 MG: 10 TABLET ORAL at 10:54

## 2024-12-30 RX ADMIN — FLUTICASONE FUROATE AND VILANTEROL TRIFENATATE 1 PUFF: 200; 25 POWDER RESPIRATORY (INHALATION) at 09:33

## 2024-12-30 ASSESSMENT — PAIN SCALES - GENERAL
PAINLEVEL_OUTOF10: 0 - NO PAIN

## 2024-12-30 ASSESSMENT — PAIN - FUNCTIONAL ASSESSMENT
PAIN_FUNCTIONAL_ASSESSMENT: 0-10
PAIN_FUNCTIONAL_ASSESSMENT: 0-10

## 2024-12-30 NOTE — PROGRESS NOTES
Hortensia Rodriguez is a 59 y.o. female on day 3 of admission presenting with Community acquired bacterial pneumonia.      Subjective   Pt assessed at bedside, says she is frustrated with things going on with her  that she does not want to get in to. Sp02 noted to be 89% on RA. Very minimal dyspnea noted with conversation. Pt denies any acute concerns.        Objective     Last Recorded Vitals  BP (!) 159/98 (BP Location: Right arm, Patient Position: Sitting)   Pulse 87   Temp 36.8 °C (98.2 °F) (Temporal)   Resp 18   Wt 73.1 kg (161 lb 2.5 oz)   SpO2 96%   Intake/Output last 3 Shifts:    Intake/Output Summary (Last 24 hours) at 12/30/2024 1132  Last data filed at 12/30/2024 1000  Gross per 24 hour   Intake 410 ml   Output 1 ml   Net 409 ml       Admission Weight  Weight: 81.6 kg (180 lb) (12/27/24 1553)    Daily Weight  12/28/24 : 73.1 kg (161 lb 2.5 oz)    Image Results  CT angio chest for pulmonary embolism  Narrative: Interpreted By:  Unique Colunga,   STUDY:  CT ANGIO CHEST FOR PULMONARY EMBOLISM;  12/27/2024 5:02 pm      INDICATION:  Signs/Symptoms:SOB.      COMPARISON:  09/18/2024      ACCESSION NUMBER(S):  [id number]      ORDERING CLINICIAN:  KATE BURKETT      TECHNIQUE:  Serial axial CT images of the chest obtained following the  intravenous administration 75 mL Omnipaque 350.. Sagittal, coronal  and MIP reconstructions were generated.      FINDINGS:  VESSELS: There is good opacification of the pulmonary arteries. There  are no obvious pulmonary emboli.      There are atherosclerotic changes of the aorta. The ascending  thoracic aorta measures 39 mm. The cava is unremarkable.      HEART:  The heart is normal in size.      MEDIASTINUM AND DARRIAN: There are slightly prominent mediastinal and  hilar lymph nodes      LUNG, PLEURA, AND LARGE AIRWAYS: There is bilateral patchiness. There  is extensive peribronchial thickening.      There is a 9 mm left upper lobe nodule similar to the prior exam.       CHEST WALL AND LOWER NECK: The thyroid is not well seen.      BONES: Patient is scoliotic.      UPPER ABDOMEN: There is a question peripelvic and cortical left cyst.      COMPARISON TO THE PRIOR EXAM:  The lungs are improved in appearance spur      Impression: No evidence of PE.      There is motion artifact.      Peribronchial thickening and mucous plugging.      9 mm left upper lung nodule.      Signed by: Unique Colunga 12/27/2024 5:15 PM  Dictation workstation:   [id number]  XR chest 1 view  Narrative: Interpreted By:  Cash Colunga,   STUDY:  XR CHEST 1 VIEW      INDICATION:  Signs/Symptoms:Chest Pain.      COMPARISON:  In September 18, 2024      ACCESSION NUMBER(S):  [id number]      ORDERING CLINICIAN:  KATE BURKETT      FINDINGS:  No consolidation, effusion, edema, pneumothorax. Heart size within  normal limits. Previous right rib fractures.      Impression: No evidence of acute intrathoracic abnormality.      Signed by: Cash Colunga 12/27/2024 4:53 PM  Dictation workstation:   [id number]      Physical Exam  Vitals reviewed.   HENT:      Head: Normocephalic and atraumatic.      Right Ear: External ear normal.      Left Ear: External ear normal.      Nose: Nose normal.      Mouth/Throat:      Pharynx: Oropharynx is clear.   Eyes:      Conjunctiva/sclera: Conjunctivae normal.   Cardiovascular:      Rate and Rhythm: Normal rate and regular rhythm.      Pulses: Normal pulses.      Heart sounds: Normal heart sounds.   Pulmonary:      Breath sounds: Decreased breath sounds present.      Comments: Faint wheeze  Abdominal:      General: Bowel sounds are normal.      Palpations: Abdomen is soft.   Musculoskeletal:         General: Normal range of motion.      Cervical back: Normal range of motion and neck supple.   Skin:     General: Skin is dry.   Neurological:      General: No focal deficit present.      Mental Status: She is alert and oriented to person, place, and time.   Psychiatric:         Mood and Affect:  Mood is anxious.       Relevant Results  Scheduled medications  amLODIPine, 10 mg, oral, Daily  azithromycin, 500 mg, oral, q24h ENEDINA  cefTRIAXone, 2 g, intravenous, q24h  enoxaparin, 40 mg, subcutaneous, q24h  ezetimibe, 10 mg, oral, Daily  fluticasone furoate-vilanteroL, 1 puff, inhalation, Daily  ipratropium-albuteroL, 3 mL, nebulization, TID  levothyroxine, 25 mcg, oral, Daily  meloxicam, 15 mg, oral, Daily  methylPREDNISolone sodium succinate (PF), 40 mg, intravenous, q8h ENEDINA  oxygen, , inhalation, Continuous - Inhalation  pantoprazole, 40 mg, oral, Daily before breakfast      Continuous medications     PRN medications  PRN medications: acetaminophen, albuterol, melatonin, metoprolol, ondansetron, polyethylene glycol    No results found for this or any previous visit (from the past 24 hours).         Assessment/Plan      Assessment & Plan  Community acquired bacterial pneumonia    Pneumonia due to infectious organism, unspecified laterality, unspecified part of lung    Depression with anxiety    Benign essential hypertension    Hypothyroidism    Hypokalemia    Hypercholesteremia    EMMA (obstructive sleep apnea)    Vitamin D deficiency    Acute respiratory failure with hypoxia (Multi)    #Community Acquired pneumonia  #Acute respiratory failure with hypoxia  -SpO2 88% on RA  -supplemental oxygen to keep SpO2 > 90%  -currently on RA; Sp02 89%  -No oxygen at home  -started azithromycin 500 mg daily and ceftriaxone 2 g daily; day 4  -started solumedrol 40 mg q8h  -started duoneb q6h  -continue Breo 200-25 mg daily  -RT for bronchial hygiene  -blood culture pending  -CTA chest: No evidence of PE. Peribronchial thickening and mucous plugging. 9 mm left upper lung nodule.  -Discussed lung nodule with patient; follow up with pulmonology outpatient     #Hypokalemia  -K 2.4 on admission; 3.0 > pending for today   -replaced with KCL  -monitor BMP     #Hypercholesteremia  -continue ezetimibe 10 mg daily      #Hypothyroidism  -continue levothyroxine 25 mcg daily     #Chronic pain  -continue meloxecam 15 mg daily    DVT ppx  -lovenox     PUD ppx  -pantoprazole    F: PRN  E: Replete per protocol  N: Regular  A: PIV    Disposition: Pt requires more than 2 inpatient days at this time   Code Status: Full Code         Sarah Beth Reis, APRN-CNP

## 2024-12-30 NOTE — DISCHARGE INSTR - DIET
Mediterranean diet  Your registered dietitian nutritionist (RDN) or physician has recommended you follow the general, healthful Mediterranean diet. A Mediterranean diet will help you to reach and stay at a healthy weight and/or improve your overall health.  This diet emphasizes plant-based foods like vegetables, fruits, grains, beans, peas, lentils, nuts, and seeds. These foods provide fiber, healthy fat, protein, vitamins, minerals, and other beneficial nutrients. The general, healthful Mediterranean diet may be lower in calories, sodium, added sugars, and saturated fat than other diets.  In addition to the general information presented here, your RDN may make recommendations just for you based on your individual needs or personal and cultural preferences.  This diet emphasizes healthy fats, such as olives and olive oil, avocados, nuts, and seeds.  Less healthy fats are limited. Less healthy fats include certain red meats, high-fat dairy products, and processed foods like donuts and other baked goods.  The primary sources of protein in this diet are seafood, beans, peas, lentils, and nuts.  Tips  Here are some general tips for making healthy choices:  Eat a variety of fruits and vegetables in a variety of colors every day.  Be sure to include lots of dark green, red, blue-purple, and orange vegetables.     Choose whole grains for at least half your day's grain servings. Read labels to make sure the product is made from 100% of the grain.  Whole wheat  Brown rice  Oats  Barley  Bulgur  Cornmeal  Get your protein mainly from seafood, beans, peas, soybeans, lentils, nuts and nut butters, and seeds and seed butters.  You can also get protein from the following other sources, but they should be limited to small amounts:    Lean red meat  Poultry  Eggs  Fat-free and low-fat milk and yogurt  Cheese     Use all oils and fats in moderation, up to five servings per day. Focus on heart-healthy fats.  Avocados  Olives and  olive oil     Read product labels to help you avoid foods and beverages with added sugar, sodium, and saturated fats.  Foods to Choose or to Limit  The following foods are good choices for a general, healthful Mediterranean diet. Your RDN may make individualized portion size recommendations based on your needs.  Food Group Foods to Choose Foods to Limit   Grains Whole wheat, barley, rye, buckwheat, corn, teff, quinoa, millet, amaranth, brown and wild rice, sorghum, and oats; focus on intact cooked whole grains  Whole grain bread, rolls, prepared breakfast cereals, crackers, and pasta made from whole grains that are low in added sugars, saturated fat, and sodium Sweetened, low-fiber breakfast cereals  Products that list “refined” ingredients on the label:  Packaged baked goods  Snack crackers, cheese crackers, butter crackers, chips  Breads such as biscuits, frozen waffles, sweet breads, doughnuts, pastries, packaged baking mixes, pancakes, cakes, and cookies   Protein Foods Seafood, including fish, shrimp, lobster, clams, and scallops a few times per week.  Fatty fish, such as tuna, salmon, herring, and sardines  Unsalted beans, lentils, or peas at least a few times per week  Soy products such as tofu, tempeh, or soy nuts  Eggs  Unsalted nuts and seeds, such as peanuts, walnuts, almonds, pistachios, and sunflower seeds, several times per week.  Unsalted nut and seed butters, such as peanut butter, almond butter, and sunflower seed butter (unsalted)  Poultry, including skinless chicken or turkey, limited to a few servings per week  Red meat, including lean, trimmed cuts of beef, pork, or lamb, limited to a few times per month  Plant protein-based meat alternatives, such as veggie burgers and sausages (reduced-sodium varieties) Red meat, poultry, or eggs in excess of a few servings per week  Marbled or fatty red meats (beef, pork, lamb), such as ribs  Processed meats, such as rodriges, sausage, and ham  Poultry (chicken  and turkey) with skin  Fried meats, poultry, or fish  Deli meats such as pastrami, bologna, or salami  Fried eggs  Salted beans, peas, lentils, nuts, seeds, or nut/seed butters  [place] mackerel, shark, and tilefish (may contain high levels of mercury)   Dairy and Dairy Alternatives Low-fat or fat-free milk, yogurt (low in added sugars), Greek yogurt, cottage cheese, and cheeses  Fortified soymilk or soy yogurt Whole milk, cream, sour cream  Cheeses made from whole milk  Yogurt or ice cream made from whole milk or with added sugar  Cream cheese made from whole milk  Fried cheeses   Vegetables A variety of vegetables, including dark-green, red, blue-purple, and orange vegetables  Low-sodium vegetable juices Canned or frozen vegetables with salt  Fresh vegetables prepared with salt  Fried vegetables  Vegetables in cream sauce or cheese sauce  Tomato or pasta sauce with high levels of salt or sugar   Fruit A variety of whole fruits, canned fruit packed in water, or dried fruits  100% fruit juice (limited to ½ cup per day) Fruits packed in syrup or made with added sugar   Fats and Oils Olives  Unsaturated vegetable oils, including olive and peanut  Vegetable oil-based margarines and spreads  Solid shortening or partially hydrogenated oils  Solid margarine made with hydrogenated or partially hydrogenated oils  Butter   Beverages Coffee and tea (unsweetened)  Water Sweetened drinks, including sweetened coffee or tea drinks, soda, energy drinks, and sports drinks   Other Prepared foods, including soups, casseroles, salads, baked goods, and snacks made from recommended ingredients, with low levels of added saturated fat, sugars, or salt Sugary and/or fatty desserts (donuts, pastries, cookies, cakes), candy, and other sweets  Salt and seasonings that contain salt  Fried foods   General, Mediterranean Sample 1-Day Menu View Nutrient Info  Breakfast Omelet made with: 1 egg  1 egg white  ¼ cup spinach  ¼ cup asparagus, chopped  ¼  cup tomato, chopped  1 ounce feta cheese  1 orange  1 cup unsweetened coffee   Lunch 1 cup lentil soup  ½ cup tabouleh (parsley, bulgur salad)  ½ whole grain becca bread  ½ cup hummus  ½ cup cucumber slices  ½ cup olives  1 cup unsweetened tea   Evening Meal 3 ounces grilled fish  1 cup cooked whole grain pasta with:  ¼ cup marinara sauce  ¼ cup fresh basil  1 ounce almonds  ½ cup sauteed eggplant  1 cup spinach for salad  1 tablespoon olive oil  ½ cup grapes   Evening Snack 1 cup Greek yogurt  ½ cup figs   From the Nutrition Care Manual

## 2024-12-30 NOTE — PROGRESS NOTES
12/30/24 1312   Discharge Planning   Living Arrangements Spouse/significant other   Support Systems Spouse/significant other   Assistance Needed None   Home or Post Acute Services None   Expected Discharge Disposition Home   Does the patient need discharge transport arranged? No     Patient independent with ADLs and IADLS per patient. Patient denies any home going needs, no oxyen,no HD, ADOD tomorrow.

## 2024-12-30 NOTE — CARE PLAN
The patient's goals for the shift include Respiratory status to remain stable or improove    The clinical goals for the shift include pt will remain safe and use call for assistance during this shift.    Over the shift, the patient had a trial of baseline room air and pt remained between 88-91% at night. Pt ambulates independently to bathroom and denies any needs at this time. Pt just reports she is anxious as her  is downtown and has not updated her yet about anything. Call light within reach for assistance.

## 2024-12-30 NOTE — CONSULTS
"Nutrition Initial Assessment:   Nutrition Assessment    Reason for Assessment: Admission nursing screening (MST=3)    Patient is a 59 y.o. female on day 3 presenting with Community acquired bacterial pneumonia.    PMH: Acute respiratory failure with hypoxia, community-acquired bacterial pneumonia, pneumonia due to infectious organism (unspecified laterality and part of lung), hypokalemia, vitamin D deficiency, hypothyroidism, hypercholesterolemia, obstructive sleep apnea (EMMA), benign essential hypertension, depression with anxiety.    Nutrition History:       Food Allergies/Intolerances:   NKFA  GI Symptoms:  Unable to assess, none documented  Oral Problems:  Unable to assess  Teeth: Missing teeth.    Anthropometrics:  Height: 167.6 cm (5' 6\")   Weight: 73.1 kg (161 lb 2.5 oz)   BMI (Calculated): 26.02  IBW/kg (Dietitian Calculated): 59.1 kg  Percent of IBW: 124 %       Weight History:   Wt Readings from Last 7 Encounters:   12/28/24 73.1 kg (161 lb 2.5 oz)   10/29/24 76.2 kg (168 lb)   10/04/24 74.8 kg (165 lb)   09/23/24 71.2 kg (157 lb)   02/15/24 75.8 kg (167 lb)   02/05/24 75.7 kg (166 lb 14.2 oz)   01/11/24 77.3 kg (170 lb 6.4 oz)       Weight Change %:  Weight History / % Weight Change: 10/29/24 (76.2 kg) to 12/28/24 (73.1 kg), 4.1% decrease in~ 2 months. 77.3 kg (170 lbs) on 01/11/24, to 71.2 kg (157 lbs) by 09/23/24, 7.9% decrease over eight months.    Nutrition Focused Physical Exam Findings:  defer: unable to see patient in person  Subcutaneous Fat Loss:    defer  Muscle Wasting:   defer  Edema:  Edema: none  Edema Location: per nursing flowsheet  Physical Findings:   defer    Nutrition Significant Labs:  CBC Trend:   Results from last 7 days   Lab Units 12/30/24  1422 12/28/24  0529 12/27/24  1605   WBC AUTO x10*3/uL 7.5 3.7* 7.1   RBC AUTO x10*6/uL 4.50 4.80 4.90   HEMOGLOBIN g/dL 13.8 14.4 15.0   HEMATOCRIT % 41.3 43.1 43.3   MCV fL 92 90 88   PLATELETS AUTO x10*3/uL 260 269 266   Renal Lab Trend: "   Results from last 7 days   Lab Units 12/30/24  1421 12/29/24  0904 12/28/24  0529 12/27/24  1605   POTASSIUM mmol/L 3.0* 3.0* 3.3* 2.4*   SODIUM mmol/L 142 139 138 139   EGFR mL/min/1.73m*2 71 81 85 78   BUN mg/dL 30* 25* 13 12   CREATININE mg/dL 0.92 0.82 0.79 0.85   GLUCOSE mg/dL 166* 216* 154* 117*        Nutrition Specific Medications:  amLODIPine, 10 mg, oral, Daily  azithromycin, 500 mg, oral, q24h ENEDINA  cefTRIAXone, 2 g, intravenous, q24h  enoxaparin, 40 mg, subcutaneous, q24h  ezetimibe, 10 mg, oral, Daily  levothyroxine, 25 mcg, oral, Daily  meloxicam, 15 mg, oral, Daily  methylPREDNISolone sodium succinate (PF), 40 mg, intravenous, q8h ENEDINA  pantoprazole, 40 mg, oral, Daily before breakfast    I/O:   Last BM Date: 12/30/24; Stool Appearance: Loose (12/28/24 1000)    Dietary Orders (From admission, onward)       Start     Ordered    12/27/24 2313  May Participate in Room Service  ( ROOM SERVICE MAY PARTICIPATE)  Once        Question:  .  Answer:  Yes    12/27/24 2312 12/27/24 2159  Adult diet Regular  Diet effective now        Question:  Diet type  Answer:  Regular    12/27/24 2158                Estimated Needs:   Total Energy Estimated Needs (kCal):  (1828-2193kcal/day)  Method for Estimating Needs: 25-30 kcal/kg actual BW  Total Protein Estimated Needs (g):  (73-88 g/day)  Method for Estimating Needs: 1-1.2g/kg of actual BW  Total Fluid Estimated Needs (mL):  (1828-2193mL/day)  Method for Estimating Needs: 1ml/kcal or per medical team        Nutrition Diagnosis   Malnutrition Diagnosis  Patient has Malnutrition Diagnosis:  (not enough infomration to assess. Intake currently good.)    Nutrition Diagnosis  Patient has Nutrition Diagnosis: Yes  Nutrition Diagnosis 1: Increased nutrient needs  Related to (1): Increased metabolic demands  As Evidenced by (1): infection and respiratory stress  Additional Nutrition Diagnosis: Diagnosis 2  Diagnosis Status (2): New  Nutrition Diagnosis 2: Altered nutrition  related to laboratory values  Related to (2): Hypokalemia  As Evidenced by (2): Potassium level was critically low (2.4 mmol/L on admission) and has improved to 3.0 mmol/L with KCl replacement       Nutrition Interventions/Recommendations         Nutrition Prescription:  Individualized Nutrition Prescription Provided for : diet, fluids        Nutrition Interventions:   Interventions: Meals and snacks  Meals and Snacks: General healthful diet  Goal: Cont with regular adult diet  Additional Interventions: No ONS at this time. Pt is consuming 100% of meals.    Collaboration and Referral of Nutrition Care: Collaboration by nutrition professional with other providers  Goal: IDT rounds    Nutrition Education:   Not able to talk with patient. Information left in discharge paperwork.      Nutrition Monitoring and Evaluation   Food/Nutrient Related History Monitoring  Monitoring and Evaluation Plan: Amount of food  Amount of Food: Estimated amout of food, Medical food intake  Criteria: Pt will consume and tolerate % of est needs         Biochemical Data, Medical Tests and Procedures  Monitoring and Evaluation Plan: Electrolyte/renal panel  Electrolyte and Renal Panel: Potassium  Criteria: Labs WNL    Nutrition Focused Physical Findings  Monitoring and Evaluation Plan: Digestive System  Criteria: no N/V/D/C  Other: Evaluate nutrition intervention as compared to nutrition goal(s) and estimated nutrient need criteria.       Follow Up:       Time Spent (min): 60 minutes

## 2024-12-30 NOTE — CARE PLAN
The patient's goals for the shift include Respiratory status to remain stable or improove    The clinical goals for the shift include Pulse ox will be >90% today on room air    Pt taking diet well. She was on room air most of the day and pulse ox >90% for most of the time--at times she dipped to 89%. Up to toilet ad keith. No C/O pain. Potassium replaced today. Lungs diminished.

## 2024-12-31 LAB
ANION GAP SERPL CALC-SCNC: 15 MMOL/L (ref 10–20)
ATRIAL RATE: 116 BPM
ATRIAL RATE: 124 BPM
BUN SERPL-MCNC: 29 MG/DL (ref 6–23)
CALCIUM SERPL-MCNC: 9.1 MG/DL (ref 8.6–10.3)
CHLORIDE SERPL-SCNC: 105 MMOL/L (ref 98–107)
CO2 SERPL-SCNC: 25 MMOL/L (ref 21–32)
CREAT SERPL-MCNC: 0.78 MG/DL (ref 0.5–1.05)
EGFRCR SERPLBLD CKD-EPI 2021: 87 ML/MIN/1.73M*2
ERYTHROCYTE [DISTWIDTH] IN BLOOD BY AUTOMATED COUNT: 13.9 % (ref 11.5–14.5)
GLUCOSE SERPL-MCNC: 124 MG/DL (ref 74–99)
HCT VFR BLD AUTO: 42.4 % (ref 36–46)
HGB BLD-MCNC: 13.8 G/DL (ref 12–16)
MCH RBC QN AUTO: 30.6 PG (ref 26–34)
MCHC RBC AUTO-ENTMCNC: 32.5 G/DL (ref 32–36)
MCV RBC AUTO: 94 FL (ref 80–100)
NRBC BLD-RTO: 0 /100 WBCS (ref 0–0)
P AXIS: 66 DEGREES
P AXIS: 75 DEGREES
P OFFSET: 209 MS
P OFFSET: 210 MS
P ONSET: 155 MS
P ONSET: 159 MS
PLATELET # BLD AUTO: 257 X10*3/UL (ref 150–450)
POTASSIUM SERPL-SCNC: 3.1 MMOL/L (ref 3.5–5.3)
PR INTERVAL: 134 MS
PR INTERVAL: 138 MS
Q ONSET: 224 MS
Q ONSET: 226 MS
QRS COUNT: 19 BEATS
QRS COUNT: 21 BEATS
QRS DURATION: 104 MS
QRS DURATION: 96 MS
QT INTERVAL: 334 MS
QT INTERVAL: 364 MS
QTC CALCULATION(BAZETT): 479 MS
QTC CALCULATION(BAZETT): 505 MS
QTC FREDERICIA: 425 MS
QTC FREDERICIA: 453 MS
R AXIS: -36 DEGREES
R AXIS: -5 DEGREES
RBC # BLD AUTO: 4.51 X10*6/UL (ref 4–5.2)
SODIUM SERPL-SCNC: 142 MMOL/L (ref 136–145)
T AXIS: 55 DEGREES
T AXIS: 74 DEGREES
T OFFSET: 393 MS
T OFFSET: 406 MS
VENTRICULAR RATE: 116 BPM
VENTRICULAR RATE: 124 BPM
WBC # BLD AUTO: 7.2 X10*3/UL (ref 4.4–11.3)

## 2024-12-31 PROCEDURE — 94668 MNPJ CHEST WALL SBSQ: CPT | Mod: IPSPLIT

## 2024-12-31 PROCEDURE — 94762 N-INVAS EAR/PLS OXIMTRY CONT: CPT | Mod: IPSPLIT

## 2024-12-31 PROCEDURE — 1100000001 HC PRIVATE ROOM DAILY: Mod: IPSPLIT

## 2024-12-31 PROCEDURE — 2500000004 HC RX 250 GENERAL PHARMACY W/ HCPCS (ALT 636 FOR OP/ED): Mod: IPSPLIT | Performed by: HOSPITALIST

## 2024-12-31 PROCEDURE — 2500000001 HC RX 250 WO HCPCS SELF ADMINISTERED DRUGS (ALT 637 FOR MEDICARE OP): Mod: SE,IPSPLIT | Performed by: STUDENT IN AN ORGANIZED HEALTH CARE EDUCATION/TRAINING PROGRAM

## 2024-12-31 PROCEDURE — 2500000001 HC RX 250 WO HCPCS SELF ADMINISTERED DRUGS (ALT 637 FOR MEDICARE OP): Mod: SE,IPSPLIT | Performed by: HOSPITALIST

## 2024-12-31 PROCEDURE — 94760 N-INVAS EAR/PLS OXIMETRY 1: CPT | Mod: IPSPLIT

## 2024-12-31 PROCEDURE — 2500000005 HC RX 250 GENERAL PHARMACY W/O HCPCS: Mod: SE,IPSPLIT | Performed by: NURSE PRACTITIONER

## 2024-12-31 PROCEDURE — 2500000002 HC RX 250 W HCPCS SELF ADMINISTERED DRUGS (ALT 637 FOR MEDICARE OP, ALT 636 FOR OP/ED): Mod: SE,IPSPLIT | Performed by: NURSE PRACTITIONER

## 2024-12-31 PROCEDURE — 2500000001 HC RX 250 WO HCPCS SELF ADMINISTERED DRUGS (ALT 637 FOR MEDICARE OP): Mod: SE,IPSPLIT | Performed by: NURSE PRACTITIONER

## 2024-12-31 PROCEDURE — 2500000001 HC RX 250 WO HCPCS SELF ADMINISTERED DRUGS (ALT 637 FOR MEDICARE OP): Mod: SE,IPSPLIT

## 2024-12-31 PROCEDURE — 2500000005 HC RX 250 GENERAL PHARMACY W/O HCPCS: Mod: SE,IPSPLIT | Performed by: PHYSICIAN ASSISTANT

## 2024-12-31 PROCEDURE — 94640 AIRWAY INHALATION TREATMENT: CPT | Mod: IPSPLIT

## 2024-12-31 PROCEDURE — 2500000002 HC RX 250 W HCPCS SELF ADMINISTERED DRUGS (ALT 637 FOR MEDICARE OP, ALT 636 FOR OP/ED): Mod: SE,IPSPLIT | Performed by: STUDENT IN AN ORGANIZED HEALTH CARE EDUCATION/TRAINING PROGRAM

## 2024-12-31 PROCEDURE — 82374 ASSAY BLOOD CARBON DIOXIDE: CPT | Mod: IPSPLIT

## 2024-12-31 PROCEDURE — 99232 SBSQ HOSP IP/OBS MODERATE 35: CPT

## 2024-12-31 PROCEDURE — 85027 COMPLETE CBC AUTOMATED: CPT | Mod: IPSPLIT

## 2024-12-31 PROCEDURE — 36415 COLL VENOUS BLD VENIPUNCTURE: CPT | Mod: IPSPLIT

## 2024-12-31 PROCEDURE — 2500000002 HC RX 250 W HCPCS SELF ADMINISTERED DRUGS (ALT 637 FOR MEDICARE OP, ALT 636 FOR OP/ED): Mod: SE,IPSPLIT

## 2024-12-31 PROCEDURE — 2500000004 HC RX 250 GENERAL PHARMACY W/ HCPCS (ALT 636 FOR OP/ED): Mod: SE,IPSPLIT | Performed by: NURSE PRACTITIONER

## 2024-12-31 RX ORDER — FLUTICASONE PROPIONATE 50 MCG
2 SPRAY, SUSPENSION (ML) NASAL DAILY
Status: DISCONTINUED | OUTPATIENT
Start: 2024-12-31 | End: 2025-01-02 | Stop reason: HOSPADM

## 2024-12-31 RX ORDER — HYDRALAZINE HYDROCHLORIDE 20 MG/ML
10 INJECTION INTRAMUSCULAR; INTRAVENOUS EVERY 4 HOURS PRN
Status: DISCONTINUED | OUTPATIENT
Start: 2024-12-31 | End: 2025-01-02 | Stop reason: HOSPADM

## 2024-12-31 RX ORDER — POTASSIUM CHLORIDE 20 MEQ/1
40 TABLET, EXTENDED RELEASE ORAL
Status: COMPLETED | OUTPATIENT
Start: 2024-12-31 | End: 2024-12-31

## 2024-12-31 RX ORDER — METHYLPREDNISOLONE 4 MG/1
TABLET ORAL
Qty: 21 TABLET | Refills: 0 | Status: CANCELLED | OUTPATIENT
Start: 2024-12-31 | End: 2025-01-06

## 2024-12-31 RX ORDER — CEFUROXIME AXETIL 500 MG/1
500 TABLET ORAL 2 TIMES DAILY
Qty: 6 TABLET | Refills: 0 | Status: CANCELLED | OUTPATIENT
Start: 2024-12-31 | End: 2025-01-03

## 2024-12-31 RX ORDER — METOPROLOL TARTRATE 25 MG/1
25 TABLET, FILM COATED ORAL 2 TIMES DAILY
Status: DISCONTINUED | OUTPATIENT
Start: 2024-12-31 | End: 2025-01-02 | Stop reason: HOSPADM

## 2024-12-31 RX ORDER — AMLODIPINE BESYLATE 10 MG/1
10 TABLET ORAL DAILY
Qty: 30 TABLET | Refills: 1 | Status: CANCELLED | OUTPATIENT
Start: 2024-12-31 | End: 2025-03-01

## 2024-12-31 RX ADMIN — FLUTICASONE FUROATE AND VILANTEROL TRIFENATATE 1 PUFF: 200; 25 POWDER RESPIRATORY (INHALATION) at 10:08

## 2024-12-31 RX ADMIN — CEFTRIAXONE 2 G: 2 INJECTION, SOLUTION INTRAVENOUS at 17:13

## 2024-12-31 RX ADMIN — Medication 21 PERCENT: at 22:07

## 2024-12-31 RX ADMIN — BENZOCAINE AND MENTHOL 1 LOZENGE: 15; 3.6 LOZENGE ORAL at 08:43

## 2024-12-31 RX ADMIN — EZETIMIBE 10 MG: 10 TABLET ORAL at 08:43

## 2024-12-31 RX ADMIN — HYDRALAZINE HYDROCHLORIDE 10 MG: 20 INJECTION INTRAMUSCULAR; INTRAVENOUS at 01:57

## 2024-12-31 RX ADMIN — Medication 6 MG: at 21:37

## 2024-12-31 RX ADMIN — POTASSIUM CHLORIDE 40 MEQ: 1500 TABLET, EXTENDED RELEASE ORAL at 13:56

## 2024-12-31 RX ADMIN — METOPROLOL TARTRATE 25 MG: 25 TABLET, FILM COATED ORAL at 21:34

## 2024-12-31 RX ADMIN — AZITHROMYCIN DIHYDRATE 500 MG: 250 TABLET, FILM COATED ORAL at 08:43

## 2024-12-31 RX ADMIN — PANTOPRAZOLE SODIUM 40 MG: 40 TABLET, DELAYED RELEASE ORAL at 06:16

## 2024-12-31 RX ADMIN — ENOXAPARIN SODIUM 40 MG: 40 INJECTION SUBCUTANEOUS at 21:37

## 2024-12-31 RX ADMIN — IPRATROPIUM BROMIDE AND ALBUTEROL SULFATE 3 ML: 2.5; .5 SOLUTION RESPIRATORY (INHALATION) at 15:56

## 2024-12-31 RX ADMIN — FLUTICASONE PROPIONATE 2 SPRAY: 50 SPRAY, METERED NASAL at 14:01

## 2024-12-31 RX ADMIN — METHYLPREDNISOLONE SODIUM SUCCINATE 40 MG: 40 INJECTION, POWDER, FOR SOLUTION INTRAMUSCULAR; INTRAVENOUS at 13:38

## 2024-12-31 RX ADMIN — IPRATROPIUM BROMIDE AND ALBUTEROL SULFATE 3 ML: 2.5; .5 SOLUTION RESPIRATORY (INHALATION) at 10:08

## 2024-12-31 RX ADMIN — LEVOTHYROXINE SODIUM 25 MCG: 25 TABLET ORAL at 08:43

## 2024-12-31 RX ADMIN — POTASSIUM CHLORIDE 40 MEQ: 1500 TABLET, EXTENDED RELEASE ORAL at 13:57

## 2024-12-31 RX ADMIN — AMLODIPINE BESYLATE 10 MG: 10 TABLET ORAL at 08:43

## 2024-12-31 RX ADMIN — METHYLPREDNISOLONE SODIUM SUCCINATE 40 MG: 40 INJECTION, POWDER, FOR SOLUTION INTRAMUSCULAR; INTRAVENOUS at 21:41

## 2024-12-31 RX ADMIN — METHYLPREDNISOLONE SODIUM SUCCINATE 40 MG: 40 INJECTION, POWDER, FOR SOLUTION INTRAMUSCULAR; INTRAVENOUS at 06:16

## 2024-12-31 RX ADMIN — MELOXICAM 15 MG: 7.5 TABLET ORAL at 08:49

## 2024-12-31 RX ADMIN — IPRATROPIUM BROMIDE AND ALBUTEROL SULFATE 3 ML: 2.5; .5 SOLUTION RESPIRATORY (INHALATION) at 22:07

## 2024-12-31 ASSESSMENT — COGNITIVE AND FUNCTIONAL STATUS - GENERAL
DAILY ACTIVITIY SCORE: 23
CLIMB 3 TO 5 STEPS WITH RAILING: A LITTLE
WALKING IN HOSPITAL ROOM: A LITTLE
MOBILITY SCORE: 22
HELP NEEDED FOR BATHING: A LITTLE

## 2024-12-31 ASSESSMENT — PAIN SCALES - GENERAL
PAINLEVEL_OUTOF10: 0 - NO PAIN
PAINLEVEL_OUTOF10: 0 - NO PAIN

## 2024-12-31 ASSESSMENT — PAIN - FUNCTIONAL ASSESSMENT: PAIN_FUNCTIONAL_ASSESSMENT: 0-10

## 2024-12-31 NOTE — NURSING NOTE
Patient heart rate above 100 while at rest to 130's when ambulating. Recovers after a few minutes of rest to low 100's. No complaints of any discomfort. Sarah Beth Reis NP aware and order placed.

## 2024-12-31 NOTE — PROCEDURES
Pt completed an overnight pulse ox trend on room air, on the night of 12/30/24.  Report start date: 12/30/24 11:00:00 PM.  Report end date: 12/31/24 05:51:00 AM.  Total time below spo2 88%: 4 min 28 sec.  Longest duration: 1 min 18 sec.  Lowest spo2: 80 at 12/30/24 11:19:56 PM.  Number of events: 11.

## 2024-12-31 NOTE — PROGRESS NOTES
12/31/24 1247   Discharge Planning   Living Arrangements Spouse/significant other   Support Systems Spouse/significant other   Type of Residence Private residence   Home or Post Acute Services None   Expected Discharge Disposition Home   Does the patient need discharge transport arranged? No     Patient denies any home going needs. Patient not medically stable for discharge today. ADOD 1/1/25

## 2024-12-31 NOTE — CARE PLAN
The patient's goals for the shift include Respiratory status to remain stable or improove    The clinical goals for the shift include pt will maintain an o2 sat level of 90% or above through the night. pt will have favorable overnight o2 study tonight.    Pt was able to maintain oxygen levels on room air through the night. Occasional drop in o2 level with exertion ie: walking to bathroom. Hypoxia was not sustained and pt did attain sufficient o2 level. Pt did have increased blood pressure  of 179/113 and provider on duty was notified. PRN hydrolozine was ordered and administered with good effect. See flowsheet for specifics.

## 2024-12-31 NOTE — PROGRESS NOTES
Hortensia Rodriguez is a 59 y.o. female on day 4 of admission presenting with Community acquired bacterial pneumonia.      Subjective   Pt assessed at bedside. Stable on RA overnight but noted to have some conversational dyspnea. She does report dyspnea with any exertion as well. Discussed possibly discharging home tomorrow, pt agreeable.        Objective     Last Recorded Vitals  /89 (BP Location: Right arm, Patient Position: Lying)   Pulse 110   Temp 37.1 °C (98.8 °F) (Temporal)   Resp 18   Wt 73.1 kg (161 lb 2.5 oz)   SpO2 91%   Intake/Output last 3 Shifts:    Intake/Output Summary (Last 24 hours) at 12/31/2024 1131  Last data filed at 12/31/2024 0900  Gross per 24 hour   Intake 1540 ml   Output 6 ml   Net 1534 ml       Admission Weight  Weight: 81.6 kg (180 lb) (12/27/24 1553)    Daily Weight  12/28/24 : 73.1 kg (161 lb 2.5 oz)    Image Results  CT angio chest for pulmonary embolism  Narrative: Interpreted By:  Unique Colunga,   STUDY:  CT ANGIO CHEST FOR PULMONARY EMBOLISM;  12/27/2024 5:02 pm      INDICATION:  Signs/Symptoms:SOB.      COMPARISON:  09/18/2024      ACCESSION NUMBER(S):  [id number]      ORDERING CLINICIAN:  KATE BURKETT      TECHNIQUE:  Serial axial CT images of the chest obtained following the  intravenous administration 75 mL Omnipaque 350.. Sagittal, coronal  and MIP reconstructions were generated.      FINDINGS:  VESSELS: There is good opacification of the pulmonary arteries. There  are no obvious pulmonary emboli.      There are atherosclerotic changes of the aorta. The ascending  thoracic aorta measures 39 mm. The cava is unremarkable.      HEART:  The heart is normal in size.      MEDIASTINUM AND DARRIAN: There are slightly prominent mediastinal and  hilar lymph nodes      LUNG, PLEURA, AND LARGE AIRWAYS: There is bilateral patchiness. There  is extensive peribronchial thickening.      There is a 9 mm left upper lobe nodule similar to the prior exam.      CHEST WALL AND LOWER  NECK: The thyroid is not well seen.      BONES: Patient is scoliotic.      UPPER ABDOMEN: There is a question peripelvic and cortical left cyst.      COMPARISON TO THE PRIOR EXAM:  The lungs are improved in appearance spur      Impression: No evidence of PE.      There is motion artifact.      Peribronchial thickening and mucous plugging.      9 mm left upper lung nodule.      Signed by: Unique Colunga 12/27/2024 5:15 PM  Dictation workstation:   [id number]  XR chest 1 view  Narrative: Interpreted By:  Cash Colunga,   STUDY:  XR CHEST 1 VIEW      INDICATION:  Signs/Symptoms:Chest Pain.      COMPARISON:  In September 18, 2024      ACCESSION NUMBER(S):  [id number]      ORDERING CLINICIAN:  KATE BURKETT      FINDINGS:  No consolidation, effusion, edema, pneumothorax. Heart size within  normal limits. Previous right rib fractures.      Impression: No evidence of acute intrathoracic abnormality.      Signed by: Cash Colunga 12/27/2024 4:53 PM  Dictation workstation:   [id number]      Physical Exam  Vitals reviewed.   HENT:      Head: Normocephalic and atraumatic.      Right Ear: External ear normal.      Left Ear: External ear normal.      Nose: Nose normal.      Mouth/Throat:      Pharynx: Oropharynx is clear.   Eyes:      Conjunctiva/sclera: Conjunctivae normal.   Cardiovascular:      Rate and Rhythm: Normal rate and regular rhythm.      Pulses: Normal pulses.      Heart sounds: Normal heart sounds.   Pulmonary:      Breath sounds: Decreased breath sounds present.   Abdominal:      General: Bowel sounds are normal.      Palpations: Abdomen is soft.   Musculoskeletal:         General: Normal range of motion.      Cervical back: Normal range of motion and neck supple.   Skin:     General: Skin is dry.   Neurological:      General: No focal deficit present.      Mental Status: She is alert and oriented to person, place, and time.   Psychiatric:         Mood and Affect: Mood is anxious.         Behavior: Behavior normal.        Relevant Results  Scheduled medications  amLODIPine, 10 mg, oral, Daily  cefTRIAXone, 2 g, intravenous, q24h  enoxaparin, 40 mg, subcutaneous, q24h  ezetimibe, 10 mg, oral, Daily  fluticasone furoate-vilanteroL, 1 puff, inhalation, Daily  ipratropium-albuteroL, 3 mL, nebulization, TID  levothyroxine, 25 mcg, oral, Daily  meloxicam, 15 mg, oral, Daily  methylPREDNISolone sodium succinate (PF), 40 mg, intravenous, q8h ENEDINA  oxygen, , inhalation, Continuous - Inhalation  pantoprazole, 40 mg, oral, Daily before breakfast      Continuous medications     PRN medications  PRN medications: acetaminophen, albuterol, benzocaine-menthol, hydrALAZINE, melatonin, metoprolol, ondansetron, polyethylene glycol    Results for orders placed or performed during the hospital encounter of 12/27/24 (from the past 24 hours)   Basic Metabolic Panel   Result Value Ref Range    Glucose 166 (H) 74 - 99 mg/dL    Sodium 142 136 - 145 mmol/L    Potassium 3.0 (L) 3.5 - 5.3 mmol/L    Chloride 103 98 - 107 mmol/L    Bicarbonate 30 21 - 32 mmol/L    Anion Gap 12 10 - 20 mmol/L    Urea Nitrogen 30 (H) 6 - 23 mg/dL    Creatinine 0.92 0.50 - 1.05 mg/dL    eGFR 71 >60 mL/min/1.73m*2    Calcium 9.3 8.6 - 10.3 mg/dL   CBC   Result Value Ref Range    WBC 7.5 4.4 - 11.3 x10*3/uL    nRBC 0.0 0.0 - 0.0 /100 WBCs    RBC 4.50 4.00 - 5.20 x10*6/uL    Hemoglobin 13.8 12.0 - 16.0 g/dL    Hematocrit 41.3 36.0 - 46.0 %    MCV 92 80 - 100 fL    MCH 30.7 26.0 - 34.0 pg    MCHC 33.4 32.0 - 36.0 g/dL    RDW 14.0 11.5 - 14.5 %    Platelets 260 150 - 450 x10*3/uL   CBC   Result Value Ref Range    WBC 7.2 4.4 - 11.3 x10*3/uL    nRBC 0.0 0.0 - 0.0 /100 WBCs    RBC 4.51 4.00 - 5.20 x10*6/uL    Hemoglobin 13.8 12.0 - 16.0 g/dL    Hematocrit 42.4 36.0 - 46.0 %    MCV 94 80 - 100 fL    MCH 30.6 26.0 - 34.0 pg    MCHC 32.5 32.0 - 36.0 g/dL    RDW 13.9 11.5 - 14.5 %    Platelets 257 150 - 450 x10*3/uL   Basic Metabolic Panel   Result Value Ref Range    Glucose 124 (H) 74 -  99 mg/dL    Sodium 142 136 - 145 mmol/L    Potassium 3.1 (L) 3.5 - 5.3 mmol/L    Chloride 105 98 - 107 mmol/L    Bicarbonate 25 21 - 32 mmol/L    Anion Gap 15 10 - 20 mmol/L    Urea Nitrogen 29 (H) 6 - 23 mg/dL    Creatinine 0.78 0.50 - 1.05 mg/dL    eGFR 87 >60 mL/min/1.73m*2    Calcium 9.1 8.6 - 10.3 mg/dL          Assessment/Plan      Assessment & Plan  Community acquired bacterial pneumonia    Pneumonia due to infectious organism, unspecified laterality, unspecified part of lung    Depression with anxiety    Benign essential hypertension    Hypothyroidism    Hypokalemia    Hypercholesteremia    EMMA (obstructive sleep apnea)    Vitamin D deficiency    Acute respiratory failure with hypoxia (Multi)    #Community Acquired pneumonia  #Acute respiratory failure with hypoxia  -SpO2 88% on RA  -supplemental oxygen to keep SpO2 > 90%  -currently on RA; Sp02 89%  -No oxygen at home  -Completed azithromycin   -Continue IV ceftriaxone, day 5  -started solumedrol 40 mg q8h  -started duoneb q6h  -continue Breo 200-25 mg daily  -RT for bronchial hygiene  -blood culture negative   -CTA chest: No evidence of PE. Peribronchial thickening and mucous plugging. 9 mm left upper lung nodule.  -Discussed lung nodule with patient; follow up with pulmonology outpatient     #Hypokalemia  -K 2.4 on admission; 3.0 > 3.0 > 3.1   -replaced with KCL  -monitor BMP     #Hypercholesteremia  -continue ezetimibe 10 mg daily     #Hypothyroidism  -continue levothyroxine 25 mcg daily     #Chronic pain  -continue meloxecam 15 mg daily     DVT ppx  -lovenox      PUD ppx  -pantoprazole     F: PRN  E: Replete per protocol  N: Regular  A: PIV     Disposition: Pt requires more than 2 inpatient days at this time   Code Status: Full Code         LAN Blank-JAYDE

## 2025-01-01 LAB
ANION GAP SERPL CALC-SCNC: 13 MMOL/L (ref 10–20)
BACTERIA BLD CULT: NORMAL
BACTERIA BLD CULT: NORMAL
BUN SERPL-MCNC: 27 MG/DL (ref 6–23)
CALCIUM SERPL-MCNC: 8.8 MG/DL (ref 8.6–10.3)
CHLORIDE SERPL-SCNC: 104 MMOL/L (ref 98–107)
CO2 SERPL-SCNC: 27 MMOL/L (ref 21–32)
CREAT SERPL-MCNC: 0.74 MG/DL (ref 0.5–1.05)
EGFRCR SERPLBLD CKD-EPI 2021: >90 ML/MIN/1.73M*2
ERYTHROCYTE [DISTWIDTH] IN BLOOD BY AUTOMATED COUNT: 13.9 % (ref 11.5–14.5)
GLUCOSE SERPL-MCNC: 115 MG/DL (ref 74–99)
HCT VFR BLD AUTO: 41.9 % (ref 36–46)
HGB BLD-MCNC: 13.8 G/DL (ref 12–16)
MCH RBC QN AUTO: 30 PG (ref 26–34)
MCHC RBC AUTO-ENTMCNC: 32.9 G/DL (ref 32–36)
MCV RBC AUTO: 91 FL (ref 80–100)
NRBC BLD-RTO: 0 /100 WBCS (ref 0–0)
PLATELET # BLD AUTO: 269 X10*3/UL (ref 150–450)
POTASSIUM SERPL-SCNC: 3.3 MMOL/L (ref 3.5–5.3)
RBC # BLD AUTO: 4.6 X10*6/UL (ref 4–5.2)
SODIUM SERPL-SCNC: 141 MMOL/L (ref 136–145)
WBC # BLD AUTO: 7.5 X10*3/UL (ref 4.4–11.3)

## 2025-01-01 PROCEDURE — 2500000001 HC RX 250 WO HCPCS SELF ADMINISTERED DRUGS (ALT 637 FOR MEDICARE OP): Mod: SE,IPSPLIT | Performed by: STUDENT IN AN ORGANIZED HEALTH CARE EDUCATION/TRAINING PROGRAM

## 2025-01-01 PROCEDURE — 82374 ASSAY BLOOD CARBON DIOXIDE: CPT | Mod: IPSPLIT

## 2025-01-01 PROCEDURE — 2500000001 HC RX 250 WO HCPCS SELF ADMINISTERED DRUGS (ALT 637 FOR MEDICARE OP): Mod: SE,IPSPLIT

## 2025-01-01 PROCEDURE — 94668 MNPJ CHEST WALL SBSQ: CPT | Mod: IPSPLIT

## 2025-01-01 PROCEDURE — 2500000001 HC RX 250 WO HCPCS SELF ADMINISTERED DRUGS (ALT 637 FOR MEDICARE OP): Mod: SE,IPSPLIT | Performed by: NURSE PRACTITIONER

## 2025-01-01 PROCEDURE — 94640 AIRWAY INHALATION TREATMENT: CPT | Mod: IPSPLIT

## 2025-01-01 PROCEDURE — 2500000002 HC RX 250 W HCPCS SELF ADMINISTERED DRUGS (ALT 637 FOR MEDICARE OP, ALT 636 FOR OP/ED): Mod: SE,IPSPLIT | Performed by: STUDENT IN AN ORGANIZED HEALTH CARE EDUCATION/TRAINING PROGRAM

## 2025-01-01 PROCEDURE — 99233 SBSQ HOSP IP/OBS HIGH 50: CPT | Performed by: NURSE PRACTITIONER

## 2025-01-01 PROCEDURE — 94640 AIRWAY INHALATION TREATMENT: CPT

## 2025-01-01 PROCEDURE — 94760 N-INVAS EAR/PLS OXIMETRY 1: CPT | Mod: IPSPLIT

## 2025-01-01 PROCEDURE — 2500000001 HC RX 250 WO HCPCS SELF ADMINISTERED DRUGS (ALT 637 FOR MEDICARE OP): Mod: SE,IPSPLIT | Performed by: HOSPITALIST

## 2025-01-01 PROCEDURE — 2500000005 HC RX 250 GENERAL PHARMACY W/O HCPCS: Mod: SE,IPSPLIT | Performed by: PHYSICIAN ASSISTANT

## 2025-01-01 PROCEDURE — 1100000001 HC PRIVATE ROOM DAILY: Mod: IPSPLIT

## 2025-01-01 PROCEDURE — 36415 COLL VENOUS BLD VENIPUNCTURE: CPT | Mod: IPSPLIT

## 2025-01-01 PROCEDURE — 85027 COMPLETE CBC AUTOMATED: CPT | Mod: IPSPLIT

## 2025-01-01 PROCEDURE — 2500000002 HC RX 250 W HCPCS SELF ADMINISTERED DRUGS (ALT 637 FOR MEDICARE OP, ALT 636 FOR OP/ED): Mod: SE,IPSPLIT | Performed by: NURSE PRACTITIONER

## 2025-01-01 PROCEDURE — 2500000004 HC RX 250 GENERAL PHARMACY W/ HCPCS (ALT 636 FOR OP/ED): Mod: SE,IPSPLIT | Performed by: NURSE PRACTITIONER

## 2025-01-01 RX ORDER — POTASSIUM CHLORIDE 20 MEQ/1
40 TABLET, EXTENDED RELEASE ORAL ONCE
Status: COMPLETED | OUTPATIENT
Start: 2025-01-01 | End: 2025-01-01

## 2025-01-01 RX ADMIN — POTASSIUM CHLORIDE 40 MEQ: 1500 TABLET, EXTENDED RELEASE ORAL at 13:37

## 2025-01-01 RX ADMIN — CEFTRIAXONE 2 G: 2 INJECTION, SOLUTION INTRAVENOUS at 17:31

## 2025-01-01 RX ADMIN — BENZOCAINE AND MENTHOL 1 LOZENGE: 15; 3.6 LOZENGE ORAL at 21:32

## 2025-01-01 RX ADMIN — PANTOPRAZOLE SODIUM 40 MG: 40 TABLET, DELAYED RELEASE ORAL at 06:00

## 2025-01-01 RX ADMIN — METHYLPREDNISOLONE SODIUM SUCCINATE 40 MG: 40 INJECTION, POWDER, FOR SOLUTION INTRAMUSCULAR; INTRAVENOUS at 21:32

## 2025-01-01 RX ADMIN — IPRATROPIUM BROMIDE AND ALBUTEROL SULFATE 3 ML: 2.5; .5 SOLUTION RESPIRATORY (INHALATION) at 20:17

## 2025-01-01 RX ADMIN — FLUTICASONE FUROATE AND VILANTEROL TRIFENATATE 1 PUFF: 200; 25 POWDER RESPIRATORY (INHALATION) at 10:57

## 2025-01-01 RX ADMIN — ENOXAPARIN SODIUM 40 MG: 40 INJECTION SUBCUTANEOUS at 21:32

## 2025-01-01 RX ADMIN — Medication 6 MG: at 21:32

## 2025-01-01 RX ADMIN — FLUTICASONE PROPIONATE 2 SPRAY: 50 SPRAY, METERED NASAL at 08:53

## 2025-01-01 RX ADMIN — IPRATROPIUM BROMIDE AND ALBUTEROL SULFATE 3 ML: 2.5; .5 SOLUTION RESPIRATORY (INHALATION) at 10:44

## 2025-01-01 RX ADMIN — METOPROLOL TARTRATE 25 MG: 25 TABLET, FILM COATED ORAL at 08:53

## 2025-01-01 RX ADMIN — IPRATROPIUM BROMIDE AND ALBUTEROL SULFATE 3 ML: 2.5; .5 SOLUTION RESPIRATORY (INHALATION) at 16:30

## 2025-01-01 RX ADMIN — LEVOTHYROXINE SODIUM 25 MCG: 25 TABLET ORAL at 08:52

## 2025-01-01 RX ADMIN — METHYLPREDNISOLONE SODIUM SUCCINATE 40 MG: 40 INJECTION, POWDER, FOR SOLUTION INTRAMUSCULAR; INTRAVENOUS at 13:37

## 2025-01-01 RX ADMIN — MELOXICAM 15 MG: 7.5 TABLET ORAL at 08:52

## 2025-01-01 RX ADMIN — METHYLPREDNISOLONE SODIUM SUCCINATE 40 MG: 40 INJECTION, POWDER, FOR SOLUTION INTRAMUSCULAR; INTRAVENOUS at 05:58

## 2025-01-01 RX ADMIN — EZETIMIBE 10 MG: 10 TABLET ORAL at 08:53

## 2025-01-01 RX ADMIN — AMLODIPINE BESYLATE 10 MG: 10 TABLET ORAL at 08:53

## 2025-01-01 RX ADMIN — METOPROLOL TARTRATE 25 MG: 25 TABLET, FILM COATED ORAL at 21:32

## 2025-01-01 ASSESSMENT — PAIN SCALES - GENERAL
PAINLEVEL_OUTOF10: 0 - NO PAIN
PAINLEVEL_OUTOF10: 0 - NO PAIN

## 2025-01-01 ASSESSMENT — PAIN INTENSITY VAS: VAS_PAIN_BASICVITALS_IP: 0

## 2025-01-01 NOTE — PROGRESS NOTES
Hortensia Rodriguez is a 59 y.o. female on day 5 of admission presenting with Community acquired bacterial pneumonia.    Subjective   Pt assessed at bedside. Stable on RA overnight but noted to have some conversational dyspnea. She does report dyspnea with any exertion as well. Discussed possibly discharging home tomorrow, pt agreeable.      Objective     Last Recorded Vitals  /89 (BP Location: Right arm, Patient Position: Sitting)   Pulse 86   Temp 36.8 °C (98.2 °F)   Resp 17   Wt 73.1 kg (161 lb 2.5 oz)   SpO2 94%   Intake/Output last 3 Shifts:    Intake/Output Summary (Last 24 hours) at 1/1/2025 1831  Last data filed at 1/1/2025 1826  Gross per 24 hour   Intake 770 ml   Output --   Net 770 ml     Admission Weight  Weight: 81.6 kg (180 lb) (12/27/24 1553)    Daily Weight  12/28/24 : 73.1 kg (161 lb 2.5 oz)    Physical Exam  Vitals reviewed.   HENT:      Head: Normocephalic and atraumatic.      Right Ear: External ear normal.      Left Ear: External ear normal.      Nose: Nose normal.      Mouth/Throat:      Pharynx: Oropharynx is clear.   Eyes:      Conjunctiva/sclera: Conjunctivae normal.   Cardiovascular:      Rate and Rhythm: Normal rate and regular rhythm.      Pulses: Normal pulses.      Heart sounds: Normal heart sounds.   Pulmonary:      Breath sounds: Decreased breath sounds present.   Abdominal:      General: Bowel sounds are normal.      Palpations: Abdomen is soft.   Musculoskeletal:         General: Normal range of motion.      Cervical back: Normal range of motion and neck supple.   Skin:     General: Skin is dry.   Neurological:      General: No focal deficit present.      Mental Status: She is alert and oriented to person, place, and time.   Psychiatric:         Mood and Affect: Mood is anxious.         Behavior: Behavior normal.     Scheduled medications  amLODIPine, 10 mg, oral, Daily  cefTRIAXone, 2 g, intravenous, q24h  enoxaparin, 40 mg, subcutaneous, q24h  ezetimibe, 10 mg, oral,  Daily  fluticasone, 2 spray, Each Nostril, Daily  fluticasone furoate-vilanteroL, 1 puff, inhalation, Daily  ipratropium-albuteroL, 3 mL, nebulization, TID  levothyroxine, 25 mcg, oral, Daily  meloxicam, 15 mg, oral, Daily  methylPREDNISolone sodium succinate (PF), 40 mg, intravenous, q8h ENEDINA  metoprolol tartrate, 25 mg, oral, BID  oxygen, , inhalation, Continuous - Inhalation  pantoprazole, 40 mg, oral, Daily before breakfast     PRN medications  PRN medications: acetaminophen, albuterol, benzocaine-menthol, hydrALAZINE, melatonin, metoprolol, ondansetron, polyethylene glycol    Relevant Results  ECG 12 lead  Result Date: 12/31/2024  Sinus tachycardia with Premature supraventricular complexes Biatrial enlargement Incomplete right bundle branch block Septal infarct , age undetermined Abnormal ECG When compared with ECG of 18-SEP-2024 18:12, Premature supraventricular complexes are now Present Septal infarct is now Present ST now depressed in Inferior leads See ED provider note for full interpretation and clinical correlation Confirmed by Lona Dickinson ([id number]) on 12/31/2024 12:53:44 PM    CT angio chest for pulmonary embolism  Result Date: 12/27/2024  No evidence of PE.   There is motion artifact.   Peribronchial thickening and mucous plugging.   9 mm left upper lung nodule.   Signed by: Unique Colunga 12/27/2024 5:15 PM Dictation workstation:   [id number]    XR chest 1 view  Result Date: 12/27/2024  No evidence of acute intrathoracic abnormality.   Signed by: Cash Colunga 12/27/2024 4:53 PM Dictation workstation:   [id number]         Latest Reference Range & Units 12/30/24 14:21 12/30/24 14:22 12/31/24 08:14 01/01/25 07:59   GLUCOSE 74 - 99 mg/dL 166 (H)  124 (H) 115 (H)   SODIUM 136 - 145 mmol/L 142  142 141   POTASSIUM 3.5 - 5.3 mmol/L 3.0 (L)  3.1 (L) 3.3 (L)   CHLORIDE 98 - 107 mmol/L 103  105 104   Bicarbonate 21 - 32 mmol/L 30  25 27   Anion Gap 10 - 20 mmol/L 12  15 13   Blood Urea Nitrogen 6 - 23 mg/dL 30 (H)   29 (H) 27 (H)   Creatinine 0.50 - 1.05 mg/dL 0.92  0.78 0.74   EGFR >60 mL/min/1.73m*2 71  87 >90   Calcium 8.6 - 10.3 mg/dL 9.3  9.1 8.8   WBC 4.4 - 11.3 x10*3/uL  7.5 7.2 7.5   nRBC 0.0 - 0.0 /100 WBCs  0.0 0.0 0.0   RBC 4.00 - 5.20 x10*6/uL  4.50 4.51 4.60   HEMOGLOBIN 12.0 - 16.0 g/dL  13.8 13.8 13.8   HEMATOCRIT 36.0 - 46.0 %  41.3 42.4 41.9   MCV 80 - 100 fL  92 94 91   MCH 26.0 - 34.0 pg  30.7 30.6 30.0   MCHC 32.0 - 36.0 g/dL  33.4 32.5 32.9   RED CELL DISTRIBUTION WIDTH 11.5 - 14.5 %  14.0 13.9 13.9   Platelets 150 - 450 x10*3/uL  260 257 269     Assessment/Plan      Assessment & Plan  Community acquired bacterial pneumonia    Pneumonia due to infectious organism, unspecified laterality, unspecified part of lung    Depression with anxiety    Benign essential hypertension    Hypothyroidism    Hypokalemia    Hypercholesteremia    EMMA (obstructive sleep apnea)    Vitamin D deficiency    Acute respiratory failure with hypoxia (Multi)    #Community Acquired pneumonia  #Acute respiratory failure with hypoxia  -SpO2 88% on RA  -supplemental oxygen to keep SpO2 > 90%  -currently on RA; Sp02 89%  -No oxygen at home  -Completed azithromycin   -Continue IV ceftriaxone, day 6  -started solumedrol 40 mg q8h  -started duoneb q6h  -continue Breo 200-25 mg daily  -RT for bronchial hygiene  -blood culture negative   -CTA chest: No evidence of PE. Peribronchial thickening and mucous plugging. 9 mm left upper lung nodule.  -Discussed lung nodule with patient; follow up with pulmonology outpatient     #Hypokalemia  -K 2.4 on admission; 3.0 > 3.0 > 3.3  -replaced with KCL orally  -monitor BMP     #Hypercholesteremia  -continue ezetimibe 10 mg daily     #Hypothyroidism  -continue levothyroxine 25 mcg daily     #Chronic pain  -continue meloxecam 15 mg daily     DVT ppx: lovenox    PUD ppx: pantoprazole   F: PRN  E: Replete per protocol  N: Regular  A: PIV   Disposition: Pt requires more than 2 inpatient days at this time   Code  Status: Full Code       Zoe Monaco, APRN-CNP

## 2025-01-01 NOTE — CARE PLAN
The patient's goals for the shift include Respiratory status to remain stable or improove    The clinical goals for the shift include Patient's SPO2 will remain >92% this shift    Over the shift, the patient's SPO2 remained 92-93%. VSS. Denies pain/discomfort. Provided PRN melatonin per request at HS. HR has decreased since 2100 (106) this shift and has remained <86 since 2300. Patient is resting in bed with call light within reach.

## 2025-01-01 NOTE — CARE PLAN
The clinical goals for the shift include Patient will maintain SPO2 at or above 92% this shift.    Over the shift, the patient did keep SPO2 above 92%. All vitals remained stable this shift, no reports of pain or discomfort, appetite was good. IV abx were tolerated well this shift with no adverse effects. Currently lying in bed with call light in reach, denies any needs at this time.

## 2025-01-02 VITALS
SYSTOLIC BLOOD PRESSURE: 151 MMHG | HEIGHT: 66 IN | TEMPERATURE: 99.1 F | HEART RATE: 85 BPM | BODY MASS INDEX: 25.9 KG/M2 | DIASTOLIC BLOOD PRESSURE: 98 MMHG | WEIGHT: 161.16 LBS | RESPIRATION RATE: 19 BRPM | OXYGEN SATURATION: 92 %

## 2025-01-02 LAB
ANION GAP SERPL CALC-SCNC: 13 MMOL/L (ref 10–20)
ATRIAL RATE: 103 BPM
BUN SERPL-MCNC: 30 MG/DL (ref 6–23)
CALCIUM SERPL-MCNC: 8.8 MG/DL (ref 8.6–10.3)
CHLORIDE SERPL-SCNC: 103 MMOL/L (ref 98–107)
CO2 SERPL-SCNC: 25 MMOL/L (ref 21–32)
CREAT SERPL-MCNC: 0.79 MG/DL (ref 0.5–1.05)
EGFRCR SERPLBLD CKD-EPI 2021: 85 ML/MIN/1.73M*2
ERYTHROCYTE [DISTWIDTH] IN BLOOD BY AUTOMATED COUNT: 13.8 % (ref 11.5–14.5)
GLUCOSE SERPL-MCNC: 109 MG/DL (ref 74–99)
HCT VFR BLD AUTO: 44.8 % (ref 36–46)
HGB BLD-MCNC: 14.6 G/DL (ref 12–16)
MCH RBC QN AUTO: 29.9 PG (ref 26–34)
MCHC RBC AUTO-ENTMCNC: 32.6 G/DL (ref 32–36)
MCV RBC AUTO: 92 FL (ref 80–100)
NRBC BLD-RTO: 0 /100 WBCS (ref 0–0)
P AXIS: 62 DEGREES
P OFFSET: 209 MS
P ONSET: 158 MS
PLATELET # BLD AUTO: 323 X10*3/UL (ref 150–450)
POTASSIUM SERPL-SCNC: 3.5 MMOL/L (ref 3.5–5.3)
PR INTERVAL: 136 MS
Q ONSET: 226 MS
QRS COUNT: 17 BEATS
QRS DURATION: 98 MS
QT INTERVAL: 390 MS
QTC CALCULATION(BAZETT): 510 MS
QTC FREDERICIA: 467 MS
R AXIS: 28 DEGREES
RBC # BLD AUTO: 4.88 X10*6/UL (ref 4–5.2)
SODIUM SERPL-SCNC: 137 MMOL/L (ref 136–145)
T AXIS: 65 DEGREES
T OFFSET: 421 MS
VENTRICULAR RATE: 103 BPM
WBC # BLD AUTO: 8.8 X10*3/UL (ref 4.4–11.3)

## 2025-01-02 PROCEDURE — 80048 BASIC METABOLIC PNL TOTAL CA: CPT | Mod: IPSPLIT | Performed by: NURSE PRACTITIONER

## 2025-01-02 PROCEDURE — 36415 COLL VENOUS BLD VENIPUNCTURE: CPT | Mod: IPSPLIT | Performed by: NURSE PRACTITIONER

## 2025-01-02 PROCEDURE — 2500000002 HC RX 250 W HCPCS SELF ADMINISTERED DRUGS (ALT 637 FOR MEDICARE OP, ALT 636 FOR OP/ED): Mod: SE,IPSPLIT | Performed by: STUDENT IN AN ORGANIZED HEALTH CARE EDUCATION/TRAINING PROGRAM

## 2025-01-02 PROCEDURE — 85027 COMPLETE CBC AUTOMATED: CPT | Mod: IPSPLIT | Performed by: NURSE PRACTITIONER

## 2025-01-02 PROCEDURE — 2500000001 HC RX 250 WO HCPCS SELF ADMINISTERED DRUGS (ALT 637 FOR MEDICARE OP): Mod: SE,IPSPLIT

## 2025-01-02 PROCEDURE — 2500000001 HC RX 250 WO HCPCS SELF ADMINISTERED DRUGS (ALT 637 FOR MEDICARE OP): Mod: SE,IPSPLIT | Performed by: STUDENT IN AN ORGANIZED HEALTH CARE EDUCATION/TRAINING PROGRAM

## 2025-01-02 PROCEDURE — 2500000001 HC RX 250 WO HCPCS SELF ADMINISTERED DRUGS (ALT 637 FOR MEDICARE OP): Mod: SE,IPSPLIT | Performed by: NURSE PRACTITIONER

## 2025-01-02 PROCEDURE — 99239 HOSP IP/OBS DSCHRG MGMT >30: CPT | Performed by: NURSE PRACTITIONER

## 2025-01-02 PROCEDURE — 94760 N-INVAS EAR/PLS OXIMETRY 1: CPT | Mod: IPSPLIT

## 2025-01-02 PROCEDURE — 2500000004 HC RX 250 GENERAL PHARMACY W/ HCPCS (ALT 636 FOR OP/ED): Mod: SE,IPSPLIT | Performed by: NURSE PRACTITIONER

## 2025-01-02 PROCEDURE — 94640 AIRWAY INHALATION TREATMENT: CPT | Mod: IPSPLIT

## 2025-01-02 RX ORDER — METHYLPREDNISOLONE 4 MG/1
TABLET ORAL
Qty: 21 TABLET | Refills: 0 | Status: SHIPPED | OUTPATIENT
Start: 2025-01-02 | End: 2025-01-08

## 2025-01-02 RX ORDER — CEFUROXIME AXETIL 500 MG/1
500 TABLET ORAL 2 TIMES DAILY
Qty: 6 TABLET | Refills: 0 | Status: SHIPPED | OUTPATIENT
Start: 2025-01-02 | End: 2025-01-05

## 2025-01-02 RX ORDER — AMLODIPINE BESYLATE 10 MG/1
10 TABLET ORAL DAILY
Qty: 30 TABLET | Refills: 0 | Status: SHIPPED | OUTPATIENT
Start: 2025-01-02 | End: 2025-02-01

## 2025-01-02 RX ORDER — FLUTICASONE PROPIONATE 50 MCG
2 SPRAY, SUSPENSION (ML) NASAL DAILY
Qty: 16 G | Refills: 12 | Status: SHIPPED | OUTPATIENT
Start: 2025-01-03

## 2025-01-02 RX ORDER — METOPROLOL TARTRATE 25 MG/1
25 TABLET, FILM COATED ORAL 2 TIMES DAILY
Qty: 60 TABLET | Refills: 0 | Status: SHIPPED | OUTPATIENT
Start: 2025-01-02 | End: 2025-02-01

## 2025-01-02 RX ADMIN — FLUTICASONE PROPIONATE 2 SPRAY: 50 SPRAY, METERED NASAL at 08:16

## 2025-01-02 RX ADMIN — LEVOTHYROXINE SODIUM 25 MCG: 25 TABLET ORAL at 08:16

## 2025-01-02 RX ADMIN — EZETIMIBE 10 MG: 10 TABLET ORAL at 08:16

## 2025-01-02 RX ADMIN — MELOXICAM 15 MG: 7.5 TABLET ORAL at 08:16

## 2025-01-02 RX ADMIN — AMLODIPINE BESYLATE 10 MG: 10 TABLET ORAL at 08:16

## 2025-01-02 RX ADMIN — PANTOPRAZOLE SODIUM 40 MG: 40 TABLET, DELAYED RELEASE ORAL at 06:27

## 2025-01-02 RX ADMIN — METOPROLOL TARTRATE 25 MG: 25 TABLET, FILM COATED ORAL at 08:16

## 2025-01-02 RX ADMIN — FLUTICASONE FUROATE AND VILANTEROL TRIFENATATE 1 PUFF: 200; 25 POWDER RESPIRATORY (INHALATION) at 09:21

## 2025-01-02 RX ADMIN — IPRATROPIUM BROMIDE AND ALBUTEROL SULFATE 3 ML: 2.5; .5 SOLUTION RESPIRATORY (INHALATION) at 15:20

## 2025-01-02 RX ADMIN — IPRATROPIUM BROMIDE AND ALBUTEROL SULFATE 3 ML: 2.5; .5 SOLUTION RESPIRATORY (INHALATION) at 09:21

## 2025-01-02 RX ADMIN — METHYLPREDNISOLONE SODIUM SUCCINATE 40 MG: 40 INJECTION, POWDER, FOR SOLUTION INTRAMUSCULAR; INTRAVENOUS at 14:18

## 2025-01-02 RX ADMIN — METHYLPREDNISOLONE SODIUM SUCCINATE 40 MG: 40 INJECTION, POWDER, FOR SOLUTION INTRAMUSCULAR; INTRAVENOUS at 06:27

## 2025-01-02 ASSESSMENT — PAIN SCALES - GENERAL: PAINLEVEL_OUTOF10: 0 - NO PAIN

## 2025-01-02 NOTE — DISCHARGE INSTR - OTHER ORDERS
Thank you for choosing [facility] for your Health Care needs. As you transition from the hospital back to home, we hope we took your preferences into account on how you manage your health needs so you can manage your health at home.     You may receive a survey in the mail within the next couple weeks. Please take the time to complete it and return it. Your input is ALWAYS important to us. Thank you!  Your Care Transition Team - Shonna Cronin Angie  & Melchor     For questions about your medications listed on your discharge instructions, please call the Nurses Station at [phone].

## 2025-01-02 NOTE — DISCHARGE SUMMARY
Discharge Diagnosis  Community acquired bacterial pneumonia  Acute Respiratory failure with hypoxia  Hypokalemia    Issues Requiring Follow-Up      Discharge Meds     Medication List      START taking these medications     cefuroxime 500 mg tablet; Commonly known as: Ceftin; Take 1 tablet (500   mg) by mouth 2 times a day for 3 days.   fluticasone 50 mcg/actuation nasal spray; Commonly known as: Flonase;   Administer 2 sprays into each nostril once daily. Shake gently. Before   first use, prime pump. After use, clean tip and replace cap.; Start taking   on: January 3, 2025   methylPREDNISolone 4 mg tablets; Commonly known as: Medrol Dospak; Take   as directed on package.   metoprolol tartrate 25 mg tablet; Commonly known as: Lopressor; Take 1   tablet (25 mg) by mouth 2 times a day.     CONTINUE taking these medications     albuterol 90 mcg/actuation inhaler; Commonly known as: ProAir HFA;   Inhale 1 puff every 6 hours if needed for wheezing.   amLODIPine 10 mg tablet; Commonly known as: Norvasc; Take 1 tablet (10   mg) by mouth once daily.   ezetimibe 10 mg tablet; Commonly known as: Zetia; Take 1 tablet (10 mg)   by mouth once daily.   levothyroxine 25 mcg tablet; Commonly known as: Synthroid, Levoxyl; Take   1 tablet (25 mcg) by mouth once daily.   Select Disposable Briefs misc; Generic drug:   diaper,brief,adult,disposable; Disposable pull up underwear. Uses   approximately 5 per day.   Symbicort 160-4.5 mcg/actuation inhaler; Generic drug:   budesonide-formoteroL; Inhale 2 puffs 2 times a day.     STOP taking these medications     meloxicam 15 mg tablet; Commonly known as: Mobic       Test Results Pending At Discharge  Pending Labs       Order Current Status    Extra Urine Gray Tube Collected (12/28/24 1032)    Urinalysis with Reflex Culture and Microscopic In process         59 y.o. female presenting with a complaint of SOB.She has had a cough, congestion and SOB for the past 3 days that has been getting  progressively worse. She complained was weakness, fatigue, productive cough, congestion, and chills.She denies fever, N/V/D/C, or chest pain.     In the ED:  Labs: Glu 117; Na 139; K 2.4; BUN 12; Cr 0.85; WBC 7.1; Hb 15.0; Hcrt 43.3;  Radiology: CTA chest No evidence of PE. Peribronchial thickening and mucous plugging. 9 mm left upper lung nodule.  Medications: solumedrol, 1000 ml IVF bolus, KCL  Disposition: admitted to med/surg [facility]    Hospital Course    Community Acquired pneumonia  Acute respiratory failure with hypoxia  -SpO2 88% on RA  -supplemental oxygen to keep SpO2 > 90%  -currently on RA; Sp02 89%  -No oxygen at home  -Completed azithromycin   -Continue IV ceftriaxone, day 6  -continue solumedrol 40 mg q8h- changed to medrol dose pack  -continue duoneb q6h  -continue Breo 200-25 mg daily  -RT for bronchial hygiene  -blood culture negative   -CTA chest: No evidence of PE. Peribronchial thickening and mucous plugging. 9 mm left upper lung nodule.  -Discussed lung nodule with patient; follow up with pulmonology outpatient     Hypokalemia  -K 2.4 on admission; 3.0 > 3.0 > 3.3  -replaced with KCL orally  -monitor BMP     Hypercholesteremia  -continue ezetimibe 10 mg daily     Hypothyroidism  -continue levothyroxine 25 mcg daily     Chronic pain  -continue meloxecam 15 mg daily     DVT ppx: enoxaparin 40 mg dialy   PUD ppx: pantoprazole 40 mg dialy  Code Status: Full Code   Disposition: Patient was stable to be discharged to home. She was discharged on cefuroxime, medrol dose pack. She will follow up with her PCP.    Total cumulative time spent in preparation of this discharge including documentation review, coordination of care with the medical team including PT/SW/care coordinators and treating consultants, discussion with patient and pertinent family members and finalization of prescriptions, follow-up appointments, and this discharge summary was approximately 45 minutes.       Pertinent Physical Exam  At Time of Discharge  Physical Exam  Vitals reviewed.   Constitutional:       Appearance: Normal appearance. She is normal weight.   HENT:      Head: Normocephalic.      Right Ear: External ear normal.      Left Ear: External ear normal.      Nose: Nose normal.      Mouth/Throat:      Mouth: Mucous membranes are moist.      Pharynx: Oropharynx is clear.   Eyes:      Conjunctiva/sclera: Conjunctivae normal.      Pupils: Pupils are equal, round, and reactive to light.   Cardiovascular:      Rate and Rhythm: Normal rate and regular rhythm.      Pulses: Normal pulses.      Heart sounds: Normal heart sounds.   Pulmonary:      Effort: Pulmonary effort is normal.   Abdominal:      General: Bowel sounds are normal.      Palpations: Abdomen is soft.   Musculoskeletal:         General: Normal range of motion.      Cervical back: Normal range of motion and neck supple.   Skin:     General: Skin is warm and dry.   Neurological:      General: No focal deficit present.      Mental Status: She is alert and oriented to person, place, and time.   Psychiatric:         Mood and Affect: Mood normal.         Behavior: Behavior normal.         Outpatient Follow-Up  Future Appointments   Date Time Provider Department Center   1/17/2025  4:15 PM GEN CT 1 [facility]   1/31/2025 10:15 AM U MRI 1 [facility]   2/7/2025  2:15 PM Ewelina Love MD [id number] [facility]         LAN Nguyen-CNP

## 2025-01-02 NOTE — PROGRESS NOTES
01/02/25 0959   Discharge Planning   Living Arrangements Spouse/significant other   Type of Residence Private residence   Home or Post Acute Services None   Expected Discharge Disposition Home   Does the patient need discharge transport arranged? No   Intensity of Service   Intensity of Service 0-30 min     Patient is medically ready for discharge. Patient follow up information is on discharge instructions. Patient will be returning home. Patient is in agreement with discharge plan.

## 2025-01-02 NOTE — CARE PLAN
Problem: Discharge Planning  Goal: Discharge to home or other facility with appropriate resources  Outcome: Progressing     Problem: Chronic Conditions and Co-morbidities  Goal: Patient's chronic conditions and co-morbidity symptoms are monitored and maintained or improved  Outcome: Progressing     Problem: Respiratory  Goal: Clear secretions with interventions this shift  Outcome: Progressing  Goal: Minimize anxiety/maximize coping throughout shift  Outcome: Progressing  Goal: Minimal/no exertional discomfort or dyspnea this shift  Outcome: Progressing  Goal: No signs of respiratory distress (eg. Use of accessory muscles. Peds grunting)  Outcome: Progressing     Problem: Fall/Injury  Goal: Not fall by end of shift  Outcome: Progressing  Goal: Be free from injury by end of the shift  Outcome: Progressing  Goal: Verbalize understanding of personal risk factors for fall in the hospital  Outcome: Progressing   The patient's goals for the shift include Respiratory status to remain stable or improove    The clinical goals for the shift include Pt will not have s/s of resp. distress this shift.    Over the shift, the patient did make progress toward the following goal.

## 2025-01-02 NOTE — CARE PLAN
The clinical goals for the shift include Patient will maintain SPO2 at or above 92% this shift.    Over the shift, the patient maintained adequate SPO2, all vitals remained stable, no reports of pain, appetite was good. Patient has been ambulating in room this shift and ambulated in the florian. Discharge paperwork reviewed with patient who verbalized understanding, IV's removed, last set of vital signs obtained. Patient discharged home at 1720.

## 2025-01-03 ENCOUNTER — PATIENT OUTREACH (OUTPATIENT)
Dept: PRIMARY CARE | Facility: CLINIC | Age: 60
End: 2025-01-03
Payer: COMMERCIAL

## 2025-01-03 NOTE — PROGRESS NOTES
Discharge Facility: [facility]  Discharge Diagnosis: pneumonia   Admission Date: 27 Dec 24  Discharge Date: 2 Jan 25    PCP Appointment Date: Pt declined  Specialist Appointment Date: None  Hospital Encounter and Summary Linked: Yes    See discharge assessment below for further details     Engagement  Call Start Time: 1049 (Spoke with patient) (1/3/2025 10:59 AM)    Medications  Medications reviewed with patient/caregiver?: Yes (1/3/2025 10:59 AM)  Is the patient having any side effects they believe may be caused by any medication additions or changes?: No (1/3/2025 10:59 AM)  Does the patient have all medications ordered at discharge?: Yes (1/3/2025 10:59 AM)  Prescription Comments: pt able to obtain and afford all medications (1/3/2025 10:59 AM)  Is the patient taking all medications as directed (includes completed medication regime)?: Yes (1/3/2025 10:59 AM)  Medication Comments: no questions on medications (1/3/2025 10:59 AM)    Appointments  Does the patient have a primary care provider?: Yes (pt declined) (1/3/2025 10:59 AM)  Has the patient kept scheduled appointments due by today?: Yes (1/3/2025 10:59 AM)    Self Management  What is the home health agency?: None (1/3/2025 10:59 AM)  Has home health visited the patient within 72 hours of discharge?: Not applicable (1/3/2025 10:59 AM)  What Durable Medical Equipment (DME) was ordered?: None (1/3/2025 10:59 AM)    Patient Teaching  Does the patient have access to their discharge instructions?: Yes (1/3/2025 10:59 AM)  Care Management Interventions: Reviewed instructions with patient (1/3/2025 10:59 AM)  What is the patient's perception of their health status since discharge?: Improving (1/3/2025 10:59 AM)  Is the patient/caregiver able to teach back the hierarchy of who to call/visit for symptoms/problems? PCP, Specialist, Home Health nurse, Urgent Care, ED, 911: Yes (1/3/2025 10:59 AM)  Patient/Caregiver Education Comments: None (1/3/2025 10:59 AM)    Wrap  Up  Wrap Up Additional Comments: Pt stating she is doing well since her discharge. pt has no questions regarding medications or discharge paperwork. pt requesting me to cancel her 17 Jan 25 appt due to scheduling issues but states she will keep her 7 Feb 25 appt for follow up. pt not interested in rescheduling at this time. (1/3/2025 10:59 AM)  Call End Time: 1059 (1/3/2025 10:59 AM)

## 2025-01-06 LAB
ATRIAL RATE: 103 BPM
P AXIS: 62 DEGREES
P OFFSET: 209 MS
P ONSET: 158 MS
PR INTERVAL: 136 MS
Q ONSET: 226 MS
QRS COUNT: 17 BEATS
QRS DURATION: 98 MS
QT INTERVAL: 390 MS
QTC CALCULATION(BAZETT): 510 MS
QTC FREDERICIA: 467 MS
R AXIS: 28 DEGREES
T AXIS: 65 DEGREES
T OFFSET: 421 MS
VENTRICULAR RATE: 103 BPM

## 2025-01-17 ENCOUNTER — DOCUMENTATION (OUTPATIENT)
Dept: PRIMARY CARE | Facility: CLINIC | Age: 60
End: 2025-01-17

## 2025-01-17 ENCOUNTER — HOSPITAL ENCOUNTER (OUTPATIENT)
Dept: RADIOLOGY | Facility: HOSPITAL | Age: 60
Discharge: HOME | End: 2025-01-17
Payer: COMMERCIAL

## 2025-01-17 ENCOUNTER — APPOINTMENT (OUTPATIENT)
Dept: PRIMARY CARE | Facility: CLINIC | Age: 60
End: 2025-01-17
Payer: COMMERCIAL

## 2025-01-17 DIAGNOSIS — K62.89 RECTAL MASS: ICD-10-CM

## 2025-01-17 PROCEDURE — 74177 CT ABD & PELVIS W/CONTRAST: CPT

## 2025-01-17 PROCEDURE — 2550000001 HC RX 255 CONTRASTS: Mod: SE | Performed by: SURGERY

## 2025-01-17 RX ADMIN — IOHEXOL 75 ML: 350 INJECTION, SOLUTION INTRAVENOUS at 17:05

## 2025-01-31 ENCOUNTER — HOSPITAL ENCOUNTER (OUTPATIENT)
Dept: RADIOLOGY | Facility: HOSPITAL | Age: 60
Discharge: HOME | End: 2025-01-31
Payer: COMMERCIAL

## 2025-01-31 DIAGNOSIS — K62.89 RECTAL MASS: ICD-10-CM

## 2025-01-31 PROCEDURE — A9575 INJ GADOTERATE MEGLUMI 0.1ML: HCPCS | Performed by: SURGERY

## 2025-01-31 PROCEDURE — 2550000001 HC RX 255 CONTRASTS: Performed by: SURGERY

## 2025-01-31 PROCEDURE — 2500000004 HC RX 250 GENERAL PHARMACY W/ HCPCS (ALT 636 FOR OP/ED): Mod: JZ | Performed by: RADIOLOGY

## 2025-01-31 PROCEDURE — 72197 MRI PELVIS W/O & W/DYE: CPT

## 2025-01-31 PROCEDURE — 96372 THER/PROPH/DIAG INJ SC/IM: CPT | Performed by: RADIOLOGY

## 2025-01-31 RX ORDER — GADOTERATE MEGLUMINE 376.9 MG/ML
15 INJECTION INTRAVENOUS
Status: COMPLETED | OUTPATIENT
Start: 2025-01-31 | End: 2025-01-31

## 2025-01-31 RX ADMIN — GLUCAGON 1 MG: KIT at 10:43

## 2025-01-31 RX ADMIN — GADOTERATE MEGLUMINE 15 ML: 376.9 INJECTION INTRAVENOUS at 11:35

## 2025-02-06 ENCOUNTER — TELEPHONE (OUTPATIENT)
Dept: SURGERY | Facility: CLINIC | Age: 60
End: 2025-02-06
Payer: COMMERCIAL

## 2025-02-06 NOTE — TELEPHONE ENCOUNTER
Called patient and left her a message asking her to call back to get scheduled for an office with Dr. Nayak to discuss her imaging. I emphasized the importance of making an appointment and provided her with the number to call.

## 2025-02-07 ENCOUNTER — APPOINTMENT (OUTPATIENT)
Dept: PRIMARY CARE | Facility: CLINIC | Age: 60
End: 2025-02-07
Payer: COMMERCIAL

## 2025-02-07 VITALS
HEART RATE: 131 BPM | OXYGEN SATURATION: 93 % | HEIGHT: 66 IN | SYSTOLIC BLOOD PRESSURE: 132 MMHG | RESPIRATION RATE: 18 BRPM | BODY MASS INDEX: 26.52 KG/M2 | DIASTOLIC BLOOD PRESSURE: 88 MMHG | WEIGHT: 165 LBS

## 2025-02-07 DIAGNOSIS — K62.89 RECTAL MASS: ICD-10-CM

## 2025-02-07 DIAGNOSIS — E03.9 HYPOTHYROIDISM, UNSPECIFIED TYPE: ICD-10-CM

## 2025-02-07 DIAGNOSIS — I10 BENIGN ESSENTIAL HYPERTENSION: ICD-10-CM

## 2025-02-07 DIAGNOSIS — J44.9 CHRONIC OBSTRUCTIVE PULMONARY DISEASE, UNSPECIFIED COPD TYPE (MULTI): ICD-10-CM

## 2025-02-07 DIAGNOSIS — R15.9 FULL INCONTINENCE OF FECES: ICD-10-CM

## 2025-02-07 DIAGNOSIS — E78.00 HYPERCHOLESTEREMIA: ICD-10-CM

## 2025-02-07 PROCEDURE — 99214 OFFICE O/P EST MOD 30 MIN: CPT | Performed by: INTERNAL MEDICINE

## 2025-02-07 PROCEDURE — 3079F DIAST BP 80-89 MM HG: CPT | Performed by: INTERNAL MEDICINE

## 2025-02-07 PROCEDURE — 3075F SYST BP GE 130 - 139MM HG: CPT | Performed by: INTERNAL MEDICINE

## 2025-02-07 PROCEDURE — 3008F BODY MASS INDEX DOCD: CPT | Performed by: INTERNAL MEDICINE

## 2025-02-07 RX ORDER — METOPROLOL TARTRATE 25 MG/1
25 TABLET, FILM COATED ORAL 2 TIMES DAILY
Qty: 180 TABLET | Refills: 1 | Status: SHIPPED | OUTPATIENT
Start: 2025-02-07

## 2025-02-07 RX ORDER — ALBUTEROL SULFATE 90 UG/1
1 INHALANT RESPIRATORY (INHALATION) EVERY 6 HOURS PRN
Qty: 18 G | Refills: 1 | Status: SHIPPED | OUTPATIENT
Start: 2025-02-07

## 2025-02-07 RX ORDER — AMLODIPINE BESYLATE 10 MG/1
10 TABLET ORAL DAILY
Qty: 90 TABLET | Refills: 1 | Status: SHIPPED | OUTPATIENT
Start: 2025-02-07

## 2025-02-07 RX ORDER — DIAPER,BRIEF,ADULT, DISPOSABLE
EACH MISCELLANEOUS
Qty: 20 EACH | Refills: 0 | Status: SHIPPED | OUTPATIENT
Start: 2025-02-07

## 2025-02-07 RX ORDER — LEVOTHYROXINE SODIUM 25 UG/1
25 TABLET ORAL DAILY
Qty: 90 TABLET | Refills: 1 | Status: SHIPPED | OUTPATIENT
Start: 2025-02-07

## 2025-02-07 RX ORDER — BUDESONIDE AND FORMOTEROL FUMARATE DIHYDRATE 160; 4.5 UG/1; UG/1
2 AEROSOL RESPIRATORY (INHALATION) 2 TIMES DAILY
Qty: 10.2 G | Refills: 1 | Status: SHIPPED | OUTPATIENT
Start: 2025-02-07

## 2025-02-07 RX ORDER — DIAPER,BRIEF,ADULT, DISPOSABLE
EACH MISCELLANEOUS
Qty: 20 EACH | Refills: 0 | Status: SHIPPED | OUTPATIENT
Start: 2025-02-07 | End: 2025-02-07 | Stop reason: SDUPTHER

## 2025-02-07 RX ORDER — EZETIMIBE 10 MG/1
10 TABLET ORAL DAILY
Qty: 90 TABLET | Refills: 1 | Status: SHIPPED | OUTPATIENT
Start: 2025-02-07

## 2025-02-07 ASSESSMENT — PATIENT HEALTH QUESTIONNAIRE - PHQ9
2. FEELING DOWN, DEPRESSED OR HOPELESS: SEVERAL DAYS
SUM OF ALL RESPONSES TO PHQ9 QUESTIONS 1 AND 2: 1
1. LITTLE INTEREST OR PLEASURE IN DOING THINGS: NOT AT ALL
10. IF YOU CHECKED OFF ANY PROBLEMS, HOW DIFFICULT HAVE THESE PROBLEMS MADE IT FOR YOU TO DO YOUR WORK, TAKE CARE OF THINGS AT HOME, OR GET ALONG WITH OTHER PEOPLE: NOT DIFFICULT AT ALL

## 2025-02-07 ASSESSMENT — PAIN SCALES - GENERAL: PAINLEVEL_OUTOF10: 6

## 2025-02-07 NOTE — PROGRESS NOTES
Patient ID:   Hortensia Rodriguez is a 59 y.o. female with PMH remarkable for COPD, tobacco dependence, hypothyroidism, lung nodule, left kidney stone s/p stent, ascending aorta aneurysm, depression, anxiety, HTN, HLD, who presents to the office today for Follow-up (Needs Rx for adult diaper faxed) and Hip Pain.    HEALTH MAINTENANCE: FOLLOW UP   Last Office Visit: 10/4/2024 for hospitalization FU from where she was at [facility] with COPD exacerbation in Sept 2024. Found to have COVID pneumonia and was treated accordingly.   Mammogram (40-75): 3/22/2024 -ve  Last Labs: CBC, BMP 1/2/2025  Colonoscopy (45-75 or age 40 with 1st degree relative dx colon ca): 7/26/2023 with Dr Rome showing palpable rectal mass, likely malignant tumor in distal rectum/anus. 4 polyps were removed.   Lung cancer screening (50-76 y/o x 20 pk yr for at least 20 yrs + current smoker OR quit in last 15 years, no CT w/I last year): 12/27/2024 showing 9, BRIANA nodule- no changes. Repeat in 1 yr.   DEXA (65+, q 2 years): DUE - ordered 2/15/2024 but not scheduled yet  - saw Dr Sierra Flores on 10/29/2024 for the rectal lesion concerning for cancer. CT, MRI pelvis, CEA and CBC, CMP and lung biopsy was ordered. Advised FU once testing complete.     Patient reports that she has not heard anything from Dr Nayak's office. Confirmed phone number with patient, it is correct.   Has some right sided hip pain.  HR was 131 today therefore EKG was done showing sinus tachycardia  - ran out of meds therefore that is why her HR is elevated    Faxed over the script for the adult diapers. Gave patient the hard signed copy of it.   Patient requested that we print medical records of her recent scans. This was done for pt.     Social History     Tobacco Use    Smoking status: Every Day     Current packs/day: 0.50     Types: Cigarettes    Smokeless tobacco: Never   Vaping Use    Vaping status: Never Used   Substance Use Topics    Alcohol use: Yes      "Comment: occasional    Drug use: Never     Review of Systems  Visit Vitals  /88 (BP Location: Left arm, Patient Position: Sitting)   Pulse (!) 131   Resp 18   Ht 1.676 m (5' 6\")   Wt 74.8 kg (165 lb)   SpO2 93%   BMI 26.63 kg/m²   OB Status Postmenopausal   Smoking Status Every Day   BSA 1.87 m²     Physical Exam  Current Outpatient Medications   Medication Instructions    albuterol (ProAir HFA) 90 mcg/actuation inhaler 1 puff, inhalation, Every 6 hours PRN    amLODIPine (NORVASC) 10 mg, oral, Daily    diaper,brief,adult,disposable (Select Disposable Briefs) misc Disposable pull up underwear. Uses approximately 5 per day.    ezetimibe (ZETIA) 10 mg, oral, Daily    fluticasone (Flonase) 50 mcg/actuation nasal spray 2 sprays, Each Nostril, Daily, Shake gently. Before first use, prime pump. After use, clean tip and replace cap.    levothyroxine (SYNTHROID, LEVOXYL) 25 mcg, oral, Daily    metoprolol tartrate (LOPRESSOR) 25 mg, oral, 2 times daily    Symbicort 160-4.5 mcg/actuation inhaler 2 puffs, inhalation, 2 times daily      Lab Results   Component Value Date    WBC 8.8 01/02/2025    HGB 14.6 01/02/2025    HCT 44.8 01/02/2025     01/02/2025    CHOL 144 03/22/2024    TRIG 72 03/22/2024    HDL 60.3 03/22/2024    ALT 7 12/27/2024    AST 11 12/27/2024     01/02/2025    K 3.5 01/02/2025     01/02/2025    CREATININE 0.79 01/02/2025    BUN 30 (H) 01/02/2025    CO2 25 01/02/2025    TSH 6.26 (H) 03/22/2024    INR 1.0 09/18/2024    HGBA1C 5.8 (H) 09/19/2024       Problem List Items Addressed This Visit             ICD-10-CM    COPD (chronic obstructive pulmonary disease) (Multi) J44.9     - c/w symbicort, PRN proair  - stable  - current smoker         Relevant Medications    albuterol (ProAir HFA) 90 mcg/actuation inhaler    Symbicort 160-4.5 mcg/actuation inhaler    Benign essential hypertension I10     - c/w amlodipine 10mg every day and metoprolol  - ran out of meds therefore her HR is more elevated " than usual here today  Visit Vitals  /88 (BP Location: Left arm, Patient Position: Sitting)   Pulse (!) 131   Resp 18   - All strategies to keep the blood pressure down is explained to the patient  - Regular exercise and blood pressure monitoring is encouraged         Relevant Medications    amLODIPine (Norvasc) 10 mg tablet    metoprolol tartrate (Lopressor) 25 mg tablet    Hypothyroidism E03.9     - c/w synthroid  - Take levothyroxine on an empty stomach with water alone, 1 hour before eating or taking other medications, 4 hours before any calcium or iron supplement.   - Hold Biotin (all B vitamins, B Complex) for 2 days before any blood draw         Relevant Medications    levothyroxine (Synthroid, Levoxyl) 25 mcg tablet    Hypercholesteremia E78.00     - c/w zetia  - reviewed last lipid panel  - Patient educated and counseled to do walking and exercise regularly  - Low-fat low-cholesterol diet is advised  - Advised to reduce weight   - The goal is to keep the LDL less than 70, and HDL the more than 40         Relevant Medications    ezetimibe (Zetia) 10 mg tablet    Rectal mass K62.89     - saw Dr Sierra Flores on 10/29/2024 for the rectal lesion concerning for cancer. CT, MRI pelvis, CEA and CBC, CMP and lung biopsy was ordered. Advised FU once testing complete.   - completed everything except the lung biopsy  - pt plans on calling the office later today or Monday to schedule FU appt.          Full incontinence of feces R15.9     - provided script for adult pull ups, faxed to supplier for pt         Relevant Medications    diaper,brief,adult,disposable (Select Disposable Briefs) misc       --------------------  Written by Yojana Florez RN, acting as a scribe for Dr. Peter. This note accurately reflects the work and decisions made by Dr. Peter.     I, Dr. Peter, attest all medical record entries made by the scribe were under my direction and were personally dictated by me. I have reviewed the  chart and agree that the record accurately reflects my performance of the history, physical exam, and assessment and plan.

## 2025-02-11 NOTE — ASSESSMENT & PLAN NOTE
- saw Dr Sierra Flores on 10/29/2024 for the rectal lesion concerning for cancer. CT, MRI pelvis, CEA and CBC, CMP and lung biopsy was ordered. Advised FU once testing complete.   - completed everything except the lung biopsy  - pt plans on calling the office later today or Monday to schedule FU appt.

## 2025-02-11 NOTE — ASSESSMENT & PLAN NOTE
- c/w zetia  - reviewed last lipid panel  - Patient educated and counseled to do walking and exercise regularly  - Low-fat low-cholesterol diet is advised  - Advised to reduce weight   - The goal is to keep the LDL less than 70, and HDL the more than 40

## 2025-02-11 NOTE — ASSESSMENT & PLAN NOTE
- c/w synthroid  - Take levothyroxine on an empty stomach with water alone, 1 hour before eating or taking other medications, 4 hours before any calcium or iron supplement.   - Hold Biotin (all B vitamins, B Complex) for 2 days before any blood draw

## 2025-02-11 NOTE — ASSESSMENT & PLAN NOTE
- c/w amlodipine 10mg every day and metoprolol  - ran out of meds therefore her HR is more elevated than usual here today  Visit Vitals  /88 (BP Location: Left arm, Patient Position: Sitting)   Pulse (!) 131   Resp 18   - All strategies to keep the blood pressure down is explained to the patient  - Regular exercise and blood pressure monitoring is encouraged

## 2025-03-03 NOTE — PROGRESS NOTES
No chief complaint on file.      History Of Present Illness  Hortensia Rodriguez is a 59 y.o. female presenting with a rectal mass.     Subjective   Hortensia Rodriguez was referred by Yani Beck CNP for evaluation of a rectal mass. She was admitted 7/2023 for a kidney stone and at that time she had a stent placed. CT a/p was performed demonstrating persistent severe eccentric rectal wall thickening along the anterior wall of the rectum and distal sigmoid colon similar compared to prior. She then underwent colonoscopy which demonstrating a likely malignant mass in the distal rectum/anus. Area was tattooed and biopsies were obtained. Pathology demonstrating superficial fragments of tubulovillous adenoma. MRI rectum was performed and MRI based staging T3bN0.      She was then seen 10/5/23 and the plan was for EUA with biopsies of rectal mass. Operating room time was obtained and she was scheduled for 10/23/23. The office attempted to reach her multiple times and did not hear back from her. She was also not able to be reached by the oncologist and missed her appointment with them on 1/5/24.     She was seen in the office 1/11/24 and was scheduled for EUA, flex sig with biopsy for 2/5/24. On exam she had a near circumferential rectal lesion extending for the dentate line to approximately 8cm. Path demonstrated fragments of villous adenoma. She was presented at  and recommendation was to biopsy her 15mm left upper lobe lung nodule and if that was negative then go back to OR for repeat biopsies of rectal mass. We attempted to reach her multiple times regarding her biopsy results but were unable to make contact including registered letters, calling her emergency contact, messages, etc.     She was last seen 10/2024 and CT c/a/p and MRI rectum were ordered. Imaging was completed 1/2025 and she presents today to discuss. MRI demonstrating enlarged rectal mass now T4aN0 with new involvement of the peritoneum as well  as the mesorectal fascia.     CEA 7/27/23: 2.4; 10/2024: 3.5      CT c/a/p 1/18/25:   1. Stable appearance of anorectal neoplasm without new obstruction or definite sites of new metastatic disease.  2. Multiple pulmonary nodules up to 0.9 cm as described. Continue attention on follow-up examination.  3. Acute-subacute mild T8 compression fracture, correlate with patient's symptomology and if symptomatic a thoracic spine MRI could be considered.  4. Interval encrustation around the pigtail of the left ureteral stent as described with stable moderate to marked left-sided hydronephrosis.  5. Additional stable chronic and incidental findings described above.     MRI rectum 1/31/25:   1. Motion degraded exam.  2. Circumferential rectal mass has enlarged compared to 16 months ago, increased from 6 cm to 9 cm in cephalocaudal length. There appear to be new areas of extramural extension which are difficult to characterize due to severe motion artifact, however there appears to  be new involvement of the peritoneum as well as the mesorectal fascia. No local organ invasion identified. The mass extends to the anorectal junction as before, with otherwise no definite involvement of the sphincter complex. T4aN0.  3. No new pelvic lymphadenopathy.     Colonoscopy 7/26/2023 ([facility]): Palpable rectal mass found on digital rectal exam. - Likely malignant tumor in the distal rectum/anus. Biopsied. Tattooed. - One 6 mm polyp in the rectum, removed with a cold snare. Resected and retrieved. - Two 4 to 5 mm polyps in the proximal transverse colon, removed with a cold snare. Resected and retrieved. - One 5 mm polyp at the ileocecal valve, removed with a cold snare. Resected and retrieved.    Past Medical History  Past Medical History:   Diagnosis Date    Abdominal pain 07/24/2023    Abnormal electrocardiogram (ECG) (EKG) 12/16/2015    Abnormal ECG    KARLY (acute kidney injury) (CMS-HCC) 09/19/2024    Anxiety     Anxiety disorder,  unspecified 08/31/2015    Anxiety    Asthma     Complicated UTI (urinary tract infection) 07/24/2023    COPD (chronic obstructive pulmonary disease) (Multi)     Delayed emergence from general anesthesia     Disease of thyroid gland     Encounter for preprocedural cardiovascular examination 10/25/2015    Pre-operative cardiovascular examination    Flank pain 07/24/2023    Hypertension     Hypomagnesemia 10/14/2015    Hypomagnesemia    Personal history of other diseases of the circulatory system 08/31/2015    History of chronic ischemic heart disease    Personal history of other diseases of the female genital tract 10/28/2015    History of ovarian cyst    Personal history of other diseases of urinary system 01/04/2016    History of hematuria    Personal history of other diseases of urinary system 10/16/2015    History of kidney disease    Personal history of other specified conditions 10/16/2015    History of pelvic mass    Personal history of other specified conditions 09/30/2015    History of fatigue    Personal history of pneumonia (recurrent) 02/26/2019    History of pneumonia    Right hip pain 07/24/2023    Strain of muscle, fascia and tendon of abdomen, initial encounter 09/17/2015    Abdominal muscle strain       Surgical History  Past Surgical History:   Procedure Laterality Date    APPENDECTOMY  08/31/2015    Appendectomy    FOOT SURGERY  08/31/2015    Foot Surgery    ORIF ANKLE FRACTURE          Social History  She reports that she has been smoking cigarettes. She has never used smokeless tobacco. She reports current alcohol use. She reports that she does not use drugs.    Family History  Family History   Problem Relation Name Age of Onset    No Known Problems Mother      No Known Problems Father      Breast cancer Sister          Allergies  Ace inhibitors and Lisinopril    Home Medications  Prior to Admission medications    Medication Sig Start Date End Date Taking? Authorizing Provider   acetaminophen  (Tylenol) 325 mg tablet 2 TAB(S) ORALLY EVERY 4 HOURS, AS NEEDED, TEMP GREATER THAN OR EQUAL TO 38.0 C 7/5/23 7/4/24 Yes Tracie Alejo MD   albuterol (ProAir HFA) 90 mcg/actuation inhaler Inhale. 3/18/22  Yes Historical Provider, MD   ibuprofen 600 mg tablet Take by mouth. 8/7/19  Yes Historical Provider, MD   levothyroxine (Synthroid, Levoxyl) 50 mcg tablet Take 1 tablet (50 mcg) by mouth once daily.   Yes Historical Provider, MD   potassium chloride CR (K-Tab) 20 mEq ER tablet Take 1 tablet (20 mEq) by mouth once daily. Do not crush, chew, or split. 6/6/23 6/5/24 Yes Ewelina Love MD   Symbicort 160-4.5 mcg/actuation inhaler Inhale 2 puffs 2 times a day. 6/1/23  Yes Chris Pearce PA-C   amLODIPine (Norvasc) 10 mg tablet Take 1 tablet (10 mg) by mouth once daily. 6/1/23 11/28/23  Chris Pearce PA-C   atorvastatin (Lipitor) 80 mg tablet Take 1 tablet (80 mg) by mouth once daily. 6/1/23 11/28/23  Chris Pearce PA-C   cefuroxime (Ceftin) 250 mg tablet TAKE 2 TABLETS BY MOUTH ONCE DAILY  Patient not taking: Reported on 10/5/2023 7/5/23 7/4/24  Tracie Alejo MD   citalopram (CeleXA) 10 mg tablet Take 1 tablet (10 mg) by mouth once daily. 6/1/23 11/28/23  Chris Pearce PA-C   ezetimibe (Zetia) 10 mg tablet Take 1 tablet (10 mg) by mouth once daily. 6/2/23 11/29/23  Ewelina Love MD   metoprolol succinate XL (Toprol-XL) 25 mg 24 hr tablet Take 1 tablet (25 mg) by mouth once daily. Do not crush or chew. 6/1/23 11/28/23  Chris Pearce PA-C   tamsulosin (Flomax) 0.4 mg 24 hr capsule TAKE 1 CAPSULE BY MOUTH ONCE DAILY  Patient not taking: Reported on 10/5/2023 7/5/23 7/4/24  Tracie Alejo MD       Review of Systems     Physical Exam    Abdomen soft, NT/ND  Perineum normal  LUCRECIA with circumferential mass just above sphincters with tethering   Exam limited by discomfort    Last Recorded Vitals  There were no vitals taken for this visit.        ASSESSMENT & PLAN    Rectal lesion,  concern for cancer - was lost to follow up despite multiple attempts to coordinate care. Even though we don't have a biopsy to prove cancer, given the interval change I think its pretty convincing as an overall clinical picture. We discussed the diagnosis in detail and discussed treatment. I am referring her to medonc, radonc, and reordering the biopsy of her lung lesions. I emphasized to the patient and her daughter in law the need for follow up. We added her daughter in laws number to the chart. Will discuss at tumor board as well.

## 2025-03-04 ENCOUNTER — OFFICE VISIT (OUTPATIENT)
Dept: SURGERY | Facility: CLINIC | Age: 60
End: 2025-03-04
Payer: COMMERCIAL

## 2025-03-04 VITALS
HEART RATE: 76 BPM | DIASTOLIC BLOOD PRESSURE: 76 MMHG | BODY MASS INDEX: 25.99 KG/M2 | WEIGHT: 161 LBS | SYSTOLIC BLOOD PRESSURE: 111 MMHG

## 2025-03-04 DIAGNOSIS — R91.1 LUNG NODULE: ICD-10-CM

## 2025-03-04 DIAGNOSIS — K62.89 RECTAL MASS: ICD-10-CM

## 2025-03-04 PROCEDURE — 3074F SYST BP LT 130 MM HG: CPT | Performed by: SURGERY

## 2025-03-04 PROCEDURE — 99214 OFFICE O/P EST MOD 30 MIN: CPT | Performed by: SURGERY

## 2025-03-04 PROCEDURE — 3078F DIAST BP <80 MM HG: CPT | Performed by: SURGERY

## 2025-03-05 LAB
ATRIAL RATE: 116 BPM
P AXIS: 66 DEGREES
P OFFSET: 210 MS
P ONSET: 155 MS
PR INTERVAL: 138 MS
Q ONSET: 224 MS
QRS COUNT: 19 BEATS
QRS DURATION: 104 MS
QT INTERVAL: 364 MS
QTC CALCULATION(BAZETT): 505 MS
QTC FREDERICIA: 453 MS
R AXIS: -36 DEGREES
T AXIS: 55 DEGREES
T OFFSET: 406 MS
VENTRICULAR RATE: 116 BPM

## 2025-03-26 ENCOUNTER — APPOINTMENT (OUTPATIENT)
Dept: LAB | Facility: HOSPITAL | Age: 60
End: 2025-03-26
Payer: COMMERCIAL

## 2025-03-26 ENCOUNTER — OFFICE VISIT (OUTPATIENT)
Dept: HEMATOLOGY/ONCOLOGY | Facility: HOSPITAL | Age: 60
End: 2025-03-26
Payer: COMMERCIAL

## 2025-03-26 VITALS
HEIGHT: 66 IN | SYSTOLIC BLOOD PRESSURE: 114 MMHG | TEMPERATURE: 97.5 F | WEIGHT: 158.4 LBS | DIASTOLIC BLOOD PRESSURE: 79 MMHG | BODY MASS INDEX: 25.46 KG/M2 | OXYGEN SATURATION: 93 % | RESPIRATION RATE: 16 BRPM | HEART RATE: 90 BPM

## 2025-03-26 DIAGNOSIS — R91.1 LUNG NODULE: ICD-10-CM

## 2025-03-26 DIAGNOSIS — K62.89 RECTAL MASS: Primary | ICD-10-CM

## 2025-03-26 LAB
ALBUMIN SERPL BCP-MCNC: 4.5 G/DL (ref 3.4–5)
ALP SERPL-CCNC: 123 U/L (ref 33–136)
ALT SERPL W P-5'-P-CCNC: 14 U/L (ref 7–45)
ANION GAP SERPL CALC-SCNC: 19 MMOL/L (ref 10–20)
AST SERPL W P-5'-P-CCNC: 15 U/L (ref 9–39)
BASOPHILS # BLD AUTO: 0.03 X10*3/UL (ref 0–0.1)
BASOPHILS NFR BLD AUTO: 0.3 %
BILIRUB SERPL-MCNC: 0.6 MG/DL (ref 0–1.2)
BUN SERPL-MCNC: 14 MG/DL (ref 6–23)
CALCIUM SERPL-MCNC: 9.9 MG/DL (ref 8.6–10.3)
CHLORIDE SERPL-SCNC: 97 MMOL/L (ref 98–107)
CO2 SERPL-SCNC: 27 MMOL/L (ref 21–32)
CREAT SERPL-MCNC: 1.02 MG/DL (ref 0.5–1.05)
EGFRCR SERPLBLD CKD-EPI 2021: 63 ML/MIN/1.73M*2
EOSINOPHIL # BLD AUTO: 0.07 X10*3/UL (ref 0–0.7)
EOSINOPHIL NFR BLD AUTO: 0.7 %
ERYTHROCYTE [DISTWIDTH] IN BLOOD BY AUTOMATED COUNT: 14.7 % (ref 11.5–14.5)
FERRITIN SERPL-MCNC: 47 NG/ML (ref 8–150)
GLUCOSE SERPL-MCNC: 113 MG/DL (ref 74–99)
HCT VFR BLD AUTO: 48 % (ref 36–46)
HGB BLD-MCNC: 16.3 G/DL (ref 12–16)
IMM GRANULOCYTES # BLD AUTO: 0.02 X10*3/UL (ref 0–0.7)
IMM GRANULOCYTES NFR BLD AUTO: 0.2 % (ref 0–0.9)
IRON SATN MFR SERPL: 37 % (ref 25–45)
IRON SERPL-MCNC: 105 UG/DL (ref 35–150)
LYMPHOCYTES # BLD AUTO: 2.71 X10*3/UL (ref 1.2–4.8)
LYMPHOCYTES NFR BLD AUTO: 28.9 %
MCH RBC QN AUTO: 29.5 PG (ref 26–34)
MCHC RBC AUTO-ENTMCNC: 34 G/DL (ref 32–36)
MCV RBC AUTO: 87 FL (ref 80–100)
MONOCYTES # BLD AUTO: 0.58 X10*3/UL (ref 0.1–1)
MONOCYTES NFR BLD AUTO: 6.2 %
NEUTROPHILS # BLD AUTO: 5.96 X10*3/UL (ref 1.2–7.7)
NEUTROPHILS NFR BLD AUTO: 63.7 %
NRBC BLD-RTO: 0 /100 WBCS (ref 0–0)
PLATELET # BLD AUTO: 436 X10*3/UL (ref 150–450)
POTASSIUM SERPL-SCNC: 2.5 MMOL/L (ref 3.5–5.3)
PROT SERPL-MCNC: 8.2 G/DL (ref 6.4–8.2)
RBC # BLD AUTO: 5.52 X10*6/UL (ref 4–5.2)
SODIUM SERPL-SCNC: 140 MMOL/L (ref 136–145)
TIBC SERPL-MCNC: 286 UG/DL (ref 240–445)
UIBC SERPL-MCNC: 181 UG/DL (ref 110–370)
WBC # BLD AUTO: 9.4 X10*3/UL (ref 4.4–11.3)

## 2025-03-26 PROCEDURE — 83735 ASSAY OF MAGNESIUM: CPT

## 2025-03-26 PROCEDURE — 82746 ASSAY OF FOLIC ACID SERUM: CPT

## 2025-03-26 PROCEDURE — 83540 ASSAY OF IRON: CPT

## 2025-03-26 PROCEDURE — 80053 COMPREHEN METABOLIC PANEL: CPT

## 2025-03-26 PROCEDURE — 3074F SYST BP LT 130 MM HG: CPT | Performed by: INTERNAL MEDICINE

## 2025-03-26 PROCEDURE — 82378 CARCINOEMBRYONIC ANTIGEN: CPT

## 2025-03-26 PROCEDURE — 3078F DIAST BP <80 MM HG: CPT | Performed by: INTERNAL MEDICINE

## 2025-03-26 PROCEDURE — 83550 IRON BINDING TEST: CPT

## 2025-03-26 PROCEDURE — 85025 COMPLETE CBC W/AUTO DIFF WBC: CPT

## 2025-03-26 PROCEDURE — 99215 OFFICE O/P EST HI 40 MIN: CPT | Performed by: INTERNAL MEDICINE

## 2025-03-26 PROCEDURE — 82728 ASSAY OF FERRITIN: CPT

## 2025-03-26 PROCEDURE — 3008F BODY MASS INDEX DOCD: CPT | Performed by: INTERNAL MEDICINE

## 2025-03-26 PROCEDURE — 82607 VITAMIN B-12: CPT

## 2025-03-26 PROCEDURE — 36415 COLL VENOUS BLD VENIPUNCTURE: CPT

## 2025-03-26 PROCEDURE — 99205 OFFICE O/P NEW HI 60 MIN: CPT | Performed by: INTERNAL MEDICINE

## 2025-03-26 ASSESSMENT — ENCOUNTER SYMPTOMS
FREQUENCY: 1
RESPIRATORY NEGATIVE: 1
OCCASIONAL FEELINGS OF UNSTEADINESS: 1
CONSTITUTIONAL NEGATIVE: 1
GASTROINTESTINAL NEGATIVE: 1
DEPRESSION: 0
HEMATURIA: 0
CARDIOVASCULAR NEGATIVE: 1
LOSS OF SENSATION IN FEET: 0
DYSURIA: 1

## 2025-03-26 ASSESSMENT — PATIENT HEALTH QUESTIONNAIRE - PHQ9
2. FEELING DOWN, DEPRESSED OR HOPELESS: NOT AT ALL
1. LITTLE INTEREST OR PLEASURE IN DOING THINGS: NOT AT ALL
SUM OF ALL RESPONSES TO PHQ9 QUESTIONS 1 AND 2: 0

## 2025-03-26 ASSESSMENT — PAIN SCALES - GENERAL: PAINLEVEL_OUTOF10: 5

## 2025-03-26 ASSESSMENT — COLUMBIA-SUICIDE SEVERITY RATING SCALE - C-SSRS
6. HAVE YOU EVER DONE ANYTHING, STARTED TO DO ANYTHING, OR PREPARED TO DO ANYTHING TO END YOUR LIFE?: NO
2. HAVE YOU ACTUALLY HAD ANY THOUGHTS OF KILLING YOURSELF?: NO
1. IN THE PAST MONTH, HAVE YOU WISHED YOU WERE DEAD OR WISHED YOU COULD GO TO SLEEP AND NOT WAKE UP?: NO

## 2025-03-26 NOTE — PROGRESS NOTES
Patient ID: Hortensia Rodriguez is a 59 y.o. female.  Referring Physician: Sierra Flores MD  [id number]  Department [place] [address]  Primary Care Provider: Ewelina Love MD  Referral Reason: Rectal mass    Subjective:  Complains of dysuria. Blood in stool +. Was found to have a rectal mass in 2023 but has not received treatment so far. Lost some weight.     Heme/Onc History:  - p/w abdominal pain in Jul 2023 => Rectal mass palpated. Colonoscopy showed a rectal mass: Bx showed tubulovillous adenoma. This was a large mass and it was felt to be a sampling error  - CT c/a/p (Jul 2023): Rectal wall thickening. Multuiple lung nodules  - Was lsot to follow up. Even when she p/to surgeon's office did not complete testing.   - CT c/a/p (Jan 2024): 15 mm BRIANA lung nodule (increased in size) + large rectal mass looks larger than before. L retroperitoneal LAP  - CT c/a/p (Jan 2025): Stable anorectal mass. Multiple lung nodules.   - MR Rectum (Jan 2025): Rectal mass about 9 cm in size extending to peritoneum? No LAPs    Assessment/Plan:  ? Rectal mass: Considering the slow increase in size of the mass, this is likely a slow growing rectal cancer. On CT, there was LAPs reported but not on MRI. The lung nodule in BRIANA had increased then decreased in size. No liver lesions. I doubt the lung lesion is metastatic. Most likely, this is a local rectal cancer.     I ordered a PET/CT to check uptake in the lung nodule. We will try to get a repeat colonoscopy for diagnosis confirmation. If confirmed to be rectal cancer and no uptake in lung, she should get chemoradiation prior to surgery. Will see Rad Onc next week.     ADDENDUM: Potassium is 2.5 => Will start her onPO potassium    Review Of Systems:  Review of Systems   Constitutional: Negative.    HENT:  Negative.     Respiratory: Negative.     Cardiovascular: Negative.    Gastrointestinal: Negative.    Genitourinary:  Positive for  "dysuria and frequency. Negative for hematuria.        Physical Exam:  /79 (BP Location: Right arm, Patient Position: Sitting, BP Cuff Size: Adult)   Pulse 90   Temp 36.4 °C (97.5 °F) (Temporal)   Resp 16   Ht 1.676 m (5' 6\")   Wt 71.9 kg (158 lb 6.4 oz)   SpO2 93%   BMI 25.57 kg/m²   BSA: 1.83 meters squared  Performance Status: Asymptomatic  Physical Exam  HENT:      Head: Normocephalic and atraumatic.   Eyes:      General: No scleral icterus.  Pulmonary:      Effort: Pulmonary effort is normal.   Musculoskeletal:         General: Normal range of motion.   Skin:     Coloration: Skin is not jaundiced.   Neurological:      General: No focal deficit present.      Mental Status: She is alert and oriented to person, place, and time.         Results:  Diagnostic Results   Lab Results   Component Value Date    WBC 8.8 01/02/2025    HGB 14.6 01/02/2025    HCT 44.8 01/02/2025    MCV 92 01/02/2025     01/02/2025     Lab Results   Component Value Date    CALCIUM 8.8 01/02/2025     01/02/2025    K 3.5 01/02/2025    CO2 25 01/02/2025     01/02/2025    BUN 30 (H) 01/02/2025    CREATININE 0.79 01/02/2025    ALT 7 12/27/2024    AST 11 12/27/2024       Current Outpatient Medications:     albuterol (ProAir HFA) 90 mcg/actuation inhaler, Inhale 1 puff every 6 hours if needed for wheezing., Disp: 18 g, Rfl: 1    amLODIPine (Norvasc) 10 mg tablet, Take 1 tablet (10 mg) by mouth once daily., Disp: 90 tablet, Rfl: 1    diaper,brief,adult,disposable (Select Disposable Briefs) misc, Disposable pull up underwear. Uses approximately 5 per day., Disp: 20 each, Rfl: 0    ezetimibe (Zetia) 10 mg tablet, Take 1 tablet (10 mg) by mouth once daily., Disp: 90 tablet, Rfl: 1    fluticasone (Flonase) 50 mcg/actuation nasal spray, Administer 2 sprays into each nostril once daily. Shake gently. Before first use, prime pump. After use, clean tip and replace cap., Disp: 16 g, Rfl: 12    levothyroxine (Synthroid, Levoxyl) " 25 mcg tablet, Take 1 tablet (25 mcg) by mouth once daily., Disp: 90 tablet, Rfl: 1    metoprolol tartrate (Lopressor) 25 mg tablet, Take 1 tablet (25 mg) by mouth 2 times a day., Disp: 180 tablet, Rfl: 1    Symbicort 160-4.5 mcg/actuation inhaler, Inhale 2 puffs 2 times a day., Disp: 10.2 g, Rfl: 1     Past Surgical History:   Procedure Laterality Date    APPENDECTOMY  08/31/2015    Appendectomy    FOOT SURGERY  08/31/2015    Foot Surgery    ORIF ANKLE FRACTURE       Family History   Problem Relation Name Age of Onset    No Known Problems Mother      No Known Problems Father      Breast cancer Sister        reports that she has been smoking cigarettes. She has never used smokeless tobacco.  Social History     Socioeconomic History    Marital status:      Spouse name: Not on file    Number of children: Not on file    Years of education: Not on file    Highest education level: Not on file   Occupational History    Not on file   Tobacco Use    Smoking status: Every Day     Current packs/day: 0.50     Types: Cigarettes    Smokeless tobacco: Never   Vaping Use    Vaping status: Never Used   Substance and Sexual Activity    Alcohol use: Yes     Comment: occasional    Drug use: Never    Sexual activity: Defer   Other Topics Concern    Not on file   Social History Narrative    Not on file     Social Drivers of Health     Financial Resource Strain: Low Risk  (12/27/2024)    Overall Financial Resource Strain (CARDIA)     Difficulty of Paying Living Expenses: Not hard at all   Food Insecurity: No Food Insecurity (12/27/2024)    Hunger Vital Sign     Worried About Running Out of Food in the Last Year: Never true     Ran Out of Food in the Last Year: Never true   Transportation Needs: No Transportation Needs (12/27/2024)    PRAPARE - Transportation     Lack of Transportation (Medical): No     Lack of Transportation (Non-Medical): No   Physical Activity: Inactive (12/27/2024)    Exercise Vital Sign     Days of Exercise  per Week: 0 days     Minutes of Exercise per Session: 0 min   Stress: No Stress Concern Present (12/27/2024)    [employer] - Occupational Stress Questionnaire     Feeling of Stress : Not at all   Social Connections: Moderately Isolated (12/27/2024)    Social Connection and Isolation Panel [NHANES]     Frequency of Communication with Friends and Family: Twice a week     Frequency of Social Gatherings with Friends and Family: Once a week     Attends [employer] Services: Never     Active Member of Clubs or Organizations: No     Attends Club or Organization Meetings: Never     Marital Status:    Intimate Partner Violence: Not At Risk (12/27/2024)    Humiliation, Afraid, Rape, and Kick questionnaire     Fear of Current or Ex-Partner: No     Emotionally Abused: No     Physically Abused: No     Sexually Abused: No   Housing Stability: Low Risk  (12/27/2024)    Housing Stability Vital Sign     Unable to Pay for Housing in the Last Year: No     Number of Times Moved in the Last Year: 0     Homeless in the Last Year: No       Diagnoses and all orders for this visit:  Rectal mass  -     Referral To Hematology and Oncology  -     CBC and Auto Differential; Future  -     Comprehensive Metabolic Panel; Future  -     Carcinoembryonic Antigen; Future  -     Ferritin; Future  -     Iron and TIBC; Future  -     Folate; Future  -     Vitamin B12; Future  -     NM PET CT bone skull base to mid thigh; Future  -     Clinic Appointment Request; Future  Lung nodule  -     Referral To Hematology and Oncology  -     CBC and Auto Differential; Future  -     Comprehensive Metabolic Panel; Future  -     Carcinoembryonic Antigen; Future  -     Ferritin; Future  -     Iron and TIBC; Future  -     Folate; Future  -     Vitamin B12; Future  -     NM PET CT bone skull base to mid thigh; Future  -     Clinic Appointment Request; Future    Grady Bernardo MD

## 2025-03-27 ENCOUNTER — TELEPHONE (OUTPATIENT)
Dept: HEMATOLOGY/ONCOLOGY | Facility: HOSPITAL | Age: 60
End: 2025-03-27
Payer: COMMERCIAL

## 2025-03-27 LAB
CEA SERPL-MCNC: 3.7 UG/L
FOLATE SERPL-MCNC: 8.2 NG/ML
MAGNESIUM SERPL-MCNC: 2.27 MG/DL (ref 1.6–2.4)
VIT B12 SERPL-MCNC: 344 PG/ML (ref 211–911)

## 2025-03-27 RX ORDER — POTASSIUM CHLORIDE 20 MEQ/1
20 TABLET, EXTENDED RELEASE ORAL DAILY
Qty: 30 TABLET | Refills: 0 | Status: SHIPPED | OUTPATIENT
Start: 2025-03-27 | End: 2025-04-26

## 2025-03-27 NOTE — TELEPHONE ENCOUNTER
I sent PO potassium to her pharmacy. Please let her know per Dr. Cleaning staff message.        Contacted patient, let her know her potassium is low and that a prescription has been sent to her pharmacy. Instructed her to start it as soon as possible as a low potassium can cause heart rhythm problems. Patient verbalized understanding.

## 2025-03-31 ENCOUNTER — HOSPITAL ENCOUNTER (OUTPATIENT)
Dept: RADIATION ONCOLOGY | Facility: CLINIC | Age: 60
Setting detail: RADIATION/ONCOLOGY SERIES
Discharge: HOME | End: 2025-03-31
Payer: COMMERCIAL

## 2025-03-31 VITALS
OXYGEN SATURATION: 92 % | RESPIRATION RATE: 18 BRPM | SYSTOLIC BLOOD PRESSURE: 127 MMHG | DIASTOLIC BLOOD PRESSURE: 79 MMHG | TEMPERATURE: 96.6 F | BODY MASS INDEX: 25.42 KG/M2 | HEART RATE: 95 BPM | WEIGHT: 157.52 LBS

## 2025-03-31 DIAGNOSIS — R91.1 LUNG NODULE: ICD-10-CM

## 2025-03-31 DIAGNOSIS — K62.89 RECTAL MASS: Primary | ICD-10-CM

## 2025-03-31 PROCEDURE — 99215 OFFICE O/P EST HI 40 MIN: CPT | Performed by: STUDENT IN AN ORGANIZED HEALTH CARE EDUCATION/TRAINING PROGRAM

## 2025-03-31 PROCEDURE — 99205 OFFICE O/P NEW HI 60 MIN: CPT | Performed by: STUDENT IN AN ORGANIZED HEALTH CARE EDUCATION/TRAINING PROGRAM

## 2025-03-31 ASSESSMENT — ENCOUNTER SYMPTOMS
COUGH: 1
FREQUENCY: 1
HEMATOLOGIC/LYMPHATIC NEGATIVE: 1
MUSCULOSKELETAL NEGATIVE: 1
FATIGUE: 1
SHORTNESS OF BREATH: 1
ENDOCRINE NEGATIVE: 1
OCCASIONAL FEELINGS OF UNSTEADINESS: 0
BLOOD IN STOOL: 1
LOSS OF SENSATION IN FEET: 0
PSYCHIATRIC NEGATIVE: 1
NEUROLOGICAL NEGATIVE: 1
CARDIOVASCULAR NEGATIVE: 1
DEPRESSION: 0
EYES NEGATIVE: 1
DIARRHEA: 1
RECTAL PAIN: 1

## 2025-03-31 ASSESSMENT — PATIENT HEALTH QUESTIONNAIRE - PHQ9
1. LITTLE INTEREST OR PLEASURE IN DOING THINGS: NOT AT ALL
2. FEELING DOWN, DEPRESSED OR HOPELESS: NOT AT ALL
SUM OF ALL RESPONSES TO PHQ9 QUESTIONS 1 AND 2: 0

## 2025-03-31 ASSESSMENT — PAIN SCALES - GENERAL: PAINLEVEL_OUTOF10: 5

## 2025-03-31 NOTE — PROGRESS NOTES
Radiation Oncology Nursing Note    Prior Radiotherapy:  No  No radiation treatments to show. (Treatments may have been administered in another system.)     Current Systemic Treatment:  No     Presence of Pacemaker or ICD:  No    History of Autoimmune or Connective Tissue Disorders:  No    Pain: The patient's current pain level was assessed.  They report currently having a pain of 5 out of 10.  They feel their pain is under control without the use of pain medications.    Review of Systems:  Review of Systems   Constitutional:  Positive for fatigue.   HENT:  Negative.     Eyes: Negative.    Respiratory:  Positive for cough and shortness of breath.    Cardiovascular: Negative.    Gastrointestinal:  Positive for blood in stool, diarrhea and rectal pain.   Endocrine: Negative.    Genitourinary:  Positive for bladder incontinence and frequency.    Musculoskeletal: Negative.    Skin: Negative.    Neurological: Negative.    Hematological: Negative.    Psychiatric/Behavioral: Negative.       Patient is in today for consult visit with Dr Sharp. Today her pain is 5/10 in the carmen-area. She reports bowel movements 3-4 times per day. The stool is liquid to loose in consistency and she reports blood in the stool as well as when wiping. The patient also states that she has urinary frequency, urgency, and incontinence. Hortensia reports SOB with activity, non-productive cough, and fatigue. She is a current smoker on 12-15 cigarettes per day. The patient will have PET 4/14 and see [facility] 4/29. Prior to appointment ending the patient was given written information about radiation therapy, side effects, and   CT sim.    Education Documentation  Diarrhea, taught by Johnna Calderon RN at 3/31/2025  2:53 PM.  Learner: Patient  Readiness: Acceptance  Method: Explanation  Response: Verbalizes Understanding    Fatigue, taught by Johnna Calderon RN at 3/31/2025  2:53 PM.  Learner: Patient  Readiness: Acceptance  Method:  Explanation  Response: Verbalizes Understanding    When and How to Contact Clinic, taught by Johnna Calderon RN at 3/31/2025  2:53 PM.  Learner: Patient  Readiness: Acceptance  Method: Explanation  Response: Verbalizes Understanding    Radiation Therapy (External Beam) : What Is Radiation Therapy, taught by Johnna Calderon RN at 3/31/2025  2:53 PM.  Learner: Patient  Readiness: Acceptance  Method: Explanation  Response: Verbalizes Understanding    Radiation Therapy (External Beam) : Side Effects, taught by Johnna Calderon RN at 3/31/2025  2:53 PM.  Learner: Patient  Readiness: Acceptance  Method: Explanation  Response: Verbalizes Understanding    Radiation Therapy (External Beam) : Review, taught by Johnna Calderon RN at 3/31/2025  2:53 PM.  Learner: Patient  Readiness: Acceptance  Method: Explanation  Response: Verbalizes Understanding    Radiation Therapy (External Beam) : Life During Treatment, taught by Johnna Calderon RN at 3/31/2025  2:53 PM.  Learner: Patient  Readiness: Acceptance  Method: Explanation  Response: Verbalizes Understanding    Radiation Therapy (External Beam) : Getting Radiation, taught by Johnna Calderon RN at 3/31/2025  2:53 PM.  Learner: Patient  Readiness: Acceptance  Method: Explanation  Response: Verbalizes Understanding    Education Comments  No comments found.

## 2025-03-31 NOTE — PROGRESS NOTES
Radiation Oncology Outpatient Consult    Patient Name:  Hortensia Rodriguez  MRN:  [id number]  :  1965    Referring Provider: Armando Flores*  Primary Care Provider: Ewelina Love MD  Care Team: Patient Care Team:  Ewelina Love MD as PCP - General (Internal Medicine)  Chris Pearce PA-C as PCP - [facility] Medicaid PCP  Grady Bernardo MD as Medical Oncologist (Hematology and Oncology)    Date of Service: 3/31/2025     SUBJECTIVE  History of Present Illness:  Hortensia Rodriguez is a 59 y.o. female who was referred by Armando Flores*, for a consultation to the [facility] Department of Radiation Oncology.  She is presenting for evaluation and management of the radiological concern for rectal adenocarcinoma.     #) Radiologic concern for rectal adenocarcinoma arising from villous adenoma    Ms. Rodriguez is a female with a prior history of adenomas from colonoscopy in .  The patient was found to have abdominal pain and underwent CT abdomen/pelvis imaging on 2023.  Imaging revealed an exophytic mass along the anterior aspect of the rectum.  The patient underwent colonoscopy and EGD on 2023.  EGD showed Z-line irregularity at 36 cm from the incisors, biopsy revealed cardia type gastric mucosa, biopsy from the stomach antrum revealed chronic gastritis.  Colonoscopy revealed a polypoid and ulcerated nonobstructing large mass within the anal canal measuring approximately 5 cm in length, additional polyps were removed from the rectum, proximal transverse colon and ileocecal valve.  Pathology revealed tubular adenomas of the ileocecal valve, transverse colon and rectum.  The anal rectal mass showed superficial fragments of tubulovillous adenoma.  The patient had MRI of the rectum with and without contrast on 2023 which revealed a mass measuring 6 cm, 2.8 cm from the anal verge and at the anal rectal junction with extramural extension questionable  extramural vascular invasion.    The patient was lost to follow-up and sigmoidoscopy on 2/5/2024 which showed a nonobstructing protruding lesion involving 75% of the wall with tethering spanning 8 cm from the dentate line.  Biopsies obtained of the rectal lesion showed villous adenoma.  The patient was lost to follow-up again.  The patient underwent CT chest/abdomen/pelvis with contrast imaging on 1/17/2025 which showed stable pulmonary nodules including a 9 mm left upper lobe nodule, bilateral adrenal nodules, and  irregular circumferential wall thickening of the distal rectum/anus measuring 3 cm in the 12 o'clock position not significantly changed compared to prior examinations.  MRI rectum on 1/31/2025 showed circumferential wall thickening increased from previous now measuring 9 cm with polypoidal components projecting into the lumen, invasion of the peritoneum at the cephalad margin and in the cephalad portion there is 5 mm extramural extension with contact of the mesorectal fascia at the 10:00, additional regions 2:00 and 9:00.  Inferior aspect of the lesion is at the anal rectal junction and 2.8 cm from the anal verge.  No evidence of pelvic lymphadenopathy.    The patient has met with medical oncology and is scheduled for PET/CT.  The patient is following with colorectal surgery and is planned for lung nodule biopsy.    Prior Radiotherapy:  No radiation treatments to show. (Treatments may have been administered in another system.)       Past Medical History:    Past Medical History:   Diagnosis Date    Abdominal pain 07/24/2023    Abnormal electrocardiogram (ECG) (EKG) 12/16/2015    Abnormal ECG    KARLY (acute kidney injury) 09/19/2024    Anxiety     Anxiety disorder, unspecified 08/31/2015    Anxiety    Asthma     Complicated UTI (urinary tract infection) 07/24/2023    COPD (chronic obstructive pulmonary disease) (Multi)     Delayed emergence from general anesthesia     Disease of thyroid gland     Encounter  for preprocedural cardiovascular examination 10/25/2015    Pre-operative cardiovascular examination    Flank pain 07/24/2023    Hypertension     Hypomagnesemia 10/14/2015    Hypomagnesemia    Personal history of other diseases of the circulatory system 08/31/2015    History of chronic ischemic heart disease    Personal history of other diseases of the female genital tract 10/28/2015    History of ovarian cyst    Personal history of other diseases of urinary system 01/04/2016    History of hematuria    Personal history of other diseases of urinary system 10/16/2015    History of kidney disease    Personal history of other specified conditions 10/16/2015    History of pelvic mass    Personal history of other specified conditions 09/30/2015    History of fatigue    Personal history of pneumonia (recurrent) 02/26/2019    History of pneumonia    Right hip pain 07/24/2023    Strain of muscle, fascia and tendon of abdomen, initial encounter 09/17/2015    Abdominal muscle strain        Past Surgical History:    Past Surgical History:   Procedure Laterality Date    APPENDECTOMY  08/31/2015    Appendectomy    FOOT SURGERY  08/31/2015    Foot Surgery    ORIF ANKLE FRACTURE          Family History:  Cancer-related family history includes Breast cancer in her sister.    Social History:    Social History     Tobacco Use    Smoking status: Every Day     Current packs/day: 0.50     Types: Cigarettes    Smokeless tobacco: Never   Vaping Use    Vaping status: Never Used   Substance Use Topics    Alcohol use: Yes     Comment: occasional    Drug use: Never       Allergies:    Allergies   Allergen Reactions    Ace Inhibitors Other    Lisinopril Other        Medications:    Current Outpatient Medications:     albuterol (ProAir HFA) 90 mcg/actuation inhaler, Inhale 1 puff every 6 hours if needed for wheezing., Disp: 18 g, Rfl: 1    amLODIPine (Norvasc) 10 mg tablet, Take 1 tablet (10 mg) by mouth once daily., Disp: 90 tablet, Rfl: 1     diaper,brief,adult,disposable (Select Disposable Briefs) misc, Disposable pull up underwear. Uses approximately 5 per day., Disp: 20 each, Rfl: 0    ezetimibe (Zetia) 10 mg tablet, Take 1 tablet (10 mg) by mouth once daily., Disp: 90 tablet, Rfl: 1    fluticasone (Flonase) 50 mcg/actuation nasal spray, Administer 2 sprays into each nostril once daily. Shake gently. Before first use, prime pump. After use, clean tip and replace cap., Disp: 16 g, Rfl: 12    levothyroxine (Synthroid, Levoxyl) 25 mcg tablet, Take 1 tablet (25 mcg) by mouth once daily., Disp: 90 tablet, Rfl: 1    metoprolol tartrate (Lopressor) 25 mg tablet, Take 1 tablet (25 mg) by mouth 2 times a day., Disp: 180 tablet, Rfl: 1    potassium chloride CR (Klor-Con M20) 20 mEq ER tablet, Take 1 tablet (20 mEq) by mouth once daily. Do not crush or chew., Disp: 30 tablet, Rfl: 0    Symbicort 160-4.5 mcg/actuation inhaler, Inhale 2 puffs 2 times a day., Disp: 10.2 g, Rfl: 1      Review of Systems:  Review of Systems   Constitutional:  Positive for fatigue.   HENT:  Negative.     Eyes: Negative.    Respiratory:  Positive for cough and shortness of breath.    Cardiovascular: Negative.    Gastrointestinal:  Positive for blood in stool, diarrhea and rectal pain.   Endocrine: Negative.    Genitourinary:  Positive for bladder incontinence and frequency.    Musculoskeletal: Negative.    Skin: Negative.    Neurological: Negative.    Hematological: Negative.    Psychiatric/Behavioral: Negative.         Performance Status:  The Karnofsky performance scale today is 80, Normal activity with effort; some signs or symptoms of disease (ECOG equivalent 1).        OBJECTIVE  There were no vitals taken for this visit.   Physical Exam     Laboratory Review:  There are no laboratory contraindications to radiation therapy.    The pertinent lab results were reviewed and discussed with the patient.  Notably,       3/26/2025-CBC w/ diff: 16.3 (H)  3/26/2025-CMP: Glucose 113 9H), K  2.5 (LL), Cl 97 (L)    Pathology Review:  The pertinent pathology results were reviewed and discussed with the patient.  Notably,     2/5/2024- RECTAL LESION, BIOPSY:- FRAGMENTS OF VILLOUS ADENOMA.    7/26/2023 - A.  STOMACH, ANTRUM, BODY, BIOPSY:    -- MILD CHRONIC INACTIVE GASTRITIS (SEE NOTE)  -- NEGATIVE FOR INTESTINAL METAPLASIA AND DYSPLASIA    Note: An immunohistochemical stain for Helicobacter organisms has been ordered  and the results will be reported as an addendum.    B.  Z-LINE, BIOPSY:    -- CARDIA-TYPE GASTRIC MUCOSA WITH NO SIGNIFICANT PATHOLOGIC FINDINGS  -- NEGATIVE FOR INTESTINAL METAPLASIA AND DYSPLASIA    C.  ILEOCECAL VALVE, POLYPECTOMY:    -- TUBULAR ADENOMA    D.  COLON, TRANSVERSE, POLYPECTOMY:    -- TUBULAR ADENOMA    E.  ANAL RECTAL MASS, BIOPSY:    -- SUPERFICIAL FRAGMENTS OF TUBULOVILLOUS ADENOMA (SEE NOTE)    Note: There is no evidence of invasive carcinoma. Multiple deeper levels have  been examined.    F.  RECTUM, POLYPECTOMY:    -- TUBULAR ADENOMA     Disease Associated Genomics: []    Disease Tumor Markers:  3/26/2025-CEA: 3.7 (10/29/2024-CEA: 3.5)       Imaging:  The pertinent imaging results were reviewed and discussed with the patient.  Notably,     1/31/2025-MRI rectum with and without contrast: Circumferential wall thickening increased from previous now measuring 9 cm with polypoidal components projecting into the lumen, invasion of the peritoneum at the cephalad margin and in the cephalad portion there is 5 mm extramural extension with contact of the mesorectal fascia at the 10:00, additional regions 2:00 and 9:00.  Inferior aspect of the lesion is at the anal rectal junction and 2.8 cm from the anal verge.  Interval development of a 3.7 cm calculus encased in the distal end of the left ureteral stent.  No evidence of pelvic lymphadenopathy.  mT4a mN0    1/17/2025-CT chest/abdomen/pelvis with contrast: Stable 9 mm pulmonary nodule in the left upper lobe, stable small pulmonary  nodules including the right middle lobe, left lower lobe.  Stable 1 .9 cm left adrenal gland nodule and 1.4 cm right adrenal gland nodule.  Left-sided double-J ureteral stent with calcifications around the proximal and distal pigtails in the central renal pelvis measuring 1.5 cm and within the urinary bladder measuring 3.8 cm a few nonobstructing calculi are noted in the left lower pole kidney measuring 1.3 cm.  Moderate left-sided hydronephrosis, and 3 mm nonobstructing calculus within the right lower pole without hydronephrosis.  Irregular circumferential wall thickening of the distal rectum/anus measuring 3 cm in the 12 o'clock position not significantly changed compared to prior examinations.    9/25/2023-MRI rectum with and without contrast: Within the anorectal junction there is a semicircular wall thickening measuring 6 cm with lesion starting 2.8 cm from the anal verge and at the anal rectal junction abutting the internal sphincter without any involvement of the external sphincter.  Involvement of the muscularis propria with evidence of extramural nodular tumor 3 mm at 9:00, questionable extramural vascular invasion of the right perirectal space.  Tumor distance from the mesorectal fascia is 3 mm.  Few tiny mesorectal and extra mesorectal lymph nodes which are not radiologically enlarged.  mT3b mN0    7/1/2023-CT abdomen/pelvis with contrast: Unchanged 2-3 mm subpleural nodules within the right lower lobe. Additional unchanged 3 mm nodule within the right lower lobe. Mild asymmetric enlargement of the left kidney with delayed nephrogram and 11 mm diameter obstructing left renal calculus at the ureteropelvic junction. Exophytic 4.6 cm diameter cyst associated with the upper pole of the left kidney. Exophytic mass lesion associated with the rectum along the anterior aspect and extending along the lateral side walls measuring up to 2.8 cm in thickness.         Prior Systemic Therapies:   None    Prior  Surgeries:  2/5/2024-sigmoidoscopy: Nonobstructing, protruding lesion involving 75% of the wall with tethering, spanning from dentate line to at least 8 cm from the anal verge.  Biopsies obtained.    7/26/2023-colonoscopy: There is a polypoid and ulcerated nonobstructing large mass within the anal rectal region.  Circumferential involving one half of the lumen measuring approximately 5 cm in length.  Biopsy was obtained.  6 mm polyp found in the rectum which was removed by cold snare.  2 sessile polyps found in the proximal transverse colon removed with cold snare.  A 5 mm polyp found in the ileocecal valve, sessile which was removed by cold snare.    7/26/2023-EGD: Z-line irregularity found at 36 cm from the incisors.  Biopsy obtained.  Diffuse erythematous mucosa without bleeding in the stomach.  Biopsies obtained.  Diffuse severe erythematous mucosa found in the duodenal bulb.    Prior Radiation Therapy:   None      ASSESSMENT:   Hortensia Rodriguez is a 59 y.o. female with radiologic concern for rectal adenocarcinoma arising from villous adenoma.      I discussed with the patient regarding clinical presentation, imaging, pathology and treatment recommendations for rectal adenocarcinomas.  I discussed with the patient that her biopsies have only shown villous adenomas, however given their propensity to become carcinomas with additional imaging findings concerning for disease progression this is likely a transformation from an adenoma to carcinoma.  I discussed reaching out to colorectal surgery for evaluation of potential repeat colonoscopy/sigmoidoscopy and repeat biopsy versus presentation in multidisciplinary tumor board.    I discussed with the patient regarding the role of neoadjuvant radiation therapy for improvement in locoregional control, surgical downstaging and potential role in nonoperative management.  I discussed with the patient given the involvement of the mesorectal fascia and peritoneum, long  course chemoradiation therapy would be favored to optimize with pathological downstaging for resection if indicated.  I discussed radiation therapy [id number] cGy in 28-30 fractions.  I discussed with the patient the acute and long-term toxicities of radiation therapy to the pelvis.    The patient is scheduled for PET/CT imaging.  The patient will be seen for follow-up to discuss results and to coordinate CT simulation if felt to be localized disease on PET/CT.    PLAN:     #) Radiologic concern for rectal adenocarcinoma arising from villous adenoma  -Await PET/CT, follow-up in approximately 2 weeks  -If M0 clinically, present in multidisciplinary tumor board, potential treatment with clinical concern for malignancy radiologically  -Will reach out to surgeon regarding need for sigmoidoscopy/colonoscopy with repeat biopsy to obtain pathologic malignancy diagnosis  -Favor long course chemoradiation therapy as part of total neoadjuvant therapy    NCCN Guidelines were applicable to guide this patients treatment plan.     A total of 40 minutes were spent face-to-face with the patient, the majority of time spent detailing treatment options with an additional 20 minutes spent reviewing records including imaging, pathology and physician notes.    Farhat Sharp MD  [facility]  [facility] clinical  - Department of Radiation Oncology  Phone: [phone]  Fax: [phone]  Epic secure chat preferred / Pager [phone]    Note: This was transcribed using Dragon voice recognition software. Attempts were made to correct any errors; however, errors or omissions may be present.

## 2025-04-14 ENCOUNTER — APPOINTMENT (OUTPATIENT)
Dept: RADIOLOGY | Facility: HOSPITAL | Age: 60
End: 2025-04-14
Payer: COMMERCIAL

## 2025-04-18 ENCOUNTER — APPOINTMENT (OUTPATIENT)
Dept: RADIATION ONCOLOGY | Facility: CLINIC | Age: 60
End: 2025-04-18
Payer: COMMERCIAL

## 2025-04-21 ENCOUNTER — HOSPITAL ENCOUNTER (OUTPATIENT)
Dept: RADIOLOGY | Facility: HOSPITAL | Age: 60
End: 2025-04-21
Payer: COMMERCIAL

## 2025-04-21 ENCOUNTER — APPOINTMENT (OUTPATIENT)
Dept: RADIOLOGY | Facility: HOSPITAL | Age: 60
End: 2025-04-21
Payer: COMMERCIAL

## 2025-04-29 ENCOUNTER — TELEPHONE (OUTPATIENT)
Dept: HEMATOLOGY/ONCOLOGY | Facility: HOSPITAL | Age: 60
End: 2025-04-29

## 2025-04-29 NOTE — TELEPHONE ENCOUNTER
Reason for Conversation  Appointment    Background   Patient scheduled for HEM ONC ESTABLISHED PATIENT VISIT with Dr. Cleaning today, 4/29 at 2:40 PM. Patient has not shown or checked in for appointment. Attempted to reach patient at 2:51 PM to see if she was on her way or needed to reschedule appointment. Left detailed message, with call back number, for patient to call back at earliest convenience. Appointment marked as no-show and sent patient a message via [employer].

## 2025-05-01 NOTE — TELEPHONE ENCOUNTER
Reason for Conversation  Appointment    Background   Patient was scheduled for HEM ONC ESTABLISHED PATIENT VISIT with Dr. Cleaning on Tuesday, 4/29/25 at 2:40 PM. Patient no-showed appointment. Attempted to contact patient twice to reschedule; left a detailed voicemail including call-back number and request to return call at earliest convenience. Additionally, [employer] message sent on 4/29 was marked as read by the patient on 4/29/25 at 5:15 PM.

## 2025-05-05 NOTE — TELEPHONE ENCOUNTER
Reason for Conversation  Appointment    Background   Patient was scheduled for HEM ONC ESTABLISHED PATIENT VISIT with Dr. Cleaning on Tuesday, 4/29/25 at 2:40 PM. Patient no-showed appointment. Attempted to contact patient x3 to reschedule; left a detailed voicemail including call-back number and request to return call at earliest convenience.     3 attempts made to reach patient. Attempt to reach letter sent via [employer] and mail. Secure chat sent to nurse partners to notify PCP and/or referring provider of failure to follow up.

## 2025-05-06 NOTE — TELEPHONE ENCOUNTER
PCP, Dr. Love, and Dr. Cleaning, oncologist, notified by email of patient's failure to reschedule.

## 2025-05-08 ENCOUNTER — APPOINTMENT (OUTPATIENT)
Dept: RADIATION ONCOLOGY | Facility: CLINIC | Age: 60
End: 2025-05-08
Payer: COMMERCIAL

## 2025-05-12 ENCOUNTER — HOSPITAL ENCOUNTER (OUTPATIENT)
Dept: RADIOLOGY | Facility: HOSPITAL | Age: 60
Discharge: HOME | End: 2025-05-12
Payer: COMMERCIAL

## 2025-05-12 DIAGNOSIS — R91.1 LUNG NODULE: ICD-10-CM

## 2025-05-12 DIAGNOSIS — K62.89 RECTAL MASS: ICD-10-CM

## 2025-05-12 PROCEDURE — A9552 F18 FDG: HCPCS | Mod: SE | Performed by: INTERNAL MEDICINE

## 2025-05-12 PROCEDURE — 3430000001 HC RX 343 DIAGNOSTIC RADIOPHARMACEUTICALS: Mod: SE | Performed by: INTERNAL MEDICINE

## 2025-05-12 PROCEDURE — 78815 PET IMAGE W/CT SKULL-THIGH: CPT | Mod: PET TUMOR INIT TX STRAT | Performed by: INTERNAL MEDICINE

## 2025-05-12 PROCEDURE — 78815 PET IMAGE W/CT SKULL-THIGH: CPT | Mod: PI

## 2025-05-12 RX ORDER — FLUDEOXYGLUCOSE F 18 200 MCI/ML
12 INJECTION, SOLUTION INTRAVENOUS
Status: COMPLETED | OUTPATIENT
Start: 2025-05-12 | End: 2025-05-12

## 2025-05-12 RX ORDER — FLUDEOXYGLUCOSE F 18 200 MCI/ML
12 INJECTION, SOLUTION INTRAVENOUS
Status: DISCONTINUED | OUTPATIENT
Start: 2025-05-12 | End: 2025-05-13 | Stop reason: HOSPADM

## 2025-05-12 RX ADMIN — FLUDEOXYGLUCOSE F 18 12 MILLICURIE: 200 INJECTION, SOLUTION INTRAVENOUS at 13:20

## 2025-05-13 ENCOUNTER — APPOINTMENT (OUTPATIENT)
Dept: RADIOLOGY | Facility: HOSPITAL | Age: 60
End: 2025-05-13
Payer: COMMERCIAL

## 2025-05-13 ENCOUNTER — HOSPITAL ENCOUNTER (OUTPATIENT)
Dept: RADIATION ONCOLOGY | Facility: CLINIC | Age: 60
Setting detail: RADIATION/ONCOLOGY SERIES
Discharge: HOME | End: 2025-05-13
Payer: COMMERCIAL

## 2025-05-13 ENCOUNTER — HOSPITAL ENCOUNTER (EMERGENCY)
Facility: HOSPITAL | Age: 60
Discharge: ANOTHER HOSPITAL | End: 2025-05-14
Attending: EMERGENCY MEDICINE
Payer: COMMERCIAL

## 2025-05-13 ENCOUNTER — APPOINTMENT (OUTPATIENT)
Dept: CARDIOLOGY | Facility: HOSPITAL | Age: 60
End: 2025-05-13
Payer: COMMERCIAL

## 2025-05-13 ENCOUNTER — LAB (OUTPATIENT)
Dept: LAB | Facility: CLINIC | Age: 60
End: 2025-05-13
Payer: COMMERCIAL

## 2025-05-13 ENCOUNTER — TELEPHONE (OUTPATIENT)
Dept: HEMATOLOGY/ONCOLOGY | Facility: CLINIC | Age: 60
End: 2025-05-13

## 2025-05-13 VITALS
OXYGEN SATURATION: 97 % | RESPIRATION RATE: 18 BRPM | HEART RATE: 141 BPM | WEIGHT: 144.29 LBS | TEMPERATURE: 97.9 F | SYSTOLIC BLOOD PRESSURE: 125 MMHG | DIASTOLIC BLOOD PRESSURE: 79 MMHG | BODY MASS INDEX: 23.29 KG/M2

## 2025-05-13 DIAGNOSIS — E87.6 HYPOKALEMIA: ICD-10-CM

## 2025-05-13 DIAGNOSIS — N13.30 HYDRONEPHROSIS, UNSPECIFIED HYDRONEPHROSIS TYPE: ICD-10-CM

## 2025-05-13 DIAGNOSIS — R30.0 DYSURIA: ICD-10-CM

## 2025-05-13 DIAGNOSIS — K62.89 RECTAL MASS: Primary | ICD-10-CM

## 2025-05-13 DIAGNOSIS — C20 RECTAL CANCER (MULTI): Primary | ICD-10-CM

## 2025-05-13 DIAGNOSIS — K62.89 RECTAL MASS: ICD-10-CM

## 2025-05-13 LAB
ALBUMIN SERPL BCP-MCNC: 4.3 G/DL (ref 3.4–5)
ALP SERPL-CCNC: 124 U/L (ref 33–136)
ALT SERPL W P-5'-P-CCNC: 9 U/L (ref 7–45)
ANION GAP SERPL CALC-SCNC: 15 MMOL/L (ref 10–20)
ANION GAP SERPL CALC-SCNC: 19 MMOL/L (ref 10–20)
APPEARANCE UR: ABNORMAL
APPEARANCE UR: ABNORMAL
AST SERPL W P-5'-P-CCNC: 11 U/L (ref 9–39)
BACTERIA #/AREA URNS AUTO: ABNORMAL /HPF
BASOPHILS # BLD AUTO: 0.04 X10*3/UL (ref 0–0.1)
BASOPHILS # BLD AUTO: 0.04 X10*3/UL (ref 0–0.1)
BASOPHILS NFR BLD AUTO: 0.4 %
BASOPHILS NFR BLD AUTO: 0.4 %
BILIRUB SERPL-MCNC: 0.7 MG/DL (ref 0–1.2)
BILIRUB UR STRIP.AUTO-MCNC: NEGATIVE MG/DL
BILIRUB UR STRIP.AUTO-MCNC: NEGATIVE MG/DL
BUN SERPL-MCNC: 29 MG/DL (ref 6–23)
BUN SERPL-MCNC: 30 MG/DL (ref 6–23)
CALCIUM SERPL-MCNC: 8.6 MG/DL (ref 8.6–10.3)
CALCIUM SERPL-MCNC: 9.6 MG/DL (ref 8.6–10.3)
CARDIAC TROPONIN I PNL SERPL HS: 6 NG/L (ref 0–13)
CHLORIDE SERPL-SCNC: 92 MMOL/L (ref 98–107)
CHLORIDE SERPL-SCNC: 96 MMOL/L (ref 98–107)
CO2 SERPL-SCNC: 25 MMOL/L (ref 21–32)
CO2 SERPL-SCNC: 27 MMOL/L (ref 21–32)
COLOR UR: ABNORMAL
COLOR UR: ABNORMAL
CREAT SERPL-MCNC: 1.42 MG/DL (ref 0.5–1.05)
CREAT SERPL-MCNC: 1.44 MG/DL (ref 0.5–1.05)
EGFRCR SERPLBLD CKD-EPI 2021: 41 ML/MIN/1.73M*2
EGFRCR SERPLBLD CKD-EPI 2021: 42 ML/MIN/1.73M*2
EOSINOPHIL # BLD AUTO: 0.03 X10*3/UL (ref 0–0.7)
EOSINOPHIL # BLD AUTO: 0.04 X10*3/UL (ref 0–0.7)
EOSINOPHIL NFR BLD AUTO: 0.3 %
EOSINOPHIL NFR BLD AUTO: 0.4 %
ERYTHROCYTE [DISTWIDTH] IN BLOOD BY AUTOMATED COUNT: 13.7 % (ref 11.5–14.5)
ERYTHROCYTE [DISTWIDTH] IN BLOOD BY AUTOMATED COUNT: 14 % (ref 11.5–14.5)
GLUCOSE SERPL-MCNC: 121 MG/DL (ref 74–99)
GLUCOSE SERPL-MCNC: 132 MG/DL (ref 74–99)
GLUCOSE UR STRIP.AUTO-MCNC: NORMAL MG/DL
GLUCOSE UR STRIP.AUTO-MCNC: NORMAL MG/DL
HCT VFR BLD AUTO: 46.4 % (ref 36–46)
HCT VFR BLD AUTO: 47.1 % (ref 36–46)
HGB BLD-MCNC: 16.3 G/DL (ref 12–16)
HGB BLD-MCNC: 16.7 G/DL (ref 12–16)
HOLD SPECIMEN: NORMAL
IMM GRANULOCYTES # BLD AUTO: 0.05 X10*3/UL (ref 0–0.7)
IMM GRANULOCYTES # BLD AUTO: 0.06 X10*3/UL (ref 0–0.7)
IMM GRANULOCYTES NFR BLD AUTO: 0.5 % (ref 0–0.9)
IMM GRANULOCYTES NFR BLD AUTO: 0.6 % (ref 0–0.9)
KETONES UR STRIP.AUTO-MCNC: ABNORMAL MG/DL
KETONES UR STRIP.AUTO-MCNC: NEGATIVE MG/DL
LACTATE SERPL-SCNC: 1.4 MMOL/L (ref 0.4–2)
LEUKOCYTE ESTERASE UR QL STRIP.AUTO: ABNORMAL
LEUKOCYTE ESTERASE UR QL STRIP.AUTO: ABNORMAL
LYMPHOCYTES # BLD AUTO: 2.56 X10*3/UL (ref 1.2–4.8)
LYMPHOCYTES # BLD AUTO: 2.63 X10*3/UL (ref 1.2–4.8)
LYMPHOCYTES NFR BLD AUTO: 24.7 %
LYMPHOCYTES NFR BLD AUTO: 25.8 %
MAGNESIUM SERPL-MCNC: 2.26 MG/DL (ref 1.6–2.4)
MCH RBC QN AUTO: 30 PG (ref 26–34)
MCH RBC QN AUTO: 30.3 PG (ref 26–34)
MCHC RBC AUTO-ENTMCNC: 35.1 G/DL (ref 32–36)
MCHC RBC AUTO-ENTMCNC: 35.5 G/DL (ref 32–36)
MCV RBC AUTO: 85 FL (ref 80–100)
MCV RBC AUTO: 86 FL (ref 80–100)
MONOCYTES # BLD AUTO: 0.88 X10*3/UL (ref 0.1–1)
MONOCYTES # BLD AUTO: 0.93 X10*3/UL (ref 0.1–1)
MONOCYTES NFR BLD AUTO: 8.5 %
MONOCYTES NFR BLD AUTO: 9.1 %
NEUTROPHILS # BLD AUTO: 6.53 X10*3/UL (ref 1.2–7.7)
NEUTROPHILS # BLD AUTO: 6.77 X10*3/UL (ref 1.2–7.7)
NEUTROPHILS NFR BLD AUTO: 63.9 %
NEUTROPHILS NFR BLD AUTO: 65.4 %
NITRITE UR QL STRIP.AUTO: NEGATIVE
NITRITE UR QL STRIP.AUTO: NEGATIVE
NRBC BLD-RTO: 0 /100 WBCS (ref 0–0)
NRBC BLD-RTO: 0 /100 WBCS (ref 0–0)
PH UR STRIP.AUTO: 7.5 [PH]
PH UR STRIP.AUTO: 7.5 [PH]
PLATELET # BLD AUTO: 390 X10*3/UL (ref 150–450)
PLATELET # BLD AUTO: 410 X10*3/UL (ref 150–450)
POTASSIUM SERPL-SCNC: 2.3 MMOL/L (ref 3.5–5.3)
POTASSIUM SERPL-SCNC: 2.8 MMOL/L (ref 3.5–5.3)
PROT SERPL-MCNC: 7.5 G/DL (ref 6.4–8.2)
PROT UR STRIP.AUTO-MCNC: ABNORMAL MG/DL
PROT UR STRIP.AUTO-MCNC: ABNORMAL MG/DL
RBC # BLD AUTO: 5.44 X10*6/UL (ref 4–5.2)
RBC # BLD AUTO: 5.51 X10*6/UL (ref 4–5.2)
RBC # UR STRIP.AUTO: ABNORMAL MG/DL
RBC # UR STRIP.AUTO: ABNORMAL MG/DL
RBC #/AREA URNS AUTO: >20 /HPF
RBC #/AREA URNS AUTO: >20 /HPF
SODIUM SERPL-SCNC: 134 MMOL/L (ref 136–145)
SODIUM SERPL-SCNC: 135 MMOL/L (ref 136–145)
SP GR UR STRIP.AUTO: 1.01
SP GR UR STRIP.AUTO: 1.05
SQUAMOUS #/AREA URNS AUTO: ABNORMAL /HPF
UROBILINOGEN UR STRIP.AUTO-MCNC: NORMAL MG/DL
UROBILINOGEN UR STRIP.AUTO-MCNC: NORMAL MG/DL
WBC # BLD AUTO: 10.2 X10*3/UL (ref 4.4–11.3)
WBC # BLD AUTO: 10.4 X10*3/UL (ref 4.4–11.3)
WBC #/AREA URNS AUTO: >50 /HPF
WBC #/AREA URNS AUTO: >50 /HPF
WBC CLUMPS #/AREA URNS AUTO: ABNORMAL /HPF
WBC CLUMPS #/AREA URNS AUTO: ABNORMAL /HPF

## 2025-05-13 PROCEDURE — 84075 ASSAY ALKALINE PHOSPHATASE: CPT

## 2025-05-13 PROCEDURE — 80048 BASIC METABOLIC PNL TOTAL CA: CPT | Performed by: EMERGENCY MEDICINE

## 2025-05-13 PROCEDURE — 83735 ASSAY OF MAGNESIUM: CPT | Performed by: PHYSICIAN ASSISTANT

## 2025-05-13 PROCEDURE — 2550000001 HC RX 255 CONTRASTS: Mod: JZ,SE | Performed by: PHYSICIAN ASSISTANT

## 2025-05-13 PROCEDURE — 81001 URINALYSIS AUTO W/SCOPE: CPT | Performed by: PHYSICIAN ASSISTANT

## 2025-05-13 PROCEDURE — 87086 URINE CULTURE/COLONY COUNT: CPT | Performed by: STUDENT IN AN ORGANIZED HEALTH CARE EDUCATION/TRAINING PROGRAM

## 2025-05-13 PROCEDURE — 99215 OFFICE O/P EST HI 40 MIN: CPT | Performed by: STUDENT IN AN ORGANIZED HEALTH CARE EDUCATION/TRAINING PROGRAM

## 2025-05-13 PROCEDURE — 81001 URINALYSIS AUTO W/SCOPE: CPT | Performed by: STUDENT IN AN ORGANIZED HEALTH CARE EDUCATION/TRAINING PROGRAM

## 2025-05-13 PROCEDURE — 87040 BLOOD CULTURE FOR BACTERIA: CPT | Mod: GENLAB | Performed by: PHYSICIAN ASSISTANT

## 2025-05-13 PROCEDURE — 85025 COMPLETE CBC W/AUTO DIFF WBC: CPT | Performed by: PHYSICIAN ASSISTANT

## 2025-05-13 PROCEDURE — 71275 CT ANGIOGRAPHY CHEST: CPT

## 2025-05-13 PROCEDURE — 2500000004 HC RX 250 GENERAL PHARMACY W/ HCPCS (ALT 636 FOR OP/ED): Mod: JZ,SE | Performed by: PHYSICIAN ASSISTANT

## 2025-05-13 PROCEDURE — 2500000004 HC RX 250 GENERAL PHARMACY W/ HCPCS (ALT 636 FOR OP/ED): Mod: JZ,SE

## 2025-05-13 PROCEDURE — 85025 COMPLETE CBC W/AUTO DIFF WBC: CPT

## 2025-05-13 PROCEDURE — 87086 URINE CULTURE/COLONY COUNT: CPT | Mod: GENLAB | Performed by: PHYSICIAN ASSISTANT

## 2025-05-13 PROCEDURE — 96367 TX/PROPH/DG ADDL SEQ IV INF: CPT

## 2025-05-13 PROCEDURE — 36415 COLL VENOUS BLD VENIPUNCTURE: CPT

## 2025-05-13 PROCEDURE — 96365 THER/PROPH/DIAG IV INF INIT: CPT | Mod: 59

## 2025-05-13 PROCEDURE — 83605 ASSAY OF LACTIC ACID: CPT | Performed by: PHYSICIAN ASSISTANT

## 2025-05-13 PROCEDURE — 96366 THER/PROPH/DIAG IV INF ADDON: CPT

## 2025-05-13 PROCEDURE — 99285 EMERGENCY DEPT VISIT HI MDM: CPT | Mod: 25 | Performed by: EMERGENCY MEDICINE

## 2025-05-13 PROCEDURE — 93005 ELECTROCARDIOGRAM TRACING: CPT

## 2025-05-13 PROCEDURE — 84484 ASSAY OF TROPONIN QUANT: CPT | Performed by: PHYSICIAN ASSISTANT

## 2025-05-13 PROCEDURE — 96361 HYDRATE IV INFUSION ADD-ON: CPT

## 2025-05-13 PROCEDURE — 74176 CT ABD & PELVIS W/O CONTRAST: CPT

## 2025-05-13 PROCEDURE — 36415 COLL VENOUS BLD VENIPUNCTURE: CPT | Performed by: PHYSICIAN ASSISTANT

## 2025-05-13 PROCEDURE — 2500000002 HC RX 250 W HCPCS SELF ADMINISTERED DRUGS (ALT 637 FOR MEDICARE OP, ALT 636 FOR OP/ED): Mod: SE | Performed by: PHYSICIAN ASSISTANT

## 2025-05-13 PROCEDURE — 82378 CARCINOEMBRYONIC ANTIGEN: CPT

## 2025-05-13 RX ORDER — CEFTRIAXONE 1 G/50ML
1 INJECTION, SOLUTION INTRAVENOUS ONCE
Status: COMPLETED | OUTPATIENT
Start: 2025-05-13 | End: 2025-05-13

## 2025-05-13 RX ORDER — IBUPROFEN 600 MG/1
600 TABLET ORAL EVERY 8 HOURS PRN
Qty: 90 TABLET | Refills: 1 | Status: SHIPPED | OUTPATIENT
Start: 2025-05-13 | End: 2025-05-16 | Stop reason: HOSPADM

## 2025-05-13 RX ORDER — POTASSIUM CHLORIDE 14.9 MG/ML
20 INJECTION INTRAVENOUS
Status: COMPLETED | OUTPATIENT
Start: 2025-05-13 | End: 2025-05-14

## 2025-05-13 RX ORDER — POTASSIUM CHLORIDE 20 MEQ/1
40 TABLET, EXTENDED RELEASE ORAL DAILY
Status: DISCONTINUED | OUTPATIENT
Start: 2025-05-13 | End: 2025-05-14 | Stop reason: HOSPADM

## 2025-05-13 RX ORDER — POTASSIUM CHLORIDE 14.9 MG/ML
INJECTION INTRAVENOUS
Status: COMPLETED
Start: 2025-05-13 | End: 2025-05-14

## 2025-05-13 RX ORDER — POTASSIUM CHLORIDE 14.9 MG/ML
20 INJECTION INTRAVENOUS
Status: COMPLETED | OUTPATIENT
Start: 2025-05-13 | End: 2025-05-13

## 2025-05-13 RX ADMIN — SODIUM CHLORIDE 1000 ML: 0.9 INJECTION, SOLUTION INTRAVENOUS at 22:56

## 2025-05-13 RX ADMIN — POTASSIUM CHLORIDE 20 MEQ: 14.9 INJECTION INTRAVENOUS at 23:32

## 2025-05-13 RX ADMIN — SODIUM CHLORIDE 1000 ML: 0.9 INJECTION, SOLUTION INTRAVENOUS at 14:35

## 2025-05-13 RX ADMIN — IOHEXOL 75 ML: 350 INJECTION, SOLUTION INTRAVENOUS at 14:45

## 2025-05-13 RX ADMIN — POTASSIUM CHLORIDE 20 MEQ: 14.9 INJECTION, SOLUTION INTRAVENOUS at 16:52

## 2025-05-13 RX ADMIN — CEFTRIAXONE 1 G: 1 INJECTION, SOLUTION INTRAVENOUS at 20:54

## 2025-05-13 RX ADMIN — POTASSIUM CHLORIDE 20 MEQ: 14.9 INJECTION, SOLUTION INTRAVENOUS at 23:32

## 2025-05-13 RX ADMIN — POTASSIUM CHLORIDE 40 MEQ: 1500 TABLET, EXTENDED RELEASE ORAL at 14:28

## 2025-05-13 RX ADMIN — POTASSIUM CHLORIDE 20 MEQ: 14.9 INJECTION, SOLUTION INTRAVENOUS at 14:29

## 2025-05-13 ASSESSMENT — ENCOUNTER SYMPTOMS
ENDOCRINE NEGATIVE: 1
BLOOD IN STOOL: 1
LOSS OF SENSATION IN FEET: 0
EYES NEGATIVE: 1
DEPRESSION: 0
PSYCHIATRIC NEGATIVE: 1
DIARRHEA: 1
MUSCULOSKELETAL NEGATIVE: 1
COUGH: 1
FREQUENCY: 1
FATIGUE: 1
SHORTNESS OF BREATH: 1
OCCASIONAL FEELINGS OF UNSTEADINESS: 0
CARDIOVASCULAR NEGATIVE: 1
NEUROLOGICAL NEGATIVE: 1
HEMATOLOGIC/LYMPHATIC NEGATIVE: 1
RECTAL PAIN: 1

## 2025-05-13 ASSESSMENT — COLUMBIA-SUICIDE SEVERITY RATING SCALE - C-SSRS
6. HAVE YOU EVER DONE ANYTHING, STARTED TO DO ANYTHING, OR PREPARED TO DO ANYTHING TO END YOUR LIFE?: NO
6. HAVE YOU EVER DONE ANYTHING, STARTED TO DO ANYTHING, OR PREPARED TO DO ANYTHING TO END YOUR LIFE?: NO
2. HAVE YOU ACTUALLY HAD ANY THOUGHTS OF KILLING YOURSELF?: NO
2. HAVE YOU ACTUALLY HAD ANY THOUGHTS OF KILLING YOURSELF?: NO
1. IN THE PAST MONTH, HAVE YOU WISHED YOU WERE DEAD OR WISHED YOU COULD GO TO SLEEP AND NOT WAKE UP?: NO
1. IN THE PAST MONTH, HAVE YOU WISHED YOU WERE DEAD OR WISHED YOU COULD GO TO SLEEP AND NOT WAKE UP?: NO

## 2025-05-13 ASSESSMENT — PAIN - FUNCTIONAL ASSESSMENT: PAIN_FUNCTIONAL_ASSESSMENT: 0-10

## 2025-05-13 ASSESSMENT — PAIN SCALES - GENERAL
PAINLEVEL_OUTOF10: 0 - NO PAIN
PAINLEVEL_OUTOF10: 0 - NO PAIN
PAINLEVEL_OUTOF10: 10-WORST PAIN EVER

## 2025-05-13 NOTE — TELEPHONE ENCOUNTER
Attempted to call Hortensia - no answer no identified VM  Called Laney - no answer - left VM requesting call back.   Patient's potassium is 2.3 today and Dr Sharp would like her to report to the ER. Will try to call her back again.  Lauren MANZANON, RN CMSRN

## 2025-05-13 NOTE — ED TRIAGE NOTES
Pt had blood work done at her oncologist today that showed pt had a low potassium. Pt was instructed to come to the ED for IV potassium replacement. Pt states she feels like her breathing may be more labored than usual

## 2025-05-13 NOTE — PROGRESS NOTES
Radiation Oncology Follow-Up    Patient Name:  Hortensia Rodriguez  MRN:  [id number]  :  1965    Referring Provider: Farhat Sharp MD  Primary Care Provider: Ewelina Love MD  Care Team: Patient Care Team:  Ewelina Love MD as PCP - General (Internal Medicine)  Chris Pearce PA-C as PCP - [facility] Medicaid PCP  Ewelina Love MD as PCP - [place] PCP  Grady Bernardo MD as Medical Oncologist (Hematology and Oncology)  Farhat Sharp MD as Radiation Oncologist (Radiation Oncology)    Date of Service: 2025    SUBJECTIVE  History of Present Illness:  Hortensia Rodriguez is a 59 y.o. female who was previously seen at the [facility] Department of Radiation Oncology for radiological concern for rectal adenocarcinoma.      #) Radiologic concern for rectal adenocarcinoma arising from villous adenoma     Ms. Rodriguez is a female with a prior history of adenomas from colonoscopy in .  The patient was found to have abdominal pain and underwent CT abdomen/pelvis imaging on 2023.  Imaging revealed an exophytic mass along the anterior aspect of the rectum.  The patient underwent colonoscopy and EGD on 2023.  EGD showed Z-line irregularity at 36 cm from the incisors, biopsy revealed cardia type gastric mucosa, biopsy from the stomach antrum revealed chronic gastritis.  Colonoscopy revealed a polypoid and ulcerated nonobstructing large mass within the anal canal measuring approximately 5 cm in length, additional polyps were removed from the rectum, proximal transverse colon and ileocecal valve.  Pathology revealed tubular adenomas of the ileocecal valve, transverse colon and rectum.  The anal rectal mass showed superficial fragments of tubulovillous adenoma.  The patient had MRI of the rectum with and without contrast on 2023 which revealed a mass measuring 6 cm, 2.8 cm from the anal verge and at the anal rectal junction with extramural extension  questionable extramural vascular invasion.     The patient was lost to follow-up and sigmoidoscopy on 2/5/2024 which showed a nonobstructing protruding lesion involving 75% of the wall with tethering spanning 8 cm from the dentate line.  Biopsies obtained of the rectal lesion showed villous adenoma.  The patient was lost to follow-up again.  The patient underwent CT chest/abdomen/pelvis with contrast imaging on 1/17/2025 which showed stable pulmonary nodules including a 9 mm left upper lobe nodule, bilateral adrenal nodules, and  irregular circumferential wall thickening of the distal rectum/anus measuring 3 cm in the 12 o'clock position not significantly changed compared to prior examinations.  MRI rectum on 1/31/2025 showed circumferential wall thickening increased from previous now measuring 9 cm with polypoidal components projecting into the lumen, invasion of the peritoneum at the cephalad margin and in the cephalad portion there is 5 mm extramural extension with contact of the mesorectal fascia at the 10:00, additional regions 2:00 and 9:00.  Inferior aspect of the lesion is at the anal rectal junction and 2.8 cm from the anal verge.  No evidence of pelvic lymphadenopathy.      The patient completed PET/CT on 5/12/2025, final read pending.  On initial review, there is mild hypermetabolic activity of the left upper lobe lung nodule and retroperitoneal lymphadenopathy at the level of the renal vessels.      5/13/2025: Vaginal pains, with associated burning sensation on urination or bowel movements. Not taking any medications.    Labs:,   None     Pathology:  2/5/2024- RECTAL LESION, BIOPSY:- FRAGMENTS OF VILLOUS ADENOMA.     7/26/2023 - A.  STOMACH, ANTRUM, BODY, BIOPSY:    -- MILD CHRONIC INACTIVE GASTRITIS (SEE NOTE)  -- NEGATIVE FOR INTESTINAL METAPLASIA AND DYSPLASIA    Note: An immunohistochemical stain for Helicobacter organisms has been ordered  and the results will be reported as an addendum.    B.   Z-LINE, BIOPSY:    -- CARDIA-TYPE GASTRIC MUCOSA WITH NO SIGNIFICANT PATHOLOGIC FINDINGS  -- NEGATIVE FOR INTESTINAL METAPLASIA AND DYSPLASIA    C.  ILEOCECAL VALVE, POLYPECTOMY:    -- TUBULAR ADENOMA    D.  COLON, TRANSVERSE, POLYPECTOMY:    -- TUBULAR ADENOMA    E.  ANAL RECTAL MASS, BIOPSY:    -- SUPERFICIAL FRAGMENTS OF TUBULOVILLOUS ADENOMA (SEE NOTE)    Note: There is no evidence of invasive carcinoma. Multiple deeper levels have  been examined.    F.  RECTUM, POLYPECTOMY:    -- TUBULAR ADENOMA      Disease Associated Genomics:  NA     Disease Tumor Markers:  3/26/2025-CEA: 3.7 (10/29/2024-CEA: 3.5)        Imagin2025-PET/CT: (final read pending)  On initial review, there is mild hypermetabolic activity of the left upper lobe lung nodule and retroperitoneal lymphadenopathy at the level of the renal vessels.    2025-MRI rectum with and without contrast: Circumferential wall thickening increased from previous now measuring 9 cm with polypoidal components projecting into the lumen, invasion of the peritoneum at the cephalad margin and in the cephalad portion there is 5 mm extramural extension with contact of the mesorectal fascia at the 10:00, additional regions 2:00 and 9:00.  Inferior aspect of the lesion is at the anal rectal junction and 2.8 cm from the anal verge.  Interval development of a 3.7 cm calculus encased in the distal end of the left ureteral stent.  No evidence of pelvic lymphadenopathy.  mT4a mN0     2025-CT chest/abdomen/pelvis with contrast: Stable 9 mm pulmonary nodule in the left upper lobe, stable small pulmonary nodules including the right middle lobe, left lower lobe.  Stable 1 .9 cm left adrenal gland nodule and 1.4 cm right adrenal gland nodule.  Left-sided double-J ureteral stent with calcifications around the proximal and distal pigtails in the central renal pelvis measuring 1.5 cm and within the urinary bladder measuring 3.8 cm a few nonobstructing calculi are noted  in the left lower pole kidney measuring 1.3 cm.  Moderate left-sided hydronephrosis, and 3 mm nonobstructing calculus within the right lower pole without hydronephrosis.  Irregular circumferential wall thickening of the distal rectum/anus measuring 3 cm in the 12 o'clock position not significantly changed compared to prior examinations.     9/25/2023-MRI rectum with and without contrast: Within the anorectal junction there is a semicircular wall thickening measuring 6 cm with lesion starting 2.8 cm from the anal verge and at the anal rectal junction abutting the internal sphincter without any involvement of the external sphincter.  Involvement of the muscularis propria with evidence of extramural nodular tumor 3 mm at 9:00, questionable extramural vascular invasion of the right perirectal space.  Tumor distance from the mesorectal fascia is 3 mm.  Few tiny mesorectal and extra mesorectal lymph nodes which are not radiologically enlarged.  mT3b mN0     7/1/2023-CT abdomen/pelvis with contrast: Unchanged 2-3 mm subpleural nodules within the right lower lobe. Additional unchanged 3 mm nodule within the right lower lobe. Mild asymmetric enlargement of the left kidney with delayed nephrogram and 11 mm diameter obstructing left renal calculus at the ureteropelvic junction. Exophytic 4.6 cm diameter cyst associated with the upper pole of the left kidney. Exophytic mass lesion associated with the rectum along the anterior aspect and extending along the lateral side walls measuring up to 2.8 cm in thickness.        Prior Systemic Therapies:   None     Prior Surgeries:  2/5/2024-sigmoidoscopy: Nonobstructing, protruding lesion involving 75% of the wall with tethering, spanning from dentate line to at least 8 cm from the anal verge.  Biopsies obtained.     7/26/2023-colonoscopy: There is a polypoid and ulcerated nonobstructing large mass within the anal rectal region.  Circumferential involving one half of the lumen measuring  approximately 5 cm in length.  Biopsy was obtained.  6 mm polyp found in the rectum which was removed by cold snare.  2 sessile polyps found in the proximal transverse colon removed with cold snare.  A 5 mm polyp found in the ileocecal valve, sessile which was removed by cold snare.     7/26/2023-EGD: Z-line irregularity found at 36 cm from the incisors.  Biopsy obtained.  Diffuse erythematous mucosa without bleeding in the stomach.  Biopsies obtained.  Diffuse severe erythematous mucosa found in the duodenal bulb.     Prior Radiation Therapy:   None    Treatment Rendered:   No radiation treatments to show. (Treatments may have been administered in another system.)       Review of Systems:   Review of Systems   Constitutional:  Positive for fatigue.   HENT:  Negative.     Eyes: Negative.    Respiratory:  Positive for cough and shortness of breath.    Cardiovascular: Negative.    Gastrointestinal:  Positive for blood in stool, diarrhea and rectal pain.   Endocrine: Negative.    Genitourinary:  Positive for bladder incontinence and frequency.    Musculoskeletal: Negative.    Skin: Negative.    Neurological: Negative.    Hematological: Negative.    Psychiatric/Behavioral: Negative.         Performance Status:   The Karnofsky performance scale today is 80, Normal activity with effort; some signs or symptoms of disease (ECOG equivalent 1).       OBJECTIVE  Vital Signs:  /79   Pulse (!) 141   Temp 36.6 °C (97.9 °F) (Temporal)   Resp 18   Wt 65.5 kg (144 lb 4.7 oz)   SpO2 97%   BMI 23.29 kg/m²   Physical Exam  Vitals and nursing note reviewed. Exam conducted with a chaperone present.   Eyes:      Extraocular Movements: Extraocular movements intact.   Pulmonary:      Effort: Pulmonary effort is normal.   Genitourinary:     Labia:         Right: No rash or lesion.         Left: No rash or lesion.       Vagina: Normal. No bleeding or lesions.      Cervix: Normal.      Rectum: Mass and tenderness present.       Comments: Vaginal examination: No evidence of vulvar or cutaneous lesions.  Skin examination shows no evidence of mucosal erosions or vaginal masses.    Rectal examination: No evidence of perianal lesions.  Limited digital examination due to pain, reveals large rectal mass involving the anorectal junction  Neurological:      General: No focal deficit present.      Mental Status: She is alert.            ASSESSMENT:   Hortensia Rodriguez is a 59 y.o. female with radiologic concern for rectal adenocarcinoma arising from villous adenoma.       I discussed with the patient regarding clinical presentation, imaging, pathology and treatment recommendations for rectal adenocarcinomas.  I discussed with the patient that her biopsies have only shown villous adenomas, however given their propensity to become carcinomas with additional imaging findings concerning for disease progression this is likely a transformation from an adenoma to carcinoma.      The patient has vaginal discomfort and pain with urination.  The patient will be evaluated for potential urinary tract infection; however, symptoms may be concerning for local progression of disease to involving the vagina.  No obvious clinical invasion into the vagina on examination; however, repeat MRI rectum to be completed to restage locally for any microscopic invasion of the vagina.    Ibuprofen sent to pharmacy for pain.  Plan for multidisciplinary tumor board presentation, follow-up in 2 weeks.    PLAN:    #) Radiologic concern for rectal adenocarcinoma arising from villous adenoma,  - PET/CT shows concern for retroperitoneal periaortic lymphadenopathy  - Multidisciplinary tumor board presentation to discuss imaging and treatment plan  -Repeat MRI rectum to assess for local invasion  -If concern for nonregional oumar metastases, favor initial treatment with chemotherapy to be followed by radiation therapy for improved local control with or without plan for resection of  primary  - If nonregional lymph nodes seem more infectious/inflammatory in nature, discussed the role of concurrent chemoradiation therapy as part of total neoadjuvant therapy    #) Acute toxicities  -Pain: Grade 3, ibuprofen 600 mg every 8 hours as needed for pain  - Vaginal burning/dysuria: Assess urinalysis, given essentials ointment for vaginal burning    #) Long term side effects    A total of 30 minutes were spent face-to-face with the patient, with an additional 10 minutes spent reviewing records including imaging, pathology and physician notes.    Farhat Sharp MD  [facility]  [facility] clinical  - Department of Radiation Oncology  Phone: [phone]  Fax: [phone]  [facility] secure chat preferred / Pager [phone]    Note: This was transcribed using Dragon voice recognition software. Attempts were made to correct any errors; however, errors or omissions may be present.     NCCN Guidelines were applicable to guide this patients treatment plan.

## 2025-05-13 NOTE — TELEPHONE ENCOUNTER
Spoke with Hortensia - instructed her to head to the closest ER to her as her potassium is critically low. Patient verbalized understanding and agreement with the plan.    Lauren MANZANON, RN CMSRN

## 2025-05-14 ENCOUNTER — TUMOR BOARD CONFERENCE (OUTPATIENT)
Dept: HEMATOLOGY/ONCOLOGY | Facility: HOSPITAL | Age: 60
End: 2025-05-14
Payer: COMMERCIAL

## 2025-05-14 ENCOUNTER — HOSPITAL ENCOUNTER (INPATIENT)
Facility: HOSPITAL | Age: 60
LOS: 2 days | Discharge: HOME | End: 2025-05-16
Attending: STUDENT IN AN ORGANIZED HEALTH CARE EDUCATION/TRAINING PROGRAM
Payer: COMMERCIAL

## 2025-05-14 VITALS
SYSTOLIC BLOOD PRESSURE: 126 MMHG | TEMPERATURE: 98 F | OXYGEN SATURATION: 97 % | BODY MASS INDEX: 23.14 KG/M2 | WEIGHT: 144 LBS | RESPIRATION RATE: 16 BRPM | DIASTOLIC BLOOD PRESSURE: 75 MMHG | HEIGHT: 66 IN | HEART RATE: 106 BPM

## 2025-05-14 DIAGNOSIS — J44.9 CHRONIC OBSTRUCTIVE PULMONARY DISEASE, UNSPECIFIED COPD TYPE (MULTI): ICD-10-CM

## 2025-05-14 DIAGNOSIS — E78.00 HYPERCHOLESTEREMIA: ICD-10-CM

## 2025-05-14 DIAGNOSIS — E87.6 HYPOKALEMIA: Primary | ICD-10-CM

## 2025-05-14 DIAGNOSIS — I10 BENIGN ESSENTIAL HYPERTENSION: ICD-10-CM

## 2025-05-14 DIAGNOSIS — K62.89 RECTAL MASS: ICD-10-CM

## 2025-05-14 DIAGNOSIS — E03.9 HYPOTHYROIDISM, UNSPECIFIED TYPE: ICD-10-CM

## 2025-05-14 DIAGNOSIS — F51.04 CHRONIC INSOMNIA: ICD-10-CM

## 2025-05-14 LAB
ALBUMIN SERPL BCP-MCNC: 3.4 G/DL (ref 3.4–5)
ALBUMIN SERPL BCP-MCNC: 3.8 G/DL (ref 3.4–5)
ALP SERPL-CCNC: 98 U/L (ref 33–136)
ALT SERPL W P-5'-P-CCNC: 8 U/L (ref 7–45)
ANION GAP SERPL CALC-SCNC: 14 MMOL/L (ref 10–20)
ANION GAP SERPL CALC-SCNC: 17 MMOL/L (ref 10–20)
AST SERPL W P-5'-P-CCNC: 10 U/L (ref 9–39)
BASOPHILS # BLD AUTO: 0.03 X10*3/UL (ref 0–0.1)
BASOPHILS NFR BLD AUTO: 0.4 %
BILIRUB SERPL-MCNC: 0.5 MG/DL (ref 0–1.2)
BUN SERPL-MCNC: 25 MG/DL (ref 6–23)
BUN SERPL-MCNC: 26 MG/DL (ref 6–23)
CALCIUM SERPL-MCNC: 8.6 MG/DL (ref 8.6–10.6)
CALCIUM SERPL-MCNC: 9.2 MG/DL (ref 8.6–10.6)
CEA SERPL-MCNC: 3 UG/L
CHLORIDE SERPL-SCNC: 100 MMOL/L (ref 98–107)
CHLORIDE SERPL-SCNC: 100 MMOL/L (ref 98–107)
CO2 SERPL-SCNC: 24 MMOL/L (ref 21–32)
CO2 SERPL-SCNC: 25 MMOL/L (ref 21–32)
CREAT SERPL-MCNC: 1.05 MG/DL (ref 0.5–1.05)
CREAT SERPL-MCNC: 1.2 MG/DL (ref 0.5–1.05)
EGFRCR SERPLBLD CKD-EPI 2021: 52 ML/MIN/1.73M*2
EGFRCR SERPLBLD CKD-EPI 2021: 61 ML/MIN/1.73M*2
EOSINOPHIL # BLD AUTO: 0.1 X10*3/UL (ref 0–0.7)
EOSINOPHIL NFR BLD AUTO: 1.5 %
ERYTHROCYTE [DISTWIDTH] IN BLOOD BY AUTOMATED COUNT: 14.4 % (ref 11.5–14.5)
GLUCOSE SERPL-MCNC: 95 MG/DL (ref 74–99)
GLUCOSE SERPL-MCNC: 98 MG/DL (ref 74–99)
HCT VFR BLD AUTO: 41.5 % (ref 36–46)
HGB BLD-MCNC: 14.1 G/DL (ref 12–16)
HOLD SPECIMEN: NORMAL
IMM GRANULOCYTES # BLD AUTO: 0.03 X10*3/UL (ref 0–0.7)
IMM GRANULOCYTES NFR BLD AUTO: 0.4 % (ref 0–0.9)
LYMPHOCYTES # BLD AUTO: 1.97 X10*3/UL (ref 1.2–4.8)
LYMPHOCYTES NFR BLD AUTO: 29.1 %
MAGNESIUM SERPL-MCNC: 2.06 MG/DL (ref 1.6–2.4)
MAGNESIUM SERPL-MCNC: 2.12 MG/DL (ref 1.6–2.4)
MCH RBC QN AUTO: 29.4 PG (ref 26–34)
MCHC RBC AUTO-ENTMCNC: 34 G/DL (ref 32–36)
MCV RBC AUTO: 87 FL (ref 80–100)
MONOCYTES # BLD AUTO: 0.62 X10*3/UL (ref 0.1–1)
MONOCYTES NFR BLD AUTO: 9.2 %
NEUTROPHILS # BLD AUTO: 4.02 X10*3/UL (ref 1.2–7.7)
NEUTROPHILS NFR BLD AUTO: 59.4 %
NRBC BLD-RTO: 0 /100 WBCS (ref 0–0)
PHOSPHATE SERPL-MCNC: 3.2 MG/DL (ref 2.5–4.9)
PLATELET # BLD AUTO: 321 X10*3/UL (ref 150–450)
POTASSIUM SERPL-SCNC: 2.9 MMOL/L (ref 3.5–5.3)
POTASSIUM SERPL-SCNC: 3.8 MMOL/L (ref 3.5–5.3)
PROT SERPL-MCNC: 6.7 G/DL (ref 6.4–8.2)
RBC # BLD AUTO: 4.8 X10*6/UL (ref 4–5.2)
SODIUM SERPL-SCNC: 135 MMOL/L (ref 136–145)
SODIUM SERPL-SCNC: 138 MMOL/L (ref 136–145)
WBC # BLD AUTO: 6.8 X10*3/UL (ref 4.4–11.3)

## 2025-05-14 PROCEDURE — 2500000002 HC RX 250 W HCPCS SELF ADMINISTERED DRUGS (ALT 637 FOR MEDICARE OP, ALT 636 FOR OP/ED): Mod: SE

## 2025-05-14 PROCEDURE — 80053 COMPREHEN METABOLIC PANEL: CPT

## 2025-05-14 PROCEDURE — 2500000004 HC RX 250 GENERAL PHARMACY W/ HCPCS (ALT 636 FOR OP/ED): Mod: JZ,SE

## 2025-05-14 PROCEDURE — 36415 COLL VENOUS BLD VENIPUNCTURE: CPT

## 2025-05-14 PROCEDURE — 83735 ASSAY OF MAGNESIUM: CPT

## 2025-05-14 PROCEDURE — 99233 SBSQ HOSP IP/OBS HIGH 50: CPT

## 2025-05-14 PROCEDURE — 2500000004 HC RX 250 GENERAL PHARMACY W/ HCPCS (ALT 636 FOR OP/ED): Mod: JZ,SE | Performed by: PHYSICIAN ASSISTANT

## 2025-05-14 PROCEDURE — 85025 COMPLETE CBC W/AUTO DIFF WBC: CPT

## 2025-05-14 PROCEDURE — 96366 THER/PROPH/DIAG IV INF ADDON: CPT

## 2025-05-14 PROCEDURE — 84100 ASSAY OF PHOSPHORUS: CPT

## 2025-05-14 PROCEDURE — 2500000001 HC RX 250 WO HCPCS SELF ADMINISTERED DRUGS (ALT 637 FOR MEDICARE OP): Mod: SE

## 2025-05-14 PROCEDURE — 1200000003 HC ONCOLOGY  ROOM WITH TELEMETRY DAILY

## 2025-05-14 PROCEDURE — 99223 1ST HOSP IP/OBS HIGH 75: CPT

## 2025-05-14 RX ORDER — CEFTRIAXONE 1 G/50ML
1 INJECTION, SOLUTION INTRAVENOUS EVERY 24 HOURS
Status: DISCONTINUED | OUTPATIENT
Start: 2025-05-14 | End: 2025-05-16 | Stop reason: HOSPADM

## 2025-05-14 RX ORDER — ACETAMINOPHEN 325 MG/1
650 TABLET ORAL EVERY 6 HOURS PRN
Status: DISCONTINUED | OUTPATIENT
Start: 2025-05-14 | End: 2025-05-16 | Stop reason: HOSPADM

## 2025-05-14 RX ORDER — METOPROLOL TARTRATE 25 MG/1
25 TABLET, FILM COATED ORAL 2 TIMES DAILY
Status: DISCONTINUED | OUTPATIENT
Start: 2025-05-14 | End: 2025-05-16 | Stop reason: HOSPADM

## 2025-05-14 RX ORDER — LEVOTHYROXINE SODIUM 25 UG/1
25 TABLET ORAL DAILY
Status: DISCONTINUED | OUTPATIENT
Start: 2025-05-14 | End: 2025-05-16 | Stop reason: HOSPADM

## 2025-05-14 RX ORDER — TALC
6 POWDER (GRAM) TOPICAL NIGHTLY
Status: DISCONTINUED | OUTPATIENT
Start: 2025-05-15 | End: 2025-05-16 | Stop reason: HOSPADM

## 2025-05-14 RX ORDER — AMLODIPINE BESYLATE 10 MG/1
10 TABLET ORAL DAILY
Status: DISCONTINUED | OUTPATIENT
Start: 2025-05-14 | End: 2025-05-16 | Stop reason: HOSPADM

## 2025-05-14 RX ORDER — LOPERAMIDE HYDROCHLORIDE 2 MG/1
2 CAPSULE ORAL 4 TIMES DAILY PRN
Status: DISCONTINUED | OUTPATIENT
Start: 2025-05-14 | End: 2025-05-16 | Stop reason: HOSPADM

## 2025-05-14 RX ORDER — PANTOPRAZOLE SODIUM 40 MG/1
40 TABLET, DELAYED RELEASE ORAL
Status: DISCONTINUED | OUTPATIENT
Start: 2025-05-14 | End: 2025-05-16 | Stop reason: HOSPADM

## 2025-05-14 RX ORDER — POTASSIUM CHLORIDE 20 MEQ/1
20 TABLET, EXTENDED RELEASE ORAL ONCE
Status: COMPLETED | OUTPATIENT
Start: 2025-05-15 | End: 2025-05-14

## 2025-05-14 RX ORDER — ENOXAPARIN SODIUM 100 MG/ML
40 INJECTION SUBCUTANEOUS EVERY 24 HOURS
Status: DISCONTINUED | OUTPATIENT
Start: 2025-05-14 | End: 2025-05-16 | Stop reason: HOSPADM

## 2025-05-14 RX ORDER — FLUTICASONE FUROATE AND VILANTEROL 200; 25 UG/1; UG/1
1 POWDER RESPIRATORY (INHALATION)
Status: DISCONTINUED | OUTPATIENT
Start: 2025-05-14 | End: 2025-05-16 | Stop reason: HOSPADM

## 2025-05-14 RX ORDER — EZETIMIBE 10 MG/1
10 TABLET ORAL DAILY
Status: DISCONTINUED | OUTPATIENT
Start: 2025-05-14 | End: 2025-05-16 | Stop reason: HOSPADM

## 2025-05-14 RX ORDER — POTASSIUM CHLORIDE 20 MEQ/1
20 TABLET, EXTENDED RELEASE ORAL ONCE
Status: COMPLETED | OUTPATIENT
Start: 2025-05-14 | End: 2025-05-14

## 2025-05-14 RX ORDER — ALBUTEROL SULFATE 90 UG/1
1 INHALANT RESPIRATORY (INHALATION) EVERY 6 HOURS PRN
Status: DISCONTINUED | OUTPATIENT
Start: 2025-05-14 | End: 2025-05-16 | Stop reason: HOSPADM

## 2025-05-14 RX ORDER — POTASSIUM CHLORIDE 14.9 MG/ML
20 INJECTION INTRAVENOUS
Status: COMPLETED | OUTPATIENT
Start: 2025-05-14 | End: 2025-05-14

## 2025-05-14 RX ADMIN — POTASSIUM CHLORIDE 20 MEQ: 14.9 INJECTION, SOLUTION INTRAVENOUS at 13:06

## 2025-05-14 RX ADMIN — Medication 6 MG: at 23:53

## 2025-05-14 RX ADMIN — EZETIMIBE 10 MG: 10 TABLET ORAL at 09:12

## 2025-05-14 RX ADMIN — METOPROLOL TARTRATE 25 MG: 25 TABLET, FILM COATED ORAL at 09:12

## 2025-05-14 RX ADMIN — POTASSIUM CHLORIDE 20 MEQ: 14.9 INJECTION INTRAVENOUS at 01:47

## 2025-05-14 RX ADMIN — LEVOTHYROXINE SODIUM 25 MCG: 25 TABLET ORAL at 09:12

## 2025-05-14 RX ADMIN — POTASSIUM CHLORIDE 20 MEQ: 1500 TABLET, EXTENDED RELEASE ORAL at 23:36

## 2025-05-14 RX ADMIN — AMLODIPINE BESYLATE 10 MG: 10 TABLET ORAL at 09:12

## 2025-05-14 RX ADMIN — METOPROLOL TARTRATE 25 MG: 25 TABLET, FILM COATED ORAL at 21:23

## 2025-05-14 RX ADMIN — POTASSIUM CHLORIDE 20 MEQ: 14.9 INJECTION, SOLUTION INTRAVENOUS at 10:51

## 2025-05-14 RX ADMIN — POTASSIUM CHLORIDE 20 MEQ: 1500 TABLET, EXTENDED RELEASE ORAL at 10:50

## 2025-05-14 RX ADMIN — CEFTRIAXONE SODIUM 1 G: 1 INJECTION, SOLUTION INTRAVENOUS at 21:24

## 2025-05-14 SDOH — ECONOMIC STABILITY: FOOD INSECURITY: WITHIN THE PAST 12 MONTHS, YOU WORRIED THAT YOUR FOOD WOULD RUN OUT BEFORE YOU GOT THE MONEY TO BUY MORE.: NEVER TRUE

## 2025-05-14 SDOH — SOCIAL STABILITY: SOCIAL INSECURITY: DO YOU FEEL UNSAFE GOING BACK TO THE PLACE WHERE YOU ARE LIVING?: NO

## 2025-05-14 SDOH — SOCIAL STABILITY: SOCIAL INSECURITY: WITHIN THE LAST YEAR, HAVE YOU BEEN HUMILIATED OR EMOTIONALLY ABUSED IN OTHER WAYS BY YOUR PARTNER OR EX-PARTNER?: NO

## 2025-05-14 SDOH — SOCIAL STABILITY: SOCIAL INSECURITY: HAS ANYONE EVER THREATENED TO HURT YOUR FAMILY OR YOUR PETS?: NO

## 2025-05-14 SDOH — ECONOMIC STABILITY: FOOD INSECURITY: WITHIN THE PAST 12 MONTHS, THE FOOD YOU BOUGHT JUST DIDN'T LAST AND YOU DIDN'T HAVE MONEY TO GET MORE.: NEVER TRUE

## 2025-05-14 SDOH — ECONOMIC STABILITY: INCOME INSECURITY: IN THE PAST 12 MONTHS HAS THE ELECTRIC, GAS, OIL, OR WATER COMPANY THREATENED TO SHUT OFF SERVICES IN YOUR HOME?: NO

## 2025-05-14 SDOH — SOCIAL STABILITY: SOCIAL INSECURITY: ARE THERE ANY APPARENT SIGNS OF INJURIES/BEHAVIORS THAT COULD BE RELATED TO ABUSE/NEGLECT?: NO

## 2025-05-14 SDOH — SOCIAL STABILITY: SOCIAL INSECURITY: WITHIN THE LAST YEAR, HAVE YOU BEEN AFRAID OF YOUR PARTNER OR EX-PARTNER?: NO

## 2025-05-14 SDOH — SOCIAL STABILITY: SOCIAL INSECURITY: ABUSE: ADULT

## 2025-05-14 SDOH — SOCIAL STABILITY: SOCIAL INSECURITY: HAVE YOU HAD ANY THOUGHTS OF HARMING ANYONE ELSE?: NO

## 2025-05-14 SDOH — SOCIAL STABILITY: SOCIAL INSECURITY: HAVE YOU HAD THOUGHTS OF HARMING ANYONE ELSE?: NO

## 2025-05-14 SDOH — SOCIAL STABILITY: SOCIAL INSECURITY: DOES ANYONE TRY TO KEEP YOU FROM HAVING/CONTACTING OTHER FRIENDS OR DOING THINGS OUTSIDE YOUR HOME?: NO

## 2025-05-14 SDOH — SOCIAL STABILITY: SOCIAL INSECURITY: DO YOU FEEL ANYONE HAS EXPLOITED OR TAKEN ADVANTAGE OF YOU FINANCIALLY OR OF YOUR PERSONAL PROPERTY?: NO

## 2025-05-14 SDOH — SOCIAL STABILITY: SOCIAL INSECURITY: ARE YOU OR HAVE YOU BEEN THREATENED OR ABUSED PHYSICALLY, EMOTIONALLY, OR SEXUALLY BY ANYONE?: NO

## 2025-05-14 SDOH — SOCIAL STABILITY: SOCIAL INSECURITY: WERE YOU ABLE TO COMPLETE ALL THE BEHAVIORAL HEALTH SCREENINGS?: YES

## 2025-05-14 ASSESSMENT — COGNITIVE AND FUNCTIONAL STATUS - GENERAL
CLIMB 3 TO 5 STEPS WITH RAILING: A LITTLE
DAILY ACTIVITIY SCORE: 24
MOBILITY SCORE: 23
DAILY ACTIVITIY SCORE: 24
DAILY ACTIVITIY SCORE: 22
DRESSING REGULAR UPPER BODY CLOTHING: A LITTLE
PATIENT BASELINE BEDBOUND: NO
DRESSING REGULAR LOWER BODY CLOTHING: A LITTLE
MOBILITY SCORE: 24
MOBILITY SCORE: 24

## 2025-05-14 ASSESSMENT — COLUMBIA-SUICIDE SEVERITY RATING SCALE - C-SSRS
6. HAVE YOU EVER DONE ANYTHING, STARTED TO DO ANYTHING, OR PREPARED TO DO ANYTHING TO END YOUR LIFE?: NO
1. IN THE PAST MONTH, HAVE YOU WISHED YOU WERE DEAD OR WISHED YOU COULD GO TO SLEEP AND NOT WAKE UP?: NO
2. HAVE YOU ACTUALLY HAD ANY THOUGHTS OF KILLING YOURSELF?: NO

## 2025-05-14 ASSESSMENT — PAIN - FUNCTIONAL ASSESSMENT
PAIN_FUNCTIONAL_ASSESSMENT: 0-10
PAIN_FUNCTIONAL_ASSESSMENT: 0-10

## 2025-05-14 ASSESSMENT — ENCOUNTER SYMPTOMS
BLOOD IN STOOL: 1
NEUROLOGICAL NEGATIVE: 1
ENDOCRINE NEGATIVE: 1
HEMATOLOGIC/LYMPHATIC NEGATIVE: 1
PSYCHIATRIC NEGATIVE: 1
ALLERGIC/IMMUNOLOGIC NEGATIVE: 1
MUSCULOSKELETAL NEGATIVE: 1
FATIGUE: 1
CARDIOVASCULAR NEGATIVE: 1
DIARRHEA: 1
DYSURIA: 1
EYES NEGATIVE: 1
RESPIRATORY NEGATIVE: 1

## 2025-05-14 ASSESSMENT — ACTIVITIES OF DAILY LIVING (ADL)
LACK_OF_TRANSPORTATION: NO
GROOMING: INDEPENDENT
HEARING - LEFT EAR: FUNCTIONAL
LACK_OF_TRANSPORTATION: NO
PATIENT'S MEMORY ADEQUATE TO SAFELY COMPLETE DAILY ACTIVITIES?: YES
WALKS IN HOME: INDEPENDENT
TOILETING: INDEPENDENT
JUDGMENT_ADEQUATE_SAFELY_COMPLETE_DAILY_ACTIVITIES: YES
ADEQUATE_TO_COMPLETE_ADL: YES
DRESSING YOURSELF: INDEPENDENT
BATHING: INDEPENDENT
HEARING - RIGHT EAR: FUNCTIONAL
FEEDING YOURSELF: INDEPENDENT

## 2025-05-14 ASSESSMENT — PATIENT HEALTH QUESTIONNAIRE - PHQ9
1. LITTLE INTEREST OR PLEASURE IN DOING THINGS: NOT AT ALL
SUM OF ALL RESPONSES TO PHQ9 QUESTIONS 1 & 2: 0
2. FEELING DOWN, DEPRESSED OR HOPELESS: NOT AT ALL

## 2025-05-14 ASSESSMENT — PAIN SCALES - GENERAL
PAINLEVEL_OUTOF10: 0 - NO PAIN

## 2025-05-14 ASSESSMENT — LIFESTYLE VARIABLES
AUDIT-C TOTAL SCORE: 1
HOW OFTEN DO YOU HAVE 6 OR MORE DRINKS ON ONE OCCASION: NEVER
HOW OFTEN DO YOU HAVE A DRINK CONTAINING ALCOHOL: MONTHLY OR LESS
AUDIT-C TOTAL SCORE: 1
HOW MANY STANDARD DRINKS CONTAINING ALCOHOL DO YOU HAVE ON A TYPICAL DAY: 1 OR 2
SKIP TO QUESTIONS 9-10: 1

## 2025-05-14 NOTE — TUMOR BOARD NOTE
MULTIDISCIPLINARY RECTAL TUMOR BOARD CONFERENCE NOTE  Hortensia Rodriguez was presented at Rectal Tumor Board Conference  Conference date: 5/14/2025  Presenting Provider(s): Dr. Farhat Sharp  Present at Conference: Medical Oncology, Radiation Oncology, Surgical Oncology, Radiology, and Pathology Representatives  Conference Review Type: Radiology Review    Impression:  Hortensia Rodriguez is a 59 y.o. female patient who presents with rectal adenocarcinoma arising from tubo-villous adenoma with concern for malignancy.     CT A/P 05/13/25  IMPRESSION:  1.  Large low rectal mass better characterized on yesterday's PET/CT  and recent dedicated rectal MRI.  2. Persistent findings of encrusted left ureteral stent compared to  CT 4 months ago. Large calculus encasing the proximal margin in the  renal pelvis is slightly enlarged. Large calculus encasing the distal  end in the urinary bladder is not significantly changed. Associated  left hydronephrosis has worsened and is now severe.  3. Left periaortic lymphadenopathy better characterized on CT PET/CT  from yesterday.    CT Angio Chest 05/13/25  IMPRESSION:  1.  No pulmonary emboli or confluent airspace disease.  2. Left hydronephrosis partly visualized.    PET CT 05/12/25  IMPRESSION:  1. Intensely FDG avid circumferential thickening involving anorectal  region with extensions as described, consistent with neoplasm.  2. FDG avid left para-aortic lymph nodes, consistent with oumar  metastasis.  3. Mildly FDG avid bilateral adrenal nodules, likely benign.  4. No focal FDG uptake corresponding to subcentimeter bilateral  pulmonary nodule seen on CT. Follow-up with diagnostic CT chest to  document interval resolution/stability.  5. No FDG avid osseous metastatic disease.  6. FDG avid focus within right thyroid lobe. Correlate with  ultrasound thyroid.    Tumor Board Stage: Possible T3bN0   Mismatch Repair Status: Pending    National Guidelines discussed:  Yes  Recommendations: Inpatient Flex sig for biopsy of primary and periaortic lymph node before proceeding with treatment      Cancer Staging:  Not applicable       Disclaimer  SCC tumor board recommendations represent the consensus opinion of physicians present at a weekly patient care conference. The treating SCC physician is not always present, and many of the physicians formulating the recommendation have not personally seen or examined the patient under discussion. It is understood that the treating SCC physician considers the expertise of the Tumor Board Recommendation in formulating his/her plan for the patient. However, in many situations, based on individualized patient considerations, a different plan is determined by the treating physician to be the optimal medical management.    Scribe Attestation  By signing my name below, Catherine WRIGHT Scribe   attest that this documentation has been prepared under the direction and in the presence of RECTAL TUMOR BOARD.

## 2025-05-14 NOTE — PROGRESS NOTES
Pharmacy Medication History Review    Hortensia Rodriguez is a 59 y.o. female admitted for Hypokalemia. Pharmacy reviewed the patient's [id number] medications and allergies for accuracy.    Medications ADDED  N/A  Medications CHANGED  N/A  Medications REMOVED/NOT TAKING   N/A     The list below reflects the updated PTA list.   Prior to Admission Medications   Prescriptions Last Dose Informant   Symbicort 160-4.5 mcg/actuation inhaler  Self   Sig: Inhale 2 puffs 2 times a day.   albuterol (ProAir HFA) 90 mcg/actuation inhaler  Self   Sig: Inhale 1 puff every 6 hours if needed for wheezing.   amLODIPine (Norvasc) 10 mg tablet  Self   Sig: Take 1 tablet (10 mg) by mouth once daily.   diaper,brief,adult,disposable (Select Disposable Briefs) misc  Self   Sig: Disposable pull up underwear. Uses approximately 5 per day.   ezetimibe (Zetia) 10 mg tablet  Self   Sig: Take 1 tablet (10 mg) by mouth once daily.   fluticasone (Flonase) 50 mcg/actuation nasal spray  Self   Sig: Administer 2 sprays into each nostril once daily. Shake gently. Before first use, prime pump. After use, clean tip and replace cap.   ibuprofen 600 mg tablet  Self   Sig: Take 1 tablet (600 mg) by mouth every 8 hours if needed for moderate pain (4 - 6) or mild pain (1 - 3).   levothyroxine (Synthroid, Levoxyl) 25 mcg tablet  Self   Sig: Take 1 tablet (25 mcg) by mouth once daily.   metoprolol tartrate (Lopressor) 25 mg tablet  Self   Sig: Take 1 tablet (25 mg) by mouth 2 times a day.      Facility-Administered Medications: None       The list below reflects the updated allergy list. Please review each documented allergy for additional clarification and justification.  Allergies  Reviewed by Bill Peña RN on 5/14/2025        Severity Reactions Comments    Ace Inhibitors Not Specified Other     Lisinopril Not Specified Other             Patient accepts M2B at discharge. Pharmacy has been updated to [facility].    Sources  [facility]  Pharmacy dispense  history  Patient Interview Good historian     Additional Comments  N/A    Gianfranco Petty, PharmD  [facility]  [address]  [facility], Room# [address]  [place] [address]  Phone: [phone]  Please reach out via Secure Chat for questions, or if no response call [email or web address] or [employer]

## 2025-05-14 NOTE — ASSESSMENT & PLAN NOTE
Hortensia Rodriguez is a 59 y.o. female with a PMHx remarkable for COPD, tobacco dependence, hypothyroidism, left kidney stone s/p stent, ascending aorta aneurysm, depression, anxiety, HTN, HLD and newly dx Rectal Adenocarcinoma with lung mets that presented to [facility] ED after recent outpatient blood work showed hypokalemia and was further instructed to go to the ED for evaluation.    Updates 5/14/25:  -will check AM labs and replete electrolytes prn  -will monitor creatinine, give fluids prn  -urology consult for L hydronephrosis, calcified ureteral stent, recommending CT non-con abdomen pelvis tomorrow    #Hypokalemia  - K+ on admit 2.8, repleted with total 80mEq potassium  - EKG (5/13) showed sinus tach with left-axis devation, incomplete RBBB, nonspecific ST and T wave abnormality  - continue to monitor  - telemetry ordered    #KARLY  #UTI  #c/f worsening hydronephrosis  - likely 2/2 large calculus vs. periaortic lymphadenopathy  - CT A (5/13) showed no PE, L hydronephrosis  - CT A/P (5/13) showed rectal mass, persistant findings of encrusted L ureteral stent and large calculus with worsening associated L hydronephrosis, with L periaortic lymphadenopathy  - sCr. 1.44 on admit (5/13) (BL ~0.80), s/p 1 L NS x2 in ED (5/13)  - UA (5/13) +leuks, +bacteria, urine culture pending  - blood culture x2 (5/13) pending  - s/p IV Ceftriaxone 1g x1, continue Ceftriaxone 1 g daily on admit  -urology consult for L hydronephrosis, calcified ureteral stent, recommending CT non-con abdomen pelvis tomorrow    #Rectal Adenocarcinoma  - Oncologist Dr. Bernardo - notify in AM, Radiation Oncologist Dr. Sharp  - p/w abdominal pain in Jul 2023--> Rectal mass palpated. Colonoscopy showed a rectal mass. This was a large mass and it was felt to be a sampling error  - CT C/A/P (Jul 2023): Rectal wall thickening. Multiple lung nodules  - Was lost to follow up. Even when she p/to surgeon's office did not complete testing.   - CT C/A/P (Jan  2024): 15 mm BRIANA lung nodule (increased in size) + large rectal mass looks larger than before. L retroperitoneal LAP  - CT C/A/P (Jan 2025): Stable anorectal mass. Multiple lung nodules.   - MR Rectum (Jan 2025): Rectal mass about 9 cm in size extending to peritoneum? No LAPs  - 2/5/25: Bx revealed fragments of villous adenoma  - plans to discuss pt at tumor board 5/14/25 and for repeat MRI rectum, likely outpatient, to assess for local invasion    #HTN  - continue home amlodipine 10mg daily  - continue home metoprolol 25mg BID    #Hypothyroidism  - continue home levothyroxine 25 mcg daily     #Hypercholesteremia  - continue home ezetimibe 10 mg daily     #hx COPD  - continue home Symbicort (alternative Breo Ellipta) 2 puffs BID  - continue albuterol Q6    #Prophy  - Lovenox daily  - Protonix daily    DISPO  - Full Code- confirmed on admission  - NOK: Ayden Rodriguez () #[phone]

## 2025-05-14 NOTE — H&P
History Of Present Illness  Hortensia Rodriguez is a 59 y.o. female with a PMHx remarkable for COPD, tobacco dependence, hypothyroidism, left kidney stone s/p stent, ascending aorta aneurysm, depression, anxiety, HTN, HLD and newly dx Rectal Adenocarcinoma with lung mets that presented to [facility] ED after recent outpatient blood work showed hypokalemia and was further instructed to go to the ED for evaluation.    On admit, pt feeling average. She currently denies pain, but does endorse some recent dysuria and increased frequency with voiding that began a few days ago. She also endorses loose BMs and frequent mucousy discharge from the rectum, and she notes occasional arely blood in the discharge. D[place] further [place] [facility] [place] plans to investigate worsening hydronephrosis along with monitor potassium levels. Denies any exposure to recent sick contacts. Denies fever/chills, N/V/D/C, bruising, cough, congestion, SOB, RENEE, H/A or vision changes.     ED Course:  Vitals: T 36.3, , RR 24, /74, Spox 98% on RA  Labs: WBC 10.2, hgb 16.7, Na+ 135, K+ 2.8, chloride 96, sCr. 1.44 (BL 0.80), UA +leuks, +bacteria, urine culture pending, blood cultures x2 pending  Imaging: CT A (5/13) showed no PE, L hydronephrosis  CT A/P (5/13) showed rectal mass, persistant findings of encrusted L ureteral stent and large calculus with worsening associated L hydronephrosis  Medications: IV Ceftriaxone 1g x1, 1 L NS bolus x2, Potassium chloride 80mEq    Past Medical History  She has a past medical history of Abdominal pain (07/24/2023), Abnormal electrocardiogram (ECG) (EKG) (12/16/2015), KARLY (acute kidney injury) (09/19/2024), Anxiety, Anxiety disorder, unspecified (08/31/2015), Asthma, Complicated UTI (urinary tract infection) (07/24/2023), COPD (chronic obstructive pulmonary disease) (Multi), Delayed emergence from general anesthesia, Disease of thyroid gland, Encounter for preprocedural cardiovascular  examination (10/25/2015), Flank pain (07/24/2023), Hypertension, Hypomagnesemia (10/14/2015), Personal history of other diseases of the circulatory system (08/31/2015), Personal history of other diseases of the female genital tract (10/28/2015), Personal history of other diseases of urinary system (01/04/2016), Personal history of other diseases of urinary system (10/16/2015), Personal history of other specified conditions (10/16/2015), Personal history of other specified conditions (09/30/2015), Personal history of pneumonia (recurrent) (02/26/2019), Right hip pain (07/24/2023), and Strain of muscle, fascia and tendon of abdomen, initial encounter (09/17/2015).    Surgical History  She has a past surgical history that includes Foot surgery (08/31/2015); Appendectomy (08/31/2015); and ORIF ankle fracture.    Oncology History  - p/w abdominal pain in Jul 2023 => Rectal mass palpated. Colonoscopy showed a rectal mass: Bx showed tubulovillous adenoma. This was a large mass and it was felt to be a sampling error  - CT c/a/p (Jul 2023): Rectal wall thickening. Multiple lung nodules  - Was lost to follow up. Even when she p/to surgeon's office did not complete testing.   - CT c/a/p (Jan 2024): 15 mm BRIANA lung nodule (increased in size) + large rectal mass looks larger than before. L retroperitoneal LAP  - CT c/a/p (Jan 2025): Stable anorectal mass. Multiple lung nodules.   - MR Rectum (Jan 2025): Rectal mass about 9 cm in size extending to peritoneum? No LAPs  - 2/5/25: Bx revealed fragments of villous adenoma  - plans to discuss pt at tumor board 5/14/25 and plan to repeat MRI rectum to assess for local invasion    Social History  She reports that she has been smoking cigarettes. She has been exposed to tobacco smoke. She has never used smokeless tobacco. She reports current alcohol use. She reports that she does not use drugs.     Allergies  Ace inhibitors and Lisinopril    Review of Systems   Constitutional:  Positive  for fatigue.   HENT: Negative.     Eyes: Negative.    Respiratory: Negative.     Cardiovascular: Negative.    Gastrointestinal:  Positive for blood in stool and diarrhea.   Endocrine: Negative.    Genitourinary:  Positive for dysuria and urgency.   Musculoskeletal: Negative.    Allergic/Immunologic: Negative.    Neurological: Negative.    Hematological: Negative.    Psychiatric/Behavioral: Negative.          Physical Exam  Vitals reviewed.   Constitutional:       Appearance: She is ill-appearing.   HENT:      Head: Normocephalic and atraumatic.      Nose: Nose normal.      Mouth/Throat:      Mouth: Mucous membranes are moist.      Pharynx: Oropharynx is clear.   Eyes:      Extraocular Movements: Extraocular movements intact.      Pupils: Pupils are equal, round, and reactive to light.   Cardiovascular:      Rate and Rhythm: Normal rate and regular rhythm.      Pulses: Normal pulses.      Heart sounds: Normal heart sounds.   Pulmonary:      Effort: Pulmonary effort is normal.      Breath sounds: Normal breath sounds.   Abdominal:      General: Bowel sounds are normal.      Palpations: Abdomen is soft.   Musculoskeletal:         General: Normal range of motion.   Skin:     General: Skin is warm.      Coloration: Skin is pale.   Neurological:      General: No focal deficit present.      Mental Status: She is alert and oriented to person, place, and time. Mental status is at baseline.      Motor: Weakness present.   Psychiatric:         Mood and Affect: Mood normal.         Behavior: Behavior normal.          Last Recorded Vitals  There were no vitals taken for this visit.    Relevant Results  Scheduled medications  Scheduled Medications[1]  Continuous medications  Continuous Medications[2]  PRN medications  PRN Medications[3]      Assessment/Plan   Assessment & Plan  Hypokalemia  Hortensia Rodriguez is a 59 y.o. female with a PMHx remarkable for COPD, tobacco dependence, hypothyroidism, left kidney stone s/p stent,  ascending aorta aneurysm, depression, anxiety, HTN, HLD and newly dx Rectal Adenocarcinoma with lung mets that presented to [facility] ED after recent outpatient blood work showed hypokalemia and was further instructed to go to the ED for evaluation.    #Hypokalemia  - K+ on admit 2.8, repleted with total 80mEq potassium  - EKG (5/13) showed sinus tach with left-axis devation, incomplete RBBB, nonspecific ST and T wave abnormality  - continue to monitor  - telemetry ordered    #KARLY  #UTI  #c/f worsening hydronephrosis  - likely 2/2 large calculus vs. periaortic lymphadenopathy  - CT A (5/13) showed no PE, L hydronephrosis  - CT A/P (5/13) showed rectal mass, persistant findings of encrusted L ureteral stent and large calculus with worsening associated L hydronephrosis, with L periaortic lymphadenopathy  - sCr. 1.44 on admit (5/13) (BL ~0.80), s/p 1 L NS x2 in ED (5/13)  - UA (5/13) +leuks, +bacteria, urine culture pending  - blood culture x2 (5/13) pending  - s/p IV Ceftriaxone 1g x1, continue Ceftriaxone 1 g daily on admit  - consider Nephrology consult/IR consult in AM for encrusted stent removal/exchange    #Rectal Adenocarcinoma  - Oncologist Dr. Bernardo - notify in AM, Radiation Oncologist Dr. Sharp  - p/w abdominal pain in Jul 2023--> Rectal mass palpated. Colonoscopy showed a rectal mass. This was a large mass and it was felt to be a sampling error  - CT C/A/P (Jul 2023): Rectal wall thickening. Multiple lung nodules  - Was lost to follow up. Even when she p/to surgeon's office did not complete testing.   - CT C/A/P (Jan 2024): 15 mm BRIANA lung nodule (increased in size) + large rectal mass looks larger than before. L retroperitoneal LAP  - CT C/A/P (Jan 2025): Stable anorectal mass. Multiple lung nodules.   - MR Rectum (Jan 2025): Rectal mass about 9 cm in size extending to peritoneum? No LAPs  - 2/5/25: Bx revealed fragments of villous adenoma  - plans to discuss pt at tumor board 5/14/25 and for repeat MRI rectum  to assess for local invasion    #HTN  - continue home amlodipine 10mg daily  - continue home metoprolol 25mg BID    #Hypothyroidism  - continue home levothyroxine 25 mcg daily     #Hypercholesteremia  - continue home ezetimibe 10 mg daily     #hx COPD  - continue home Symbicort (alternative Breo Ellipta) 2 puffs BID  - continue albuterol Q6    #Prophy  - Lovenox daily, on HOLD for possible procedure (unhold as necessary)  - Protonix daily    DISPO  - Full Code- confirmed on admission  - NOK: Ayden Rodriguez () #[phone]    I spent 90 minutes in the professional and overall care of this patient.    Trina Meehan, APRN-CNP       [1] amLODIPine, 10 mg, oral, Daily  cefTRIAXone, 1 g, intravenous, q24h  [Held by provider] enoxaparin, 40 mg, subcutaneous, q24h  ezetimibe, 10 mg, oral, Daily  fluticasone furoate-vilanteroL, 1 puff, inhalation, Daily  levothyroxine, 25 mcg, oral, Daily  metoprolol tartrate, 25 mg, oral, BID  [2]    [3] PRN medications: acetaminophen, albuterol

## 2025-05-14 NOTE — ED PROVIDER NOTES
HPI   Chief Complaint   Patient presents with    Abnormal Labs- Low K+       History of present illness:  59-year-old female presents emergency room for complaints of low potassium.  The patient states that she was diagnosed with rectal cancer and she states she recently had a PET scan done which unfortunately showed metastases to the lungs.  She has a past medical history of anxiety asthma COPD she is a daily smoker and also has a history of ischemic heart disease and fatigue.  The patient states that she has been's been going for testing was contacted today after she had some blood work drawn which showed that her potassium was apparently low.  She was encouraged to come here to the emergency room this time.  She states she has not noticed any other symptoms.    Social history: Negative for alcohol and drug use.    Review of systems:   Gen.: No weight loss,  fever.   Eyes: No vision loss, double vision, drainage, eye pain.   ENT: No pharyngitis, neck pain, headache  Cardiac: No chest pain, palpitations, syncope, near syncope.   Pulmonary: No shortness of breath, cough, hemoptysis.   Heme/lymph: No swollen glands, fever, bleeding.   GI: No abdominal pain, change in bowel habits, melena, hematemesis, hematochezia, nausea, vomiting, diarrhea.   : No discharge, dysuria, frequency, urgency, hematuria.   Musculoskeletal: No limb pain, joint pain, joint swelling.   Skin: No rashes.   Review of systems is otherwise negative unless stated above or in history of present illness.      Physical exam:  General: Vitals noted, no distress. Afebrile.   EENT: No lymphadenopathy appreciated  Cardiac: Regular, rate, rhythm, no murmur.   Pulmonary: Lungs clear bilaterally with good aeration. No adventitious breath sounds.   Abdomen: Soft, nonsurgical. Nontender. No peritoneal signs. Normoactive bowel sounds.   Extremities: No peripheral edema.   Skin: No rash.   Neuro: No focal neurologic deficits        Medical decision making:    Testing: Lactate 1.4 urinalysis shows concerns for UTI troponin 6 magnesium 2.26 CMP shows KARLY and potassium of 2.3 CBC shows normal white count  Plan:  59-year-old female presents emergency room for complaints of low potassium.  The patient states that she was diagnosed with rectal cancer and she states she recently had a PET scan done which unfortunately showed metastases to the lungs.  She has a past medical history of anxiety asthma COPD she is a daily smoker and also has a history of ischemic heart disease and fatigue.  The patient states that she has been's been going for testing was contacted today after she had some blood work drawn which showed that her potassium was apparently low.  She was encouraged to come here to the emergency room this time.  She states she has not noticed any other symptoms. Cardiac: Regular, rate, rhythm, no murmur.   Pulmonary: Lungs clear bilaterally with good aeration. No adventitious breath sounds.   Abdomen: Soft, nonsurgical. Nontender. No peritoneal signs. Normoactive bowel sounds.  Upon review of the laboratory testing I spoke with the patient at bedside and CT scan of the chest for PE study was ordered which did not show any acute findings interpreted radiology given the patient's tachycardia.  CT scan of the abdomen pelvis without contrast was ordered after the scan of the chest showed concerns for possible hydronephrosis on the left kidney is not completely visualized.  The abdominal scan is interpreted by radiology showed worsening hydronephrosis on the left side and concerns for enlarging calculus in the left kidney.  I spoke with the urologist on-call at  [facility] who reviewed the case and requested oncology excepted patient.  I spoke with the oncology team at [facility] who accepted the patient at this time the patient be transferred downtown for further care and treatment.  Impression:   1.  Hypokalemia  2.  Tachycardia  3.  Rectal cancer    EKG taken on  March 13, 2025 1327 shows tachycardia 117 left axis deviation, incomplete right bundle branch block no STEMI no T wave flattening or inversion        History provided by:  Patient   used: No            Patient History   Medical History[1]  Surgical History[2]  Family History[3]  Social History[4]    Physical Exam   ED Triage Vitals   Temperature Heart Rate Respirations BP   05/13/25 1322 05/13/25 1322 05/13/25 1322 05/13/25 1322   36.3 °C (97.3 °F) (!) 138 (!) 24 119/74      Pulse Ox Temp Source Heart Rate Source Patient Position   05/13/25 1322 05/13/25 1322 05/13/25 2030 --   98 % Temporal Monitor       BP Location FiO2 (%)     -- --             Physical Exam      ED Course & MDM   Diagnoses as of 05/13/25 2140   Rectal cancer (Multi)   Hydronephrosis, unspecified hydronephrosis type   Hypokalemia                 No data recorded     Eyal Coma Scale Score: 15 (05/13/25 1322 : Devi Hauser RN)                           Medical Decision Making      Procedure  Procedures         [1]   Past Medical History:  Diagnosis Date    Abdominal pain 07/24/2023    Abnormal electrocardiogram (ECG) (EKG) 12/16/2015    Abnormal ECG    KARLY (acute kidney injury) 09/19/2024    Anxiety     Anxiety disorder, unspecified 08/31/2015    Anxiety    Asthma     Complicated UTI (urinary tract infection) 07/24/2023    COPD (chronic obstructive pulmonary disease) (Multi)     Delayed emergence from general anesthesia     Disease of thyroid gland     Encounter for preprocedural cardiovascular examination 10/25/2015    Pre-operative cardiovascular examination    Flank pain 07/24/2023    Hypertension     Hypomagnesemia 10/14/2015    Hypomagnesemia    Personal history of other diseases of the circulatory system 08/31/2015    History of chronic ischemic heart disease    Personal history of other diseases of the female genital tract 10/28/2015    History of ovarian cyst    Personal history of other diseases of urinary system  01/04/2016    History of hematuria    Personal history of other diseases of urinary system 10/16/2015    History of kidney disease    Personal history of other specified conditions 10/16/2015    History of pelvic mass    Personal history of other specified conditions 09/30/2015    History of fatigue    Personal history of pneumonia (recurrent) 02/26/2019    History of pneumonia    Right hip pain 07/24/2023    Strain of muscle, fascia and tendon of abdomen, initial encounter 09/17/2015    Abdominal muscle strain   [2]   Past Surgical History:  Procedure Laterality Date    APPENDECTOMY  08/31/2015    Appendectomy    FOOT SURGERY  08/31/2015    Foot Surgery    ORIF ANKLE FRACTURE     [3]   Family History  Problem Relation Name Age of Onset    No Known Problems Mother      No Known Problems Father      Breast cancer Sister     [4]   Social History  Tobacco Use    Smoking status: Every Day     Current packs/day: 0.50     Types: Cigarettes     Passive exposure: Current    Smokeless tobacco: Never   Vaping Use    Vaping status: Never Used   Substance Use Topics    Alcohol use: Yes     Comment: occasional    Drug use: Never        Patric Xiao PA-C  05/14/25 1243

## 2025-05-14 NOTE — PROGRESS NOTES
Hortensia Rodriguez is a 59 y.o. female on day 0 of admission presenting with Hypokalemia.      Subjective   Ms. Rodriguez seen sitting up in bed today. She denies fever, chills, chest pain, shortness of breath. Has chronic bloody stools/diarrhea. Notes burning on urination.        Objective     Last Recorded Vitals  /69   Pulse 98   Temp 37 °C (98.6 °F) (Temporal)   Resp 16   Wt 67.8 kg (149 lb 7.6 oz)   SpO2 96%   Intake/Output last 3 Shifts:    Intake/Output Summary (Last 24 hours) at 5/14/2025 1010  Last data filed at 5/14/2025 0913  Gross per 24 hour   Intake 30 ml   Output --   Net 30 ml       Admission Weight  Weight: 67.8 kg (149 lb 7.6 oz) (05/14/25 0451)    Daily Weight  05/14/25 : 67.8 kg (149 lb 7.6 oz)    Image Results  CT abdomen pelvis wo IV contrast  Narrative: Interpreted By:  Madhav Rosenberg,   STUDY:  CT ABDOMEN PELVIS WO IV CONTRAST;  5/13/2025 3:30 pm      INDICATION:  Signs/Symptoms:left sided hydroneprhosis.      COMPARISON:  01/17/2025.      ACCESSION NUMBER(S):  [id number]      ORDERING CLINICIAN:  MATEUS MELARA      TECHNIQUE:  CT of the abdomen and pelvis was performed.  Contiguous axial images  were obtained at 3 mm slice thickness through the abdomen and pelvis.  Coronal and sagittal reconstructions at 3 mm slice thickness were  performed. The patient received no intravenous contrast. Please note  evaluation of the solid organs and vessels is limited in the absence  of intravenous contrast. Given this caveat, the following  observations are made:      FINDINGS:  LOWER CHEST:  Mild bibasilar atelectasis. Coronary atherosclerosis.      ABDOMEN:  Please note there is still residual contrast still present from the  intravenous contrast injection from CT of the chest 40 minutes prior.      LIVER:  Unremarkable      BILE DUCTS:  Not dilated.      GALLBLADDER:  No calcified stone or definite inflammation.      PANCREAS:  Unremarkable      SPLEEN:  Unremarkable      ADRENAL  GLANDS:  Bilateral low-attenuation adrenal nodule compatible with adenomas.      KIDNEYS AND URETERS:  There is cortical scarring involving the lower pole of the right  kidney. Otherwise unremarkable right kidney without hydronephrosis.      There is a large left renal peripelvic cyst. There is a 25 mm left  renal pelvis calculus causing severe hydronephrosis. Additional large  caliceal calculi on yesterday's PET/CT are obscured by contrast.  There is a left ureteral stent in place, proximal margin encased by  the renal pelvis stone which previously measuredd 23 mm..      PELVIS:      BLADDER:  Distal margin of left ureteral stent appears to be encased by a 39 mm  calculus. This is similar to prior exam.      REPRODUCTIVE ORGANS:  Uterus is present.      BOWEL:  There appears to be a circumferential low rectal mass, extending into  the anal canal. No findings of bowel obstruction otherwise  identified. Appendix not identified. Unremarkable mesentery.      VESSELS:  No abdominal aortic aneurysm.Severe atherosclerosis. Abdominal aorta  are grossly appears patent. IVC and major portal venous branches  grossly appear patent.      PERITONEUM AND RETROPERITONEUM:  No free fluid or free air.  Clustered enlarged left periaortic lymph nodes measuring up to 10 mm  short axis.      BONE AND ABDOMINAL WALL:  Severe degenerative changes of the hips. Severe, lower lumbar  predominant degenerative changes of the spine.      Impression: 1.  Large low rectal mass better characterized on yesterday's PET/CT  and recent dedicated rectal MRI.  2. Persistent findings of encrusted left ureteral stent compared to  CT 4 months ago. Large calculus encasing the proximal margin in the  renal pelvis is slightly enlarged. Large calculus encasing the distal  end in the urinary bladder is not significantly changed. Associated  left hydronephrosis has worsened and is now severe.  3. Left periaortic lymphadenopathy better characterized on CT  PET/CT  from yesterday.      MACRO:  None.      Signed by: Madhav Rosenberg 5/13/2025 3:47 PM  Dictation workstation:   [id number]  ECG 12 lead  Sinus tachycardia  Possible Left atrial enlargement  Left axis deviation  Incomplete right bundle branch block  Nonspecific ST and T wave abnormality  Abnormal ECG  When compared with ECG of 28-DEC-2024 16:23,  ST now depressed in Anterior leads  CT angio chest for pulmonary embolism  Narrative: Interpreted By:  John Callahan,   STUDY:  CT ANGIO CHEST FOR PULMONARY EMBOLISM;  5/13/2025 2:43 pm      INDICATION:  Signs/Symptoms:tachycardia, sob, recent diagnosis of stage 4 rectal  cancer.          COMPARISON:  01/17/2025      ACCESSION NUMBER(S):  [id number]      ORDERING CLINICIAN:  MATEUS MELARA      TECHNIQUE:  Helical data acquisition of the chest was obtained with 75 mL  Omnipaque 350. Images were reformatted in coronal and sagittal  planes. Axial and coronal MIP images were created and reviewed.      FINDINGS:  POTENTIAL LIMITATIONS OF THE STUDY: None      HEART AND VESSELS:  No discrete filling defects within the main pulmonary artery or its  branches.      Main pulmonary artery and its branches are normal in caliber.      The thoracic aorta is of normal course and caliber without vascular  calcifications.      Coronary artery calcifications are seen.The study is not optimized  for evaluation of coronary arteries.      The cardiac chambers are not enlarged.      No evidence of pericardial effusion.      MEDIASTINUM AND DARRIAN, LOWER NECK AND AXILLA:  The visualized thyroid gland is within normal limits.      No evidence of thoracic lymphadenopathy by CT criteria.      Esophagus appears within normal limits as seen.      LUNGS AND AIRWAYS:  The trachea and central airways are patent. No endobronchial lesion.      Lungs are clear.      UPPER ABDOMEN:  Left hydronephrosis partly visualized. Exophytic cyst off the upper  pole of the left kidney.      CHEST WALL AND OSSEOUS  STRUCTURES:  There are no suspicious osseous lesions. Multilevel degenerative  changes are present      Impression: 1.  No pulmonary emboli or confluent airspace disease.  2. Left hydronephrosis partly visualized.          MACRO:  None      Signed by: John Callahan 5/13/2025 2:50 PM  Dictation workstation:   [id number]  NM PET CT bone skull base to mid thigh  Narrative: Interpreted By:  Liz Stephens and Kaur Arashdeep   STUDY:  NM PET CT SKULL BASE TO MID THIGH;  5/12/2025 2:39 pm      INDICATION:  Signs/Symptoms:Rectal cancer. 59-year-old female with a history of  rectal adenocarcinoma. MRI rectum dated 09/25/2023 revealed 6 cm  rectal mass approximately 2.8 cm from anal verge. CT chest abdomen  and pelvis dated 01/17/2025 shows stable pulmonary nodules including  9 mm left upper lobe nodule, bilateral adrenal nodule and irregular  circumferential wall thickening involving distal rectum.      COMPARISON:  MRI rectum dated 01/31/2025. CT chest, abdomen and pelvis dated  01/17/2025..      ACCESSION NUMBER(S):  [id number]      ORDERING CLINICIAN:  MANDA HUBBARD      TECHNIQUE:  DIVISION OF NUCLEAR MEDICINE  POSITRON EMISSION TOMOGRAPHY (PET-CT)      The patient received an intravenous dose of 12.0 mCi of Fluorine-18  fluorodeoxyglucose (FDG).  Positron emission tomographic (PET) images  from mid thigh to skull base were then acquired after a one hour  delay. Also acquired was a contemporaneous low dose non-contrast CT  scan performed for attenuation correction of PET images and anatomic  localization.  The PET and CT images were digitally fused for  display.  All images were acquired on a combined PET-CT scanner unit.  Some areas of FDG accumulation may be described in standardized  uptake value (SUV) units.      CODING:  Initial Treatment Strategy (PI)      CALIBRATION:  Dose Injection-to-Scan Interval (mins): 50 min  Mediastinal bloodpool SUV (normal 1.5-2.5): 3.1  Blood glucose: 98 mg/dL      FINDINGS:  HEAD  AND NECK:  * No evidence of focal FDG avid lesion in the partially visualized  brain parenchyma, noting that evaluation is limited because of the  expected physiologic diffuse FDG uptake in the brain. * No focal FDG  avid  soft tissue lesion is seen in the neck. * No FDG avid  cervical  lymphadenopathy is present. *Focal FDG uptake with a SUV max 4.4  within right thyroid lobe. Left thyroid lobe is unremarkable.          CHEST:  *No focal FDG uptake corresponding to subcentimeter pulmonary nodule  seen on CT dated 01/17/2025. Given the small size is below the  resolution of PET for further characterization. Otherwise, no focal  FDG avid  lesion is seen in the lung parenchyma. * No evidence of FDG  avid mediastinal, hilar or axillary lymphadenopathy. *Note is made of  a hiatal hernia.          ABDOMEN AND PELVIS:  *Intense FDG avid circumferential thickening involving anorectal  region with a SUV max 14.8, corresponding to lesion seen on MRI  rectum dated 01/31/2025. The lesion is seen involving posterior wall  of the urinary bladder and adjacent peritoneum. *FDG avid left  para-aortic lymph nodes with a SUV max 4.2 are seen. *Mildly FDG avid  bilateral adrenal nodules with a SUV max 3.82 on left and 3.2 on  right. *Physiologic radiotracer uptake is present in the liver and  spleen with excretion into the bowel loops and the genitourinary  tract. Note is made of photopenic left renal cortical cysts. Note is  made of left ureteral stent.          MUSCULOSKELETAL:  *No focal FDG avid  lesion is seen in the axial or appendicular to  suggest osseous metastasis.          Impression: 1. Intensely FDG avid circumferential thickening involving anorectal  region with extensions as described, consistent with neoplasm.  2. FDG avid left para-aortic lymph nodes, consistent with oumar  metastasis.  3. Mildly FDG avid bilateral adrenal nodules, likely benign.  4. No focal FDG uptake corresponding to subcentimeter  bilateral  pulmonary nodule seen on CT. Follow-up with diagnostic CT chest to  document interval resolution/stability.  5. No FDG avid osseous metastatic disease.  6. FDG avid focus within right thyroid lobe. Correlate with  ultrasound thyroid.      I personally reviewed the images/study and I agree with the findings  as stated by Payam Lopez MD.  This study was interpreted at  [facility], [place], [place].      Signed by: Liz Jessielaurent 5/13/2025 12:20 PM  Dictation workstation:   [id number]      Physical Exam  Constitutional:       Appearance: She is not ill-appearing.   HENT:      Head: Normocephalic.      Mouth/Throat:      Mouth: Mucous membranes are moist.      Comments: Poor dentition  Eyes:      General:         Right eye: No discharge.         Left eye: No discharge.   Cardiovascular:      Rate and Rhythm: Normal rate and regular rhythm.      Pulses: Normal pulses.      Heart sounds: Normal heart sounds. No murmur heard.     No friction rub. No gallop.   Pulmonary:      Effort: Pulmonary effort is normal. No respiratory distress.      Breath sounds: Normal breath sounds. No stridor. No wheezing, rhonchi or rales.   Abdominal:      General: Abdomen is flat. Bowel sounds are normal. There is no distension.      Palpations: Abdomen is soft. There is no mass.      Tenderness: There is no abdominal tenderness. There is no guarding.   Musculoskeletal:      Right lower leg: No edema.      Left lower leg: No edema.   Neurological:      Mental Status: She is alert and oriented to person, place, and time. Mental status is at baseline.      Cranial Nerves: No cranial nerve deficit.      Sensory: No sensory deficit.      Motor: No weakness.      Coordination: Coordination normal.      Gait: Gait normal.      Deep Tendon Reflexes: Reflexes normal.         Relevant Results  Scheduled medications  Scheduled Medications[1]  Continuous medications  Continuous Medications[2]  PRN  medications  PRN Medications[3]  Results for orders placed or performed during the hospital encounter of 05/14/25 (from the past 24 hours)   CBC and Auto Differential   Result Value Ref Range    WBC 6.8 4.4 - 11.3 x10*3/uL    nRBC 0.0 0.0 - 0.0 /100 WBCs    RBC 4.80 4.00 - 5.20 x10*6/uL    Hemoglobin 14.1 12.0 - 16.0 g/dL    Hematocrit 41.5 36.0 - 46.0 %    MCV 87 80 - 100 fL    MCH 29.4 26.0 - 34.0 pg    MCHC 34.0 32.0 - 36.0 g/dL    RDW 14.4 11.5 - 14.5 %    Platelets 321 150 - 450 x10*3/uL    Neutrophils % 59.4 40.0 - 80.0 %    Immature Granulocytes %, Automated 0.4 0.0 - 0.9 %    Lymphocytes % 29.1 13.0 - 44.0 %    Monocytes % 9.2 2.0 - 10.0 %    Eosinophils % 1.5 0.0 - 6.0 %    Basophils % 0.4 0.0 - 2.0 %    Neutrophils Absolute 4.02 1.20 - 7.70 x10*3/uL    Immature Granulocytes Absolute, Automated 0.03 0.00 - 0.70 x10*3/uL    Lymphocytes Absolute 1.97 1.20 - 4.80 x10*3/uL    Monocytes Absolute 0.62 0.10 - 1.00 x10*3/uL    Eosinophils Absolute 0.10 0.00 - 0.70 x10*3/uL    Basophils Absolute 0.03 0.00 - 0.10 x10*3/uL     CT abdomen pelvis wo IV contrast  Result Date: 5/13/2025  Interpreted By:  Madhav Rosenberg, STUDY: CT ABDOMEN PELVIS WO IV CONTRAST;  5/13/2025 3:30 pm   INDICATION: Signs/Symptoms:left sided hydroneprhosis.   COMPARISON: 01/17/2025.   ACCESSION NUMBER(S): [id number]   ORDERING CLINICIAN: MATEUS MELARA   TECHNIQUE: CT of the abdomen and pelvis was performed.  Contiguous axial images were obtained at 3 mm slice thickness through the abdomen and pelvis. Coronal and sagittal reconstructions at 3 mm slice thickness were performed. The patient received no intravenous contrast. Please note evaluation of the solid organs and vessels is limited in the absence of intravenous contrast. Given this caveat, the following observations are made:   FINDINGS: LOWER CHEST: Mild bibasilar atelectasis. Coronary atherosclerosis.   ABDOMEN: Please note there is still residual contrast still present from the  intravenous contrast injection from CT of the chest 40 minutes prior.   LIVER: Unremarkable   BILE DUCTS: Not dilated.   GALLBLADDER: No calcified stone or definite inflammation.   PANCREAS: Unremarkable   SPLEEN: Unremarkable   ADRENAL GLANDS: Bilateral low-attenuation adrenal nodule compatible with adenomas.   KIDNEYS AND URETERS: There is cortical scarring involving the lower pole of the right kidney. Otherwise unremarkable right kidney without hydronephrosis.   There is a large left renal peripelvic cyst. There is a 25 mm left renal pelvis calculus causing severe hydronephrosis. Additional large caliceal calculi on yesterday's PET/CT are obscured by contrast. There is a left ureteral stent in place, proximal margin encased by the renal pelvis stone which previously measuredd 23 mm..   PELVIS:   BLADDER: Distal margin of left ureteral stent appears to be encased by a 39 mm calculus. This is similar to prior exam.   REPRODUCTIVE ORGANS: Uterus is present.   BOWEL: There appears to be a circumferential low rectal mass, extending into the anal canal. No findings of bowel obstruction otherwise identified. Appendix not identified. Unremarkable mesentery.   VESSELS: No abdominal aortic aneurysm.Severe atherosclerosis. Abdominal aorta are grossly appears patent. IVC and major portal venous branches grossly appear patent.   PERITONEUM AND RETROPERITONEUM: No free fluid or free air. Clustered enlarged left periaortic lymph nodes measuring up to 10 mm short axis.   BONE AND ABDOMINAL WALL: Severe degenerative changes of the hips. Severe, lower lumbar predominant degenerative changes of the spine.       1.  Large low rectal mass better characterized on yesterday's PET/CT and recent dedicated rectal MRI. 2. Persistent findings of encrusted left ureteral stent compared to CT 4 months ago. Large calculus encasing the proximal margin in the renal pelvis is slightly enlarged. Large calculus encasing the distal end in the urinary  bladder is not significantly changed. Associated left hydronephrosis has worsened and is now severe. 3. Left periaortic lymphadenopathy better characterized on CT PET/CT from yesterday.   MACRO: None.   Signed by: Madhav Rosenberg 5/13/2025 3:47 PM Dictation workstation:   [id number]    ECG 12 lead  Result Date: 5/13/2025  Sinus tachycardia Possible Left atrial enlargement Left axis deviation Incomplete right bundle branch block Nonspecific ST and T wave abnormality Abnormal ECG When compared with ECG of 28-DEC-2024 16:23, ST now depressed in Anterior leads    CT angio chest for pulmonary embolism  Result Date: 5/13/2025  Interpreted By:  John Callahan, STUDY: CT ANGIO CHEST FOR PULMONARY EMBOLISM;  5/13/2025 2:43 pm   INDICATION: Signs/Symptoms:tachycardia, sob, recent diagnosis of stage 4 rectal cancer.     COMPARISON: 01/17/2025   ACCESSION NUMBER(S): [id number]   ORDERING CLINICIAN: MATEUS MELARA   TECHNIQUE: Helical data acquisition of the chest was obtained with 75 mL Omnipaque 350. Images were reformatted in coronal and sagittal planes. Axial and coronal MIP images were created and reviewed.   FINDINGS: POTENTIAL LIMITATIONS OF THE STUDY: None   HEART AND VESSELS: No discrete filling defects within the main pulmonary artery or its branches.   Main pulmonary artery and its branches are normal in caliber.   The thoracic aorta is of normal course and caliber without vascular calcifications.   Coronary artery calcifications are seen.The study is not optimized for evaluation of coronary arteries.   The cardiac chambers are not enlarged.   No evidence of pericardial effusion.   MEDIASTINUM AND DARRIAN, LOWER NECK AND AXILLA: The visualized thyroid gland is within normal limits.   No evidence of thoracic lymphadenopathy by CT criteria.   Esophagus appears within normal limits as seen.   LUNGS AND AIRWAYS: The trachea and central airways are patent. No endobronchial lesion.   Lungs are clear.   UPPER ABDOMEN: Left  hydronephrosis partly visualized. Exophytic cyst off the upper pole of the left kidney.   CHEST WALL AND OSSEOUS STRUCTURES: There are no suspicious osseous lesions. Multilevel degenerative changes are present       1.  No pulmonary emboli or confluent airspace disease. 2. Left hydronephrosis partly visualized.     MACRO: None   Signed by: John Callahan 5/13/2025 2:50 PM Dictation workstation:   [id number]    NM PET CT bone skull base to mid thigh  Result Date: 5/13/2025  Interpreted By:  Liz Stephens and Kaur Arashdeep STUDY: NM PET CT SKULL BASE TO MID THIGH;  5/12/2025 2:39 pm   INDICATION: Signs/Symptoms:Rectal cancer. 59-year-old female with a history of rectal adenocarcinoma. MRI rectum dated 09/25/2023 revealed 6 cm rectal mass approximately 2.8 cm from anal verge. CT chest abdomen and pelvis dated 01/17/2025 shows stable pulmonary nodules including 9 mm left upper lobe nodule, bilateral adrenal nodule and irregular circumferential wall thickening involving distal rectum.   COMPARISON: MRI rectum dated 01/31/2025. CT chest, abdomen and pelvis dated 01/17/2025..   ACCESSION NUMBER(S): [id number]   ORDERING CLINICIAN: MANDA HUBBARD   TECHNIQUE: DIVISION OF NUCLEAR MEDICINE POSITRON EMISSION TOMOGRAPHY (PET-CT)   The patient received an intravenous dose of 12.0 mCi of Fluorine-18 fluorodeoxyglucose (FDG).  Positron emission tomographic (PET) images from mid thigh to skull base were then acquired after a one hour delay. Also acquired was a contemporaneous low dose non-contrast CT scan performed for attenuation correction of PET images and anatomic localization.  The PET and CT images were digitally fused for display.  All images were acquired on a combined PET-CT scanner unit. Some areas of FDG accumulation may be described in standardized uptake value (SUV) units.   CODING: Initial Treatment Strategy (PI)   CALIBRATION: Dose Injection-to-Scan Interval (mins): 50 min Mediastinal bloodpool SUV (normal  1.5-2.5): 3.1 Blood glucose: 98 mg/dL   FINDINGS: HEAD AND NECK: * No evidence of focal FDG avid lesion in the partially visualized brain parenchyma, noting that evaluation is limited because of the expected physiologic diffuse FDG uptake in the brain. * No focal FDG avid  soft tissue lesion is seen in the neck. * No FDG avid  cervical lymphadenopathy is present. *Focal FDG uptake with a SUV max 4.4 within right thyroid lobe. Left thyroid lobe is unremarkable.     CHEST: *No focal FDG uptake corresponding to subcentimeter pulmonary nodule seen on CT dated 01/17/2025. Given the small size is below the resolution of PET for further characterization. Otherwise, no focal FDG avid  lesion is seen in the lung parenchyma. * No evidence of FDG avid mediastinal, hilar or axillary lymphadenopathy. *Note is made of a hiatal hernia.     ABDOMEN AND PELVIS: *Intense FDG avid circumferential thickening involving anorectal region with a SUV max 14.8, corresponding to lesion seen on MRI rectum dated 01/31/2025. The lesion is seen involving posterior wall of the urinary bladder and adjacent peritoneum. *FDG avid left para-aortic lymph nodes with a SUV max 4.2 are seen. *Mildly FDG avid bilateral adrenal nodules with a SUV max 3.82 on left and 3.2 on right. *Physiologic radiotracer uptake is present in the liver and spleen with excretion into the bowel loops and the genitourinary tract. Note is made of photopenic left renal cortical cysts. Note is made of left ureteral stent.     MUSCULOSKELETAL: *No focal FDG avid  lesion is seen in the axial or appendicular to suggest osseous metastasis.         1. Intensely FDG avid circumferential thickening involving anorectal region with extensions as described, consistent with neoplasm. 2. FDG avid left para-aortic lymph nodes, consistent with oumar metastasis. 3. Mildly FDG avid bilateral adrenal nodules, likely benign. 4. No focal FDG uptake corresponding to subcentimeter bilateral pulmonary  nodule seen on CT. Follow-up with diagnostic CT chest to document interval resolution/stability. 5. No FDG avid osseous metastatic disease. 6. FDG avid focus within right thyroid lobe. Correlate with ultrasound thyroid.   I personally reviewed the images/study and I agree with the findings as stated by Payam Lopez MD.  This study was interpreted at [facility], [place], [place].   Signed by: Liz Stephens 5/13/2025 12:20 PM Dictation workstation:   [id number]           Assessment & Plan  Hypokalemia  Hortensia Rodriguez is a 59 y.o. female with a PMHx remarkable for COPD, tobacco dependence, hypothyroidism, left kidney stone s/p stent, ascending aorta aneurysm, depression, anxiety, HTN, HLD and newly dx Rectal Adenocarcinoma with lung mets that presented to OSH ED after recent outpatient blood work showed hypokalemia and was further instructed to go to the ED for evaluation.    Updates 5/14/25:  -will check AM labs and replete electrolytes prn  -will monitor creatinine, give fluids prn  -urology consult for L hydronephrosis, calcified ureteral stent, recommending CT non-con abdomen pelvis tomorrow    #Hypokalemia  - K+ on admit 2.8, repleted with total 80mEq potassium  - EKG (5/13) showed sinus tach with left-axis devation, incomplete RBBB, nonspecific ST and T wave abnormality  - continue to monitor  - telemetry ordered    #KARLY  #UTI  #c/f worsening hydronephrosis  - likely 2/2 large calculus vs. periaortic lymphadenopathy  - CT A (5/13) showed no PE, L hydronephrosis  - CT A/P (5/13) showed rectal mass, persistant findings of encrusted L ureteral stent and large calculus with worsening associated L hydronephrosis, with L periaortic lymphadenopathy  - sCr. 1.44 on admit (5/13) (BL ~0.80), s/p 1 L NS x2 in ED (5/13)  - UA (5/13) +leuks, +bacteria, urine culture pending  - blood culture x2 (5/13) pending  - s/p IV Ceftriaxone 1g x1, continue Ceftriaxone 1 g daily on  admit  -urology consult for L hydronephrosis, calcified ureteral stent, recommending CT non-con abdomen pelvis tomorrow    #Rectal Adenocarcinoma  - Oncologist Dr. Bernardo - notify in AM, Radiation Oncologist Dr. Sharp  - p/w abdominal pain in Jul 2023--> Rectal mass palpated. Colonoscopy showed a rectal mass. This was a large mass and it was felt to be a sampling error  - CT C/A/P (Jul 2023): Rectal wall thickening. Multiple lung nodules  - Was lost to follow up. Even when she p/to surgeon's office did not complete testing.   - CT C/A/P (Jan 2024): 15 mm BRIANA lung nodule (increased in size) + large rectal mass looks larger than before. L retroperitoneal LAP  - CT C/A/P (Jan 2025): Stable anorectal mass. Multiple lung nodules.   - MR Rectum (Jan 2025): Rectal mass about 9 cm in size extending to peritoneum? No LAPs  - 2/5/25: Bx revealed fragments of villous adenoma  - plans to discuss pt at tumor board 5/14/25 and for repeat MRI rectum, likely outpatient, to assess for local invasion    #HTN  - continue home amlodipine 10mg daily  - continue home metoprolol 25mg BID    #Hypothyroidism  - continue home levothyroxine 25 mcg daily     #Hypercholesteremia  - continue home ezetimibe 10 mg daily     #hx COPD  - continue home Symbicort (alternative Breo Ellipta) 2 puffs BID  - continue albuterol Q6    #Prophy  - Lovenox daily  - Protonix daily    DISPO  - Full Code- confirmed on admission  - NOK: Ayden Rodriguez () #[phone]    F: prn, s/p 2L NS at OSH  E: K>4, Mg>2  N: regular diet  A: PIV  GI: imodium for diarrhea  DVT: lovenox    Code: full code (confirmed on admission)  NOK: Ayden Rodriguez () #[phone]       Hilario Baer MD PGY1           [1] amLODIPine, 10 mg, oral, Daily  cefTRIAXone, 1 g, intravenous, q24h  [Held by provider] enoxaparin, 40 mg, subcutaneous, q24h  ezetimibe, 10 mg, oral, Daily  fluticasone furoate-vilanteroL, 1 puff, inhalation, Daily  levothyroxine, 25 mcg,  oral, Daily  metoprolol tartrate, 25 mg, oral, BID  pantoprazole, 40 mg, oral, Daily before breakfast  [2]    [3] PRN medications: acetaminophen, albuterol

## 2025-05-14 NOTE — SIGNIFICANT EVENT
Patient hooked up to telemetry monitor per order.     Tele strip printed and reviewed with second RN. Strip reflected in flowsheets and copy placed in paper chart.     Tele batteries full and monitor on and functioning appropriately.     A Simona, RN

## 2025-05-14 NOTE — CARE PLAN
Problem: Pain - Adult  Goal: Verbalizes/displays adequate comfort level or baseline comfort level  Outcome: Progressing     Problem: Safety - Adult  Goal: Free from fall injury  Outcome: Progressing     Problem: Discharge Planning  Goal: Discharge to home or other facility with appropriate resources  Outcome: Progressing     Problem: Chronic Conditions and Co-morbidities  Goal: Patient's chronic conditions and co-morbidity symptoms are monitored and maintained or improved  Outcome: Progressing     Problem: Nutrition  Goal: Nutrient intake appropriate for maintaining nutritional needs  Outcome: Progressing   The patient's goals for the shift include      The clinical goals for the shift include Remain safe and injury free

## 2025-05-14 NOTE — CONSULTS
Reason For Consult  Retained L ureteral stent in setting of KARLY     History Of Present Illness  Hortensia Rodriguez is a 59 y.o. female w/ PMH significant for COPD, hypothyroidism, left renal stone s/p ureteral stent placed by Dr. Muniz in July 2023, AAA, HTN, HLD, and rectal adenocarcinoma w/ lung mets presenting after outpatient lab workup revealed severe hypokalemia and was subsequently instructed to present to the hospital. Urology team was consulted for imaging findings concerning for retained encrusted stent and hydronephrosis in the setting of KARLY and UTI. Reportedly, patient did not follow up with urology for definitive stone treatment and the stent was never removed or exchanged since 2023. Afebrile and HDS. She denies abdominal/flank pain, chest pain, SOB, N/V, or fever/chills. Endorses urinary frequency and dysuria without urgency or hematuria. UA positive for LE and negative Nitrites. Urine cultures pending. Labs with improving renal function and no leukocytosis.      Past Medical History  She has a past medical history of Abdominal pain (07/24/2023), Abnormal electrocardiogram (ECG) (EKG) (12/16/2015), KARLY (acute kidney injury) (09/19/2024), Anxiety, Anxiety disorder, unspecified (08/31/2015), Asthma, Complicated UTI (urinary tract infection) (07/24/2023), COPD (chronic obstructive pulmonary disease) (Multi), Delayed emergence from general anesthesia, Disease of thyroid gland, Encounter for preprocedural cardiovascular examination (10/25/2015), Flank pain (07/24/2023), Hypertension, Hypomagnesemia (10/14/2015), Personal history of other diseases of the circulatory system (08/31/2015), Personal history of other diseases of the female genital tract (10/28/2015), Personal history of other diseases of urinary system (01/04/2016), Personal history of other diseases of urinary system (10/16/2015), Personal history of other specified conditions (10/16/2015), Personal history of other specified conditions  "(09/30/2015), Personal history of pneumonia (recurrent) (02/26/2019), Right hip pain (07/24/2023), and Strain of muscle, fascia and tendon of abdomen, initial encounter (09/17/2015).    Surgical History  She has a past surgical history that includes Foot surgery (08/31/2015); Appendectomy (08/31/2015); and ORIF ankle fracture.     Social History  She reports that she has been smoking cigarettes. She has been exposed to tobacco smoke. She has never used smokeless tobacco. She reports current alcohol use. She reports that she does not use drugs.    Family History  Family History[1]     Allergies  Ace inhibitors and Lisinopril    Review of Systems  None other than HPI     Physical Exam  General: resting comfortably in bed  Neuro: alert and oriented, no obvious focal deficit   Head/Neck: normocephalic, atraumatic  ENT: moist mucous membranes  CV: regular rate and rhythm  Pulm: nonlabored breathing on room air, no conversational dyspnea  Abd: soft, nondistended, nontender  MSK: MCKEON, no gross deformities  Psych: mood and behavior normal       Last Recorded Vitals  Blood pressure 117/69, pulse 98, temperature 37 °C (98.6 °F), temperature source Temporal, resp. rate 16, height 1.591 m (5' 2.64\"), weight 67.8 kg (149 lb 7.6 oz), SpO2 96%.    Relevant Results      CT abd/pel 5/13:  1.  Large low rectal mass better characterized on yesterday's PET/CT  and recent dedicated rectal MRI.  2. Persistent findings of encrusted left ureteral stent compared to  CT 4 months ago. Large calculus encasing the proximal margin in the  renal pelvis is slightly enlarged. Large calculus encasing the distal  end in the urinary bladder is not significantly changed. Associated  left hydronephrosis has worsened and is now severe.  3. Left periaortic lymphadenopathy better characterized on CT PET/CT  from yesterday.     Assessment/Plan     Hortensia Rodriguez is a 59 y.o. female w/ PMH significant for COPD, hypothyroidism, left renal stone s/p " ureteral stent placed by Dr. Muniz in July 2023, AAA, HTN, HLD, and rectal adenocarcinoma w/ lung mets presenting after outpatient lab workup revealed severe hypokalemia and was subsequently instructed to present to the hospital. Urology team was consulted for imaging findings concerning for retained encrusted stent and hydronephrosis in the setting of KARLY and UTI.    Patient is overall well appearing without major complaints at this time. Her KARLY is probably 2/2 to recent contrast administration and likely unrelated to her stone disease given that her renal function was at baseline prior to admission. Cr improving today 1.2 from 1.4. Her left ureteral stent and calculus are difficult to assess on recent CT scan due to contrast obscuring visualization. Patient will ultimately need surgery for stent retrieval and definitive stone treatment likely in the outpatient setting given active UTI.    Recommendations:  - No acute urologic intervention this admission  - Continue UTI treatment with abx, follow urine culture, tailor treatment to sensitivities  - Daily BMP and trend Cr  - Please obtain CT renal stone to re-assess stone and stent location  - Will arrange outpatient urology follow up to discuss definitive stone treatment and stent removal      Discussed with chief resident Dr. Gotti and attending Dr. Talya Ceja MD         [1]   Family History  Problem Relation Name Age of Onset    No Known Problems Mother      No Known Problems Father      Breast cancer Sister

## 2025-05-14 NOTE — CARE PLAN
The patient's goals for the shift include      The clinical goals for the shift include Pt will remain HDS and free of fall or injury through end of shift 5/14 1900      Problem: Pain - Adult  Goal: Verbalizes/displays adequate comfort level or baseline comfort level  Outcome: Progressing     Problem: Safety - Adult  Goal: Free from fall injury  Outcome: Progressing     Problem: Discharge Planning  Goal: Discharge to home or other facility with appropriate resources  Outcome: Progressing     Problem: Chronic Conditions and Co-morbidities  Goal: Patient's chronic conditions and co-morbidity symptoms are monitored and maintained or improved  Outcome: Progressing     Problem: Nutrition  Goal: Nutrient intake appropriate for maintaining nutritional needs  Outcome: Progressing

## 2025-05-14 NOTE — ASSESSMENT & PLAN NOTE
Hortensia Rodriguez is a 59 y.o. female with a PMHx remarkable for COPD, tobacco dependence, hypothyroidism, left kidney stone s/p stent, ascending aorta aneurysm, depression, anxiety, HTN, HLD and newly dx Rectal Adenocarcinoma with lung mets that presented to [facility] ED after recent outpatient blood work showed hypokalemia and was further instructed to go to the ED for evaluation.    #Hypokalemia  - K+ on admit 2.8, repleted with total 80mEq potassium  - EKG (5/13) showed sinus tach with left-axis devation, incomplete RBBB, nonspecific ST and T wave abnormality  - continue to monitor  - telemetry ordered    #KARLY  #UTI  #c/f worsening hydronephrosis  - likely 2/2 large calculus vs. periaortic lymphadenopathy  - CT A (5/13) showed no PE, L hydronephrosis  - CT A/P (5/13) showed rectal mass, persistant findings of encrusted L ureteral stent and large calculus with worsening associated L hydronephrosis, with L periaortic lymphadenopathy  - sCr. 1.44 on admit (5/13) (BL ~0.80), s/p 1 L NS x2 in ED (5/13)  - UA (5/13) +leuks, +bacteria, urine culture pending  - blood culture x2 (5/13) pending  - s/p IV Ceftriaxone 1g x1, continue Ceftriaxone 1 g daily on admit  - consider Nephrology consult/IR consult in AM for encrusted stent removal/exchange    #Rectal Adenocarcinoma  - Oncologist Dr. Bernardo - notify in AM, Radiation Oncologist Dr. Sharp  - p/w abdominal pain in Jul 2023--> Rectal mass palpated. Colonoscopy showed a rectal mass. This was a large mass and it was felt to be a sampling error  - CT C/A/P (Jul 2023): Rectal wall thickening. Multiple lung nodules  - Was lost to follow up. Even when she p/to surgeon's office did not complete testing.   - CT C/A/P (Jan 2024): 15 mm BRIANA lung nodule (increased in size) + large rectal mass looks larger than before. L retroperitoneal LAP  - CT C/A/P (Jan 2025): Stable anorectal mass. Multiple lung nodules.   - MR Rectum (Jan 2025): Rectal mass about 9 cm in size extending to  peritoneum? No LAPs  - 2/5/25: Bx revealed fragments of villous adenoma  - plans to discuss pt at tumor board 5/14/25 and for repeat MRI rectum to assess for local invasion    #HTN  - continue home amlodipine 10mg daily  - continue home metoprolol 25mg BID    #Hypothyroidism  - continue home levothyroxine 25 mcg daily     #Hypercholesteremia  - continue home ezetimibe 10 mg daily     #hx COPD  - continue home Symbicort (alternative Breo Ellipta) 2 puffs BID  - continue albuterol Q6    #Prophy  - Lovenox daily, on HOLD for possible procedure (unhold as necessary)  - Protonix daily    DISPO  - Full Code- confirmed on admission  - NOK: Ayden Rodriguez () #[phone]

## 2025-05-14 NOTE — PROGRESS NOTES
05/14/25 1500   Discharge Planning   Living Arrangements Spouse/significant other;Children   Support Systems Spouse/significant other;Children   Type of Residence Private residence   Number of Stairs to Enter Residence 0   Number of Stairs Within Residence 3   Do you have animals or pets at home? No   Who is requesting discharge planning? Provider   Home or Post Acute Services None   Expected Discharge Disposition Home   Does the patient need discharge transport arranged? No   Financial Resource Strain   How hard is it for you to pay for the very basics like food, housing, medical care, and heating? Not hard   Housing Stability   In the last 12 months, was there a time when you were not able to pay the mortgage or rent on time? N   In the past 12 months, how many times have you moved where you were living? 0   At any time in the past 12 months, were you homeless or living in a shelter (including now)? N   Transportation Needs   In the past 12 months, has lack of transportation kept you from medical appointments or from getting medications? no   In the past 12 months, has lack of transportation kept you from meetings, work, or from getting things needed for daily living? No     05/14/2025: TCC met with pt to introduce self and role. Per the medical team, this patient has no anticipated discharge needs; full assessment deferred at this time. Please advise TCC if discharge planning needs arise. Zuleyka [place] TCC

## 2025-05-15 ENCOUNTER — APPOINTMENT (OUTPATIENT)
Dept: RADIOLOGY | Facility: HOSPITAL | Age: 60
End: 2025-05-15
Payer: COMMERCIAL

## 2025-05-15 ENCOUNTER — APPOINTMENT (OUTPATIENT)
Dept: GASTROENTEROLOGY | Facility: HOSPITAL | Age: 60
End: 2025-05-15
Payer: COMMERCIAL

## 2025-05-15 LAB
ALBUMIN SERPL BCP-MCNC: 3.5 G/DL (ref 3.4–5)
ALBUMIN SERPL BCP-MCNC: 3.7 G/DL (ref 3.4–5)
ALP SERPL-CCNC: 96 U/L (ref 33–136)
ALT SERPL W P-5'-P-CCNC: 9 U/L (ref 7–45)
ANION GAP SERPL CALC-SCNC: 11 MMOL/L (ref 10–20)
ANION GAP SERPL CALC-SCNC: 13 MMOL/L (ref 10–20)
AST SERPL W P-5'-P-CCNC: 11 U/L (ref 9–39)
ATRIAL RATE: 117 BPM
BACTERIA UR CULT: NORMAL
BACTERIA UR CULT: NORMAL
BASOPHILS # BLD AUTO: 0.04 X10*3/UL (ref 0–0.1)
BASOPHILS NFR BLD AUTO: 0.7 %
BILIRUB SERPL-MCNC: 0.4 MG/DL (ref 0–1.2)
BUN SERPL-MCNC: 18 MG/DL (ref 6–23)
BUN SERPL-MCNC: 20 MG/DL (ref 6–23)
C COLI+JEJ+UPSA DNA STL QL NAA+PROBE: NOT DETECTED
CALCIUM SERPL-MCNC: 8.8 MG/DL (ref 8.6–10.6)
CALCIUM SERPL-MCNC: 9.1 MG/DL (ref 8.6–10.6)
CHLORIDE SERPL-SCNC: 102 MMOL/L (ref 98–107)
CHLORIDE SERPL-SCNC: 103 MMOL/L (ref 98–107)
CO2 SERPL-SCNC: 26 MMOL/L (ref 21–32)
CO2 SERPL-SCNC: 26 MMOL/L (ref 21–32)
CREAT SERPL-MCNC: 0.9 MG/DL (ref 0.5–1.05)
CREAT SERPL-MCNC: 1.11 MG/DL (ref 0.5–1.05)
EC STX1 GENE STL QL NAA+PROBE: NOT DETECTED
EC STX2 GENE STL QL NAA+PROBE: NOT DETECTED
EGFRCR SERPLBLD CKD-EPI 2021: 57 ML/MIN/1.73M*2
EGFRCR SERPLBLD CKD-EPI 2021: 73 ML/MIN/1.73M*2
EOSINOPHIL # BLD AUTO: 0.15 X10*3/UL (ref 0–0.7)
EOSINOPHIL NFR BLD AUTO: 2.7 %
ERYTHROCYTE [DISTWIDTH] IN BLOOD BY AUTOMATED COUNT: 14.6 % (ref 11.5–14.5)
GLUCOSE SERPL-MCNC: 149 MG/DL (ref 74–99)
GLUCOSE SERPL-MCNC: 90 MG/DL (ref 74–99)
HCT VFR BLD AUTO: 40.4 % (ref 36–46)
HGB BLD-MCNC: 13.4 G/DL (ref 12–16)
IMM GRANULOCYTES # BLD AUTO: 0.03 X10*3/UL (ref 0–0.7)
IMM GRANULOCYTES NFR BLD AUTO: 0.5 % (ref 0–0.9)
LYMPHOCYTES # BLD AUTO: 1.9 X10*3/UL (ref 1.2–4.8)
LYMPHOCYTES NFR BLD AUTO: 33.7 %
MAGNESIUM SERPL-MCNC: 1.97 MG/DL (ref 1.6–2.4)
MCH RBC QN AUTO: 29.9 PG (ref 26–34)
MCHC RBC AUTO-ENTMCNC: 33.2 G/DL (ref 32–36)
MCV RBC AUTO: 90 FL (ref 80–100)
MONOCYTES # BLD AUTO: 0.47 X10*3/UL (ref 0.1–1)
MONOCYTES NFR BLD AUTO: 8.3 %
NEUTROPHILS # BLD AUTO: 3.05 X10*3/UL (ref 1.2–7.7)
NEUTROPHILS NFR BLD AUTO: 54.1 %
NOROVIRUS GI + GII RNA STL NAA+PROBE: NOT DETECTED
NRBC BLD-RTO: 0 /100 WBCS (ref 0–0)
P AXIS: 70 DEGREES
P OFFSET: 198 MS
P ONSET: 139 MS
PHOSPHATE SERPL-MCNC: 3.2 MG/DL (ref 2.5–4.9)
PLATELET # BLD AUTO: 310 X10*3/UL (ref 150–450)
POTASSIUM SERPL-SCNC: 3.5 MMOL/L (ref 3.5–5.3)
POTASSIUM SERPL-SCNC: 3.6 MMOL/L (ref 3.5–5.3)
PR INTERVAL: 138 MS
PROT SERPL-MCNC: 6.4 G/DL (ref 6.4–8.2)
Q ONSET: 208 MS
QRS COUNT: 20 BEATS
QRS DURATION: 94 MS
QT INTERVAL: 344 MS
QTC CALCULATION(BAZETT): 479 MS
QTC FREDERICIA: 430 MS
R AXIS: -82 DEGREES
RBC # BLD AUTO: 4.48 X10*6/UL (ref 4–5.2)
RV RNA STL NAA+PROBE: NOT DETECTED
SALMONELLA DNA STL QL NAA+PROBE: NOT DETECTED
SHIGELLA DNA SPEC QL NAA+PROBE: NOT DETECTED
SODIUM SERPL-SCNC: 136 MMOL/L (ref 136–145)
SODIUM SERPL-SCNC: 137 MMOL/L (ref 136–145)
T AXIS: 73 DEGREES
T OFFSET: 380 MS
V CHOLERAE DNA STL QL NAA+PROBE: NOT DETECTED
VENTRICULAR RATE: 117 BPM
WBC # BLD AUTO: 5.6 X10*3/UL (ref 4.4–11.3)
Y ENTEROCOL DNA STL QL NAA+PROBE: NOT DETECTED

## 2025-05-15 PROCEDURE — 84075 ASSAY ALKALINE PHOSPHATASE: CPT

## 2025-05-15 PROCEDURE — 83735 ASSAY OF MAGNESIUM: CPT

## 2025-05-15 PROCEDURE — 87506 IADNA-DNA/RNA PROBE TQ 6-11: CPT

## 2025-05-15 PROCEDURE — 2500000002 HC RX 250 W HCPCS SELF ADMINISTERED DRUGS (ALT 637 FOR MEDICARE OP, ALT 636 FOR OP/ED): Mod: SE

## 2025-05-15 PROCEDURE — 2500000004 HC RX 250 GENERAL PHARMACY W/ HCPCS (ALT 636 FOR OP/ED): Mod: JZ,SE

## 2025-05-15 PROCEDURE — 1200000003 HC ONCOLOGY  ROOM WITH TELEMETRY DAILY

## 2025-05-15 PROCEDURE — 74176 CT ABD & PELVIS W/O CONTRAST: CPT

## 2025-05-15 PROCEDURE — 3700000012 HC SEDATION LEVEL 5+ TIME - INITIAL 15 MINUTES 5/> YEARS

## 2025-05-15 PROCEDURE — 7100000009 HC PHASE TWO TIME - INITIAL BASE CHARGE

## 2025-05-15 PROCEDURE — 99254 IP/OBS CNSLTJ NEW/EST MOD 60: CPT | Performed by: STUDENT IN AN ORGANIZED HEALTH CARE EDUCATION/TRAINING PROGRAM

## 2025-05-15 PROCEDURE — 99233 SBSQ HOSP IP/OBS HIGH 50: CPT

## 2025-05-15 PROCEDURE — 45331 SIGMOIDOSCOPY AND BIOPSY: CPT | Performed by: STUDENT IN AN ORGANIZED HEALTH CARE EDUCATION/TRAINING PROGRAM

## 2025-05-15 PROCEDURE — 7100000010 HC PHASE TWO TIME - EACH INCREMENTAL 1 MINUTE

## 2025-05-15 PROCEDURE — 0DBP8ZX EXCISION OF RECTUM, VIA NATURAL OR ARTIFICIAL OPENING ENDOSCOPIC, DIAGNOSTIC: ICD-10-PCS | Performed by: STUDENT IN AN ORGANIZED HEALTH CARE EDUCATION/TRAINING PROGRAM

## 2025-05-15 PROCEDURE — 2500000004 HC RX 250 GENERAL PHARMACY W/ HCPCS (ALT 636 FOR OP/ED): Mod: JZ,SE | Performed by: STUDENT IN AN ORGANIZED HEALTH CARE EDUCATION/TRAINING PROGRAM

## 2025-05-15 PROCEDURE — 2500000001 HC RX 250 WO HCPCS SELF ADMINISTERED DRUGS (ALT 637 FOR MEDICARE OP): Mod: SE

## 2025-05-15 PROCEDURE — 80069 RENAL FUNCTION PANEL: CPT | Mod: CCI

## 2025-05-15 PROCEDURE — 85025 COMPLETE CBC W/AUTO DIFF WBC: CPT

## 2025-05-15 PROCEDURE — 99152 MOD SED SAME PHYS/QHP 5/>YRS: CPT | Performed by: STUDENT IN AN ORGANIZED HEALTH CARE EDUCATION/TRAINING PROGRAM

## 2025-05-15 PROCEDURE — 2500000005 HC RX 250 GENERAL PHARMACY W/O HCPCS: Mod: SE

## 2025-05-15 PROCEDURE — 36415 COLL VENOUS BLD VENIPUNCTURE: CPT

## 2025-05-15 PROCEDURE — 94640 AIRWAY INHALATION TREATMENT: CPT

## 2025-05-15 RX ORDER — POTASSIUM CHLORIDE 14.9 MG/ML
20 INJECTION INTRAVENOUS
Status: DISPENSED | OUTPATIENT
Start: 2025-05-15 | End: 2025-05-15

## 2025-05-15 RX ORDER — POTASSIUM CHLORIDE 14.9 MG/ML
20 INJECTION INTRAVENOUS ONCE
Status: COMPLETED | OUTPATIENT
Start: 2025-05-15 | End: 2025-05-15

## 2025-05-15 RX ORDER — FENTANYL CITRATE 50 UG/ML
INJECTION, SOLUTION INTRAMUSCULAR; INTRAVENOUS AS NEEDED
Status: DISCONTINUED | OUTPATIENT
Start: 2025-05-15 | End: 2025-05-15 | Stop reason: HOSPADM

## 2025-05-15 RX ORDER — MIDAZOLAM HYDROCHLORIDE 1 MG/ML
INJECTION, SOLUTION INTRAMUSCULAR; INTRAVENOUS AS NEEDED
Status: DISCONTINUED | OUTPATIENT
Start: 2025-05-15 | End: 2025-05-15 | Stop reason: HOSPADM

## 2025-05-15 RX ADMIN — MIDAZOLAM 1 MG: 1 INJECTION INTRAMUSCULAR; INTRAVENOUS at 14:22

## 2025-05-15 RX ADMIN — PANTOPRAZOLE SODIUM 40 MG: 40 TABLET, DELAYED RELEASE ORAL at 06:08

## 2025-05-15 RX ADMIN — EZETIMIBE 10 MG: 10 TABLET ORAL at 08:32

## 2025-05-15 RX ADMIN — AMLODIPINE BESYLATE 10 MG: 10 TABLET ORAL at 08:33

## 2025-05-15 RX ADMIN — POTASSIUM CHLORIDE 20 MEQ: 14.9 INJECTION, SOLUTION INTRAVENOUS at 11:01

## 2025-05-15 RX ADMIN — POTASSIUM CHLORIDE 20 MEQ: 14.9 INJECTION, SOLUTION INTRAVENOUS at 15:38

## 2025-05-15 RX ADMIN — METOPROLOL TARTRATE 25 MG: 25 TABLET, FILM COATED ORAL at 08:33

## 2025-05-15 RX ADMIN — Medication 6 MG: at 21:02

## 2025-05-15 RX ADMIN — FENTANYL CITRATE 25 MCG: 50 INJECTION, SOLUTION INTRAMUSCULAR; INTRAVENOUS at 14:26

## 2025-05-15 RX ADMIN — FENTANYL CITRATE 25 MCG: 50 INJECTION, SOLUTION INTRAMUSCULAR; INTRAVENOUS at 14:20

## 2025-05-15 RX ADMIN — FLUTICASONE FUROATE AND VILANTEROL TRIFENATATE 1 PUFF: 200; 25 POWDER RESPIRATORY (INHALATION) at 10:43

## 2025-05-15 RX ADMIN — CEFTRIAXONE SODIUM 1 G: 1 INJECTION, SOLUTION INTRAVENOUS at 21:02

## 2025-05-15 RX ADMIN — ENOXAPARIN SODIUM 40 MG: 100 INJECTION SUBCUTANEOUS at 04:45

## 2025-05-15 RX ADMIN — LEVOTHYROXINE SODIUM 25 MCG: 25 TABLET ORAL at 08:33

## 2025-05-15 RX ADMIN — MIDAZOLAM 1 MG: 1 INJECTION INTRAMUSCULAR; INTRAVENOUS at 14:20

## 2025-05-15 RX ADMIN — METOPROLOL TARTRATE 25 MG: 25 TABLET, FILM COATED ORAL at 21:02

## 2025-05-15 RX ADMIN — FENTANYL CITRATE 25 MCG: 50 INJECTION, SOLUTION INTRAMUSCULAR; INTRAVENOUS at 14:22

## 2025-05-15 ASSESSMENT — COGNITIVE AND FUNCTIONAL STATUS - GENERAL
DAILY ACTIVITIY SCORE: 24
MOBILITY SCORE: 23
CLIMB 3 TO 5 STEPS WITH RAILING: A LITTLE
MOBILITY SCORE: 23
CLIMB 3 TO 5 STEPS WITH RAILING: A LITTLE
MOBILITY SCORE: 23
DAILY ACTIVITIY SCORE: 24
CLIMB 3 TO 5 STEPS WITH RAILING: A LITTLE
DAILY ACTIVITIY SCORE: 24

## 2025-05-15 ASSESSMENT — PAIN - FUNCTIONAL ASSESSMENT
PAIN_FUNCTIONAL_ASSESSMENT: 0-10

## 2025-05-15 ASSESSMENT — PAIN SCALES - GENERAL
PAINLEVEL_OUTOF10: 0 - NO PAIN

## 2025-05-15 NOTE — CONSULTS
[facility]   [facility]  INITIAL CONSULT NOTE       Reason For Consult  Rectal mass    SUBJECTIVE     History Of Present Illness  Hortensia Rodriguez is a 59 y.o. female with a past medical history of COPD, tobacco dependence, hypothyroidism, left kidney stone s/p stent placed by Dr. Muniz in July 2023, AAA, atherosclerosis, depression, anxiety, HTN, HLD and rectal mass concerning for malignancy who is admitted to [facility] as a transfer from [facility] after outpatient bloodwork showed hypokalemia to 2.3. Patient has had this rectal mass since 2023 and she has been biopsied both during a colonoscopy, and with EUA (with Dr. Sierra Nayak), but both times they have come back as villous adenoma. This time, she had a PET scan on 5/12/2025 that showed intense FDG avid circumferential thickening involving the anorectal region with involvement of the posterior wall of the urinary bladder and adjacent peritoneum, as well as clustered enlarged left periaortic lymph nodes concerning for metastasis. Her case was discussed in multi-disciplinary tumor board and was recommended to undergo both Flex sig with biopsies as well as IR biopsy of para-aortic lymph node. GI has been consulted for assistance with Flex sig.     Prior history:  She was admitted in July 2023 for a kidney stone and at that time she had a L ureteral stent placed. CT a/p done on 7/1/2023 showed concern for potential exophytic enhancing mass associated with the rectum measuring 2.8 cm in thickness. A repeat CT a/p done on 7/24/2023 showed  persistent severe eccentric rectal wall thickening along the anterior wall of the rectum and distal sigmoid colon similar compared to prior. She then underwent colonoscopy on 7/26/2023 demonstrating a likely malignant mass in the distal rectum/anus. Area was tattooed and biopsies were obtained. Pathology demonstrated superficial fragments of tubulovillous  adenoma. MRI rectum was performed on 9/25/2023 and MRI based staging T3bN0 with questionable right perirectal extramural   vascular involvement.     She was then seen 10/5/2023 by Dr. Sierra Nayak and the plan was for EUA with biopsies of rectal mass. Operating room time was obtained and she was scheduled for 10/23/2023. Dr. Nayak's office attempted to reach her multiple times and did not hear back from her. She was also not able to be reached by the oncologist and missed her appointment with them on 1/5/2024. She was seen by Dr. Sierra Nayak again on 1/11/2024 and was scheduled for EUA, flex sig with biopsy for 2/5/2024.  Another CT c/a/p with IV contrast was done on 1/15/2025 that showed interval increase in the size of rectal mass and a 15 mm left upper lobe nodule as well as hydronephrosis. Patient had her EUA on 2/5/2025 and was noted to have a near circumferential rectal lesion extending for the dentate line to approximately 8cm. Path demonstrated fragments of villous adenoma. She was presented at Tumor Board and recommendation was to biopsy her 15 mm left upper lobe lung nodule and if that was negative then go back to OR for repeat biopsies of rectal mass. Unfortunately, the patient was again lost to follow-up for >6 months, and she eventually followed up with Dr. Nayak on 10/29/2024 and was told to repeat a CT c/a/p and CEA. Her CEA was 3.5 on 10/29/2024. CT c/a/p was not done until 1/17/2025 and showed stability of the rectal mass, but showed interval encrustation around the pigtail of the left ureteral stent as described with stable moderate to marked left-sided hydronephrosis. MRI rectum was also ordered and was completed on 1/31/2025 which demonstrated enlarged rectal mass now T4aN0 with new involvement of the peritoneum as well as the mesorectal fascia. She was seen by Dr. Nayak again on 3/4/2025 and Dr. Nayak was pretty convinced this was rectal adenocarcinoma and given the  interval worsening, she referred her to [facility] and re-ordered biopsy of her lung lesions.     She saw oncologist Dr. Grady Falk on 3/26/2025 who felt that this was a slow growing rectal cancer and he ordered a PET-CT and wanted a repeat colonoscopy. She then saw Dr. Farhat Sharp from Radiation Oncology on 3/31/2025 who also wanted flex sig for repeat biopsies. PET-CT was done on 5/1/2025 and showed intense FDG avid circumferential thickening involving the anorectal region with involvement of the posterior wall of the urinary bladder and adjacent peritoneum, as well as clustered enlarged left periaortic lymph nodes concerning for metastasis.     Her case was presented to Tumor Board on 5/13/2025 and recommendation was to repeat a flex sig with biopsies and also for IR to biopsy the para-aortic lymph node.     Interview today:  Patient says she has been having mucusy, bloody diarrhea. She can't remember when she had a formed stool the last time. She says she typically has 2-4 loose, bloody bowel movements per day. She also has been having fecal incontinence. She says that she has lost about 13 lbs recently. Since 2023, she has lost about 20 lbs. She is not anemic. Her potassium has been aggressively repleted, and today, her potassium is 3.5. She has also been seen by Urology for hydronephrosis and retained L ureteral stent. She had another CT a/p on 5/13/2025 which showed similar rectal mass, large calculus encasing the proximal margin in the renal pelvis, and large calculus encasing the distal end in the urinary bladder and worsening of L sided hydronephrosis.     She denies any fevers or chills. She is a chronic smoker.      Review of Systems  A 12 point ROS was performed and is negative except for HPI above.      Past Medical History:  Medical History[1]    Home Medications  Prescriptions Prior to Admission[2]    Surgical History:  Surgical History[3]    Allergies:  Allergies[4]    Social  History:    Social History     Socioeconomic History    Marital status:      Spouse name: Not on file    Number of children: Not on file    Years of education: Not on file    Highest education level: Not on file   Occupational History    Not on file   Tobacco Use    Smoking status: Every Day     Current packs/day: 0.50     Types: Cigarettes     Passive exposure: Current    Smokeless tobacco: Never   Vaping Use    Vaping status: Never Used   Substance and Sexual Activity    Alcohol use: Yes     Comment: occasional    Drug use: Never    Sexual activity: Defer   Other Topics Concern    Not on file   Social History Narrative    Not on file     Social Drivers of Health     Financial Resource Strain: Low Risk  (5/14/2025)    Overall Financial Resource Strain (CARDIA)     Difficulty of Paying Living Expenses: Not hard at all   Food Insecurity: No Food Insecurity (5/14/2025)    Hunger Vital Sign     Worried About Running Out of Food in the Last Year: Never true     Ran Out of Food in the Last Year: Never true   Transportation Needs: No Transportation Needs (5/14/2025)    PRAPARE - Transportation     Lack of Transportation (Medical): No     Lack of Transportation (Non-Medical): No   Physical Activity: Inactive (12/27/2024)    Exercise Vital Sign     Days of Exercise per Week: 0 days     Minutes of Exercise per Session: 0 min   Stress: No Stress Concern Present (12/27/2024)    [employer] [place] [employer] - Occupational Stress Questionnaire     Feeling of Stress : Not at all   Social Connections: Moderately Isolated (12/27/2024)    Social Connection and Isolation Panel [NHANES]     Frequency of Communication with Friends and Family: Twice a week     Frequency of Social Gatherings with Friends and Family: Once a week     Attends Roman [employer] Services: Never     Active Member of Clubs or Organizations: No     Attends Club or Organization Meetings: Never     Marital Status:    Intimate Partner Violence:  Not At Risk (5/14/2025)    Humiliation, Afraid, Rape, and Kick questionnaire     Fear of Current or Ex-Partner: No     Emotionally Abused: No     Physically Abused: No     Sexually Abused: No   Housing Stability: Low Risk  (5/14/2025)    Housing Stability Vital Sign     Unable to Pay for Housing in the Last Year: No     Number of Times Moved in the Last Year: 0     Homeless in the Last Year: No       Family History:  Family History[5]    EXAM     Vitals:    Vitals:    05/14/25 2355 05/15/25 0442 05/15/25 0702 05/15/25 1101   BP: 98/66 101/67 135/64 110/68   BP Location: Right arm Right arm Right arm Right arm   Patient Position: Lying Lying Lying Lying   Pulse: 72 74 76 68   Resp: 17 16 17 17   Temp: 36.5 °C (97.7 °F) 36.7 °C (98.1 °F) 36.9 °C (98.4 °F) 36.6 °C (97.9 °F)   TempSrc: Temporal Temporal Temporal Temporal   SpO2: 97% 97% 97% 94%   Weight:       Height:         Failed to redirect to the Timeline version of the [employer] SmartLink.    Intake/Output Summary (Last 24 hours) at 5/15/2025 1249  Last data filed at 5/15/2025 0846  Gross per 24 hour   Intake 630 ml   Output --   Net 630 ml       Physical Exam  General: well-nourished, no acute distress  HEENT: no pallor, no icterus, poor dentition  Respiratory: CTAB  Cardiovascular: S1, S2 heard   Abdomen: Soft, nontender, nondistended, bowel sounds present. No masses palpated  Extremities: no edema, no asterixis  Neuro: AOX3, moves all 4 extremities spontaneously. Full neuro exam deferred today     OBJECTIVE                                                                              Medications     Current Medications[6]                                                                            Labs     Reviewed.                                                                            Imaging           CT a/p without IV contrast 5/13/2025:  IMPRESSION:  1.  Large low rectal mass better characterized on yesterday's PET/CT  and recent dedicated rectal MRI.  2.  Persistent findings of encrusted left ureteral stent compared to  CT 4 months ago. Large calculus encasing the proximal margin in the renal pelvis is slightly enlarged. Large calculus encasing the distal end in the urinary bladder is not significantly changed. Associated left hydronephrosis has worsened and is now severe.  3. Left periaortic lymphadenopathy better characterized on CT PET/CT from yesterday.    NM PET CT 5/12/2025:  IMPRESSION:  1. Intensely FDG avid circumferential thickening involving anorectal  region with extensions as described, consistent with neoplasm.  2. FDG avid left para-aortic lymph nodes, consistent with oumar  metastasis.  3. Mildly FDG avid bilateral adrenal nodules, likely benign.  4. No focal FDG uptake corresponding to subcentimeter bilateral  pulmonary nodule seen on CT. Follow-up with diagnostic CT chest to document interval resolution/stability.  5. No FDG avid osseous metastatic disease.  6. FDG avid focus within right thyroid lobe. Correlate with  ultrasound thyroid.    MR Rectum with and without IV contrast 1/31/2025:  IMPRESSION:  1. Motion degraded exam.  2. Circumferential rectal mass has enlarged compared to 16 months  ago, increased from 6 cm to 9 cm in cephalocaudal length. There  appear to be new areas of extramural extension which are difficult to characterize due to severe motion artifact, however there appears to be new involvement of the peritoneum as well as the mesorectal fascia. No local organ invasion identified. The mass extends to the anorectal junction as before, with otherwise no definite involvement of the sphincter complex.  3. No new pelvic lymphadenopathy.      T4 A N0                                                                           GI Procedures     Flexible sigmoidoscopy 5/15/2025:  Findings  Single friable and fungating mass in the distal rectum. The mass was near circumferential extending from 10 cm proximally all the way to the anal verge.  The dentate line was not clearly visualized but is presumed to be involved.   A tattoo was noted at the proximal extent of the rectal mass.  Performed multiple random forceps biopsies in the distal rectum using jumbo forceps to rule out malignancy     EGD 7/26/2023:  Impression:                               - Z-line irregular, 36 cm from the incisors. Biopsied.                         - Erythematous mucosa in the stomach. Biopsied.    Colonoscopy 7/26/2023:  Impression:                                    - Palpable rectal mass found on digital rectal exam.                         - Likely malignant tumor in the distal rectum/anus.                          Biopsied. Tattooed.                         - One 6 mm polyp in the rectum, removed with a                             cold snare. Resected and retrieved.                         - Two 4 to 5 mm polyps in the proximal transverse                          colon, removed with a cold snare. Resected and                          retrieved.                         - One 5 mm polyp at the ileocecal valve, removed                             with a cold snare. Resected and retrieved.    Bx:  A.  STOMACH, ANTRUM, BODY, BIOPSY:    -- MILD CHRONIC INACTIVE GASTRITIS (SEE NOTE)  -- NEGATIVE FOR INTESTINAL METAPLASIA AND DYSPLASIA    Note: An immunohistochemical stain for Helicobacter organisms has been ordered and the results will be reported as an addendum.    B.  Z-LINE, BIOPSY:    -- CARDIA-TYPE GASTRIC MUCOSA WITH NO SIGNIFICANT PATHOLOGIC FINDINGS  -- NEGATIVE FOR INTESTINAL METAPLASIA AND DYSPLASIA    C.  ILEOCECAL VALVE, POLYPECTOMY:    -- TUBULAR ADENOMA    D.  COLON, TRANSVERSE, POLYPECTOMY:    -- TUBULAR ADENOMA    E.  ANAL RECTAL MASS, BIOPSY:    -- SUPERFICIAL FRAGMENTS OF TUBULOVILLOUS ADENOMA (SEE NOTE)    Note: There is no evidence of invasive carcinoma. Multiple deeper levels have been examined.    F.  RECTUM, POLYPECTOMY:    -- TUBULAR ADENOMA     ASSESSMENT  / PLAN                  ASSESSMENT/PLAN:  Hortensia Rodriguez is a 59 y.o. female with a past medical history of COPD, tobacco dependence, hypothyroidism, left kidney stone s/p stent placed by Dr. Muniz in July 2023, AAA, atherosclerosis, depression, anxiety, HTN, HLD and rectal mass concerning for malignancy who is admitted to [facility] as a transfer from [facility] after outpatient bloodwork showed hypokalemia to 2.3. Patient has had this rectal mass since 2023 and she has been biopsied both during a colonoscopy, and with EUA (with Dr. Sierra Nayak), but both times they have come back as villous adenoma. This time, she had a PET scan on 5/12/2025 that showed intense FDG avid circumferential thickening involving the anorectal region with involvement of the posterior wall of the urinary bladder and adjacent peritoneum, as well as clustered enlarged left periaortic lymph nodes concerning for metastasis. Her case was discussed in multi-disciplinary tumor board and was recommended to undergo both Flex sig with biopsies as well as IR biopsy of para-aortic lymph node. GI has been consulted for assistance with Flex sig.     Patient has had this rectal mass since 2023, when it was discovered incidentally. It has been biopsied twice, but both times, biopsies have come back as villous adenoma without cancer. Recent MRI rectum from 1/31/2025 shows increase in size of the mass with extramural spread, as well as new involvement of the peritoneum and mesorectal fascia. CT a/p and PET Scan done in May 2025 show concern for metastatic involvement in para-aortic lymph node, though it is unclear if these are just reactive. These are scheduled to be biopsied by IR. Patient reports bloody diarrhea multiple times per day and weight loss despite good appetite. She reports pain while having bowel movements. She reports fecal incontinence as well. She is a smoker.     We performed a Flexible sigmoidoscopy on 5/15/2025  based on the recommendations of the tumor board and found a large, friable rectal mass involving the dentate line that extended 10 cm proximally. Multiple jumbo biopsies were obtained.     RECS:  GI will follow up on biopsies taken today  If patient has significant rectal bleeding, endoscopic interventions to control bleeding would be of low utility as we don't have good options for tumor bleeding. Hemospray will slough off within 72 hours usually in cases of tumor bleeding and it also expensive. Radiation and chemotherapy are likely better options for tumor related bleeding   Continue to trend H&H closely     Patient was seen and discussed with GI attending, Dr. Иван Zuleta.     Thank you for this interesting consult. Gastroenterology will SIGN OF at this time.     -During weekday hours of 7am-5pm please do not hesitate to contact me on [employer] Chat or page [phone] if there are any further questions between the weekday hours of 7 AM - 5 PM.   -After hours, on weekends, and on holidays, please page [place] [phone]. Thank you.    Dahiana Blake MD MPH   GI Fellow   [facility] [place]        [1]   Past Medical History:  Diagnosis Date    Abdominal pain 07/24/2023    Abnormal electrocardiogram (ECG) (EKG) 12/16/2015    Abnormal ECG    KARLY (acute kidney injury) 09/19/2024    Anxiety     Anxiety disorder, unspecified 08/31/2015    Anxiety    Asthma     Complicated UTI (urinary tract infection) 07/24/2023    COPD (chronic obstructive pulmonary disease) (Multi)     Delayed emergence from general anesthesia     Disease of thyroid gland     Encounter for preprocedural cardiovascular examination 10/25/2015    Pre-operative cardiovascular examination    Flank pain 07/24/2023    Hypertension     Hypomagnesemia 10/14/2015    Hypomagnesemia    Personal history of other diseases of the circulatory system 08/31/2015    History of chronic ischemic heart disease    Personal history of other diseases of the  female genital tract 10/28/2015    History of ovarian cyst    Personal history of other diseases of urinary system 01/04/2016    History of hematuria    Personal history of other diseases of urinary system 10/16/2015    History of kidney disease    Personal history of other specified conditions 10/16/2015    History of pelvic mass    Personal history of other specified conditions 09/30/2015    History of fatigue    Personal history of pneumonia (recurrent) 02/26/2019    History of pneumonia    Right hip pain 07/24/2023    Strain of muscle, fascia and tendon of abdomen, initial encounter 09/17/2015    Abdominal muscle strain   [2]   Medications Prior to Admission   Medication Sig Dispense Refill Last Dose/Taking    albuterol (ProAir HFA) 90 mcg/actuation inhaler Inhale 1 puff every 6 hours if needed for wheezing. 18 g 1     amLODIPine (Norvasc) 10 mg tablet Take 1 tablet (10 mg) by mouth once daily. 90 tablet 1     diaper,brief,adult,disposable (Select Disposable Briefs) misc Disposable pull up underwear. Uses approximately 5 per day. 20 each 0     ezetimibe (Zetia) 10 mg tablet Take 1 tablet (10 mg) by mouth once daily. 90 tablet 1     fluticasone (Flonase) 50 mcg/actuation nasal spray Administer 2 sprays into each nostril once daily. Shake gently. Before first use, prime pump. After use, clean tip and replace cap. 16 g 12     ibuprofen 600 mg tablet Take 1 tablet (600 mg) by mouth every 8 hours if needed for moderate pain (4 - 6) or mild pain (1 - 3). 90 tablet 1     levothyroxine (Synthroid, Levoxyl) 25 mcg tablet Take 1 tablet (25 mcg) by mouth once daily. 90 tablet 1     metoprolol tartrate (Lopressor) 25 mg tablet Take 1 tablet (25 mg) by mouth 2 times a day. 180 tablet 1     Symbicort 160-4.5 mcg/actuation inhaler Inhale 2 puffs 2 times a day. 10.2 g 1    [3]   Past Surgical History:  Procedure Laterality Date    APPENDECTOMY  08/31/2015    Appendectomy    FOOT SURGERY  08/31/2015    Foot Surgery    ORIF ANKLE  FRACTURE     [4]   Allergies  Allergen Reactions    Ace Inhibitors Other    Lisinopril Other   [5]   Family History  Problem Relation Name Age of Onset    No Known Problems Mother      No Known Problems Father      Breast cancer Sister     [6]   Current Facility-Administered Medications:     acetaminophen (Tylenol) tablet 650 mg, 650 mg, oral, q6h PRN, JOAN Ugarte    albuterol 90 mcg/actuation inhaler 1 puff, 1 puff, inhalation, q6h PRN, JOAN Ugarte    amLODIPine (Norvasc) tablet 10 mg, 10 mg, oral, Daily, JOAN Ugarte, 10 mg at 05/15/25 0833    cefTRIAXone (Rocephin) 1 g in dextrose (iso) IV 50 mL, 1 g, intravenous, q24h, JOAN Ugarte, Stopped at 05/14/25 2155    enoxaparin (Lovenox) syringe 40 mg, 40 mg, subcutaneous, q24h, Hilario Baer MD, 40 mg at 05/15/25 0445    ezetimibe (Zetia) tablet 10 mg, 10 mg, oral, Daily, JOAN Ugarte, 10 mg at 05/15/25 0832    fluticasone furoate-vilanteroL (Breo Ellipta) 200-25 mcg/dose inhaler 1 puff, 1 puff, inhalation, Daily, JOAN Ugarte, 1 puff at 05/15/25 1043    levothyroxine (Synthroid, Levoxyl) tablet 25 mcg, 25 mcg, oral, Daily, JOAN Ugarte, 25 mcg at 05/15/25 0833    loperamide (Imodium) capsule 2 mg, 2 mg, oral, 4x daily PRN, Hilario Baer MD    melatonin tablet 6 mg, 6 mg, oral, Nightly, Saji Ortiz MD, 6 mg at 05/14/25 2353    metoprolol tartrate (Lopressor) tablet 25 mg, 25 mg, oral, BID, JOAN Ugarte, 25 mg at 05/15/25 0833    pantoprazole (ProtoNix) EC tablet 40 mg, 40 mg, oral, Daily before breakfast, Trina Meehan APRN-CNP, 40 mg at 05/15/25 0608    potassium chloride 20 mEq in sterile water for injection 100 mL, 20 mEq, intravenous, q2h, Hilario Baer MD, Last Rate: 50 mL/hr at 05/15/25 1101, 20 mEq at 05/15/25 1101

## 2025-05-15 NOTE — CARE PLAN
The patient's goals for the shift include      The clinical goals for the shift include Pt will remain HDS and free of fall or injury through end of shift 5/15 1900      Problem: Pain - Adult  Goal: Verbalizes/displays adequate comfort level or baseline comfort level  Outcome: Progressing     Problem: Safety - Adult  Goal: Free from fall injury  Outcome: Progressing     Problem: Discharge Planning  Goal: Discharge to home or other facility with appropriate resources  Outcome: Progressing     Problem: Chronic Conditions and Co-morbidities  Goal: Patient's chronic conditions and co-morbidity symptoms are monitored and maintained or improved  Outcome: Progressing     Problem: Nutrition  Goal: Nutrient intake appropriate for maintaining nutritional needs  Outcome: Progressing

## 2025-05-15 NOTE — SIGNIFICANT EVENT
Tele batteries changed. Tele monitor turned back on and functioning appropriately.     Tele strip printed and reviewed with off-going RN. Strip reflected in flowsheets and copy placed in paper chart.    RODERICK Jarvis RN

## 2025-05-15 NOTE — PROGRESS NOTES
Hortensia Rodriguez is a 59 y.o. female on day 1 of admission presenting with Hypokalemia.      Subjective   Ms. Rodriguez seen sitting up in bed today. She denies fever, chills, chest pain, shortness of breath. Has chronic bloody stools/diarrhea. Understanding of plan for procedure this afternoon.        Objective     Last Recorded Vitals  /71   Pulse 82   Temp 36 °C (96.8 °F) (Temporal)   Resp 12   Wt 67.8 kg (149 lb 7.6 oz)   SpO2 98%   Intake/Output last 3 Shifts:    Intake/Output Summary (Last 24 hours) at 5/15/2025 1437  Last data filed at 5/15/2025 1306  Gross per 24 hour   Intake 230 ml   Output --   Net 230 ml       Admission Weight  Weight: 67.8 kg (149 lb 7.6 oz) (05/14/25 0451)    Daily Weight  05/14/25 : 67.8 kg (149 lb 7.6 oz)    Image Results  CT abdomen pelvis wo IV contrast  Narrative: Interpreted By:  Madhav Rosenberg,   STUDY:  CT ABDOMEN PELVIS WO IV CONTRAST;  5/13/2025 3:30 pm      INDICATION:  Signs/Symptoms:left sided hydroneprhosis.      COMPARISON:  01/17/2025.      ACCESSION NUMBER(S):  [id number]      ORDERING CLINICIAN:  MATEUS MELARA      TECHNIQUE:  CT of the abdomen and pelvis was performed.  Contiguous axial images  were obtained at 3 mm slice thickness through the abdomen and pelvis.  Coronal and sagittal reconstructions at 3 mm slice thickness were  performed. The patient received no intravenous contrast. Please note  evaluation of the solid organs and vessels is limited in the absence  of intravenous contrast. Given this caveat, the following  observations are made:      FINDINGS:  LOWER CHEST:  Mild bibasilar atelectasis. Coronary atherosclerosis.      ABDOMEN:  Please note there is still residual contrast still present from the  intravenous contrast injection from CT of the chest 40 minutes prior.      LIVER:  Unremarkable      BILE DUCTS:  Not dilated.      GALLBLADDER:  No calcified stone or definite inflammation.      PANCREAS:  Unremarkable       SPLEEN:  Unremarkable      ADRENAL GLANDS:  Bilateral low-attenuation adrenal nodule compatible with adenomas.      KIDNEYS AND URETERS:  There is cortical scarring involving the lower pole of the right  kidney. Otherwise unremarkable right kidney without hydronephrosis.      There is a large left renal peripelvic cyst. There is a 25 mm left  renal pelvis calculus causing severe hydronephrosis. Additional large  caliceal calculi on yesterday's PET/CT are obscured by contrast.  There is a left ureteral stent in place, proximal margin encased by  the renal pelvis stone which previously measuredd 23 mm..      PELVIS:      BLADDER:  Distal margin of left ureteral stent appears to be encased by a 39 mm  calculus. This is similar to prior exam.      REPRODUCTIVE ORGANS:  Uterus is present.      BOWEL:  There appears to be a circumferential low rectal mass, extending into  the anal canal. No findings of bowel obstruction otherwise  identified. Appendix not identified. Unremarkable mesentery.      VESSELS:  No abdominal aortic aneurysm.Severe atherosclerosis. Abdominal aorta  are grossly appears patent. IVC and major portal venous branches  grossly appear patent.      PERITONEUM AND RETROPERITONEUM:  No free fluid or free air.  Clustered enlarged left periaortic lymph nodes measuring up to 10 mm  short axis.      BONE AND ABDOMINAL WALL:  Severe degenerative changes of the hips. Severe, lower lumbar  predominant degenerative changes of the spine.      Impression: 1.  Large low rectal mass better characterized on yesterday's PET/CT  and recent dedicated rectal MRI.  2. Persistent findings of encrusted left ureteral stent compared to  CT 4 months ago. Large calculus encasing the proximal margin in the  renal pelvis is slightly enlarged. Large calculus encasing the distal  end in the urinary bladder is not significantly changed. Associated  left hydronephrosis has worsened and is now severe.  3. Left periaortic  lymphadenopathy better characterized on CT PET/CT  from yesterday.      MACRO:  None.      Signed by: Madhav Rosenberg 5/13/2025 3:47 PM  Dictation workstation:   [id number]  ECG 12 lead  Sinus tachycardia  Possible Left atrial enlargement  Left axis deviation  Incomplete right bundle branch block  Nonspecific ST and T wave abnormality  Abnormal ECG  When compared with ECG of 28-DEC-2024 16:23,  ST now depressed in Anterior leads  CT angio chest for pulmonary embolism  Narrative: Interpreted By:  John Callahan,   STUDY:  CT ANGIO CHEST FOR PULMONARY EMBOLISM;  5/13/2025 2:43 pm      INDICATION:  Signs/Symptoms:tachycardia, sob, recent diagnosis of stage 4 rectal  cancer.          COMPARISON:  01/17/2025      ACCESSION NUMBER(S):  [id number]      ORDERING CLINICIAN:  MATEUS MELARA      TECHNIQUE:  Helical data acquisition of the chest was obtained with 75 mL  Omnipaque 350. Images were reformatted in coronal and sagittal  planes. Axial and coronal MIP images were created and reviewed.      FINDINGS:  POTENTIAL LIMITATIONS OF THE STUDY: None      HEART AND VESSELS:  No discrete filling defects within the main pulmonary artery or its  branches.      Main pulmonary artery and its branches are normal in caliber.      The thoracic aorta is of normal course and caliber without vascular  calcifications.      Coronary artery calcifications are seen.The study is not optimized  for evaluation of coronary arteries.      The cardiac chambers are not enlarged.      No evidence of pericardial effusion.      MEDIASTINUM AND DARRIAN, LOWER NECK AND AXILLA:  The visualized thyroid gland is within normal limits.      No evidence of thoracic lymphadenopathy by CT criteria.      Esophagus appears within normal limits as seen.      LUNGS AND AIRWAYS:  The trachea and central airways are patent. No endobronchial lesion.      Lungs are clear.      UPPER ABDOMEN:  Left hydronephrosis partly visualized. Exophytic cyst off the upper  pole of the  left kidney.      CHEST WALL AND OSSEOUS STRUCTURES:  There are no suspicious osseous lesions. Multilevel degenerative  changes are present      Impression: 1.  No pulmonary emboli or confluent airspace disease.  2. Left hydronephrosis partly visualized.          MACRO:  None      Signed by: John Callahan 5/13/2025 2:50 PM  Dictation workstation:   [id number]  NM PET CT bone skull base to mid thigh  Narrative: Interpreted By:  Liz Stephens and Kaur Arashdeep   STUDY:  NM PET CT SKULL BASE TO MID THIGH;  5/12/2025 2:39 pm      INDICATION:  Signs/Symptoms:Rectal cancer. 59-year-old female with a history of  rectal adenocarcinoma. MRI rectum dated 09/25/2023 revealed 6 cm  rectal mass approximately 2.8 cm from anal verge. CT chest abdomen  and pelvis dated 01/17/2025 shows stable pulmonary nodules including  9 mm left upper lobe nodule, bilateral adrenal nodule and irregular  circumferential wall thickening involving distal rectum.      COMPARISON:  MRI rectum dated 01/31/2025. CT chest, abdomen and pelvis dated  01/17/2025..      ACCESSION NUMBER(S):  [id number]      ORDERING CLINICIAN:  MANDA HUBBARD      TECHNIQUE:  DIVISION OF NUCLEAR MEDICINE  POSITRON EMISSION TOMOGRAPHY (PET-CT)      The patient received an intravenous dose of 12.0 mCi of Fluorine-18  fluorodeoxyglucose (FDG).  Positron emission tomographic (PET) images  from mid thigh to skull base were then acquired after a one hour  delay. Also acquired was a contemporaneous low dose non-contrast CT  scan performed for attenuation correction of PET images and anatomic  localization.  The PET and CT images were digitally fused for  display.  All images were acquired on a combined PET-CT scanner unit.  Some areas of FDG accumulation may be described in standardized  uptake value (SUV) units.      CODING:  Initial Treatment Strategy (PI)      CALIBRATION:  Dose Injection-to-Scan Interval (mins): 50 min  Mediastinal bloodpool SUV (normal 1.5-2.5):  3.1  Blood glucose: 98 mg/dL      FINDINGS:  HEAD AND NECK:  * No evidence of focal FDG avid lesion in the partially visualized  brain parenchyma, noting that evaluation is limited because of the  expected physiologic diffuse FDG uptake in the brain. * No focal FDG  avid  soft tissue lesion is seen in the neck. * No FDG avid  cervical  lymphadenopathy is present. *Focal FDG uptake with a SUV max 4.4  within right thyroid lobe. Left thyroid lobe is unremarkable.          CHEST:  *No focal FDG uptake corresponding to subcentimeter pulmonary nodule  seen on CT dated 01/17/2025. Given the small size is below the  resolution of PET for further characterization. Otherwise, no focal  FDG avid  lesion is seen in the lung parenchyma. * No evidence of FDG  avid mediastinal, hilar or axillary lymphadenopathy. *Note is made of  a hiatal hernia.          ABDOMEN AND PELVIS:  *Intense FDG avid circumferential thickening involving anorectal  region with a SUV max 14.8, corresponding to lesion seen on MRI  rectum dated 01/31/2025. The lesion is seen involving posterior wall  of the urinary bladder and adjacent peritoneum. *FDG avid left  para-aortic lymph nodes with a SUV max 4.2 are seen. *Mildly FDG avid  bilateral adrenal nodules with a SUV max 3.82 on left and 3.2 on  right. *Physiologic radiotracer uptake is present in the liver and  spleen with excretion into the bowel loops and the genitourinary  tract. Note is made of photopenic left renal cortical cysts. Note is  made of left ureteral stent.          MUSCULOSKELETAL:  *No focal FDG avid  lesion is seen in the axial or appendicular to  suggest osseous metastasis.          Impression: 1. Intensely FDG avid circumferential thickening involving anorectal  region with extensions as described, consistent with neoplasm.  2. FDG avid left para-aortic lymph nodes, consistent with oumar  metastasis.  3. Mildly FDG avid bilateral adrenal nodules, likely benign.  4. No focal FDG  uptake corresponding to subcentimeter bilateral  pulmonary nodule seen on CT. Follow-up with diagnostic CT chest to  document interval resolution/stability.  5. No FDG avid osseous metastatic disease.  6. FDG avid focus within right thyroid lobe. Correlate with  ultrasound thyroid.      I personally reviewed the images/study and I agree with the findings  as stated by Payam Lopez MD.  This study was interpreted at  [facility], [place], [place].      Signed by: Liz Stephens 5/13/2025 12:20 PM  Dictation workstation:   [id number]      Physical Exam  Constitutional:       Appearance: She is not ill-appearing.   HENT:      Head: Normocephalic.      Mouth/Throat:      Mouth: Mucous membranes are moist.      Comments: Poor dentition  Eyes:      General:         Right eye: No discharge.         Left eye: No discharge.   Cardiovascular:      Rate and Rhythm: Normal rate and regular rhythm.      Pulses: Normal pulses.      Heart sounds: Normal heart sounds. No murmur heard.     No friction rub. No gallop.   Pulmonary:      Effort: Pulmonary effort is normal. No respiratory distress.      Breath sounds: Normal breath sounds. No stridor. No wheezing, rhonchi or rales.   Abdominal:      General: Abdomen is flat. Bowel sounds are normal. There is no distension.      Palpations: Abdomen is soft. There is no mass.      Tenderness: There is no abdominal tenderness. There is no guarding.   Musculoskeletal:      Right lower leg: No edema.      Left lower leg: No edema.   Neurological:      Mental Status: She is alert and oriented to person, place, and time. Mental status is at baseline.      Cranial Nerves: No cranial nerve deficit.      Sensory: No sensory deficit.      Motor: No weakness.      Coordination: Coordination normal.      Gait: Gait normal.      Deep Tendon Reflexes: Reflexes normal.         Relevant Results  Scheduled medications  Scheduled Medications[1]  Continuous  medications  Continuous Medications[2]  PRN medications  PRN Medications[3]  Results for orders placed or performed during the hospital encounter of 05/14/25 (from the past 24 hours)   Renal function panel   Result Value Ref Range    Glucose 95 74 - 99 mg/dL    Sodium 135 (L) 136 - 145 mmol/L    Potassium 3.8 3.5 - 5.3 mmol/L    Chloride 100 98 - 107 mmol/L    Bicarbonate 25 21 - 32 mmol/L    Anion Gap 14 10 - 20 mmol/L    Urea Nitrogen 26 (H) 6 - 23 mg/dL    Creatinine 1.05 0.50 - 1.05 mg/dL    eGFR 61 >60 mL/min/1.73m*2    Calcium 8.6 8.6 - 10.6 mg/dL    Phosphorus 3.2 2.5 - 4.9 mg/dL    Albumin 3.4 3.4 - 5.0 g/dL   Magnesium   Result Value Ref Range    Magnesium 2.06 1.60 - 2.40 mg/dL   CBC and Auto Differential   Result Value Ref Range    WBC 5.6 4.4 - 11.3 x10*3/uL    nRBC 0.0 0.0 - 0.0 /100 WBCs    RBC 4.48 4.00 - 5.20 x10*6/uL    Hemoglobin 13.4 12.0 - 16.0 g/dL    Hematocrit 40.4 36.0 - 46.0 %    MCV 90 80 - 100 fL    MCH 29.9 26.0 - 34.0 pg    MCHC 33.2 32.0 - 36.0 g/dL    RDW 14.6 (H) 11.5 - 14.5 %    Platelets 310 150 - 450 x10*3/uL    Neutrophils % 54.1 40.0 - 80.0 %    Immature Granulocytes %, Automated 0.5 0.0 - 0.9 %    Lymphocytes % 33.7 13.0 - 44.0 %    Monocytes % 8.3 2.0 - 10.0 %    Eosinophils % 2.7 0.0 - 6.0 %    Basophils % 0.7 0.0 - 2.0 %    Neutrophils Absolute 3.05 1.20 - 7.70 x10*3/uL    Immature Granulocytes Absolute, Automated 0.03 0.00 - 0.70 x10*3/uL    Lymphocytes Absolute 1.90 1.20 - 4.80 x10*3/uL    Monocytes Absolute 0.47 0.10 - 1.00 x10*3/uL    Eosinophils Absolute 0.15 0.00 - 0.70 x10*3/uL    Basophils Absolute 0.04 0.00 - 0.10 x10*3/uL   Comprehensive metabolic panel   Result Value Ref Range    Glucose 90 74 - 99 mg/dL    Sodium 137 136 - 145 mmol/L    Potassium 3.5 3.5 - 5.3 mmol/L    Chloride 102 98 - 107 mmol/L    Bicarbonate 26 21 - 32 mmol/L    Anion Gap 13 10 - 20 mmol/L    Urea Nitrogen 20 6 - 23 mg/dL    Creatinine 0.90 0.50 - 1.05 mg/dL    eGFR 73 >60 mL/min/1.73m*2     Calcium 9.1 8.6 - 10.6 mg/dL    Albumin 3.7 3.4 - 5.0 g/dL    Alkaline Phosphatase 96 33 - 136 U/L    Total Protein 6.4 6.4 - 8.2 g/dL    AST 11 9 - 39 U/L    Bilirubin, Total 0.4 0.0 - 1.2 mg/dL    ALT 9 7 - 45 U/L     CT abdomen pelvis wo IV contrast  Result Date: 5/13/2025  Interpreted By:  Madhav Rosenberg, STUDY: CT ABDOMEN PELVIS WO IV CONTRAST;  5/13/2025 3:30 pm   INDICATION: Signs/Symptoms:left sided hydroneprhosis.   COMPARISON: 01/17/2025.   ACCESSION NUMBER(S): [id number]   ORDERING CLINICIAN: MATEUS MELARA   TECHNIQUE: CT of the abdomen and pelvis was performed.  Contiguous axial images were obtained at 3 mm slice thickness through the abdomen and pelvis. Coronal and sagittal reconstructions at 3 mm slice thickness were performed. The patient received no intravenous contrast. Please note evaluation of the solid organs and vessels is limited in the absence of intravenous contrast. Given this caveat, the following observations are made:   FINDINGS: LOWER CHEST: Mild bibasilar atelectasis. Coronary atherosclerosis.   ABDOMEN: Please note there is still residual contrast still present from the intravenous contrast injection from CT of the chest 40 minutes prior.   LIVER: Unremarkable   BILE DUCTS: Not dilated.   GALLBLADDER: No calcified stone or definite inflammation.   PANCREAS: Unremarkable   SPLEEN: Unremarkable   ADRENAL GLANDS: Bilateral low-attenuation adrenal nodule compatible with adenomas.   KIDNEYS AND URETERS: There is cortical scarring involving the lower pole of the right kidney. Otherwise unremarkable right kidney without hydronephrosis.   There is a large left renal peripelvic cyst. There is a 25 mm left renal pelvis calculus causing severe hydronephrosis. Additional large caliceal calculi on yesterday's PET/CT are obscured by contrast. There is a left ureteral stent in place, proximal margin encased by the renal pelvis stone which previously measuredd 23 mm..   PELVIS:   BLADDER: Distal  margin of left ureteral stent appears to be encased by a 39 mm calculus. This is similar to prior exam.   REPRODUCTIVE ORGANS: Uterus is present.   BOWEL: There appears to be a circumferential low rectal mass, extending into the anal canal. No findings of bowel obstruction otherwise identified. Appendix not identified. Unremarkable mesentery.   VESSELS: No abdominal aortic aneurysm.Severe atherosclerosis. Abdominal aorta are grossly appears patent. IVC and major portal venous branches grossly appear patent.   PERITONEUM AND RETROPERITONEUM: No free fluid or free air. Clustered enlarged left periaortic lymph nodes measuring up to 10 mm short axis.   BONE AND ABDOMINAL WALL: Severe degenerative changes of the hips. Severe, lower lumbar predominant degenerative changes of the spine.       1.  Large low rectal mass better characterized on yesterday's PET/CT and recent dedicated rectal MRI. 2. Persistent findings of encrusted left ureteral stent compared to CT 4 months ago. Large calculus encasing the proximal margin in the renal pelvis is slightly enlarged. Large calculus encasing the distal end in the urinary bladder is not significantly changed. Associated left hydronephrosis has worsened and is now severe. 3. Left periaortic lymphadenopathy better characterized on CT PET/CT from yesterday.   MACRO: None.   Signed by: Madhav Rosenberg 5/13/2025 3:47 PM Dictation workstation:   [id number]    CT angio chest for pulmonary embolism  Result Date: 5/13/2025  Interpreted By:  John Callahan, STUDY: CT ANGIO CHEST FOR PULMONARY EMBOLISM;  5/13/2025 2:43 pm   INDICATION: Signs/Symptoms:tachycardia, sob, recent diagnosis of stage 4 rectal cancer.     COMPARISON: 01/17/2025   ACCESSION NUMBER(S): [id number]   ORDERING CLINICIAN: MATEUS MELARA   TECHNIQUE: Helical data acquisition of the chest was obtained with 75 mL Omnipaque 350. Images were reformatted in coronal and sagittal planes. Axial and coronal MIP images were created and  reviewed.   FINDINGS: POTENTIAL LIMITATIONS OF THE STUDY: None   HEART AND VESSELS: No discrete filling defects within the main pulmonary artery or its branches.   Main pulmonary artery and its branches are normal in caliber.   The thoracic aorta is of normal course and caliber without vascular calcifications.   Coronary artery calcifications are seen.The study is not optimized for evaluation of coronary arteries.   The cardiac chambers are not enlarged.   No evidence of pericardial effusion.   MEDIASTINUM AND DARRIAN, LOWER NECK AND AXILLA: The visualized thyroid gland is within normal limits.   No evidence of thoracic lymphadenopathy by CT criteria.   Esophagus appears within normal limits as seen.   LUNGS AND AIRWAYS: The trachea and central airways are patent. No endobronchial lesion.   Lungs are clear.   UPPER ABDOMEN: Left hydronephrosis partly visualized. Exophytic cyst off the upper pole of the left kidney.   CHEST WALL AND OSSEOUS STRUCTURES: There are no suspicious osseous lesions. Multilevel degenerative changes are present       1.  No pulmonary emboli or confluent airspace disease. 2. Left hydronephrosis partly visualized.     MACRO: None   Signed by: John Callahan 5/13/2025 2:50 PM Dictation workstation:   [id number]           Assessment & Plan  Hypokalemia  Hortensia Rodriguez is a 59 y.o. female with a PMHx remarkable for COPD, tobacco dependence, hypothyroidism, left kidney stone s/p stent, ascending aorta aneurysm, depression, anxiety, HTN, HLD and newly dx Rectal Adenocarcinoma with lung mets that presented to OSH ED after recent outpatient blood work showed hypokalemia and was further instructed to go to the ED for evaluation.    Updates 5/15/25:  -will continue to monitor electrolytes and replete prn  -will monitor creatinine, give fluids prn  -urology consult for L hydronephrosis, calcified ureteral stent, recommending CT non-con abdomen pelvis  -plan for flex sig with biopsy of rectal  mass  -plan for IR para-aortic lymph node tomorrow    #Hypokalemia  - K+ on admit 2.8, repleted with total 80mEq potassium on admission, now improving  - EKG (5/13) showed sinus tach with left-axis devation, incomplete RBBB, nonspecific ST and T wave abnormality  - continue to monitor  - telemetry ordered    #KARLY  #UTI  #c/f worsening hydronephrosis  - likely 2/2 large calculus vs. periaortic lymphadenopathy  - CT A (5/13) showed no PE, L hydronephrosis  - CT A/P (5/13) showed rectal mass, persistant findings of encrusted L ureteral stent and large calculus with worsening associated L hydronephrosis, with L periaortic lymphadenopathy  - sCr. 1.44 on admit (5/13) (BL ~0.80), s/p 1 L NS x2 in ED (5/13)  - UA (5/13) +leuks, +bacteria, urine culture pending  - blood culture x2 (5/13) pending  - s/p IV Ceftriaxone 1g x1, continue Ceftriaxone 1 g daily on admit  -urology consult for L hydronephrosis, calcified ureteral stent, recommending CT non-con abdomen pelvis     #Rectal Adenocarcinoma  - Oncologist Dr. Bernardo - notify in AM, Radiation Oncologist Dr. Sharp  - p/w abdominal pain in Jul 2023--> Rectal mass palpated. Colonoscopy showed a rectal mass. This was a large mass and it was felt to be a sampling error  - CT C/A/P (Jul 2023): Rectal wall thickening. Multiple lung nodules  - Was lost to follow up. Even when she p/to surgeon's office did not complete testing.   - CT C/A/P (Jan 2024): 15 mm BRIANA lung nodule (increased in size) + large rectal mass looks larger than before. L retroperitoneal LAP  - CT C/A/P (Jan 2025): Stable anorectal mass. Multiple lung nodules.   - MR Rectum (Jan 2025): Rectal mass about 9 cm in size extending to peritoneum? No LAPs  - 2/5/25: Bx revealed fragments of villous adenoma  -plan for flex sig with biopsy of rectal mass  -plan for IR para-aortic lymph node tomorrow    #HTN  - continue home amlodipine 10mg daily  - continue home metoprolol 25mg BID    #Hypothyroidism  - continue home  levothyroxine 25 mcg daily     #Hypercholesteremia  - continue home ezetimibe 10 mg daily     #hx COPD  - continue home Symbicort (alternative Breo Ellipta) 2 puffs BID  - continue albuterol Q6    #Prophy  - Lovenox daily  - Protonix daily    DISPO  - Full Code- confirmed on admission  - NOK: Ayden Rodriguez () #[phone]    F: prn, s/p 2L NS at OSH  E: K>4, Mg>2  N: regular diet  A: PIV  GI: imodium for diarrhea  DVT: lovenox    Code: full code (confirmed on admission)  NOK: Ayden Rodriguez () #[phone]       Hilario Baer MD PGY1           [1] amLODIPine, 10 mg, oral, Daily  cefTRIAXone, 1 g, intravenous, q24h  enoxaparin, 40 mg, subcutaneous, q24h  ezetimibe, 10 mg, oral, Daily  fluticasone furoate-vilanteroL, 1 puff, inhalation, Daily  levothyroxine, 25 mcg, oral, Daily  melatonin, 6 mg, oral, Nightly  metoprolol tartrate, 25 mg, oral, BID  pantoprazole, 40 mg, oral, Daily before breakfast     [2]    [3] PRN medications: acetaminophen, albuterol, loperamide

## 2025-05-15 NOTE — ASSESSMENT & PLAN NOTE
Hortensia Rodriguez is a 59 y.o. female with a PMHx remarkable for COPD, tobacco dependence, hypothyroidism, left kidney stone s/p stent, ascending aorta aneurysm, depression, anxiety, HTN, HLD and newly dx Rectal Adenocarcinoma with lung mets that presented to [facility] ED after recent outpatient blood work showed hypokalemia and was further instructed to go to the ED for evaluation.    Updates 5/15/25:  -will continue to monitor electrolytes and replete prn  -will monitor creatinine, give fluids prn  -urology consult for L hydronephrosis, calcified ureteral stent, recommending CT non-con abdomen pelvis  -plan for flex sig with biopsy of rectal mass  -plan for IR para-aortic lymph node tomorrow    #Hypokalemia  - K+ on admit 2.8, repleted with total 80mEq potassium on admission, now improving  - EKG (5/13) showed sinus tach with left-axis devation, incomplete RBBB, nonspecific ST and T wave abnormality  - continue to monitor  - telemetry ordered    #KARLY  #UTI  #c/f worsening hydronephrosis  - likely 2/2 large calculus vs. periaortic lymphadenopathy  - CT A (5/13) showed no PE, L hydronephrosis  - CT A/P (5/13) showed rectal mass, persistant findings of encrusted L ureteral stent and large calculus with worsening associated L hydronephrosis, with L periaortic lymphadenopathy  - sCr. 1.44 on admit (5/13) (BL ~0.80), s/p 1 L NS x2 in ED (5/13)  - UA (5/13) +leuks, +bacteria, urine culture pending  - blood culture x2 (5/13) pending  - s/p IV Ceftriaxone 1g x1, continue Ceftriaxone 1 g daily on admit  -urology consult for L hydronephrosis, calcified ureteral stent, recommending CT non-con abdomen pelvis     #Rectal Adenocarcinoma  - Oncologist Dr. Bernardo - notify in AM, Radiation Oncologist Dr. Sharp  - p/w abdominal pain in Jul 2023--> Rectal mass palpated. Colonoscopy showed a rectal mass. This was a large mass and it was felt to be a sampling error  - CT C/A/P (Jul 2023): Rectal wall thickening. Multiple lung  nodules  - Was lost to follow up. Even when she p/to surgeon's office did not complete testing.   - CT C/A/P (Jan 2024): 15 mm BRIANA lung nodule (increased in size) + large rectal mass looks larger than before. L retroperitoneal LAP  - CT C/A/P (Jan 2025): Stable anorectal mass. Multiple lung nodules.   - MR Rectum (Jan 2025): Rectal mass about 9 cm in size extending to peritoneum? No LAPs  - 2/5/25: Bx revealed fragments of villous adenoma  -plan for flex sig with biopsy of rectal mass  -plan for IR para-aortic lymph node tomorrow    #HTN  - continue home amlodipine 10mg daily  - continue home metoprolol 25mg BID    #Hypothyroidism  - continue home levothyroxine 25 mcg daily     #Hypercholesteremia  - continue home ezetimibe 10 mg daily     #hx COPD  - continue home Symbicort (alternative Breo Ellipta) 2 puffs BID  - continue albuterol Q6    #Prophy  - Lovenox daily  - Protonix daily    DISPO  - Full Code- confirmed on admission  - NOK: Ayden Rodriguez () #[phone]

## 2025-05-15 NOTE — CARE PLAN
The clinical goals for the shift include Pt will remain HDS    Problem: Pain - Adult  Goal: Verbalizes/displays adequate comfort level or baseline comfort level  Outcome: Progressing  Flowsheets (Taken 5/15/2025 0504)  Verbalizes/displays adequate comfort level or baseline comfort level:   Encourage patient to monitor pain and request assistance   Assess pain using appropriate pain scale   Administer analgesics based on type and severity of pain and evaluate response   Implement non-pharmacological measures as appropriate and evaluate response     Problem: Safety - Adult  Goal: Free from fall injury  Outcome: Progressing  Flowsheets (Taken 5/15/2025 0504)  Free from fall injury: Instruct family/caregiver on patient safety

## 2025-05-15 NOTE — SIGNIFICANT EVENT
Tele batteries changed. Tele monitor turned back on and functioning appropriately.     RODERICK Jarvis RN

## 2025-05-15 NOTE — CONSULTS
"Nutrition Initial Assessment:   Nutrition Assessment    The patient is a 59 y.o. female who is hospital day #1.  Pt admitted d/t outpatient lab work showing hypokalemia. Now repleted. Urology consulted for L hydronephrosis. GI following as well.     PMHx:  COPD, hypothyroidism, left kidney stone s/p stent,depression, anxiety, HTN, HLD and newly dx Rectal Adenocarcinoma with lung mets    Reason for Assessment: Admission nursing screening  Have you recently lost weight without trying?: Yes, Lost 14 - 23 pounds  Have you been eating poorly because of a decreased appetite?: No  Malnutrition Score: 2    Nutrition History:  Food and Nutrient History: Unable to meet with pt today as she is SYLVIE @GI lab. She met with an inpatient RD about 4 months ago which noted pt with good PO intake. Flowsheets indicate pt at least ate ~700 kcal and 24g pro (without including dinner). Reviewed health touch, pt did order dinner yesterday which was about 850 kcal and 32g pro. No breakfast as pt admitted later in the AM. No changes in PO intake noted in MST screening. Would appear pt with good PO intake but will neeed to monitor.  Vitamin/Herbal Supplement Use: None @ home med list       Date/Time Percent Meals Eaten (%)   05/15/25 0846 0 (npo)   05/14/25 1437 100 (chips) = 140 kcal and 2g pro    05/14/25 1217 75 = 550 kcal and 22g pro       Anthropometrics:  Start of admission anthropometrics:  Height: 159.1 cm (5' 2.64\")  Weight: 67.8 kg (149 lb 7.6 oz)  BMI (Calculated): 26.78  IBW/kg (Dietitian Calculated): 51.36 kg  Percent of IBW: 132 %    Wt Hx   05/14/25 67.8 kg (149 lb 7.6 oz)   05/13/25 65.5 kg (144 lb 4.7 oz)   03/26/25 71.9 kg (158 lb 6.4 oz) - 5.6 to 9 % wt loss x3 weeks.    02/07/25 74.8 kg (165 lb)   12/28/24 73.1 kg (161 lb 2.5 oz)   10/29/24 76.2 kg (168 lb) - >/=10% wt loss x6 months    09/23/24 71.2 kg (157 lb)   02/05/24 75.7 kg (166 lb 14.2 oz)        Weight History / % Weight Change: Possible significant wt loss x1 " month and x6 months. EMR wt hx is limited.  Significant Weight Loss: Yes    Nutrition Focused Physical Exam Findings:  Subcutaneous Fat Loss   Defer Subcutaneous Fat Loss Assessment: Defer all  Defer All Reason: pt SYLVIE  Muscle Wasting  Defer Muscle Wasting Assessment: Defer all  Defer All Reason: pt SYLVIE  Edema  Edema: none  Physical Findings   Skin: Negative      Last BM Date: 05/15/25    Nutrition Significant Labs:    Results from last 7 days   Lab Units 05/15/25  0757 05/14/25 2024 05/14/25  0844 05/13/25  2252 05/13/25  1339   HEMOGLOBIN g/dL 13.4  --  14.1  --  16.7*   MCV fL 90  --  87  --  86   GLUCOSE mg/dL 90 95 98   < >  --    POTASSIUM mmol/L 3.5 3.8 2.9*   < >  --    SODIUM mmol/L 137 135* 138   < >  --    PHOSPHORUS mg/dL  --  3.2  --   --   --    MAGNESIUM mg/dL  --  2.06 2.12  --  2.26   CREATININE mg/dL 0.90 1.05 1.20*   < >  --    BUN mg/dL 20 26* 25*   < >  --    ALT U/L 9  --  8  --   --    AST U/L 11  --  10  --   --    ALK PHOS U/L 96  --  98  --   --     < > = values in this interval not displayed.     Lab Results   Component Value Date    VITD25 29 (L) 03/22/2024     Lab Results   Component Value Date    FVCKRESY81 344 03/26/2025     Lab Results   Component Value Date    FOLATE 8.2 03/26/2025         Nutrition Specific Medications:  cefTRIAXone, 1 g, intravenous, q24h  levothyroxine, 25 mcg, oral, Daily  pantoprazole, 40 mg, oral, Daily before breakfast  potassium chloride, 20 mEq, intravenous, q2h    I/O:   I/O last 2 completed shifts:  In: 750 (11.1 mL/kg) [P.O.:550; IV Piggyback:200]  Out: - (0 mL/kg)   Weight: 67.8 kg     Dietary Orders (From admission, onward)       Start     Ordered    05/16/25 0001  NPO Diet Except: Other (specify); Additional Details: Clear liquids until 0500. May have clears up to 2 hours prior to procedure if exact time is known.; Effective midnight  Diet effective midnight        Question Answer Comment   Except: Other (specify)    Additional Details Clear liquids  until 0500. May have clears up to 2 hours prior to procedure if exact time is known.        05/15/25 1225    05/15/25 1400  Adult diet Regular  Diet effective 1400        Question:  Diet type  Answer:  Regular    05/15/25 1225    05/14/25 0533  May Participate in Room Service  ( ROOM SERVICE MAY PARTICIPATE)  Once        Question:  .  Answer:  Yes    05/14/25 0532                     Estimated Needs:   Total Energy Estimated Needs in 24 hours (kCal): 1850 kCal  Method for Estimating Needs: 27 kcal/kg * 68 kg    Total Protein Estimated Needs in 24 Hours (g): 90 g  Method for Estimating 24 Hour Protein Needs: 1.3 g/kg * 68 kg    Method for Estimating 24 Hour Fluid Needs: 1 ml/kcal or per team          Nutrition Diagnosis   Malnutrition Diagnosis  Patient has Malnutrition Diagnosis:  (Unable to determine. PO intake appears good but pt with significant wt loss x1 month and x6 months.)    Nutrition Diagnosis  Patient has Nutrition Diagnosis: Yes  Diagnosis Status (1): New  Nutrition Diagnosis 1: Altered GI function  Related to (1): altered GI structure  As Evidenced by (1): rectal adenocarcinoma, hypokalemia 2/2 malignancy       Nutrition Interventions/Recommendations   Nutrition prescription for oral nutrition    Nutrition Recommendations:  Continue with regular diet.   Recheck vitamin D levels.   Weekly weights.  Order placed for Ensure Plus (350 kcal, 13g pro) x1/d          Nutrition Monitoring and Evaluation   Monitoring and Evaluation Plan: Estimated Energy Intake  Estimated Energy Intake: Energy intake greater or equal to 75% of estimated energy needs    Monitoring and Evaluation Plan: Body weight  Body Weight: Body weight - Maintain stable weight              New goal(s) identified         Time Spent (min): 30 minutes

## 2025-05-16 ENCOUNTER — PHARMACY VISIT (OUTPATIENT)
Dept: PHARMACY | Facility: CLINIC | Age: 60
End: 2025-05-16
Payer: MEDICAID

## 2025-05-16 VITALS
OXYGEN SATURATION: 93 % | DIASTOLIC BLOOD PRESSURE: 62 MMHG | HEIGHT: 63 IN | HEART RATE: 74 BPM | WEIGHT: 149.47 LBS | BODY MASS INDEX: 26.48 KG/M2 | RESPIRATION RATE: 18 BRPM | SYSTOLIC BLOOD PRESSURE: 95 MMHG | TEMPERATURE: 96.8 F

## 2025-05-16 LAB
ALBUMIN SERPL BCP-MCNC: 3.7 G/DL (ref 3.4–5)
ALP SERPL-CCNC: 89 U/L (ref 33–136)
ALT SERPL W P-5'-P-CCNC: 11 U/L (ref 7–45)
ANION GAP SERPL CALC-SCNC: 15 MMOL/L (ref 10–20)
AST SERPL W P-5'-P-CCNC: 9 U/L (ref 9–39)
BASOPHILS # BLD AUTO: 0.03 X10*3/UL (ref 0–0.1)
BASOPHILS NFR BLD AUTO: 0.4 %
BILIRUB SERPL-MCNC: 0.3 MG/DL (ref 0–1.2)
BUN SERPL-MCNC: 23 MG/DL (ref 6–23)
CALCIUM SERPL-MCNC: 9.2 MG/DL (ref 8.6–10.6)
CHLORIDE SERPL-SCNC: 100 MMOL/L (ref 98–107)
CO2 SERPL-SCNC: 27 MMOL/L (ref 21–32)
CREAT SERPL-MCNC: 1.01 MG/DL (ref 0.5–1.05)
EGFRCR SERPLBLD CKD-EPI 2021: 63 ML/MIN/1.73M*2
EOSINOPHIL # BLD AUTO: 0.14 X10*3/UL (ref 0–0.7)
EOSINOPHIL NFR BLD AUTO: 2 %
ERYTHROCYTE [DISTWIDTH] IN BLOOD BY AUTOMATED COUNT: 14.9 % (ref 11.5–14.5)
GLUCOSE SERPL-MCNC: 102 MG/DL (ref 74–99)
HCT VFR BLD AUTO: 40.2 % (ref 36–46)
HGB BLD-MCNC: 13.2 G/DL (ref 12–16)
IMM GRANULOCYTES # BLD AUTO: 0.04 X10*3/UL (ref 0–0.7)
IMM GRANULOCYTES NFR BLD AUTO: 0.6 % (ref 0–0.9)
LYMPHOCYTES # BLD AUTO: 1.88 X10*3/UL (ref 1.2–4.8)
LYMPHOCYTES NFR BLD AUTO: 27.5 %
MCH RBC QN AUTO: 29.5 PG (ref 26–34)
MCHC RBC AUTO-ENTMCNC: 32.8 G/DL (ref 32–36)
MCV RBC AUTO: 90 FL (ref 80–100)
MONOCYTES # BLD AUTO: 0.56 X10*3/UL (ref 0.1–1)
MONOCYTES NFR BLD AUTO: 8.2 %
NEUTROPHILS # BLD AUTO: 4.18 X10*3/UL (ref 1.2–7.7)
NEUTROPHILS NFR BLD AUTO: 61.3 %
NRBC BLD-RTO: 0 /100 WBCS (ref 0–0)
PLATELET # BLD AUTO: 327 X10*3/UL (ref 150–450)
POTASSIUM SERPL-SCNC: 4 MMOL/L (ref 3.5–5.3)
PROT SERPL-MCNC: 6.7 G/DL (ref 6.4–8.2)
RBC # BLD AUTO: 4.48 X10*6/UL (ref 4–5.2)
SODIUM SERPL-SCNC: 138 MMOL/L (ref 136–145)
WBC # BLD AUTO: 6.8 X10*3/UL (ref 4.4–11.3)

## 2025-05-16 PROCEDURE — 84075 ASSAY ALKALINE PHOSPHATASE: CPT

## 2025-05-16 PROCEDURE — 99239 HOSP IP/OBS DSCHRG MGMT >30: CPT

## 2025-05-16 PROCEDURE — 85025 COMPLETE CBC W/AUTO DIFF WBC: CPT

## 2025-05-16 PROCEDURE — 2500000002 HC RX 250 W HCPCS SELF ADMINISTERED DRUGS (ALT 637 FOR MEDICARE OP, ALT 636 FOR OP/ED): Mod: SE

## 2025-05-16 PROCEDURE — 2500000001 HC RX 250 WO HCPCS SELF ADMINISTERED DRUGS (ALT 637 FOR MEDICARE OP): Mod: SE

## 2025-05-16 PROCEDURE — RXMED WILLOW AMBULATORY MEDICATION CHARGE

## 2025-05-16 PROCEDURE — 2500000004 HC RX 250 GENERAL PHARMACY W/ HCPCS (ALT 636 FOR OP/ED): Mod: JZ,SE

## 2025-05-16 PROCEDURE — 36415 COLL VENOUS BLD VENIPUNCTURE: CPT

## 2025-05-16 RX ORDER — LEVOTHYROXINE SODIUM 25 UG/1
25 TABLET ORAL DAILY
Qty: 30 TABLET | Refills: 0 | Status: SHIPPED | OUTPATIENT
Start: 2025-05-16

## 2025-05-16 RX ORDER — ALBUTEROL SULFATE 90 UG/1
1 INHALANT RESPIRATORY (INHALATION) EVERY 6 HOURS PRN
Qty: 18 G | Refills: 1 | Status: SHIPPED | OUTPATIENT
Start: 2025-05-16

## 2025-05-16 RX ORDER — AMLODIPINE BESYLATE 10 MG/1
10 TABLET ORAL DAILY
Qty: 30 TABLET | Refills: 0 | Status: SHIPPED | OUTPATIENT
Start: 2025-05-16

## 2025-05-16 RX ORDER — EZETIMIBE 10 MG/1
10 TABLET ORAL DAILY
Qty: 30 TABLET | Refills: 0 | Status: SHIPPED | OUTPATIENT
Start: 2025-05-16

## 2025-05-16 RX ORDER — ACETAMINOPHEN 325 MG/1
650 TABLET ORAL EVERY 6 HOURS PRN
Qty: 30 TABLET | Refills: 0 | Status: SHIPPED | OUTPATIENT
Start: 2025-05-16

## 2025-05-16 RX ORDER — TALC
6 POWDER (GRAM) TOPICAL NIGHTLY
Qty: 60 TABLET | Refills: 0 | Status: SHIPPED | OUTPATIENT
Start: 2025-05-16

## 2025-05-16 RX ORDER — BUDESONIDE AND FORMOTEROL FUMARATE DIHYDRATE 160; 4.5 UG/1; UG/1
2 AEROSOL RESPIRATORY (INHALATION) 2 TIMES DAILY
Qty: 10.2 G | Refills: 0 | Status: SHIPPED | OUTPATIENT
Start: 2025-05-16

## 2025-05-16 RX ORDER — PANTOPRAZOLE SODIUM 40 MG/1
40 TABLET, DELAYED RELEASE ORAL
Qty: 30 TABLET | Refills: 0 | Status: SHIPPED | OUTPATIENT
Start: 2025-05-17

## 2025-05-16 RX ORDER — POTASSIUM CHLORIDE 1.5 G/1.58G
40 POWDER, FOR SOLUTION ORAL ONCE
Status: COMPLETED | OUTPATIENT
Start: 2025-05-16 | End: 2025-05-16

## 2025-05-16 RX ORDER — METOPROLOL TARTRATE 25 MG/1
25 TABLET, FILM COATED ORAL 2 TIMES DAILY
Qty: 60 TABLET | Refills: 0 | Status: SHIPPED | OUTPATIENT
Start: 2025-05-16

## 2025-05-16 RX ORDER — LOPERAMIDE HYDROCHLORIDE 2 MG/1
2 CAPSULE ORAL 4 TIMES DAILY PRN
Qty: 30 CAPSULE | Refills: 0 | Status: SHIPPED | OUTPATIENT
Start: 2025-05-16

## 2025-05-16 RX ADMIN — METOPROLOL TARTRATE 25 MG: 25 TABLET, FILM COATED ORAL at 08:06

## 2025-05-16 RX ADMIN — LEVOTHYROXINE SODIUM 25 MCG: 25 TABLET ORAL at 08:06

## 2025-05-16 RX ADMIN — POTASSIUM CHLORIDE 40 MEQ: 1.5 POWDER, FOR SOLUTION ORAL at 08:07

## 2025-05-16 RX ADMIN — FLUTICASONE FUROATE AND VILANTEROL TRIFENATATE 1 PUFF: 200; 25 POWDER RESPIRATORY (INHALATION) at 08:17

## 2025-05-16 RX ADMIN — EZETIMIBE 10 MG: 10 TABLET ORAL at 08:06

## 2025-05-16 RX ADMIN — AMLODIPINE BESYLATE 10 MG: 10 TABLET ORAL at 08:06

## 2025-05-16 RX ADMIN — PANTOPRAZOLE SODIUM 40 MG: 40 TABLET, DELAYED RELEASE ORAL at 05:11

## 2025-05-16 ASSESSMENT — COGNITIVE AND FUNCTIONAL STATUS - GENERAL
DAILY ACTIVITIY SCORE: 24
CLIMB 3 TO 5 STEPS WITH RAILING: A LITTLE
MOBILITY SCORE: 23

## 2025-05-16 ASSESSMENT — PAIN - FUNCTIONAL ASSESSMENT: PAIN_FUNCTIONAL_ASSESSMENT: 0-10

## 2025-05-16 ASSESSMENT — PAIN SCALES - GENERAL: PAINLEVEL_OUTOF10: 0 - NO PAIN

## 2025-05-16 NOTE — CARE PLAN
The patient's goals for the shift include      The clinical goals for the shift include pt will remain HDS and VSS throughout shift

## 2025-05-16 NOTE — PROGRESS NOTES
Hortensia Rodriguez is a 59 y.o. female on day 2 of admission presenting with Hypokalemia.      Subjective   Ms. Rodriguez seen sitting up in bed today. She denies fever, chills, chest pain, shortness of breath. Has chronic bloody stools/diarrhea. Understanding of plan for procedure with IR as outpatient.       Objective     Last Recorded Vitals  /72   Pulse 75   Temp 36 °C (96.8 °F)   Resp 18   Wt 67.8 kg (149 lb 7.6 oz)   SpO2 95%   Intake/Output last 3 Shifts:    Intake/Output Summary (Last 24 hours) at 5/16/2025 0829  Last data filed at 5/16/2025 0810  Gross per 24 hour   Intake 290 ml   Output 1 ml   Net 289 ml       Admission Weight  Weight: 67.8 kg (149 lb 7.6 oz) (05/14/25 0451)    Daily Weight  05/14/25 : 67.8 kg (149 lb 7.6 oz)    Image Results  CT abdomen pelvis wo IV contrast  Narrative: Interpreted By:  David Vázquez and Stevens Alex   STUDY:  CT ABDOMEN PELVIS WO IV CONTRAST;  5/15/2025 10:14 am      INDICATION:  Signs/Symptoms:evaluate renal stone/hydronephrosis/ureteral stent.          COMPARISON:  CT abdomen pelvis 05/13/2025, MR rectum 01/31/2025, CT chest abdomen  pelvis 01/17/2025      ACCESSION NUMBER(S):  [id number]      ORDERING CLINICIAN:  DENISE RAE      TECHNIQUE:  Contiguous axial images of the abdomen and pelvis were obtained  without intravenous contrast. Coronal and sagittal reformatted images  were reconstructed from the axial data.      FINDINGS:  Note: The absence of intravenous contrast limits evaluation of the  solid organs and vasculature.      LOWER CHEST: Mild bibasilar atelectasis. Partially visualized  coronary atherosclerosis.          ABDOMEN/PELVIS:      ABDOMINAL WALL: No significant abnormality.      LIVER: The liver demonstrates a normal noncontrast attenuation.      BILE DUCTS: No significant intrahepatic or extrahepatic dilatation.      GALLBLADDER: No significant abnormality.      PANCREAS: No significant abnormality.      SPLEEN: No significant  abnormality.      ADRENALS: 1.6 cm right and 1.8 cm left hypodense lesions within the  adrenal glands (series 2, image 33 and 34), favored to be reflective  of adenomas.      KIDNEYS, URETERS, BLADDER: Cortical scarring involving the lower pole  of the right kidney.      There is re-demonstration of a large left renal peripelvic cyst.  There is a retained nephroureteral stent with associated calculus  (series 900, image 63), measuring approximately 2.2 cm in diameter,  encasing the proximal portion of the stent within the renal pelvis  and causing severe hydronephrosis. The distal aspect of the stent  within the bladder is also associated calculus measuring 4.2 cm in  greatest diameter (series 2, image 121).      Additional renal calculi within the inferior pole of the left kidney,  the largest measuring 1.5 cm (series 900, image 65). There is  resultant moderate-to-severe hydronephrosis with a mild degree of  perinephric stranding.      REPRODUCTIVE ORGANS: No significant abnormality.      VESSELS: Moderate atherosclerotic disease of the abdominal aorta and  branching vasculature. No aneurysmal dilatation of the abdominal  aorta. The IVC is within normal limits.      RETROPERITONEUM/LYMPH NODES: No acute retroperitoneal abnormality.  Similar appearance of multiple prominent and enlarged lymph nodes  within the left periaortic space (series 2, image 59).      BOWEL/PERITONEUM: The stomach is unremarkable. No inflammatory bowel  wall thickening or dilatation. Normal appendix. There is  re-demonstration of a large heterogenous mass arising from the rectum  measuring 6.7 cm x 5.4 cm (series 2, image 123), better characterized  on prior MR rectum from 01/31/2025 and PET-CT dated 05/12/2025.      No ascites, free air, or fluid collection.      MUSCULOSKELETAL: No acute osseous abnormality. Multilevel  degenerative changes of the thoracolumbar spine. No suspicious  osseous lesion.      Impression: 1. Retained left-sided  nephroureteral stent with proximal (2.2 cm)  and distal (4.2 cm) encrustations resulting in moderate-to-severe  left hydronephrosis and mild perinephric stranding.  2. Additional left lower pole renal calculi the largest measuring 1.5  cm.  3. Large rectal mass which is stable in size and appearance compared  to prior imaging. Please see dedicated rectal MRI/prior PET-CT for  further characterization.  4. Unchanged left periaortic lymphadenopathy, better characterized on  recent PET-CT.  5. Additional chronic or incidental findings as detailed above.      I personally reviewed the images/study and I agree with the findings  as stated by Richard Fernandez DO PGY-2. This study was interpreted at  [place], [place].      MACRO:  None.      Signed by: David Vázquez 5/15/2025 4:31 PM  Dictation workstation:   [id number]  ECG 12 lead  Sinus tachycardia  Possible Left atrial enlargement  Left axis deviation  Incomplete right bundle branch block  Nonspecific ST and T wave abnormality  Abnormal ECG  When compared with ECG of 28-DEC-2024 16:23,  ST now depressed in Anterior leads  See ED provider note for full interpretation and clinical correlation  Confirmed by Anju Bolanos ([id number]) on 5/15/2025 3:05:15 PM  Flexible Sigmoidoscopy  Table formatting from the original result was not included.  Impression  Large rectal mass as noted above. Extensively biopsied.     Findings  Single friable and fungating mass in the distal rectum. The mass was near   circumferential extending from 10 cm proximally all the way to the anal   verge. The dentate line was not clearly visualized but is presumed to be   involved.   A tattoo was noted at the proximal extent of the rectal mass.  Performed multiple random forceps biopsies in the distal rectum using   jumbo forceps to rule out malignancy    Recommendation  Await pathology results   Follow up with PCP   Follow up with inpatient GI service with  Dr. Zuleta and Dr. Blake       Indication  Rectal mass    Post-Op Diagnosis  None    Staff  Staff Role   Dahiana Blake MD MPH Fellow   Alcon Navarrete MD Proceduralist     Medications  fentaNYL PF (Sublimaze) injection 75 mcg   midazolam (Versed) injection 2 mg   (Totals for administrations occurring from 1358 to 1433 on 05/15/25)     Preprocedure  A history and physical has been performed, and patient medication   allergies have been reviewed. The patient's tolerance of previous   anesthesia has been reviewed. The risks and benefits of the procedure and   the sedation options and risks were discussed with the patient. All   questions were answered and informed consent obtained.    Details of the Procedure  The patient underwent moderate sedation, which was administered by the   procedural nurse. The patient's blood pressure, heart rate, level of   consciousness, oxygen and respirations were monitored throughout the   procedure. A digital rectal exam was performed. A perianal exam was   performed. The scope was introduced through the anus and advanced to the   rectosigmoid. Retroflexion was not performed due to altered anatomy. The   quality of bowel preparation was evaluated using the [place] Bowel   Preparation Scale with scores of: right colon = not assessed, transverse   colon = not assessed, left colon = 2. Bowel prep was adequate. The   patient's estimated blood loss was minimal (<5 mL). The procedure was not   difficult. The patient tolerated the procedure well. There were no   apparent adverse events. The total BBPS score was 2.     Events  Procedure Events   Event Event Time   ENDO SCOPE IN TIME 5/15/2025  2:21 PM   ENDO SCOPE OUT TIME 5/15/2025  2:31 PM     Specimens  ID Type Source Tests Collected by Time   1 : Rectal Mass Biopsy Tissue RECTUM BIOPSY SURGICAL PATHOLOGY EXAM Alcon Navarrete MD 5/15/2025 1421     Procedure Location  [place]  [address]  [address]  [facility] [address]  [phone]    Referring Provider  Dahiana Blake MD MPH    Procedure Provider  MD Dahiana Ferrara MD MPH       Physical Exam  Constitutional:       Appearance: She is not ill-appearing.   HENT:      Head: Normocephalic.      Mouth/Throat:      Mouth: Mucous membranes are moist.      Comments: Poor dentition  Eyes:      General:         Right eye: No discharge.         Left eye: No discharge.   Cardiovascular:      Rate and Rhythm: Normal rate and regular rhythm.      Pulses: Normal pulses.      Heart sounds: Normal heart sounds. No murmur heard.     No friction rub. No gallop.   Pulmonary:      Effort: Pulmonary effort is normal. No respiratory distress.      Breath sounds: Normal breath sounds. No stridor. No wheezing, rhonchi or rales.   Abdominal:      General: Abdomen is flat. Bowel sounds are normal. There is no distension.      Palpations: Abdomen is soft. There is no mass.      Tenderness: There is no abdominal tenderness. There is no guarding.   Musculoskeletal:      Right lower leg: No edema.      Left lower leg: No edema.   Neurological:      Mental Status: She is alert and oriented to person, place, and time. Mental status is at baseline.      Cranial Nerves: No cranial nerve deficit.      Sensory: No sensory deficit.      Motor: No weakness.      Coordination: Coordination normal.      Gait: Gait normal.      Deep Tendon Reflexes: Reflexes normal.         Relevant Results  Scheduled medications  Scheduled Medications[1]  Continuous medications  Continuous Medications[2]  PRN medications  PRN Medications[3]  Results for orders placed or performed during the hospital encounter of 05/14/25 (from the past 24 hours)   Stool Pathogen Panel, PCR    Specimen: Stool   Result Value Ref Range    Campylobacter Group Not Detected Not Detected    Salmonella species Not Detected Not Detected    Shigella species Not Detected Not Detected    Vibrio Group Not Detected  Not Detected    Yersinia Enterocolitica Not Detected Not Detected    Shiga Toxin 1 Not Detected Not Detected    Shiga Toxin 2 Not Detected Not Detected    Norovirus GI/GII Not Detected Not Detected    Rotavirus A Not Detected Not Detected   Renal function panel   Result Value Ref Range    Glucose 149 (H) 74 - 99 mg/dL    Sodium 136 136 - 145 mmol/L    Potassium 3.6 3.5 - 5.3 mmol/L    Chloride 103 98 - 107 mmol/L    Bicarbonate 26 21 - 32 mmol/L    Anion Gap 11 10 - 20 mmol/L    Urea Nitrogen 18 6 - 23 mg/dL    Creatinine 1.11 (H) 0.50 - 1.05 mg/dL    eGFR 57 (L) >60 mL/min/1.73m*2    Calcium 8.8 8.6 - 10.6 mg/dL    Phosphorus 3.2 2.5 - 4.9 mg/dL    Albumin 3.5 3.4 - 5.0 g/dL   Magnesium   Result Value Ref Range    Magnesium 1.97 1.60 - 2.40 mg/dL     CT abdomen pelvis wo IV contrast  Result Date: 5/15/2025  Interpreted By:  David Vázquez and Stevens Alex STUDY: CT ABDOMEN PELVIS WO IV CONTRAST;  5/15/2025 10:14 am   INDICATION: Signs/Symptoms:evaluate renal stone/hydronephrosis/ureteral stent.     COMPARISON: CT abdomen pelvis 05/13/2025, MR rectum 01/31/2025, CT chest abdomen pelvis 01/17/2025   ACCESSION NUMBER(S): [id number]   ORDERING CLINICIAN: DENISE RAE   TECHNIQUE: Contiguous axial images of the abdomen and pelvis were obtained without intravenous contrast. Coronal and sagittal reformatted images were reconstructed from the axial data.   FINDINGS: Note: The absence of intravenous contrast limits evaluation of the solid organs and vasculature.   LOWER CHEST: Mild bibasilar atelectasis. Partially visualized coronary atherosclerosis.     ABDOMEN/PELVIS:   ABDOMINAL WALL: No significant abnormality.   LIVER: The liver demonstrates a normal noncontrast attenuation.   BILE DUCTS: No significant intrahepatic or extrahepatic dilatation.   GALLBLADDER: No significant abnormality.   PANCREAS: No significant abnormality.   SPLEEN: No significant abnormality.   ADRENALS: 1.6 cm right and 1.8 cm left hypodense  lesions within the adrenal glands (series 2, image 33 and 34), favored to be reflective of adenomas.   KIDNEYS, URETERS, BLADDER: Cortical scarring involving the lower pole of the right kidney.   There is re-demonstration of a large left renal peripelvic cyst. There is a retained nephroureteral stent with associated calculus (series 900, image 63), measuring approximately 2.2 cm in diameter, encasing the proximal portion of the stent within the renal pelvis and causing severe hydronephrosis. The distal aspect of the stent within the bladder is also associated calculus measuring 4.2 cm in greatest diameter (series 2, image 121).   Additional renal calculi within the inferior pole of the left kidney, the largest measuring 1.5 cm (series 900, image 65). There is resultant moderate-to-severe hydronephrosis with a mild degree of perinephric stranding.   REPRODUCTIVE ORGANS: No significant abnormality.   VESSELS: Moderate atherosclerotic disease of the abdominal aorta and branching vasculature. No aneurysmal dilatation of the abdominal aorta. The IVC is within normal limits.   RETROPERITONEUM/LYMPH NODES: No acute retroperitoneal abnormality. Similar appearance of multiple prominent and enlarged lymph nodes within the left periaortic space (series 2, image 59).   BOWEL/PERITONEUM: The stomach is unremarkable. No inflammatory bowel wall thickening or dilatation. Normal appendix. There is re-demonstration of a large heterogenous mass arising from the rectum measuring 6.7 cm x 5.4 cm (series 2, image 123), better characterized on prior MR rectum from 01/31/2025 and PET-CT dated 05/12/2025.   No ascites, free air, or fluid collection.   MUSCULOSKELETAL: No acute osseous abnormality. Multilevel degenerative changes of the thoracolumbar spine. No suspicious osseous lesion.       1. Retained left-sided nephroureteral stent with proximal (2.2 cm) and distal (4.2 cm) encrustations resulting in moderate-to-severe left  hydronephrosis and mild perinephric stranding. 2. Additional left lower pole renal calculi the largest measuring 1.5 cm. 3. Large rectal mass which is stable in size and appearance compared to prior imaging. Please see dedicated rectal MRI/prior PET-CT for further characterization. 4. Unchanged left periaortic lymphadenopathy, better characterized on recent PET-CT. 5. Additional chronic or incidental findings as detailed above.   I personally reviewed the images/study and I agree with the findings as stated by Richard Fernandez DO PGY-2. This study was interpreted at [place], [place].   MACRO: None.   Signed by: David Vázquez 5/15/2025 4:31 PM Dictation workstation:   [id number]    Flexible Sigmoidoscopy  Result Date: 5/15/2025  Table formatting from the original result was not included. Impression Large rectal mass as noted above. Extensively biopsied. Findings Single friable and fungating mass in the distal rectum. The mass was near circumferential extending from 10 cm proximally all the way to the anal verge. The dentate line was not clearly visualized but is presumed to be involved. A tattoo was noted at the proximal extent of the rectal mass. Performed multiple random forceps biopsies in the distal rectum using jumbo forceps to rule out malignancy Recommendation Await pathology results Follow up with PCP Follow up with inpatient GI service with Dr. Zuleta and Dr. Blake  Indication Rectal mass Post-Op Diagnosis None Staff Staff Role Dahiana Blake MD MPH Fellow Alcon Navarrete MD Proceduralist Medications fentaNYL PF (Sublimaze) injection 75 mcg midazolam (Versed) injection 2 mg (Totals for administrations occurring from 1358 to 1433 on 05/15/25) Preprocedure A history and physical has been performed, and patient medication allergies have been reviewed. The patient's tolerance of previous anesthesia has been reviewed. The risks and benefits of the procedure and the  sedation options and risks were discussed with the patient. All questions were answered and informed consent obtained. Details of the Procedure The patient underwent moderate sedation, which was administered by the procedural nurse. The patient's blood pressure, heart rate, level of consciousness, oxygen and respirations were monitored throughout the procedure. A digital rectal exam was performed. A perianal exam was performed. The scope was introduced through the anus and advanced to the rectosigmoid. Retroflexion was not performed due to altered anatomy. The quality of bowel preparation was evaluated using the [place] Bowel Preparation Scale with scores of: right colon = not assessed, transverse colon = not assessed, left colon = 2. Bowel prep was adequate. The patient's estimated blood loss was minimal (<5 mL). The procedure was not difficult. The patient tolerated the procedure well. There were no apparent adverse events. The total BBPS score was 2. Events Procedure Events Event Event Time ENDO SCOPE IN TIME 5/15/2025  2:21 PM ENDO SCOPE OUT TIME 5/15/2025  2:31 PM Specimens ID Type Source Tests Collected by Time 1 : Rectal Mass Biopsy Tissue RECTUM BIOPSY SURGICAL PATHOLOGY EXAM Alcon Navarrete MD 5/15/2025 1425 Procedure Location [facility] [address] [facility] [address] [phone] Referring Provider Dahiana Blake MD MPH Procedure Provider MD Dahiana Ferrara MD MPH            Assessment & Plan  Hypokalemia  Hortensia Rodriguez is a 59 y.o. female with a PMHx remarkable for COPD, tobacco dependence, hypothyroidism, left kidney stone s/p stent, ascending aorta aneurysm, depression, anxiety, HTN, HLD and newly dx Rectal Adenocarcinoma with lung mets that presented to [facility] ED after recent outpatient blood work showed hypokalemia and was further instructed to go to the ED for evaluation.    Updates 5/16/25:  -urology consult for L hydronephrosis,  calcified ureteral stent, CT non-con yesterday, recommending outpatient follow up  -flex sig with biopsy of rectal mass yesterday, will follow up results outpatient  -plan for IR para-aortic lymph node as outpatient  -likely discharge this afternoon/evening    #Hypokalemia  - K+ on admit 2.8, repleted with total 80mEq potassium on admission, now improving  - EKG (5/13) showed sinus tach with left-axis devation, incomplete RBBB, nonspecific ST and T wave abnormality  - continue to monitor  - telemetry ordered    #KARLY  #UTI  #c/f worsening hydronephrosis  - likely 2/2 large calculus vs. periaortic lymphadenopathy  - CT A (5/13) showed no PE, L hydronephrosis  - CT A/P (5/13) showed rectal mass, persistant findings of encrusted L ureteral stent and large calculus with worsening associated L hydronephrosis, with L periaortic lymphadenopathy  - sCr. 1.44 on admit (5/13) (BL ~0.80), s/p 1 L NS x2 in ED (5/13)  - UA (5/13) +leuks, +bacteria, urine culture pending  - blood culture x2 (5/13) pending  - s/p IV Ceftriaxone 1g x1, continue Ceftriaxone 1 g daily on admit  -urology consult for L hydronephrosis, calcified ureteral stent, recommending CT non-con abdomen pelvis, outpatient follow up    #Rectal Adenocarcinoma  - Oncologist Dr. Bernardo - Radiation Oncologist Dr. Sharp  - p/w abdominal pain in Jul 2023--> Rectal mass palpated. Colonoscopy showed a rectal mass. This was a large mass and it was felt to be a sampling error  - CT C/A/P (Jul 2023): Rectal wall thickening. Multiple lung nodules  - Was lost to follow up. Even when she p/to surgeon's office did not complete testing.   - CT C/A/P (Jan 2024): 15 mm BRIANA lung nodule (increased in size) + large rectal mass looks larger than before. L retroperitoneal LAP  - CT C/A/P (Jan 2025): Stable anorectal mass. Multiple lung nodules.   - MR Rectum (Jan 2025): Rectal mass about 9 cm in size extending to peritoneum? No LAPs  - 2/5/25: Bx revealed fragments of villous  adenoma  -flex sig with biopsy of rectal mass with biopsy 5/15  -plan for IR para-aortic lymph node as outpatient    #HTN  - continue home amlodipine 10mg daily  - continue home metoprolol 25mg BID    #Hypothyroidism  - continue home levothyroxine 25 mcg daily     #Hypercholesteremia  - continue home ezetimibe 10 mg daily     #hx COPD  - continue home Symbicort (alternative Breo Ellipta) 2 puffs BID  - continue albuterol Q6    #Prophy  - Lovenox daily  - Protonix daily    DISPO  - Full Code- confirmed on admission  - NOK: Ayden Rodriguez () #[phone]    F: prn, s/p 2L NS at OSH  E: K>4, Mg>2  N: regular diet  A: PIV  GI: imodium for diarrhea  DVT: lovenox    Code: full code (confirmed on admission)  NOK: Ayden Rodriguez () #[phone]       Hilario Baer MD PGY1           [1] amLODIPine, 10 mg, oral, Daily  cefTRIAXone, 1 g, intravenous, q24h  enoxaparin, 40 mg, subcutaneous, q24h  ezetimibe, 10 mg, oral, Daily  fluticasone furoate-vilanteroL, 1 puff, inhalation, Daily  levothyroxine, 25 mcg, oral, Daily  melatonin, 6 mg, oral, Nightly  metoprolol tartrate, 25 mg, oral, BID  pantoprazole, 40 mg, oral, Daily before breakfast     [2]    [3] PRN medications: acetaminophen, albuterol, loperamide

## 2025-05-16 NOTE — CARE PLAN
The patient's goals for the shift include      The clinical goals for the shift include Pt will remain safe and free of fall or injury through end of shift 5/16 0700      Problem: Pain - Adult  Goal: Verbalizes/displays adequate comfort level or baseline comfort level  Outcome: Progressing     Problem: Safety - Adult  Goal: Free from fall injury  Outcome: Progressing     Problem: Discharge Planning  Goal: Discharge to home or other facility with appropriate resources  Outcome: Progressing     Problem: Chronic Conditions and Co-morbidities  Goal: Patient's chronic conditions and co-morbidity symptoms are monitored and maintained or improved  Outcome: Progressing     Problem: Nutrition  Goal: Nutrient intake appropriate for maintaining nutritional needs  Outcome: Progressing

## 2025-05-16 NOTE — ASSESSMENT & PLAN NOTE
Hortensia Rodriguez is a 59 y.o. female with a PMHx remarkable for COPD, tobacco dependence, hypothyroidism, left kidney stone s/p stent, ascending aorta aneurysm, depression, anxiety, HTN, HLD and newly dx Rectal Adenocarcinoma with lung mets that presented to OSH ED after recent outpatient blood work showed hypokalemia and was further instructed to go to the ED for evaluation.    Updates 5/16/25:  -urology consult for L hydronephrosis, calcified ureteral stent, CT non-con yesterday, recommending outpatient follow up  -flex sig with biopsy of rectal mass yesterday, will follow up results outpatient  -plan for IR para-aortic lymph node as outpatient  -likely discharge this afternoon/evening    #Hypokalemia  - K+ on admit 2.8, repleted with total 80mEq potassium on admission, now improving  - EKG (5/13) showed sinus tach with left-axis devation, incomplete RBBB, nonspecific ST and T wave abnormality  - continue to monitor  - telemetry ordered    #KARLY  #UTI  #c/f worsening hydronephrosis  - likely 2/2 large calculus vs. periaortic lymphadenopathy  - CT A (5/13) showed no PE, L hydronephrosis  - CT A/P (5/13) showed rectal mass, persistant findings of encrusted L ureteral stent and large calculus with worsening associated L hydronephrosis, with L periaortic lymphadenopathy  - sCr. 1.44 on admit (5/13) (BL ~0.80), s/p 1 L NS x2 in ED (5/13)  - UA (5/13) +leuks, +bacteria, urine culture pending  - blood culture x2 (5/13) pending  - s/p IV Ceftriaxone 1g x1, continue Ceftriaxone 1 g daily on admit  -urology consult for L hydronephrosis, calcified ureteral stent, recommending CT non-con abdomen pelvis, outpatient follow up    #Rectal Adenocarcinoma  - Oncologist Dr. Bernardo - Radiation Oncologist Dr. Sharp  - p/w abdominal pain in Jul 2023--> Rectal mass palpated. Colonoscopy showed a rectal mass. This was a large mass and it was felt to be a sampling error  - CT C/A/P (Jul 2023): Rectal wall thickening. Multiple lung  nodules  - Was lost to follow up. Even when she p/to surgeon's office did not complete testing.   - CT C/A/P (Jan 2024): 15 mm BRIANA lung nodule (increased in size) + large rectal mass looks larger than before. L retroperitoneal LAP  - CT C/A/P (Jan 2025): Stable anorectal mass. Multiple lung nodules.   - MR Rectum (Jan 2025): Rectal mass about 9 cm in size extending to peritoneum? No LAPs  - 2/5/25: Bx revealed fragments of villous adenoma  -flex sig with biopsy of rectal mass with biopsy 5/15  -plan for IR para-aortic lymph node as outpatient    #HTN  - continue home amlodipine 10mg daily  - continue home metoprolol 25mg BID    #Hypothyroidism  - continue home levothyroxine 25 mcg daily     #Hypercholesteremia  - continue home ezetimibe 10 mg daily     #hx COPD  - continue home Symbicort (alternative Breo Ellipta) 2 puffs BID  - continue albuterol Q6    #Prophy  - Lovenox daily  - Protonix daily    DISPO  - Full Code- confirmed on admission  - NOK: Ayden Rodriguez () #[phone]

## 2025-05-16 NOTE — NURSING NOTE
5/16/25 1858:    Pt discharge complete. IV removed, catheter intact. Discharge instructions printed and reviewed with patient, including medication changes and follow-up appointments. No questions or concerns at this time. Meds to beds delivered to patient bedside, no other belongings to return. Pt wheeled to [facility] via transport to discharge to home. [facility] approved by MAHOGANY Justin RN (manager).    RODREICK Jarvis RN

## 2025-05-16 NOTE — SIGNIFICANT EVENT
CT reviewed - there are large calcifications on the proximal and distal curl of her stent with hydronephrosis.    Her renal function remains relatively stable. Likely KARLY from earlier was from contrast load for CT PE.    - Will plan for outpatient management of her stent/stones, scheduled for follow up 5/27.     D/w attending, Dr. Babin.    --------------------------------------------  Sima Gotti MD, PGY-6  Urology  Consult Uro: [phone]  Pediatrics Uro: [id number]   After 5pm and Weekends: [id number]

## 2025-05-16 NOTE — DISCHARGE INSTRUCTIONS
Dear Ms. Rodriguez,    You were admitted to the hospital for low potassium levels. Your potassium levels have normalized after we supplemented your levels. A biopsy was taken of your rectal mass and will be followed up by your medical and radiation oncologists.     We still need a biopsy of some lymph nodes to see whether or not your suspected cancer has spread beyond your rectum (checking to see whether or not your cancer has metastasized). Your appointment for this biopsy has been scheduled for 5/27/2025, 9:30AM, at [facility].    Additionally, please ensure you have follow-up with your primary care provider in one to two weeks. A referral has been placed to schedule an appointment next week with your oncologist. Please call [phone] if you do not receive a phone call to schedule appointments by Tuesday. Follow up with Dr. Sharp has been arranged and is noted below.    Please return to the hospital with any concerns.    Thank you,  Your  Care Team

## 2025-05-17 NOTE — DISCHARGE SUMMARY
Discharge Diagnosis  Hypokalemia, KARLY     Issues Requiring Follow-Up  Follow up with radiation oncology and medical oncology for further evaluation of rectal mass. Biopsy taken while inpatient to be followed up.    Discharge Meds     Medication List      START taking these medications     acetaminophen 325 mg tablet; Commonly known as: Tylenol; Take 2 tablets   (650 mg) by mouth every 6 hours if needed for mild pain (1 - 3) or   moderate pain (4 - 6).   loperamide 2 mg capsule; Commonly known as: Imodium; Take 1 capsule (2   mg) by mouth 4 times a day as needed for diarrhea.   melatonin 3 mg tablet; Take 2 tablets (6 mg) by mouth once daily at   bedtime.   pantoprazole 40 mg EC tablet; Commonly known as: ProtoNix; Take 1 tablet   (40 mg) by mouth once daily in the morning. Take before meals. Do not   crush, chew, or split.     CONTINUE taking these medications     albuterol 90 mcg/actuation inhaler; Commonly known as: ProAir HFA;   Inhale 1 puff every 6 hours if needed for wheezing.   amLODIPine 10 mg tablet; Commonly known as: Norvasc; Take 1 tablet (10   mg) by mouth once daily.   ezetimibe 10 mg tablet; Commonly known as: Zetia; Take 1 tablet (10 mg)   by mouth once daily.   fluticasone 50 mcg/actuation nasal spray; Commonly known as: Flonase;   Administer 2 sprays into each nostril once daily. Shake gently. Before   first use, prime pump. After use, clean tip and replace cap.   levothyroxine 25 mcg tablet; Commonly known as: Synthroid, Levoxyl; Take   1 tablet (25 mcg) by mouth once daily.   metoprolol tartrate 25 mg tablet; Commonly known as: Lopressor; Take 1   tablet (25 mg) by mouth 2 times a day.   Select Disposable Briefs misc; Generic drug:   diaper,brief,adult,disposable; Disposable pull up underwear. Uses   approximately 5 per day.   Symbicort 160-4.5 mcg/actuation inhaler; Generic drug:   budesonide-formoterol; Inhale 2 puffs 2 times a day.     STOP taking these medications     ibuprofen 600 mg  tablet       Test Results Pending At Discharge  Pending Labs       Order Current Status    Surgical Pathology Exam In process            Hospital Course   Hortensia Rodriguez is a 59 y.o. female with a PMHx remarkable for COPD, tobacco dependence, hypothyroidism, left kidney stone s/p stent, ascending aorta aneurysm, depression, anxiety, HTN, HLD and newly dx Rectal Adenocarcinoma with lung mets that presented to [facility] ED after recent outpatient blood work showed hypokalemia and was further instructed to go to the ED for evaluation.     On admit, pt feeling average. She currently denies pain, but does endorse some recent dysuria and increased frequency with voiding that began a few days ago. She also endorses loose BMs and frequent mucousy discharge from the rectum, and she notes occasional arely blood in the discharge. Discussed further the decision for transfer to [facility], and plans to investigate worsening hydronephrosis along with monitor potassium levels. Denies any exposure to recent sick contacts. Denies fever/chills, N/V/D/C, bruising, cough, congestion, SOB, RENEE, H/A or vision changes.      ED Course:  Vitals: T 36.3, , RR 24, /74, Spox 98% on RA  Labs: WBC 10.2, hgb 16.7, Na+ 135, K+ 2.8, chloride 96, sCr. 1.44 (BL 0.80), UA +leuks, +bacteria, urine culture pending, blood cultures x2 pending  Imaging: CT A (5/13) showed no PE, L hydronephrosis  CT A/P (5/13) showed rectal mass, persistant findings of encrusted L ureteral stent and large calculus with worsening associated L hydronephrosis  Medications: IV Ceftriaxone 1g x1, 1 L NS bolus x2, Potassium chloride 80mEq    While inpatient, patient received IV fluids for KARLY with improvement. Her potassium was also replaced aggressively both PO and IV. She underwent urology evaluation for L hydronephrosis seen on CT in the setting of retained and calcified stent, who recommended and arranged for outpatient follow up. Biopsy of rectal mass taken by the GI  team via flex sig on 5/15, results pending. Patient discharged in stable condition with follow up arranged for radiation oncology and medical oncology.    Pertinent Physical Exam At Time of Discharge  Constitutional:       Appearance: She is not ill-appearing.   HENT:      Head: Normocephalic.      Mouth/Throat:      Mouth: Mucous membranes are moist.      Comments: Poor dentition  Eyes:      General:         Right eye: No discharge.         Left eye: No discharge.   Cardiovascular:      Rate and Rhythm: Normal rate and regular rhythm.      Pulses: Normal pulses.      Heart sounds: Normal heart sounds. No murmur heard.     No friction rub. No gallop.   Pulmonary:      Effort: Pulmonary effort is normal. No respiratory distress.      Breath sounds: Normal breath sounds. No stridor. No wheezing, rhonchi or rales.   Abdominal:      General: Abdomen is flat. Bowel sounds are normal. There is no distension.      Palpations: Abdomen is soft. There is no mass.      Tenderness: There is no abdominal tenderness. There is no guarding.   Musculoskeletal:      Right lower leg: No edema.      Left lower leg: No edema.   Neurological:      Mental Status: She is alert and oriented to person, place, and time. Mental status is at baseline.      Cranial Nerves: No cranial nerve deficit.      Sensory: No sensory deficit.      Motor: No weakness.      Coordination: Coordination normal.      Gait: Gait normal.      Deep Tendon Reflexes: Reflexes normal.        Outpatient Follow-Up  Future Appointments   Date Time Provider Department Center   5/27/2025  9:30 AM NOAH CT 2 W[facility]   5/27/2025  2:00 PM Nick Guerrero MD [id number] [place]   5/29/2025  1:00 PM [facility] MRI 2 CMCMRI [facility]   5/30/2025  8:00 AM Grady Bernardo MD [id number] [facility]   5/30/2025 11:30 AM Farhat Sharp MD [id number] [facility]   6/10/2025  2:30 PM Ewelina Love MD [id number] [facility]         Hilario Baer MD

## 2025-05-18 LAB
BACTERIA BLD CULT: NORMAL
BACTERIA BLD CULT: NORMAL

## 2025-05-20 ENCOUNTER — PATIENT OUTREACH (OUTPATIENT)
Dept: PRIMARY CARE | Facility: CLINIC | Age: 60
End: 2025-05-20
Payer: COMMERCIAL

## 2025-05-20 NOTE — PROGRESS NOTES
Discharge Facility: [facility]  Discharge Diagnosis: hypokalemia, KARLY  Admission Date: 5/14/25  Discharge Date:  5/16/25    PCP Appointment Date: TBD - tasked to PCP office  Specialist Appointment Date: hem/onc, rad/onc 5/30/25; urology 5/27/25  Hospital Encounter and Summary Linked: Yes  Admission (Discharged) with Alejandrina Lilly MD (05/14/2025)     Two attempts were made to reach patient within two business days after discharge. Left voicemail with contact information for patient to call back with any non-emergent questions or concerns.

## 2025-05-27 ENCOUNTER — APPOINTMENT (OUTPATIENT)
Dept: UROLOGY | Facility: CLINIC | Age: 60
End: 2025-05-27
Payer: COMMERCIAL

## 2025-05-29 ENCOUNTER — HOSPITAL ENCOUNTER (OUTPATIENT)
Dept: RADIOLOGY | Facility: HOSPITAL | Age: 60
Discharge: HOME | End: 2025-05-29
Payer: COMMERCIAL

## 2025-05-29 ENCOUNTER — PREP FOR PROCEDURE (OUTPATIENT)
Dept: RADIOLOGY | Facility: HOSPITAL | Age: 60
End: 2025-05-29

## 2025-05-29 VITALS — SYSTOLIC BLOOD PRESSURE: 123 MMHG | DIASTOLIC BLOOD PRESSURE: 81 MMHG

## 2025-05-29 DIAGNOSIS — K62.89 RECTAL MASS: ICD-10-CM

## 2025-05-29 PROCEDURE — 2500000004 HC RX 250 GENERAL PHARMACY W/ HCPCS (ALT 636 FOR OP/ED): Mod: JZ,SE | Performed by: STUDENT IN AN ORGANIZED HEALTH CARE EDUCATION/TRAINING PROGRAM

## 2025-05-29 PROCEDURE — 72197 MRI PELVIS W/O & W/DYE: CPT

## 2025-05-29 PROCEDURE — 2550000001 HC RX 255 CONTRASTS: Mod: JZ,SE | Performed by: STUDENT IN AN ORGANIZED HEALTH CARE EDUCATION/TRAINING PROGRAM

## 2025-05-29 PROCEDURE — 96372 THER/PROPH/DIAG INJ SC/IM: CPT | Performed by: STUDENT IN AN ORGANIZED HEALTH CARE EDUCATION/TRAINING PROGRAM

## 2025-05-29 PROCEDURE — A9575 INJ GADOTERATE MEGLUMI 0.1ML: HCPCS | Mod: JZ,SE | Performed by: STUDENT IN AN ORGANIZED HEALTH CARE EDUCATION/TRAINING PROGRAM

## 2025-05-29 RX ORDER — GADOTERATE MEGLUMINE 376.9 MG/ML
15 INJECTION INTRAVENOUS
Status: COMPLETED | OUTPATIENT
Start: 2025-05-29 | End: 2025-05-29

## 2025-05-29 RX ADMIN — GADOTERATE MEGLUMINE 15 ML: 376.9 INJECTION INTRAVENOUS at 14:39

## 2025-05-29 RX ADMIN — GLUCAGON 1 MG: KIT at 13:52

## 2025-05-30 ENCOUNTER — APPOINTMENT (OUTPATIENT)
Dept: RADIATION ONCOLOGY | Facility: CLINIC | Age: 60
End: 2025-05-30
Payer: COMMERCIAL

## 2025-05-30 ENCOUNTER — TELEPHONE (OUTPATIENT)
Dept: HEMATOLOGY/ONCOLOGY | Facility: CLINIC | Age: 60
End: 2025-05-30
Payer: COMMERCIAL

## 2025-05-30 LAB
LABORATORY COMMENT REPORT: NORMAL
PATH REPORT.FINAL DX SPEC: NORMAL
PATH REPORT.GROSS SPEC: NORMAL
PATH REPORT.MICROSCOPIC SPEC OTHER STN: NORMAL
PATH REPORT.RELEVANT HX SPEC: NORMAL
PATH REPORT.TOTAL CANCER: NORMAL

## 2025-05-30 NOTE — TELEPHONE ENCOUNTER
Pt called back regarding appointment today at [facility] for Med Onc. Pt stated she forgot her appointment this morning and will not be able to make it to her Rad Onc appointment today as her  is at [facility]. Pt stated she will call back to reschedule the phone visit for next week with Dr. Bernardo and will call [facility] to reschedule the appointment with Dr. Sharp. Phone #s given to pt to reach out to [facility] and [facility].     Vasile MARTINS will reach out to pt today to discuss pt needs later today. Message forwarded to [facility] Rad Onc Team

## 2025-05-30 NOTE — TELEPHONE ENCOUNTER
Called pt multiple times and left voice message with spouse to call regarding no showing to appointment today with Dr. Bernardo at 8am [facility]. Pt has an appointment with Rad Onc Dr. Sharp today at 1130 at [facility]. Dr. Bernardo aware, and stated ok to reschedule for phone visit next week. Will attempt later to try to reach pt.

## 2025-06-05 NOTE — PROGRESS NOTES
Patient ID:   Hortensia Rodriguez is a 59 y.o. female with PMH remarkable for COPD, tobacco dependence, hypothyroidism, lung nodule, left kidney stone s/p stent, ascending aorta aneurysm, depression, anxiety, HTN, HLD, rectal adenocarcinoma arising from tubo-villous adenoma who presents to the office today for Follow-up (4 month).    HEALTH MAINTENANCE: Annual Wellness Physical  Last Office Visit: 2/7/25 for hip pain   Mammogram (40-75): 3/22/2024 -ve   Last Labs: CBC, BMP 1/2/2025  Colonoscopy (45-75 or age 40 with 1st degree relative dx colon ca): 7/26/2023 with Dr Rome showing palpable rectal mass, likely malignant tumor in distal rectum/anus. 4 polyps were removed.   Lung cancer screening (50-76 y/o x 20 pk yr for at least 20 yrs + current smoker OR quit in last 15 years, no CT w/I last year):5/13/25 -negative  PET CT bone to mid thigh 5/12/25- 1. Intensely FDG avid circumferential thickening involving anorectal region with extensions as described, consistent with neoplasm.  2. FDG avid left para-aortic lymph nodes, consistent with oumar metastasis.  3. Mildly FDG avid bilateral adrenal nodules, likely benign.  4. No focal FDG uptake corresponding to subcentimeter bilateral pulmonary nodule seen on CT. Follow-up with diagnostic CT chest to document interval resolution/stability.  5. No FDG avid osseous metastatic disease.  6. FDG avid focus within right thyroid lobe. Correlate with ultrasound thyroid.  CT Abd 5/14/25-IMPRESSION: 1. Retained left-sided nephroureteral stent with proximal (2.2 cm) and distal (4.2 cm) encrustations resulting in moderate-to-severe left hydronephrosis and mild perinephric stranding.  2. Additional left lower pole renal calculi the largest measuring 1.5cm.  3. Large rectal mass which is stable in size and appearance compared to prior imaging. Please see dedicated rectal MRI/prior PET-CT for further characterization.  4. Unchanged left periaortic lymphadenopathy, better characterized  on recent PET-CT.  5. Additional chronic or incidental findings as detailed above.  MRI rectum 5/29/25:  Redemonstration of a fungating lower-to-mid rectal mass which is overall stable when compared to the prior study. In particular, there is no evidence of vaginal/uterine involvement.      Follows:  Dr. Bernardo Hematology- missed apt on 5/30  Last Visit -3/26/2025 slow growing rectal cancer and he ordered a PET-CT and wanted a repeat colonoscopy. She then saw Dr. Farhat Sharp from [facility] on 3/31/2025 who also wanted flex sig for repeat biopsies. PET-CT was done on 5/1/2025 and showed intense FDG avid circumferential thickening involving the anorectal \region with involvement of the posterior wall of the urinary bladder and adjacent peritoneum, as well as clustered enlarged left periaortic lymph nodes concerning for metastasis.     Her case was presented to Tumor Board on 5/13/2025 and recommendation was to repeat a flex sig with biopsies and also for IR to biopsy the para-aortic lymph node.     She states that she is having a hard time right now because she recently had to put her  into [employer]. States that she is not sure how long he is going to be there or what the plan is. She states that she continues to have loose stools but is able to control it. She denies any issues with urination and states the she is not incontinent but she did ask for pull ups and the exam room smells of urine strong. She states that she is has had unintentional weight loss, has lost 12lbs since 5/29/25, states that she has been eating but at times she can only take a few bites before she starts to feel full. States that she does not drink any nutritional shakes or supplements.          Medical History[1]   Surgical History[2]   Social History[3]  Family History[4]   Immunization History   Administered Date(s) Administered    Flu vaccine (IIV4), preservative free *Check age/dose* 01/30/2019    Flu vaccine,  "trivalent, preservative free, age 6 months and greater (Fluarix/Fluzone/Flulaval) 09/11/2015    Influenza, Unspecified 01/30/2019    Influenza, seasonal, injectable 09/14/2015, 10/18/2016    Pneumococcal polysaccharide vaccine, 23-valent, age 2 years and older (PNEUMOVAX 23) 08/08/2014, 09/11/2015, 09/14/2015, 01/30/2019     Review of Systems   Constitutional: Negative.    HENT: Negative.     Eyes: Negative.    Respiratory: Negative.     Cardiovascular: Negative.    Gastrointestinal: Negative.    Endocrine: Negative.    Genitourinary: Negative.    Musculoskeletal: Negative.    Skin: Negative.    Neurological:  Positive for weakness.   Psychiatric/Behavioral: Negative.     All other systems reviewed and are negative.    Allergies[5]  Visit Vitals  /76 (BP Location: Left arm, Patient Position: Sitting)   Pulse 86   Resp 18   Ht 1.676 m (5' 6\")   Wt 67.1 kg (148 lb)   SpO2 90%   BMI 23.89 kg/m²   OB Status Postmenopausal   Smoking Status Every Day   BSA 1.77 m²     Physical Exam  Vitals reviewed. Exam conducted with a chaperone present.   Constitutional:       Appearance: Normal appearance. She is well-developed.      Comments: Strong urine smell in exam room   HENT:      Head: Normocephalic.      Right Ear: External ear normal.      Left Ear: External ear normal.      Nose: Nose normal.      Mouth/Throat:      Lips: Pink.      Mouth: Mucous membranes are moist.   Eyes:      General: Lids are normal.      Pupils: Pupils are equal, round, and reactive to light.   Neck:      Trachea: Trachea normal.   Cardiovascular:      Rate and Rhythm: Normal rate and regular rhythm.      Heart sounds: Normal heart sounds.   Pulmonary:      Effort: Pulmonary effort is normal.      Breath sounds: Normal breath sounds.   Abdominal:      General: Bowel sounds are normal.      Palpations: Abdomen is soft.   Musculoskeletal:         General: Normal range of motion.      Cervical back: Normal range of motion.   Skin:     General: Skin " is warm and moist.   Neurological:      General: No focal deficit present.      Mental Status: She is alert and oriented to person, place, and time. Mental status is at baseline.   Psychiatric:         Attention and Perception: Attention normal.         Mood and Affect: Mood normal.         Speech: Speech normal.         Behavior: Behavior is cooperative.         Thought Content: Thought content normal.         Cognition and Memory: Cognition normal.         Judgment: Judgment normal.       Current Outpatient Medications   Medication Instructions    acetaminophen (TYLENOL) 650 mg, oral, Every 6 hours PRN    albuterol (ProAir HFA) 90 mcg/actuation inhaler 1 puff, inhalation, Every 6 hours PRN    amLODIPine (NORVASC) 10 mg, oral, Daily    diaper,brief,adult,disposable (Select Disposable Briefs) misc Disposable pull up underwear. Uses approximately 5 per day.    ezetimibe (ZETIA) 10 mg, oral, Daily    fluticasone (Flonase) 50 mcg/actuation nasal spray 2 sprays, Each Nostril, Daily, Shake gently. Before first use, prime pump. After use, clean tip and replace cap.    levothyroxine (SYNTHROID, LEVOXYL) 25 mcg, oral, Daily    loperamide (IMODIUM) 2 mg, oral, 4 times daily PRN    melatonin 6 mg, oral, Nightly    metoprolol tartrate (LOPRESSOR) 25 mg, oral, 2 times daily    pantoprazole (PROTONIX) 40 mg, oral, Daily before breakfast, Do not crush, chew, or split.    Symbicort 160-4.5 mcg/actuation inhaler 2 puffs, inhalation, 2 times daily     Lab Results   Component Value Date    WBC 6.8 05/16/2025    HGB 13.2 05/16/2025    HCT 40.2 05/16/2025     05/16/2025    CHOL 144 03/22/2024    TRIG 72 03/22/2024    HDL 60.3 03/22/2024    ALT 11 05/16/2025    AST 9 05/16/2025     05/16/2025    K 4.0 05/16/2025     05/16/2025    CREATININE 1.01 05/16/2025    BUN 23 05/16/2025    CO2 27 05/16/2025    TSH 6.26 (H) 03/22/2024    INR 1.0 09/18/2024    HGBA1C 5.8 (H) 09/19/2024       Problem List Items Addressed This  Visit           ICD-10-CM    COPD (chronic obstructive pulmonary disease) (Multi) J44.9    -continue with inhalers  -current smoker no interest in quitting   -stable          Benign essential hypertension I10    -BP is 112/75 HR 86  -continue taking Amlodipine and Metoprolol          Hypothyroidism E03.9    - continue taking  synthroid  - Take levothyroxine on an empty stomach with water alone, 1 hour before eating or taking other medications, 4 hours before any calcium or iron supplement.   - Hold Biotin (all B vitamins, B Complex) for 2 days before any blood draw         Hypercholesteremia E78.00    - continue zetia  - reviewed last lipid panel  - Patient educated and counseled to do walking and exercise regularly  - Low-fat low-cholesterol diet is advised  - Advised to reduce weight   - The goal is to keep the LDL less than 70, and HDL the more than 40         Rectal mass K62.89    Within the  mid to lower there is a  fungating sessile polypoid circumferential wall thickening that measures up to 8 cm in length and 3.2 cm in maximal thickness.  There is again a polypoidal component of the wall thickening that extends into the rectal lumen. The mass continues to demonstrate predominant T2w  hypointensity with restricted diffusion..  MR-imaging based stage of rectal cancer is T1/2. Based on MRI the oumar stage is N0.  There is no evidence of vaginal/uterine involvement.  -Follows up with Dr. Mercado and Dr. Bernardo           Decreased appetite R63.0    -recommend trying to eat frequent small meals   -try drinking nutritional shakes          Tobacco use Z72.0    I have counseled pt about the need for tobacco cessation and how I can support his efforts when pt is ready to quit.   Discussed about various nicotine replacement therapies as potential options.  No interest in quitting at this time            Full incontinence of feces R15.9    -order placed for adult pull up, faxed to supplier         Relevant  Medications    diaper,brief,adult,disposable (Select Disposable Briefs) misc    Aneurysm of ascending aorta without rupture I71.21    -stable         Lung nodule R91.1    5/12/25 PET CT skull base to mid thigh shows -No focal FDG uptake corresponding to subcentimeter bilateral pulmonary nodule seen on CT. Follow-up with diagnostic CT chest to document interval resolution/stability.          Z00.00         --------------------  Written by INDIANA BETANCOURT LPN, acting as a scribe for Dr. Peter. This note accurately reflects the work and decisions made by Dr. Peter.     I, Dr. Peter, attest all medical record entries made by the scribe were under my direction and were personally dictated by me. I have reviewed the chart and agree that the record accurately reflects my performance of the history, physical exam, and assessment and plan.          [1]   Past Medical History:  Diagnosis Date    Abdominal pain 07/24/2023    Abnormal electrocardiogram (ECG) (EKG) 12/16/2015    Abnormal ECG    KARLY (acute kidney injury) 09/19/2024    Anxiety     Anxiety disorder, unspecified 08/31/2015    Anxiety    Asthma     Complicated UTI (urinary tract infection) 07/24/2023    COPD (chronic obstructive pulmonary disease) (Multi)     Delayed emergence from general anesthesia     Disease of thyroid gland     Encounter for preprocedural cardiovascular examination 10/25/2015    Pre-operative cardiovascular examination    Flank pain 07/24/2023    Hypertension     Hypomagnesemia 10/14/2015    Hypomagnesemia    Personal history of other diseases of the circulatory system 08/31/2015    History of chronic ischemic heart disease    Personal history of other diseases of the female genital tract 10/28/2015    History of ovarian cyst    Personal history of other diseases of urinary system 01/04/2016    History of hematuria    Personal history of other diseases of urinary system 10/16/2015    History of kidney disease    Personal history of other  specified conditions 10/16/2015    History of pelvic mass    Personal history of other specified conditions 09/30/2015    History of fatigue    Personal history of pneumonia (recurrent) 02/26/2019    History of pneumonia    Right hip pain 07/24/2023    Strain of muscle, fascia and tendon of abdomen, initial encounter 09/17/2015    Abdominal muscle strain   [2]   Past Surgical History:  Procedure Laterality Date    APPENDECTOMY  08/31/2015    Appendectomy    FOOT SURGERY  08/31/2015    Foot Surgery    ORIF ANKLE FRACTURE     [3]   Social History  Tobacco Use    Smoking status: Every Day     Current packs/day: 0.50     Average packs/day: 0.5 packs/day for 37.4 years (18.7 ttl pk-yrs)     Types: Cigarettes     Start date: 1988     Passive exposure: Current    Smokeless tobacco: Never   Vaping Use    Vaping status: Never Used   Substance Use Topics    Alcohol use: Yes     Comment: occasional    Drug use: Never   [4]   Family History  Problem Relation Name Age of Onset    No Known Problems Mother      No Known Problems Father      Breast cancer Sister     [5]   Allergies  Allergen Reactions    Ace Inhibitors Other    Lisinopril Other

## 2025-06-10 ENCOUNTER — APPOINTMENT (OUTPATIENT)
Dept: PRIMARY CARE | Facility: CLINIC | Age: 60
End: 2025-06-10
Payer: COMMERCIAL

## 2025-06-10 VITALS
HEART RATE: 86 BPM | DIASTOLIC BLOOD PRESSURE: 76 MMHG | HEIGHT: 66 IN | OXYGEN SATURATION: 90 % | WEIGHT: 148 LBS | RESPIRATION RATE: 18 BRPM | SYSTOLIC BLOOD PRESSURE: 112 MMHG | BODY MASS INDEX: 23.78 KG/M2

## 2025-06-10 DIAGNOSIS — Z72.0 TOBACCO USE: ICD-10-CM

## 2025-06-10 DIAGNOSIS — I71.21 ANEURYSM OF ASCENDING AORTA WITHOUT RUPTURE: ICD-10-CM

## 2025-06-10 DIAGNOSIS — R15.9 FULL INCONTINENCE OF FECES: ICD-10-CM

## 2025-06-10 DIAGNOSIS — I10 BENIGN ESSENTIAL HYPERTENSION: ICD-10-CM

## 2025-06-10 DIAGNOSIS — R63.0 DECREASED APPETITE: ICD-10-CM

## 2025-06-10 DIAGNOSIS — Z00.00 ENCOUNTER FOR MEDICARE ANNUAL WELLNESS EXAM: ICD-10-CM

## 2025-06-10 DIAGNOSIS — E03.9 HYPOTHYROIDISM, UNSPECIFIED TYPE: ICD-10-CM

## 2025-06-10 DIAGNOSIS — K62.89 RECTAL MASS: ICD-10-CM

## 2025-06-10 DIAGNOSIS — E78.00 HYPERCHOLESTEREMIA: ICD-10-CM

## 2025-06-10 DIAGNOSIS — R91.1 LUNG NODULE: ICD-10-CM

## 2025-06-10 DIAGNOSIS — J44.1 CHRONIC OBSTRUCTIVE PULMONARY DISEASE WITH ACUTE EXACERBATION (MULTI): ICD-10-CM

## 2025-06-10 PROCEDURE — 99213 OFFICE O/P EST LOW 20 MIN: CPT | Performed by: INTERNAL MEDICINE

## 2025-06-10 PROCEDURE — 3074F SYST BP LT 130 MM HG: CPT | Performed by: INTERNAL MEDICINE

## 2025-06-10 PROCEDURE — 3008F BODY MASS INDEX DOCD: CPT | Performed by: INTERNAL MEDICINE

## 2025-06-10 PROCEDURE — 3078F DIAST BP <80 MM HG: CPT | Performed by: INTERNAL MEDICINE

## 2025-06-10 RX ORDER — DIAPER,BRIEF,ADULT, DISPOSABLE
EACH MISCELLANEOUS
Qty: 120 EACH | Refills: 2 | Status: CANCELLED | OUTPATIENT
Start: 2025-06-10

## 2025-06-10 RX ORDER — DIAPER,BRIEF,ADULT, DISPOSABLE
EACH MISCELLANEOUS
Qty: 120 EACH | Refills: 2 | Status: SHIPPED | OUTPATIENT
Start: 2025-06-10

## 2025-06-10 ASSESSMENT — ENCOUNTER SYMPTOMS
CONSTITUTIONAL NEGATIVE: 1
ENDOCRINE NEGATIVE: 1
PSYCHIATRIC NEGATIVE: 1
GASTROINTESTINAL NEGATIVE: 1
CARDIOVASCULAR NEGATIVE: 1
EYES NEGATIVE: 1
WEAKNESS: 1
MUSCULOSKELETAL NEGATIVE: 1
RESPIRATORY NEGATIVE: 1

## 2025-06-10 ASSESSMENT — PAIN SCALES - GENERAL: PAINLEVEL_OUTOF10: 6

## 2025-06-10 NOTE — PATIENT INSTRUCTIONS
It was nice to see you in the office today!  As discussed during our visit...     Continue the same medications.  Your chronic conditions appear stable.  Call the office with any questions or concerns [phone]  Follow up in 4 months or sooner if needed.

## 2025-06-12 PROBLEM — Z00.00 ENCOUNTER FOR MEDICARE ANNUAL WELLNESS EXAM: Status: ACTIVE | Noted: 2025-06-12

## 2025-06-12 NOTE — ASSESSMENT & PLAN NOTE
-  Preventive screening / Vaccination(s) reviewed and discussed  -  Recommended regular aerobic exercise and proper diet.  -  Discussed need and benefit for weight management  -  Medications were reviewed, list was updated, and refills given if needed.  -  Follow up for annual exam in one year

## 2025-06-12 NOTE — ASSESSMENT & PLAN NOTE
5/12/25 PET CT skull base to mid thigh shows -No focal FDG uptake corresponding to subcentimeter bilateral pulmonary nodule seen on CT. Follow-up with diagnostic CT chest to document interval resolution/stability.

## 2025-06-12 NOTE — ASSESSMENT & PLAN NOTE
- continue zetia  - reviewed last lipid panel  - Patient educated and counseled to do walking and exercise regularly  - Low-fat low-cholesterol diet is advised  - Advised to reduce weight   - The goal is to keep the LDL less than 70, and HDL the more than 40

## 2025-06-12 NOTE — ASSESSMENT & PLAN NOTE
I have counseled pt about the need for tobacco cessation and how I can support his efforts when pt is ready to quit.   Discussed about various nicotine replacement therapies as potential options.  No interest in quitting at this time

## 2025-06-12 NOTE — ASSESSMENT & PLAN NOTE
Within the  mid to lower there is a  fungating sessile polypoid circumferential wall thickening that measures up to 8 cm in length and 3.2 cm in maximal thickness.  There is again a polypoidal component of the wall thickening that extends into the rectal lumen. The mass continues to demonstrate predominant T2w  hypointensity with restricted diffusion..  MR-imaging based stage of rectal cancer is T1/2. Based on MRI the oumar stage is N0.  There is no evidence of vaginal/uterine involvement.  -Follows up with Dr. Mercado and Dr. Bernardo

## 2025-06-26 ENCOUNTER — PATIENT OUTREACH (OUTPATIENT)
Dept: PRIMARY CARE | Facility: CLINIC | Age: 60
End: 2025-06-26
Payer: COMMERCIAL

## 2025-07-02 PROBLEM — C20 RECTAL ADENOCARCINOMA (MULTI): Status: ACTIVE | Noted: 2025-07-02

## 2025-07-05 ENCOUNTER — HOSPITAL ENCOUNTER (INPATIENT)
Facility: HOSPITAL | Age: 60
End: 2025-07-05
Attending: EMERGENCY MEDICINE | Admitting: INTERNAL MEDICINE
Payer: COMMERCIAL

## 2025-07-05 ENCOUNTER — APPOINTMENT (OUTPATIENT)
Dept: RADIOLOGY | Facility: HOSPITAL | Age: 60
End: 2025-07-05
Payer: COMMERCIAL

## 2025-07-05 ENCOUNTER — APPOINTMENT (OUTPATIENT)
Dept: CARDIOLOGY | Facility: HOSPITAL | Age: 60
End: 2025-07-05
Payer: COMMERCIAL

## 2025-07-05 DIAGNOSIS — R19.7 DIARRHEA, UNSPECIFIED TYPE: ICD-10-CM

## 2025-07-05 DIAGNOSIS — N17.9 ACUTE KIDNEY INJURY: ICD-10-CM

## 2025-07-05 DIAGNOSIS — E87.1 HYPONATREMIA: ICD-10-CM

## 2025-07-05 DIAGNOSIS — E87.6 HYPOKALEMIA: ICD-10-CM

## 2025-07-05 DIAGNOSIS — N12 PYELONEPHRITIS: Primary | ICD-10-CM

## 2025-07-05 DIAGNOSIS — A41.9 SEPSIS, DUE TO UNSPECIFIED ORGANISM, UNSPECIFIED WHETHER ACUTE ORGAN DYSFUNCTION PRESENT (MULTI): ICD-10-CM

## 2025-07-05 LAB
ALBUMIN SERPL BCP-MCNC: 4.5 G/DL (ref 3.4–5)
ALP SERPL-CCNC: 129 U/L (ref 33–136)
ALT SERPL W P-5'-P-CCNC: 10 U/L (ref 7–45)
AMORPH CRY #/AREA UR COMP ASSIST: ABNORMAL /HPF
ANION GAP BLDV CALCULATED.4IONS-SCNC: 13 MMOL/L (ref 10–25)
ANION GAP SERPL CALC-SCNC: 25 MMOL/L (ref 10–20)
APPEARANCE UR: ABNORMAL
APTT PPP: 31 SECONDS (ref 26–36)
AST SERPL W P-5'-P-CCNC: 11 U/L (ref 9–39)
BASE EXCESS BLDV CALC-SCNC: 4.8 MMOL/L (ref -2–3)
BASOPHILS # BLD AUTO: 0.03 X10*3/UL (ref 0–0.1)
BASOPHILS NFR BLD AUTO: 0.2 %
BILIRUB SERPL-MCNC: 0.9 MG/DL (ref 0–1.2)
BILIRUB UR STRIP.AUTO-MCNC: NEGATIVE MG/DL
BNP SERPL-MCNC: 111 PG/ML (ref 0–99)
BODY TEMPERATURE: ABNORMAL
BUN SERPL-MCNC: 39 MG/DL (ref 6–23)
CA-I BLDV-SCNC: 1.08 MMOL/L (ref 1.1–1.33)
CALCIUM SERPL-MCNC: 9.6 MG/DL (ref 8.6–10.3)
CARDIAC TROPONIN I PNL SERPL HS: 8 NG/L (ref 0–13)
CARDIAC TROPONIN I PNL SERPL HS: 9 NG/L (ref 0–13)
CHLORIDE BLDV-SCNC: 90 MMOL/L (ref 98–107)
CHLORIDE SERPL-SCNC: 87 MMOL/L (ref 98–107)
CO2 SERPL-SCNC: 18 MMOL/L (ref 21–32)
COLOR UR: ABNORMAL
CREAT SERPL-MCNC: 1.9 MG/DL (ref 0.5–1.05)
EGFRCR SERPLBLD CKD-EPI 2021: 30 ML/MIN/1.73M*2
EOSINOPHIL # BLD AUTO: 0.01 X10*3/UL (ref 0–0.7)
EOSINOPHIL NFR BLD AUTO: 0.1 %
ERYTHROCYTE [DISTWIDTH] IN BLOOD BY AUTOMATED COUNT: 14.9 % (ref 11.5–14.5)
GLUCOSE BLDV-MCNC: 126 MG/DL (ref 74–99)
GLUCOSE SERPL-MCNC: 149 MG/DL (ref 74–99)
GLUCOSE UR STRIP.AUTO-MCNC: NORMAL MG/DL
HCO3 BLDV-SCNC: 24.9 MMOL/L (ref 22–26)
HCT VFR BLD AUTO: 47.7 % (ref 36–46)
HCT VFR BLD EST: 53 % (ref 36–46)
HGB BLD-MCNC: 17.4 G/DL (ref 12–16)
HGB BLDV-MCNC: 17.5 G/DL (ref 12–16)
IMM GRANULOCYTES # BLD AUTO: 0.07 X10*3/UL (ref 0–0.7)
IMM GRANULOCYTES NFR BLD AUTO: 0.5 % (ref 0–0.9)
INHALED O2 CONCENTRATION: 21 %
INR PPP: 1 (ref 0.9–1.1)
KETONES UR STRIP.AUTO-MCNC: ABNORMAL MG/DL
LACTATE BLDV-SCNC: 3 MMOL/L (ref 0.4–2)
LACTATE SERPL-SCNC: 1.8 MMOL/L (ref 0.4–2)
LEUKOCYTE ESTERASE UR QL STRIP.AUTO: ABNORMAL
LYMPHOCYTES # BLD AUTO: 2.21 X10*3/UL (ref 1.2–4.8)
LYMPHOCYTES NFR BLD AUTO: 16.8 %
MAGNESIUM SERPL-MCNC: 2.34 MG/DL (ref 1.6–2.4)
MCH RBC QN AUTO: 31.6 PG (ref 26–34)
MCHC RBC AUTO-ENTMCNC: 36.5 G/DL (ref 32–36)
MCV RBC AUTO: 87 FL (ref 80–100)
MONOCYTES # BLD AUTO: 0.79 X10*3/UL (ref 0.1–1)
MONOCYTES NFR BLD AUTO: 6 %
NEUTROPHILS # BLD AUTO: 10.02 X10*3/UL (ref 1.2–7.7)
NEUTROPHILS NFR BLD AUTO: 76.4 %
NITRITE UR QL STRIP.AUTO: NEGATIVE
NRBC BLD-RTO: 0 /100 WBCS (ref 0–0)
OXYHGB MFR BLDV: 74 % (ref 45–75)
PCO2 BLDV: 26 MM HG (ref 41–51)
PH BLDV: 7.59 PH (ref 7.33–7.43)
PH UR STRIP.AUTO: 7.5 [PH]
PLATELET # BLD AUTO: 489 X10*3/UL (ref 150–450)
PO2 BLDV: 44 MM HG (ref 35–45)
POTASSIUM BLDV-SCNC: 1.9 MMOL/L (ref 3.5–5.3)
POTASSIUM SERPL-SCNC: 2 MMOL/L (ref 3.5–5.3)
PROT SERPL-MCNC: 8.1 G/DL (ref 6.4–8.2)
PROT UR STRIP.AUTO-MCNC: ABNORMAL MG/DL
PROTHROMBIN TIME: 11.1 SECONDS (ref 9.8–12.4)
RBC # BLD AUTO: 5.51 X10*6/UL (ref 4–5.2)
RBC # UR STRIP.AUTO: ABNORMAL MG/DL
RBC #/AREA URNS AUTO: >20 /HPF
SAO2 % BLDV: 78 % (ref 45–75)
SODIUM BLDV-SCNC: 126 MMOL/L (ref 136–145)
SODIUM SERPL-SCNC: 128 MMOL/L (ref 136–145)
SP GR UR STRIP.AUTO: >1.05
SQUAMOUS #/AREA URNS AUTO: ABNORMAL /HPF
UROBILINOGEN UR STRIP.AUTO-MCNC: NORMAL MG/DL
WBC # BLD AUTO: 13.1 X10*3/UL (ref 4.4–11.3)
WBC #/AREA URNS AUTO: >50 /HPF
WBC CLUMPS #/AREA URNS AUTO: ABNORMAL /HPF

## 2025-07-05 PROCEDURE — 93005 ELECTROCARDIOGRAM TRACING: CPT

## 2025-07-05 PROCEDURE — 36415 COLL VENOUS BLD VENIPUNCTURE: CPT | Performed by: PHYSICIAN ASSISTANT

## 2025-07-05 PROCEDURE — 84132 ASSAY OF SERUM POTASSIUM: CPT | Mod: IPSPLIT | Performed by: HOSPITALIST

## 2025-07-05 PROCEDURE — 84132 ASSAY OF SERUM POTASSIUM: CPT | Performed by: PHYSICIAN ASSISTANT

## 2025-07-05 PROCEDURE — 2500000004 HC RX 250 GENERAL PHARMACY W/ HCPCS (ALT 636 FOR OP/ED): Mod: SE,IPSPLIT

## 2025-07-05 PROCEDURE — 74177 CT ABD & PELVIS W/CONTRAST: CPT

## 2025-07-05 PROCEDURE — 84484 ASSAY OF TROPONIN QUANT: CPT | Performed by: PHYSICIAN ASSISTANT

## 2025-07-05 PROCEDURE — 99285 EMERGENCY DEPT VISIT HI MDM: CPT | Mod: 25 | Performed by: EMERGENCY MEDICINE

## 2025-07-05 PROCEDURE — 94664 DEMO&/EVAL PT USE INHALER: CPT

## 2025-07-05 PROCEDURE — 83605 ASSAY OF LACTIC ACID: CPT | Performed by: PHYSICIAN ASSISTANT

## 2025-07-05 PROCEDURE — 81001 URINALYSIS AUTO W/SCOPE: CPT | Performed by: PHYSICIAN ASSISTANT

## 2025-07-05 PROCEDURE — 71275 CT ANGIOGRAPHY CHEST: CPT

## 2025-07-05 PROCEDURE — 2500000004 HC RX 250 GENERAL PHARMACY W/ HCPCS (ALT 636 FOR OP/ED): Mod: SE | Performed by: PHYSICIAN ASSISTANT

## 2025-07-05 PROCEDURE — 85730 THROMBOPLASTIN TIME PARTIAL: CPT | Performed by: PHYSICIAN ASSISTANT

## 2025-07-05 PROCEDURE — 1200000002 HC GENERAL ROOM WITH TELEMETRY DAILY: Mod: IPSPLIT

## 2025-07-05 PROCEDURE — 2500000004 HC RX 250 GENERAL PHARMACY W/ HCPCS (ALT 636 FOR OP/ED): Mod: SE

## 2025-07-05 PROCEDURE — 71275 CT ANGIOGRAPHY CHEST: CPT | Performed by: RADIOLOGY

## 2025-07-05 PROCEDURE — 94640 AIRWAY INHALATION TREATMENT: CPT

## 2025-07-05 PROCEDURE — 2500000002 HC RX 250 W HCPCS SELF ADMINISTERED DRUGS (ALT 637 FOR MEDICARE OP, ALT 636 FOR OP/ED): Mod: SE | Performed by: PHYSICIAN ASSISTANT

## 2025-07-05 PROCEDURE — 74177 CT ABD & PELVIS W/CONTRAST: CPT | Performed by: RADIOLOGY

## 2025-07-05 PROCEDURE — 2550000001 HC RX 255 CONTRASTS: Mod: SE | Performed by: EMERGENCY MEDICINE

## 2025-07-05 PROCEDURE — 85025 COMPLETE CBC W/AUTO DIFF WBC: CPT | Performed by: PHYSICIAN ASSISTANT

## 2025-07-05 PROCEDURE — 85610 PROTHROMBIN TIME: CPT | Performed by: PHYSICIAN ASSISTANT

## 2025-07-05 PROCEDURE — 87040 BLOOD CULTURE FOR BACTERIA: CPT | Mod: GENLAB | Performed by: PHYSICIAN ASSISTANT

## 2025-07-05 PROCEDURE — 83880 ASSAY OF NATRIURETIC PEPTIDE: CPT | Performed by: PHYSICIAN ASSISTANT

## 2025-07-05 PROCEDURE — 2500000004 HC RX 250 GENERAL PHARMACY W/ HCPCS (ALT 636 FOR OP/ED): Mod: SE,IPSPLIT | Performed by: HOSPITALIST

## 2025-07-05 PROCEDURE — 2500000002 HC RX 250 W HCPCS SELF ADMINISTERED DRUGS (ALT 637 FOR MEDICARE OP, ALT 636 FOR OP/ED): Mod: SE

## 2025-07-05 PROCEDURE — 2500000001 HC RX 250 WO HCPCS SELF ADMINISTERED DRUGS (ALT 637 FOR MEDICARE OP): Mod: SE,IPSPLIT | Performed by: HOSPITALIST

## 2025-07-05 PROCEDURE — 83735 ASSAY OF MAGNESIUM: CPT | Performed by: PHYSICIAN ASSISTANT

## 2025-07-05 RX ORDER — SODIUM CHLORIDE AND POTASSIUM CHLORIDE 150; 900 MG/100ML; MG/100ML
100 INJECTION, SOLUTION INTRAVENOUS CONTINUOUS
Status: DISPENSED | OUTPATIENT
Start: 2025-07-05 | End: 2025-07-06

## 2025-07-05 RX ORDER — PANTOPRAZOLE SODIUM 40 MG/1
40 TABLET, DELAYED RELEASE ORAL
Status: DISPENSED | OUTPATIENT
Start: 2025-07-06

## 2025-07-05 RX ORDER — POTASSIUM CHLORIDE 20 MEQ/1
40 TABLET, EXTENDED RELEASE ORAL DAILY
Status: DISCONTINUED | OUTPATIENT
Start: 2025-07-05 | End: 2025-07-06

## 2025-07-05 RX ORDER — ALUMINUM HYDROXIDE, MAGNESIUM HYDROXIDE, AND SIMETHICONE 1200; 120; 1200 MG/30ML; MG/30ML; MG/30ML
20 SUSPENSION ORAL 4 TIMES DAILY PRN
Status: ACTIVE | OUTPATIENT
Start: 2025-07-05

## 2025-07-05 RX ORDER — POLYETHYLENE GLYCOL 3350 17 G/17G
17 POWDER, FOR SOLUTION ORAL 2 TIMES DAILY PRN
Status: ACTIVE | OUTPATIENT
Start: 2025-07-05

## 2025-07-05 RX ORDER — ONDANSETRON HYDROCHLORIDE 2 MG/ML
4 INJECTION, SOLUTION INTRAVENOUS EVERY 4 HOURS PRN
Status: ACTIVE | OUTPATIENT
Start: 2025-07-05

## 2025-07-05 RX ORDER — POTASSIUM CHLORIDE 14.9 MG/ML
20 INJECTION INTRAVENOUS
Status: COMPLETED | OUTPATIENT
Start: 2025-07-05 | End: 2025-07-05

## 2025-07-05 RX ORDER — POTASSIUM CHLORIDE 14.9 MG/ML
INJECTION INTRAVENOUS
Status: COMPLETED
Start: 2025-07-05 | End: 2025-07-05

## 2025-07-05 RX ORDER — FLUTICASONE PROPIONATE 50 MCG
2 SPRAY, SUSPENSION (ML) NASAL DAILY
Status: DISPENSED | OUTPATIENT
Start: 2025-07-06

## 2025-07-05 RX ORDER — LEVOTHYROXINE SODIUM 25 UG/1
25 TABLET ORAL DAILY
Status: DISPENSED | OUTPATIENT
Start: 2025-07-06

## 2025-07-05 RX ORDER — EZETIMIBE 10 MG/1
10 TABLET ORAL DAILY
Status: DISPENSED | OUTPATIENT
Start: 2025-07-06

## 2025-07-05 RX ORDER — VANCOMYCIN HYDROCHLORIDE 1 G/200ML
1 INJECTION, SOLUTION INTRAVENOUS
Status: COMPLETED | OUTPATIENT
Start: 2025-07-05 | End: 2025-07-05

## 2025-07-05 RX ORDER — ALBUTEROL SULFATE 0.83 MG/ML
2.5 SOLUTION RESPIRATORY (INHALATION) ONCE
Status: COMPLETED | OUTPATIENT
Start: 2025-07-05 | End: 2025-07-05

## 2025-07-05 RX ORDER — ACETAMINOPHEN 325 MG/1
650 TABLET ORAL EVERY 6 HOURS PRN
Status: ACTIVE | OUTPATIENT
Start: 2025-07-05

## 2025-07-05 RX ORDER — FORMOTEROL FUMARATE 20 UG/2ML
20 SOLUTION RESPIRATORY (INHALATION)
Status: DISPENSED | OUTPATIENT
Start: 2025-07-05

## 2025-07-05 RX ORDER — AMLODIPINE BESYLATE 10 MG/1
10 TABLET ORAL DAILY
Status: DISPENSED | OUTPATIENT
Start: 2025-07-06

## 2025-07-05 RX ORDER — METOPROLOL TARTRATE 25 MG/1
25 TABLET, FILM COATED ORAL 2 TIMES DAILY
Status: DISPENSED | OUTPATIENT
Start: 2025-07-05

## 2025-07-05 RX ORDER — PIPERACILLIN SODIUM, TAZOBACTAM SODIUM 2; .25 G/10ML; G/10ML
INJECTION, POWDER, LYOPHILIZED, FOR SOLUTION INTRAVENOUS
Status: COMPLETED
Start: 2025-07-05 | End: 2025-07-05

## 2025-07-05 RX ORDER — DEXTROSE MONOHYDRATE 50 MG/ML
INJECTION, SOLUTION INTRAVENOUS
Status: COMPLETED
Start: 2025-07-05 | End: 2025-07-05

## 2025-07-05 RX ORDER — ACETAMINOPHEN 500 MG
10 TABLET ORAL DAILY PRN
Status: ACTIVE | OUTPATIENT
Start: 2025-07-05

## 2025-07-05 RX ORDER — POTASSIUM CHLORIDE 20 MEQ/1
TABLET, EXTENDED RELEASE ORAL
Status: COMPLETED
Start: 2025-07-05 | End: 2025-07-05

## 2025-07-05 RX ORDER — LOPERAMIDE HYDROCHLORIDE 2 MG/1
4 CAPSULE ORAL 4 TIMES DAILY PRN
Status: DISPENSED | OUTPATIENT
Start: 2025-07-05

## 2025-07-05 RX ORDER — BUDESONIDE 0.5 MG/2ML
0.5 INHALANT ORAL
Status: DISPENSED | OUTPATIENT
Start: 2025-07-05

## 2025-07-05 RX ADMIN — DEXTROSE MONOHYDRATE 50 ML: 50 INJECTION, SOLUTION INTRAVENOUS at 22:26

## 2025-07-05 RX ADMIN — PIPERACILLIN SODIUM AND TAZOBACTAM SODIUM 2250 MG: 2; .25 INJECTION, POWDER, LYOPHILIZED, FOR SOLUTION INTRAVENOUS at 22:34

## 2025-07-05 RX ADMIN — PIPERACILLIN SODIUM AND TAZOBACTAM SODIUM 3.38 G: 3; .375 INJECTION, SOLUTION INTRAVENOUS at 16:16

## 2025-07-05 RX ADMIN — SODIUM CHLORIDE 500 ML: 0.9 INJECTION, SOLUTION INTRAVENOUS at 15:27

## 2025-07-05 RX ADMIN — POTASSIUM CHLORIDE 20 MEQ: 14.9 INJECTION, SOLUTION INTRAVENOUS at 15:27

## 2025-07-05 RX ADMIN — POTASSIUM CHLORIDE 40 MEQ: 20 TABLET, EXTENDED RELEASE ORAL at 15:27

## 2025-07-05 RX ADMIN — POTASSIUM CHLORIDE AND SODIUM CHLORIDE 100 ML/HR: 900; 150 INJECTION, SOLUTION INTRAVENOUS at 22:26

## 2025-07-05 RX ADMIN — SODIUM CHLORIDE 500 ML: 0.9 INJECTION, SOLUTION INTRAVENOUS at 15:40

## 2025-07-05 RX ADMIN — ALBUTEROL SULFATE 2.5 MG: 2.5 SOLUTION RESPIRATORY (INHALATION) at 14:36

## 2025-07-05 RX ADMIN — IOHEXOL 75 ML: 350 INJECTION, SOLUTION INTRAVENOUS at 15:23

## 2025-07-05 RX ADMIN — POTASSIUM CHLORIDE AND SODIUM CHLORIDE 100 ML/HR: 900; 150 INJECTION, SOLUTION INTRAVENOUS at 22:21

## 2025-07-05 RX ADMIN — SODIUM CHLORIDE 500 ML: 0.9 INJECTION, SOLUTION INTRAVENOUS at 14:14

## 2025-07-05 RX ADMIN — POTASSIUM CHLORIDE 40 MEQ: 1500 TABLET, EXTENDED RELEASE ORAL at 15:27

## 2025-07-05 RX ADMIN — VANCOMYCIN HYDROCHLORIDE 1 G: 1 INJECTION, SOLUTION INTRAVENOUS at 16:57

## 2025-07-05 RX ADMIN — METOPROLOL TARTRATE 25 MG: 25 TABLET, FILM COATED ORAL at 22:26

## 2025-07-05 RX ADMIN — POTASSIUM CHLORIDE 20 MEQ: 14.9 INJECTION, SOLUTION INTRAVENOUS at 17:56

## 2025-07-05 RX ADMIN — VANCOMYCIN HYDROCHLORIDE 1 G: 1 INJECTION, SOLUTION INTRAVENOUS at 17:57

## 2025-07-05 SDOH — SOCIAL STABILITY: SOCIAL INSECURITY: ARE THERE ANY APPARENT SIGNS OF INJURIES/BEHAVIORS THAT COULD BE RELATED TO ABUSE/NEGLECT?: NO

## 2025-07-05 SDOH — ECONOMIC STABILITY: FOOD INSECURITY
WITHIN THE PAST 12 MONTHS, YOU WORRIED THAT YOUR FOOD WOULD RUN OUT BEFORE YOU GOT THE MONEY TO BUY MORE.: SOMETIMES TRUE

## 2025-07-05 SDOH — HEALTH STABILITY: PHYSICAL HEALTH
HOW OFTEN DO YOU NEED TO HAVE SOMEONE HELP YOU WHEN YOU READ INSTRUCTIONS, PAMPHLETS, OR OTHER WRITTEN MATERIAL FROM YOUR DOCTOR OR PHARMACY?: RARELY

## 2025-07-05 SDOH — SOCIAL STABILITY: SOCIAL INSECURITY: WITHIN THE LAST YEAR, HAVE YOU BEEN AFRAID OF YOUR PARTNER OR EX-PARTNER?: NO

## 2025-07-05 SDOH — HEALTH STABILITY: MENTAL HEALTH
DO YOU FEEL STRESS - TENSE, RESTLESS, NERVOUS, OR ANXIOUS, OR UNABLE TO SLEEP AT NIGHT BECAUSE YOUR MIND IS TROUBLED ALL THE TIME - THESE DAYS?: ONLY A LITTLE

## 2025-07-05 SDOH — SOCIAL STABILITY: SOCIAL INSECURITY: ARE YOU OR HAVE YOU BEEN THREATENED OR ABUSED PHYSICALLY, EMOTIONALLY, OR SEXUALLY BY ANYONE?: NO

## 2025-07-05 SDOH — SOCIAL STABILITY: SOCIAL INSECURITY: DO YOU FEEL UNSAFE GOING BACK TO THE PLACE WHERE YOU ARE LIVING?: NO

## 2025-07-05 SDOH — ECONOMIC STABILITY: INCOME INSECURITY: IN THE PAST 12 MONTHS HAS THE ELECTRIC, GAS, OIL, OR WATER COMPANY THREATENED TO SHUT OFF SERVICES IN YOUR HOME?: NO

## 2025-07-05 SDOH — SOCIAL STABILITY: SOCIAL INSECURITY: DO YOU FEEL ANYONE HAS EXPLOITED OR TAKEN ADVANTAGE OF YOU FINANCIALLY OR OF YOUR PERSONAL PROPERTY?: NO

## 2025-07-05 SDOH — SOCIAL STABILITY: SOCIAL NETWORK: IN A TYPICAL WEEK, HOW MANY TIMES DO YOU TALK ON THE PHONE WITH FAMILY, FRIENDS, OR NEIGHBORS?: TWICE A WEEK

## 2025-07-05 SDOH — SOCIAL STABILITY: SOCIAL INSECURITY: ARE YOU MARRIED, WIDOWED, DIVORCED, SEPARATED, NEVER MARRIED, OR LIVING WITH A PARTNER?: MARRIED

## 2025-07-05 SDOH — SOCIAL STABILITY: SOCIAL INSECURITY: HAS ANYONE EVER THREATENED TO HURT YOUR FAMILY OR YOUR PETS?: NO

## 2025-07-05 SDOH — SOCIAL STABILITY: SOCIAL NETWORK: HOW OFTEN DO YOU GET TOGETHER WITH FRIENDS OR RELATIVES?: ONCE A WEEK

## 2025-07-05 SDOH — SOCIAL STABILITY: SOCIAL NETWORK: HOW OFTEN DO YOU ATTEND MEETINGS OF THE CLUBS OR ORGANIZATIONS YOU BELONG TO?: NEVER

## 2025-07-05 SDOH — SOCIAL STABILITY: SOCIAL INSECURITY: WERE YOU ABLE TO COMPLETE ALL THE BEHAVIORAL HEALTH SCREENINGS?: YES

## 2025-07-05 SDOH — SOCIAL STABILITY: SOCIAL NETWORK: HOW OFTEN DO YOU ATTEND CHURCH OR RELIGIOUS SERVICES?: NEVER

## 2025-07-05 SDOH — ECONOMIC STABILITY: FOOD INSECURITY: WITHIN THE PAST 12 MONTHS, THE FOOD YOU BOUGHT JUST DIDN'T LAST AND YOU DIDN'T HAVE MONEY TO GET MORE.: SOMETIMES TRUE

## 2025-07-05 SDOH — SOCIAL STABILITY: SOCIAL INSECURITY: ABUSE: ADULT

## 2025-07-05 SDOH — HEALTH STABILITY: PHYSICAL HEALTH: ON AVERAGE, HOW MANY MINUTES DO YOU ENGAGE IN EXERCISE AT THIS LEVEL?: 0 MIN

## 2025-07-05 SDOH — SOCIAL STABILITY: SOCIAL INSECURITY: WITHIN THE LAST YEAR, HAVE YOU BEEN HUMILIATED OR EMOTIONALLY ABUSED IN OTHER WAYS BY YOUR PARTNER OR EX-PARTNER?: NO

## 2025-07-05 SDOH — SOCIAL STABILITY: SOCIAL INSECURITY: HAVE YOU HAD ANY THOUGHTS OF HARMING ANYONE ELSE?: NO

## 2025-07-05 SDOH — SOCIAL STABILITY: SOCIAL INSECURITY: DOES ANYONE TRY TO KEEP YOU FROM HAVING/CONTACTING OTHER FRIENDS OR DOING THINGS OUTSIDE YOUR HOME?: NO

## 2025-07-05 SDOH — HEALTH STABILITY: PHYSICAL HEALTH: ON AVERAGE, HOW MANY DAYS PER WEEK DO YOU ENGAGE IN MODERATE TO STRENUOUS EXERCISE (LIKE A BRISK WALK)?: 0 DAYS

## 2025-07-05 SDOH — SOCIAL STABILITY: SOCIAL INSECURITY: HAVE YOU HAD THOUGHTS OF HARMING ANYONE ELSE?: NO

## 2025-07-05 ASSESSMENT — PAIN SCALES - GENERAL
PAINLEVEL_OUTOF10: 0 - NO PAIN
PAINLEVEL_OUTOF10: 5 - MODERATE PAIN

## 2025-07-05 ASSESSMENT — COGNITIVE AND FUNCTIONAL STATUS - GENERAL
PATIENT BASELINE BEDBOUND: NO
DAILY ACTIVITIY SCORE: 24
MOBILITY SCORE: 24

## 2025-07-05 ASSESSMENT — LIFESTYLE VARIABLES
PRESCIPTION_ABUSE_PAST_12_MONTHS: NO
HOW OFTEN DO YOU HAVE A DRINK CONTAINING ALCOHOL: MONTHLY OR LESS
AUDIT-C TOTAL SCORE: 1
SUBSTANCE_ABUSE_PAST_12_MONTHS: NO
HOW MANY STANDARD DRINKS CONTAINING ALCOHOL DO YOU HAVE ON A TYPICAL DAY: 1 OR 2
AUDIT-C TOTAL SCORE: 1
SKIP TO QUESTIONS 9-10: 1
HOW OFTEN DO YOU HAVE 6 OR MORE DRINKS ON ONE OCCASION: NEVER

## 2025-07-05 ASSESSMENT — ACTIVITIES OF DAILY LIVING (ADL)
FEEDING YOURSELF: INDEPENDENT
ADEQUATE_TO_COMPLETE_ADL: YES
PATIENT'S MEMORY ADEQUATE TO SAFELY COMPLETE DAILY ACTIVITIES?: YES
HEARING - LEFT EAR: FUNCTIONAL
DRESSING YOURSELF: INDEPENDENT
GROOMING: INDEPENDENT
LACK_OF_TRANSPORTATION: NO
BATHING: INDEPENDENT
JUDGMENT_ADEQUATE_SAFELY_COMPLETE_DAILY_ACTIVITIES: YES
TOILETING: INDEPENDENT
WALKS IN HOME: INDEPENDENT
HEARING - RIGHT EAR: FUNCTIONAL

## 2025-07-05 ASSESSMENT — PAIN DESCRIPTION - DESCRIPTORS: DESCRIPTORS: DISCOMFORT

## 2025-07-05 ASSESSMENT — PAIN DESCRIPTION - LOCATION: LOCATION: GENERALIZED

## 2025-07-05 ASSESSMENT — PATIENT HEALTH QUESTIONNAIRE - PHQ9
SUM OF ALL RESPONSES TO PHQ9 QUESTIONS 1 & 2: 0
2. FEELING DOWN, DEPRESSED OR HOPELESS: NOT AT ALL
1. LITTLE INTEREST OR PLEASURE IN DOING THINGS: NOT AT ALL

## 2025-07-05 ASSESSMENT — PAIN DESCRIPTION - PAIN TYPE: TYPE: ACUTE PAIN

## 2025-07-05 ASSESSMENT — PAIN - FUNCTIONAL ASSESSMENT: PAIN_FUNCTIONAL_ASSESSMENT: 0-10

## 2025-07-05 NOTE — ED PROVIDER NOTES
"HPI   Chief Complaint   Patient presents with    Shortness of Breath    Weakness, Gen    Nausea     Pt states having SOB, nausea, fatigue, decreased appetite since Monday, progressively worsening. Pt has hx colon cancer, not undergoing any treatments currently       59-year-old female with past medical history of COPD , metastatic colon cancer/rectal, hypertension hypothyroidism hyperlipidemia has had 4-day history of shortness of breath general weakness fatigued with nausea and vomiting up phlegm symptoms became worse over the last 24 hours does not have a nebulizer but has been using her inhaler, no fevers that she knows of but has had chills    Any type of movement increases her symptoms  Patient is also having generalized abdominal cramping but states she \"always has this\" since she found out about her rectal mass              Patient History   Medical History[1]  Surgical History[2]  Family History[3]  Social History[4]    Physical Exam   ED Triage Vitals [07/05/25 1253]   Temperature Heart Rate Respirations BP   36.3 °C (97.3 °F) (!) 132 (!) 22 (!) 117/94      Pulse Ox Temp Source Heart Rate Source Patient Position   99 % Temporal -- --      BP Location FiO2 (%)     -- --       Physical Exam  Vitals and nursing note reviewed.   Constitutional:       General: She is not in acute distress.     Appearance: She is well-developed.   HENT:      Head: Normocephalic and atraumatic.      Right Ear: Tympanic membrane, ear canal and external ear normal.      Left Ear: Tympanic membrane, ear canal and external ear normal.      Nose: Nose normal.      Mouth/Throat:      Mouth: Mucous membranes are moist.   Eyes:      Conjunctiva/sclera: Conjunctivae normal.      Pupils: Pupils are equal, round, and reactive to light.   Cardiovascular:      Rate and Rhythm: Normal rate and regular rhythm.      Heart sounds: No murmur heard.  Pulmonary:      Effort: Pulmonary effort is normal. No respiratory distress.      Breath sounds: " "Normal breath sounds.      Comments: Slight wheeze    That improved after her breathing treatment  Abdominal:      General: Bowel sounds are normal.      Palpations: Abdomen is soft.      Tenderness: There is no abdominal tenderness.      Comments: Generalized abdominal cramping but no rebound or guarding  No CVA tenderness   Musculoskeletal:         General: No swelling.      Cervical back: Normal range of motion and neck supple.   Skin:     General: Skin is warm and dry.      Capillary Refill: Capillary refill takes less than 2 seconds.   Neurological:      General: No focal deficit present.      Mental Status: She is alert and oriented to person, place, and time.   Psychiatric:         Mood and Affect: Mood normal.           ED Course & MDM   Diagnoses as of 07/05/25 1820   Pyelonephritis   Sepsis, due to unspecified organism, unspecified whether acute organ dysfunction present (Multi)   Hypokalemia   Hyponatremia   Acute kidney injury                 No data recorded     Eyal Coma Scale Score: 15 (07/05/25 1255 : Devi Hauser RN)                           Medical Decision Making  Differential:   1) pneumonia   2) pulmonary embolism   3) acute infectious process   4) pyelonephritis   5) hyponatremia  6) hypokalemia   7) acute kidney injury     Plan: 59-year-old female came in here with complaints of increasing shortness of breath general malaise chills and just not feeling well she also had nausea and some intermittent vomiting but what she described as \"phlegm\" patient's lab work showed pyelonephritis lactic acid was normal troponins x 2 were normal no acute changes on EKG but patient had extensive other lab work abnormality including a sodium of 128 potassium of 2.0 BUN and creatinine 39/1.90 with a GFR of 30 previous BUN/creatinine over the last month had been normal CT of the chest abdomen and pelvis showed no acute infiltrate or pulmonary embolism did show the rectal mass/rectal cancer with mets " unchanged from previous and the ureter stent to have calcification to the distal pole,   With patient having a pyelonephritis did reach out to urology [id number] with Dr. Cabezas on advisement if patient could stay here with IV antibiotics for the pyelonephritis or if she would need to be transferred to facility with urology    He did review the patient's chart states she is stable to stay here would need outpatient follow-up with urology, but currently does not need immediate urology eval  Patient was given potassium IV fluids and IV antibiotics her symptoms have progressively improved initial heart rate was in the 130s it is currently   Patient also given belies her treatment 2/2 the shortness of breath and concern for pneumonia versus COPD exacerbation  With improvement of the symptoms  She will be admitted to the floor for continued monitoring      CRITICAL CARE   I have personally spent  _44_   __   minutes of critical care time, exclusive of time spent on any procedures, in evaluation and management of this critically ill patient's condition of _as stated above patient noted to have hyponatremia of 128 and hypokalemia of 2.0 with a BUN and creatinine of 39/1.90 patient required IV fluid hydration repletion of electrolytes patient was also showing signs of sepsis, and she was started on sepsis protocol with lactic acid (normal) , diagnosed with pyelonephritis she will be admitted to the floor for continued monitoring and IV antibiotics     impression: Hypokalemia hyponatremia acute kidney injury pyelonephritis sepsis        Orders and Diagnoses for this visit  Labs Reviewed  CBC WITH AUTO DIFFERENTIAL - Abnormal     WBC                           13.1 (*)               nRBC                          0.0                    RBC                           5.51 (*)               Hemoglobin                    17.4 (*)               Hematocrit                    47.7 (*)               MCV                            87                     MCH                           31.6                   MCHC                          36.5 (*)               RDW                           14.9 (*)               Platelets                     489 (*)                Neutrophils %                 76.4                   Immature Granulocytes %, Automated   0.5                    Lymphocytes %                 16.8                   Monocytes %                   6.0                    Eosinophils %                 0.1                    Basophils %                   0.2                    Neutrophils Absolute          10.02 (*)               Immature Granulocytes Absolute, Au*   0.07                   Lymphocytes Absolute          2.21                   Monocytes Absolute            0.79                   Eosinophils Absolute          0.01                   Basophils Absolute            0.03                COMPREHENSIVE METABOLIC PANEL - Abnormal     Glucose                       149 (*)                Sodium                        128 (*)                Potassium                     2.0 (*)                Chloride                      87 (*)                 Bicarbonate                   18 (*)                 Anion Gap                     25 (*)                 Urea Nitrogen                 39 (*)                 Creatinine                    1.90 (*)               eGFR                          30 (*)                 Calcium                       9.6                    Albumin                       4.5                    Alkaline Phosphatase          129                    Total Protein                 8.1                    AST                           11                     Bilirubin, Total              0.9                    ALT                           10                  B-TYPE NATRIURETIC PEPTIDE - Abnormal     BNP                           111 (*)                    Narrative:    <100 pg/mL - Heart failure unlikely                  100-299  pg/mL - Intermediate probability of acute heart                                  failure exacerbation. Correlate with clinical                                  context and patient history.                    >=300 pg/mL - Heart Failure likely. Correlate with clinical                                  context and patient history.                                    BNP testing is performed using different testing methodology at [facility] than at other [facility] hospitals. Direct result comparisons should only be made within the same method.                     BLOOD GAS VENOUS FULL PANEL - Abnormal     POCT pH, Venous               7.59 (*)               POCT pCO2, Venous             26 (*)                 POCT pO2, Venous              44                     POCT SO2, Venous              78 (*)                 POCT Oxy Hemoglobin, Venous   74.0                   POCT Hematocrit Calculated, Venous   53.0 (*)               POCT Sodium, Venous           126 (*)                POCT Potassium, Venous        1.9 (*)                POCT Chloride, Venous         90 (*)                 POCT Ionized Calicum, Venous   1.08 (*)               POCT Glucose, Venous          126 (*)                POCT Lactate, Venous          3.0 (*)                POCT Base Excess, Venous      4.8 (*)                POCT HCO3 Calculated, Venous   24.9                   POCT Hemoglobin, Venous       17.5 (*)               POCT Anion Gap, Venous        13.0                   Patient Temperature                                  FiO2                          21                  URINALYSIS WITH REFLEX MICROSCOPIC - Abnormal     Color, Urine                    (*)                  Appearance, Urine             Ex.Turbid (*)               Specific Gravity, Urine       >1.050 (*)               pH, Urine                     7.5                    Protein, Urine                100 (2+) (*)               Glucose, Urine                Normal                  Blood, Urine                  1.0 (3+) (*)               Ketones, Urine                TRACE (*)               Bilirubin, Urine              NEGATIVE                Urobilinogen, Urine           Normal                 Nitrite, Urine                NEGATIVE                Leukocyte Esterase, Urine     500 Sloan/uL (*)            MICROSCOPIC ONLY, URINE - Abnormal     WBC, Urine                    >50 (*)                WBC Clumps, Urine             MANY                   RBC, Urine                    >20 (*)                Squamous Epithelial Cells, Urine   10-25 (FEW)                Amorphous Crystals, Urine     2+                  MAGNESIUM - Normal     Magnesium                     2.34                PROTIME-INR - Normal     Protime                       11.1                   INR                           1.0                 APTT - Normal     aPTT                          31                         Narrative: The APTT is no longer used for monitoring Unfractionated Heparin Therapy. For monitoring Heparin Therapy, use the Heparin Assay.  SERIAL TROPONIN-INITIAL - Normal     Troponin I, High Sensitivity   9                          Narrative: Less than 99th percentile of normal range cutoff-                  Female and children under 18 years old <14 ng/L; Male <21 ng/L: Negative                  Repeat testing should be performed if clinically indicated.                                     Female and children under 18 years old 14-50 ng/L; Male 21-50 ng/L:                  Consistent with possible cardiac damage and possible increased clinical                   risk. Serial measurements may help to assess extent of myocardial damage.                                     >50 ng/L: Consistent with cardiac damage, increased clinical risk and                  myocardial infarction. Serial measurements may help assess extent of                   myocardial damage.                                      NOTE:  Children less than 1 year old may have higher baseline troponin                   levels and results should be interpreted in conjunction with the overall                   clinical context.                                     NOTE: Troponin I testing is performed using a different                   testing methodology at [facility] than at other                   [facility]. Direct result comparisons should only                   be made within the same method.  SERIAL TROPONIN, 1 HOUR - Normal     Troponin I, High Sensitivity   8                          Narrative: Less than 99th percentile of normal range cutoff-                  Female and children under 18 years old <14 ng/L; Male <21 ng/L: Negative                  Repeat testing should be performed if clinically indicated.                                     Female and children under 18 years old 14-50 ng/L; Male 21-50 ng/L:                  Consistent with possible cardiac damage and possible increased clinical                   risk. Serial measurements may help to assess extent of myocardial damage.                                     >50 ng/L: Consistent with cardiac damage, increased clinical risk and                  myocardial infarction. Serial measurements may help assess extent of                   myocardial damage.                                      NOTE: Children less than 1 year old may have higher baseline troponin                   levels and results should be interpreted in conjunction with the overall                   clinical context.                                     NOTE: Troponin I testing is performed using a different                   testing methodology at [facility] than at other                   [facility]. Direct result comparisons should only                   be made within the same method.  LACTATE - Normal     Lactate                       1.8                        Narrative:  Venipuncture immediately after or during the administration of Metamizole may lead to falsely low results. Testing should be performed immediately prior to Metamizole dosing.  BLOOD CULTURE  BLOOD CULTURE  TROPONIN SERIES- (INITIAL, 1 HR)  CT angio chest for pulmonary embolism   Final Result    No evidence of a pulmonary embolism.    Stable pulmonary nodules measuring up to 8 mm in size in the left    upper lobe. No new or enlarging nodules. Continued surveillance    recommended.          MACRO:    None.          Signed by: Dean Crocker 7/5/2025 3:57 PM    Dictation workstation:   [id number]     CT abdomen pelvis w IV contrast   Final Result    Distal sigmoid/rectal mass, compatible with the provided history of    cancer. Retroperitoneal lymphadenopathy, compatible with metastatic    disease, not significantly changed. Left ureteral stent is in place    but the proximal and distal pigtail portions of the catheter are    encased with large dense calcifications/encrustations. Persistent    severe left-sided hydronephrosis. Urothelial enhancement as well as    perinephric and periureteral stranding on the left, suggesting    infection. Thickening of the walls of the urinary bladder with    surrounding inflammation, compatible with cystitis. Numerous    additional unchanged findings as above.          MACRO:    None.          Signed by: Dean Crocker 7/5/2025 4:15 PM    Dictation workstation:   [id number]         Amount and/or Complexity of Data Reviewed  ECG/medicine tests: independent interpretation performed.     Details: EKG done at 1245 shows sinus tach rate of 132 left atrial enlargement right axis deviation incomplete right bundle branch block right ventricular hypertrophy prolonged QTc of 494        Procedure  Procedures       Quita Michael PA-C  07/05/25 1549       Quita Michael PA-C  07/05/25 1827       Quita Michael PA-C  07/05/25 1945         [1]   Past Medical History:  Diagnosis Date    Abdominal pain  07/24/2023    Abnormal electrocardiogram (ECG) (EKG) 12/16/2015    Abnormal ECG    KARLY (acute kidney injury) 09/19/2024    Anxiety     Anxiety disorder, unspecified 08/31/2015    Anxiety    Asthma     Complicated UTI (urinary tract infection) 07/24/2023    COPD (chronic obstructive pulmonary disease) (Multi)     Delayed emergence from general anesthesia     Disease of thyroid gland     Encounter for preprocedural cardiovascular examination 10/25/2015    Pre-operative cardiovascular examination    Flank pain 07/24/2023    Hypertension     Hypomagnesemia 10/14/2015    Hypomagnesemia    Personal history of other diseases of the circulatory system 08/31/2015    History of chronic ischemic heart disease    Personal history of other diseases of the female genital tract 10/28/2015    History of ovarian cyst    Personal history of other diseases of urinary system 01/04/2016    History of hematuria    Personal history of other diseases of urinary system 10/16/2015    History of kidney disease    Personal history of other specified conditions 10/16/2015    History of pelvic mass    Personal history of other specified conditions 09/30/2015    History of fatigue    Personal history of pneumonia (recurrent) 02/26/2019    History of pneumonia    Right hip pain 07/24/2023    Strain of muscle, fascia and tendon of abdomen, initial encounter 09/17/2015    Abdominal muscle strain   [2]   Past Surgical History:  Procedure Laterality Date    APPENDECTOMY  08/31/2015    Appendectomy    FOOT SURGERY  08/31/2015    Foot Surgery    ORIF ANKLE FRACTURE     [3]   Family History  Problem Relation Name Age of Onset    No Known Problems Mother      No Known Problems Father      Breast cancer Sister     [4]   Social History  Tobacco Use    Smoking status: Every Day     Current packs/day: 0.50     Average packs/day: 0.5 packs/day for 37.5 years (18.8 ttl pk-yrs)     Types: Cigarettes     Start date: 1988     Passive exposure: Current    Smokeless  tobacco: Never   Vaping Use    Vaping status: Never Used   Substance Use Topics    Alcohol use: Yes     Comment: occasional    Drug use: Never        Quita Michael PA-C  07/05/25 1946

## 2025-07-05 NOTE — ED TRIAGE NOTES
Pt states having SOB, nausea, fatigue, decreased appetite since Monday, progressively worsening. Pt has hx colon cancer, not undergoing any treatments currently

## 2025-07-06 LAB
ANION GAP SERPL CALC-SCNC: 13 MMOL/L (ref 10–20)
ANION GAP SERPL CALC-SCNC: 14 MMOL/L (ref 10–20)
ANION GAP SERPL CALC-SCNC: 15 MMOL/L (ref 10–20)
BACTERIA BLD CULT: NORMAL
BACTERIA BLD CULT: NORMAL
BASOPHILS # BLD AUTO: 0.03 X10*3/UL (ref 0–0.1)
BASOPHILS NFR BLD AUTO: 0.4 %
BUN SERPL-MCNC: 30 MG/DL (ref 6–23)
BUN SERPL-MCNC: 31 MG/DL (ref 6–23)
BUN SERPL-MCNC: 36 MG/DL (ref 6–23)
C COLI+JEJ+UPSA DNA STL QL NAA+PROBE: NOT DETECTED
C DIF TOX TCDA+TCDB STL QL NAA+PROBE: NOT DETECTED
CALCIUM SERPL-MCNC: 8.3 MG/DL (ref 8.6–10.3)
CALCIUM SERPL-MCNC: 8.3 MG/DL (ref 8.6–10.3)
CALCIUM SERPL-MCNC: 8.6 MG/DL (ref 8.6–10.3)
CHLORIDE SERPL-SCNC: 101 MMOL/L (ref 98–107)
CHLORIDE SERPL-SCNC: 95 MMOL/L (ref 98–107)
CHLORIDE SERPL-SCNC: 99 MMOL/L (ref 98–107)
CO2 SERPL-SCNC: 21 MMOL/L (ref 21–32)
CO2 SERPL-SCNC: 22 MMOL/L (ref 21–32)
CO2 SERPL-SCNC: 23 MMOL/L (ref 21–32)
CREAT SERPL-MCNC: 1.47 MG/DL (ref 0.5–1.05)
CREAT SERPL-MCNC: 1.5 MG/DL (ref 0.5–1.05)
CREAT SERPL-MCNC: 1.77 MG/DL (ref 0.5–1.05)
EC STX1 GENE STL QL NAA+PROBE: NOT DETECTED
EC STX2 GENE STL QL NAA+PROBE: NOT DETECTED
EGFRCR SERPLBLD CKD-EPI 2021: 32 ML/MIN/1.73M*2
EGFRCR SERPLBLD CKD-EPI 2021: 39 ML/MIN/1.73M*2
EGFRCR SERPLBLD CKD-EPI 2021: 40 ML/MIN/1.73M*2
EOSINOPHIL # BLD AUTO: 0.06 X10*3/UL (ref 0–0.7)
EOSINOPHIL NFR BLD AUTO: 0.9 %
ERYTHROCYTE [DISTWIDTH] IN BLOOD BY AUTOMATED COUNT: 15.6 % (ref 11.5–14.5)
GLUCOSE SERPL-MCNC: 106 MG/DL (ref 74–99)
GLUCOSE SERPL-MCNC: 85 MG/DL (ref 74–99)
GLUCOSE SERPL-MCNC: 92 MG/DL (ref 74–99)
HCT VFR BLD AUTO: 41 % (ref 36–46)
HGB BLD-MCNC: 14 G/DL (ref 12–16)
IMM GRANULOCYTES # BLD AUTO: 0.05 X10*3/UL (ref 0–0.7)
IMM GRANULOCYTES NFR BLD AUTO: 0.7 % (ref 0–0.9)
LYMPHOCYTES # BLD AUTO: 1.81 X10*3/UL (ref 1.2–4.8)
LYMPHOCYTES NFR BLD AUTO: 27.1 %
MCH RBC QN AUTO: 31.2 PG (ref 26–34)
MCHC RBC AUTO-ENTMCNC: 34.1 G/DL (ref 32–36)
MCV RBC AUTO: 91 FL (ref 80–100)
MONOCYTES # BLD AUTO: 0.7 X10*3/UL (ref 0.1–1)
MONOCYTES NFR BLD AUTO: 10.5 %
NEUTROPHILS # BLD AUTO: 4.03 X10*3/UL (ref 1.2–7.7)
NEUTROPHILS NFR BLD AUTO: 60.4 %
NOROVIRUS GI + GII RNA STL NAA+PROBE: NOT DETECTED
NRBC BLD-RTO: 0 /100 WBCS (ref 0–0)
PLATELET # BLD AUTO: 339 X10*3/UL (ref 150–450)
POTASSIUM SERPL-SCNC: 3 MMOL/L (ref 3.5–5.3)
POTASSIUM SERPL-SCNC: 3 MMOL/L (ref 3.5–5.3)
POTASSIUM SERPL-SCNC: 3.7 MMOL/L (ref 3.5–5.3)
RBC # BLD AUTO: 4.49 X10*6/UL (ref 4–5.2)
RV RNA STL NAA+PROBE: NOT DETECTED
SALMONELLA DNA STL QL NAA+PROBE: NOT DETECTED
SHIGELLA DNA SPEC QL NAA+PROBE: NOT DETECTED
SODIUM SERPL-SCNC: 130 MMOL/L (ref 136–145)
SODIUM SERPL-SCNC: 131 MMOL/L (ref 136–145)
SODIUM SERPL-SCNC: 132 MMOL/L (ref 136–145)
V CHOLERAE DNA STL QL NAA+PROBE: NOT DETECTED
WBC # BLD AUTO: 6.7 X10*3/UL (ref 4.4–11.3)
Y ENTEROCOL DNA STL QL NAA+PROBE: NOT DETECTED

## 2025-07-06 PROCEDURE — 1200000002 HC GENERAL ROOM WITH TELEMETRY DAILY: Mod: IPSPLIT

## 2025-07-06 PROCEDURE — 87493 C DIFF AMPLIFIED PROBE: CPT | Mod: GENLAB | Performed by: INTERNAL MEDICINE

## 2025-07-06 PROCEDURE — 2500000002 HC RX 250 W HCPCS SELF ADMINISTERED DRUGS (ALT 637 FOR MEDICARE OP, ALT 636 FOR OP/ED): Mod: SE,IPSPLIT | Performed by: NURSE PRACTITIONER

## 2025-07-06 PROCEDURE — 80048 BASIC METABOLIC PNL TOTAL CA: CPT | Mod: IPSPLIT | Performed by: NURSE PRACTITIONER

## 2025-07-06 PROCEDURE — 99223 1ST HOSP IP/OBS HIGH 75: CPT | Performed by: NURSE PRACTITIONER

## 2025-07-06 PROCEDURE — 85025 COMPLETE CBC W/AUTO DIFF WBC: CPT | Mod: IPSPLIT | Performed by: HOSPITALIST

## 2025-07-06 PROCEDURE — 2500000002 HC RX 250 W HCPCS SELF ADMINISTERED DRUGS (ALT 637 FOR MEDICARE OP, ALT 636 FOR OP/ED): Mod: SE,IPSPLIT | Performed by: HOSPITALIST

## 2025-07-06 PROCEDURE — 94640 AIRWAY INHALATION TREATMENT: CPT | Mod: IPSPLIT

## 2025-07-06 PROCEDURE — 36415 COLL VENOUS BLD VENIPUNCTURE: CPT | Mod: IPSPLIT | Performed by: HOSPITALIST

## 2025-07-06 PROCEDURE — 36415 COLL VENOUS BLD VENIPUNCTURE: CPT | Mod: IPSPLIT | Performed by: NURSE PRACTITIONER

## 2025-07-06 PROCEDURE — 80048 BASIC METABOLIC PNL TOTAL CA: CPT | Mod: IPSPLIT | Performed by: HOSPITALIST

## 2025-07-06 PROCEDURE — 2500000004 HC RX 250 GENERAL PHARMACY W/ HCPCS (ALT 636 FOR OP/ED): Mod: SE,IPSPLIT | Performed by: HOSPITALIST

## 2025-07-06 PROCEDURE — 9420000001 HC RT PATIENT EDUCATION 5 MIN: Mod: IPSPLIT

## 2025-07-06 PROCEDURE — 2500000004 HC RX 250 GENERAL PHARMACY W/ HCPCS (ALT 636 FOR OP/ED): Mod: SE,IPSPLIT | Performed by: NURSE PRACTITIONER

## 2025-07-06 PROCEDURE — 2500000001 HC RX 250 WO HCPCS SELF ADMINISTERED DRUGS (ALT 637 FOR MEDICARE OP): Mod: SE,IPSPLIT | Performed by: HOSPITALIST

## 2025-07-06 PROCEDURE — 2500000004 HC RX 250 GENERAL PHARMACY W/ HCPCS (ALT 636 FOR OP/ED): Mod: SE,IPSPLIT

## 2025-07-06 PROCEDURE — 87506 IADNA-DNA/RNA PROBE TQ 6-11: CPT | Mod: GENLAB | Performed by: INTERNAL MEDICINE

## 2025-07-06 PROCEDURE — 94760 N-INVAS EAR/PLS OXIMETRY 1: CPT | Mod: IPSPLIT

## 2025-07-06 RX ORDER — DEXTROSE MONOHYDRATE 50 MG/ML
INJECTION, SOLUTION INTRAVENOUS
Status: COMPLETED
Start: 2025-07-06 | End: 2025-07-06

## 2025-07-06 RX ORDER — POTASSIUM CHLORIDE 20 MEQ/1
20 TABLET, EXTENDED RELEASE ORAL ONCE
Status: COMPLETED | OUTPATIENT
Start: 2025-07-06 | End: 2025-07-06

## 2025-07-06 RX ORDER — POTASSIUM CHLORIDE 14.9 MG/ML
20 INJECTION INTRAVENOUS ONCE
Status: DISCONTINUED | OUTPATIENT
Start: 2025-07-06 | End: 2025-07-06

## 2025-07-06 RX ORDER — POTASSIUM CHLORIDE 20 MEQ/1
40 TABLET, EXTENDED RELEASE ORAL 3 TIMES DAILY
Status: COMPLETED | OUTPATIENT
Start: 2025-07-06 | End: 2025-07-06

## 2025-07-06 RX ORDER — SODIUM CHLORIDE 9 MG/ML
10 INJECTION, SOLUTION INTRAVENOUS CONTINUOUS PRN
Status: ACTIVE | OUTPATIENT
Start: 2025-07-06 | End: 2026-07-06

## 2025-07-06 RX ORDER — SODIUM CHLORIDE 9 MG/ML
125 INJECTION, SOLUTION INTRAVENOUS CONTINUOUS
Status: DISPENSED | OUTPATIENT
Start: 2025-07-06 | End: 2025-07-07

## 2025-07-06 RX ORDER — PIPERACILLIN SODIUM, TAZOBACTAM SODIUM 2; .25 G/10ML; G/10ML
INJECTION, POWDER, LYOPHILIZED, FOR SOLUTION INTRAVENOUS
Status: COMPLETED
Start: 2025-07-06 | End: 2025-07-06

## 2025-07-06 RX ORDER — SODIUM CHLORIDE AND POTASSIUM CHLORIDE 150; 900 MG/100ML; MG/100ML
100 INJECTION, SOLUTION INTRAVENOUS CONTINUOUS
Status: DISPENSED | OUTPATIENT
Start: 2025-07-06 | End: 2025-07-06

## 2025-07-06 RX ORDER — HYDROCORTISONE 25 MG/G
CREAM TOPICAL 2 TIMES DAILY PRN
Status: SHIPPED | OUTPATIENT
Start: 2025-07-06

## 2025-07-06 RX ADMIN — DEXTROSE MONOHYDRATE 50 ML: 50 INJECTION, SOLUTION INTRAVENOUS at 03:42

## 2025-07-06 RX ADMIN — POTASSIUM CHLORIDE 40 MEQ: 1500 TABLET, EXTENDED RELEASE ORAL at 15:20

## 2025-07-06 RX ADMIN — EZETIMIBE 10 MG: 10 TABLET ORAL at 08:42

## 2025-07-06 RX ADMIN — PIPERACILLIN SODIUM AND TAZOBACTAM SODIUM 2.25 G: 2; .25 INJECTION, POWDER, LYOPHILIZED, FOR SOLUTION INTRAVENOUS at 09:54

## 2025-07-06 RX ADMIN — POTASSIUM CHLORIDE 20 MEQ: 1500 TABLET, EXTENDED RELEASE ORAL at 01:10

## 2025-07-06 RX ADMIN — SODIUM CHLORIDE 125 ML/HR: 9 INJECTION, SOLUTION INTRAVENOUS at 15:14

## 2025-07-06 RX ADMIN — METOPROLOL TARTRATE 25 MG: 25 TABLET, FILM COATED ORAL at 21:46

## 2025-07-06 RX ADMIN — BUDESONIDE 0.5 MG: 0.5 INHALANT RESPIRATORY (INHALATION) at 09:46

## 2025-07-06 RX ADMIN — SODIUM CHLORIDE 125 ML/HR: 9 INJECTION, SOLUTION INTRAVENOUS at 20:23

## 2025-07-06 RX ADMIN — FORMOTEROL FUMARATE DIHYDRATE 20 MCG: 20 SOLUTION RESPIRATORY (INHALATION) at 09:46

## 2025-07-06 RX ADMIN — LOPERAMIDE HYDROCHLORIDE 4 MG: 2 CAPSULE ORAL at 16:43

## 2025-07-06 RX ADMIN — PIPERACILLIN SODIUM AND TAZOBACTAM SODIUM 2.25 G: 2; .25 INJECTION, POWDER, LYOPHILIZED, FOR SOLUTION INTRAVENOUS at 16:30

## 2025-07-06 RX ADMIN — FORMOTEROL FUMARATE DIHYDRATE 20 MCG: 20 SOLUTION RESPIRATORY (INHALATION) at 21:02

## 2025-07-06 RX ADMIN — PIPERACILLIN SODIUM AND TAZOBACTAM SODIUM 2.25 G: 2; .25 INJECTION, POWDER, LYOPHILIZED, FOR SOLUTION INTRAVENOUS at 03:42

## 2025-07-06 RX ADMIN — AMLODIPINE BESYLATE 10 MG: 10 TABLET ORAL at 08:39

## 2025-07-06 RX ADMIN — BUDESONIDE 0.5 MG: 0.5 INHALANT RESPIRATORY (INHALATION) at 21:02

## 2025-07-06 RX ADMIN — PIPERACILLIN SODIUM AND TAZOBACTAM SODIUM 2.25 G: 2; .25 INJECTION, POWDER, LYOPHILIZED, FOR SOLUTION INTRAVENOUS at 21:46

## 2025-07-06 RX ADMIN — FLUTICASONE PROPIONATE 2 SPRAY: 50 SPRAY, METERED NASAL at 08:39

## 2025-07-06 RX ADMIN — PANTOPRAZOLE SODIUM 40 MG: 40 TABLET, DELAYED RELEASE ORAL at 06:38

## 2025-07-06 RX ADMIN — POTASSIUM CHLORIDE 40 MEQ: 1500 TABLET, EXTENDED RELEASE ORAL at 08:56

## 2025-07-06 RX ADMIN — SODIUM CHLORIDE AND POTASSIUM CHLORIDE 100 ML/HR: .9; .15 SOLUTION INTRAVENOUS at 08:52

## 2025-07-06 RX ADMIN — LEVOTHYROXINE SODIUM 25 MCG: 25 TABLET ORAL at 08:43

## 2025-07-06 RX ADMIN — PIPERACILLIN AND TAZOBACTAM 2250 MG: 2; .25 INJECTION, POWDER, FOR SOLUTION INTRAVENOUS at 03:42

## 2025-07-06 RX ADMIN — POTASSIUM CHLORIDE 40 MEQ: 1500 TABLET, EXTENDED RELEASE ORAL at 21:46

## 2025-07-06 RX ADMIN — METOPROLOL TARTRATE 25 MG: 25 TABLET, FILM COATED ORAL at 08:39

## 2025-07-06 ASSESSMENT — PAIN SCALES - GENERAL
PAINLEVEL_OUTOF10: 0 - NO PAIN
PAINLEVEL_OUTOF10: 0 - NO PAIN

## 2025-07-06 ASSESSMENT — ENCOUNTER SYMPTOMS
PSYCHIATRIC NEGATIVE: 1
WEAKNESS: 1
DIARRHEA: 1
RESPIRATORY NEGATIVE: 1
CARDIOVASCULAR NEGATIVE: 1
DYSURIA: 1

## 2025-07-06 ASSESSMENT — PAIN - FUNCTIONAL ASSESSMENT
PAIN_FUNCTIONAL_ASSESSMENT: 0-10
PAIN_FUNCTIONAL_ASSESSMENT: 0-10

## 2025-07-06 NOTE — ASSESSMENT & PLAN NOTE
Pyelonephritis  -Zosyn 2.25 g IV every 6 hrs.   - Blood Cultures - Pending  - Given Vanco x 2 doses in the ER  - Continue to monitor kidney function if getting worse Dr. Peter would like patient transferred  For a nephrostomy tube. 30/1.5 -> 31/1.47  -GRF -> 39 -> 40  -CT abd/pelvis:    Distal sigmoid/rectal mass, compatible with the provided history of  cancer. Retroperitoneal lymphadenopathy, compatible with metastatic  disease, not significantly changed. Left ureteral stent is in place  but the proximal and distal pigtail portions of the catheter are  encased with large dense calcifications/encrustations. Persistent  severe left-sided hydronephrosis. Urothelial enhancement as well as  perinephric and periureteral stranding on the left, suggesting  infection. Thickening of the walls of the urinary bladder with  surrounding inflammation, compatible with cystitis. Numerous  additional unchanged findings as above.  -UA:    Leuks 500   Ketones Trace   Blood 1.0 (3+)   Protein 100 (2+)   WBCs > 50  -Lactate - 1.8    Hypokalemia   2 > 3> 3> 3.7  Patient was given 40 IV of K+ in the ER and 20 K+ p.o.  On the floor she was given another 20 of K+ in 1L of NS  40 K+ TID x 3 doses  -Continue to monitor, BMP ordered    Nausea   - Zofran 4 mg IV prn    Rectal mass    Patient is not currently being treated. She does have appointments set up with rad onc and med onc.     Diarrhea   C-diff and stool pathogen and both negative. Is incontinent of stool. Active diarrhea today.     Hyponatremia    130> 131> 132  1L NS ordered at 125 ml/hr continuous  -Continue to monitor, labs ordered    COPD (chronic obstructive pulmonary disease) (Multi)  - Pulmicort   -CTA/chest:   No evidence of a pulmonary embolism.  Stable pulmonary nodules measuring up to 8 mm in size in the left  upper lobe. No new or enlarging nodules. Continued surveillance  recommended.  -BNP - 111    Depression with anxiety  -  is currently on hospice    Benign  essential HTN  -Norvasc 10 mg every day   -Metoprolol 25 mg BID   - Trop - 8    GERD  -Protonix 40 mg at bedtime

## 2025-07-06 NOTE — H&P
"History Of Present Illness  Hortensia Rodriguez is a 59 y.o. female presenting with SOB, weakness, nausea/vomiting.     Past Medical History  Medical History[1]    Surgical History  Surgical History[2]     Social History  She reports that she has been smoking cigarettes. She started smoking about 37 years ago. She has a 18.8 pack-year smoking history. She has been exposed to tobacco smoke. She has never used smokeless tobacco. She reports current alcohol use. She reports that she does not use drugs.    Family History  Family History[3]     Allergies  Ace inhibitors and Lisinopril    Review of Systems   HENT: Negative.     Respiratory: Negative.     Cardiovascular: Negative.    Gastrointestinal:  Positive for diarrhea.        Incont of B&B. Currently wearing a brief   Genitourinary:  Positive for dysuria and urgency.   Musculoskeletal:         Does have \"arthritis pain\" to her BUE   Skin: Negative.    Neurological:  Positive for weakness.   Psychiatric/Behavioral: Negative.          Physical Exam  Vitals reviewed.   Constitutional:       Appearance: Normal appearance.   HENT:      Head: Normocephalic.      Nose: Nose normal.      Mouth/Throat:      Mouth: Mucous membranes are moist.   Cardiovascular:      Rate and Rhythm: Normal rate and regular rhythm.      Pulses: Normal pulses.      Heart sounds: Normal heart sounds.   Pulmonary:      Effort: Pulmonary effort is normal.      Breath sounds: Normal breath sounds.   Abdominal:      General: Bowel sounds are normal.      Palpations: Abdomen is soft.   Musculoskeletal:         General: Normal range of motion.   Skin:     General: Skin is warm and dry.   Neurological:      General: No focal deficit present.      Mental Status: She is alert and oriented to person, place, and time.   Psychiatric:         Mood and Affect: Mood normal.         Behavior: Behavior normal.         Thought Content: Thought content normal.         Judgment: Judgment normal.          Last " "Recorded Vitals  Blood pressure 107/70, pulse 71, temperature 36.2 °C (97.2 °F), temperature source Temporal, resp. rate 16, height 1.676 m (5' 6\"), weight 65.8 kg (145 lb), SpO2 98%.    Relevant Results  Scheduled medications  Scheduled Medications[4]  Continuous medications  Continuous Medications[5]  PRN medications  PRN Medications[6]    CT abdomen pelvis w IV contrast  Result Date: 7/5/2025  Interpreted By:  Dean Crocker, STUDY: CT ABDOMEN PELVIS W IV CONTRAST;  7/5/2025 3:28 pm   INDICATION: Signs/Symptoms:hx of colon ca (rectal/solon ca) sob abd pain n/v.   COMPARISON: 05/15/2025   ACCESSION NUMBER(S): [id number]   ORDERING CLINICIAN: CORTES MEYERS   TECHNIQUE: Contiguous axial images were obtained through the abdomen and pelvis following the intravenous administration of 75 cc of Omnipaque 350. Coronal and sagittal reconstructions were performed.   FINDINGS: LOWER CHEST: Described on a separate report.   ABDOMEN AND PELVIS:   LIVER: Within normal limits.   BILE DUCTS: Not abnormally dilated.   GALLBLADDER: No calcified stones. No wall thickening.   PANCREAS: Appears unremarkable.   SPLEEN: Appears unremarkable.   ADRENAL GLANDS: Stable adrenal nodules measuring up to 1.8 cm on the left, most likely adenomas given their low-attenuation on the previous unenhanced images.   KIDNEYS, URETERS, AND BLADDER: A left ureteral stent is in place. There are persistent dense calcification surrounding/encasing the proximal and distal pigtail portions of the catheter, similar to the prior exam. There is persistent left-sided hydronephrosis. There is urothelial enhancement on the left. Stable mild perinephric and periureteral stranding on the left. There are calculi within the left kidney measuring up to approximately 1.5 cm. At least 2 nonobstructing calculi are seen within the right kidney, measuring less than 3 mm. Thickening of the walls of the urinary bladder with surrounding inflammation, suggesting cystitis.   BOWEL: " Masslike thickening of the walls of the distal sigmoid/rectum, compatible with the provided history of cancer. Please refer to the MRI dated 05/29/2025. There is no evidence of a bowel obstruction. Although CT has limited sensitivity and specificity for gastric pathology, the stomach appears grossly unremarkable.   RETROPERITONEUM, VESSELS: There are atherosclerotic calcifications of the aorta and its branches. IVC is within normal limits. There is retroperitoneal lymphadenopathy, compatible with metastatic disease, not significantly changed when compared to the previous study.   PERITONEUM: There is no evidence of pneumoperitoneum.  No ascites or loculated fluid collection noted.  No pathologically enlarged mesenteric lymph nodes are identified.   ABDOMINAL WALL, SOFT TISSUES: No acute process.   SKELETON: Osteopenia. Degenerative changes. No acute process.       Distal sigmoid/rectal mass, compatible with the provided history of cancer. Retroperitoneal lymphadenopathy, compatible with metastatic disease, not significantly changed. Left ureteral stent is in place but the proximal and distal pigtail portions of the catheter are encased with large dense calcifications/encrustations. Persistent severe left-sided hydronephrosis. Urothelial enhancement as well as perinephric and periureteral stranding on the left, suggesting infection. Thickening of the walls of the urinary bladder with surrounding inflammation, compatible with cystitis. Numerous additional unchanged findings as above.   MACRO: None.   Signed by: Dean Crocker 7/5/2025 4:15 PM Dictation workstation:   [id number]    CT angio chest for pulmonary embolism  Result Date: 7/5/2025  Interpreted By:  Dean Crocker, STUDY: CT ANGIO CHEST FOR PULMONARY EMBOLISM;  7/5/2025 3:28 pm   INDICATION: Signs/Symptoms:sob eval for pe.     COMPARISON: 05/13/2025   ACCESSION NUMBER(S): [id number]   ORDERING CLINICIAN: KIRA TUTTLE   TECHNIQUE: Helical data acquisition of the  chest was obtained following the intravenous administration of  75 ml of contrast/Omnipaque 350. Images were reformatted in axial, coronal, and sagittal planes. 3D post processing was performed on an independent workstation and volume rendered images of the pulmonary arteries were created and utilized in the interpretation.   FINDINGS: POTENTIAL LIMITATIONS OF THE STUDY:   Respiratory motion artifact   HEART AND VESSELS: No filling defects are identified within the pulmonary arteries to indicate the presence of a pulmonary embolism.   There are atherosclerotic calcifications of the aorta and its branches. Aorta is unchanged in course and caliber. Stable ectasia of the ascending aorta up to 4.0 cm.   The heart is not significantly enlarged.   Trace pericardial fluid.   MEDIASTINUM AND DARRIAN, LOWER NECK AND AXILLA: The visualized thyroid gland is within normal limits.   No evidence of thoracic lymphadenopathy by CT criteria.   Esophagus appears within normal limits as seen.   LUNGS AND AIRWAYS: The trachea and central airways are patent. No endobronchial lesion.   Mild areas of atelectasis/scarring, most conspicuous at the lung bases. No consolidation. No effusion. No pneumothorax. A few tiny scattered nodules are seen measuring less than 5 mm in size, unchanged, for example, image 198 in the left lower lobe. Stable nodule in the left upper lobe measuring approximately 8 mm in size, possibly representing a focal area of scarring, image 90. Continued surveillance recommended. No new or enlarging nodules.   UPPER ABDOMEN: Described in a separate report.   CHEST WALL AND OSSEOUS STRUCTURES: Degenerative changes. No acute process. Remote compression deformity involving T8.       No evidence of a pulmonary embolism. Stable pulmonary nodules measuring up to 8 mm in size in the left upper lobe. No new or enlarging nodules. Continued surveillance recommended.   MACRO: None.   Signed by: Dean Crocker 7/5/2025 3:57 PM Dictation  workstation:   [id number]     Results for orders placed or performed during the hospital encounter of 07/05/25 (from the past 24 hours)   Troponin, High Sensitivity, 1 Hour   Result Value Ref Range    Troponin I, High Sensitivity 8 0 - 13 ng/L   Blood Culture    Specimen: Peripheral Venipuncture; Blood culture   Result Value Ref Range    Blood Culture Loaded on Instrument - Culture in progress    Blood Culture    Specimen: Peripheral Venipuncture; Blood culture   Result Value Ref Range    Blood Culture Loaded on Instrument - Culture in progress    Lactate   Result Value Ref Range    Lactate 1.8 0.4 - 2.0 mmol/L   Urinalysis with Reflex Microscopic   Result Value Ref Range    Color, Urine Light-Orange (N) Light-Yellow, Yellow, Dark-Yellow    Appearance, Urine Ex.Turbid (N) Clear    Specific Gravity, Urine >1.050 (N) 1.005 - 1.035    pH, Urine 7.5 5.0, 5.5, 6.0, 6.5, 7.0, 7.5, 8.0    Protein, Urine 100 (2+) (A) NEGATIVE, 10 (TRACE), 20 (TRACE) mg/dL    Glucose, Urine Normal Normal mg/dL    Blood, Urine 1.0 (3+) (A) NEGATIVE mg/dL    Ketones, Urine TRACE (A) NEGATIVE mg/dL    Bilirubin, Urine NEGATIVE NEGATIVE mg/dL    Urobilinogen, Urine Normal Normal mg/dL    Nitrite, Urine NEGATIVE NEGATIVE    Leukocyte Esterase, Urine 500 Sloan/uL (A) NEGATIVE   Microscopic Only, Urine   Result Value Ref Range    WBC, Urine >50 (A) 1-5, NONE /HPF    WBC Clumps, Urine MANY Reference range not established. /HPF    RBC, Urine >20 (A) NONE, 1-2, 3-5 /HPF    Squamous Epithelial Cells, Urine 10-25 (FEW) Reference range not established. /HPF    Amorphous Crystals, Urine 2+ NONE, 1+, 2+ /HPF   Basic metabolic panel   Result Value Ref Range    Glucose 106 (H) 74 - 99 mg/dL    Sodium 130 (L) 136 - 145 mmol/L    Potassium 3.0 (L) 3.5 - 5.3 mmol/L    Chloride 95 (L) 98 - 107 mmol/L    Bicarbonate 23 21 - 32 mmol/L    Anion Gap 15 10 - 20 mmol/L    Urea Nitrogen 36 (H) 6 - 23 mg/dL    Creatinine 1.77 (H) 0.50 - 1.05 mg/dL    eGFR 32 (L) >60  mL/min/1.73m*2    Calcium 8.6 8.6 - 10.3 mg/dL   C. difficile, PCR    Specimen: Stool   Result Value Ref Range    C. difficile, PCR Not Detected Not Detected   Stool Pathogen Panel, PCR    Specimen: Stool   Result Value Ref Range    Campylobacter Group Not Detected Not Detected    Salmonella species Not Detected Not Detected    Shigella species Not Detected Not Detected    Vibrio Group Not Detected Not Detected    Yersinia Enterocolitica Not Detected Not Detected    Shiga Toxin 1 Not Detected Not Detected, Indeterminate    Shiga Toxin 2 Not Detected Not Detected, Indeterminate    Norovirus GI/GII Not Detected Not Detected    Rotavirus A Not Detected Not Detected   Basic Metabolic Panel   Result Value Ref Range    Glucose 85 74 - 99 mg/dL    Sodium 131 (L) 136 - 145 mmol/L    Potassium 3.0 (L) 3.5 - 5.3 mmol/L    Chloride 99 98 - 107 mmol/L    Bicarbonate 21 21 - 32 mmol/L    Anion Gap 14 10 - 20 mmol/L    Urea Nitrogen 30 (H) 6 - 23 mg/dL    Creatinine 1.50 (H) 0.50 - 1.05 mg/dL    eGFR 39 (L) >60 mL/min/1.73m*2    Calcium 8.3 (L) 8.6 - 10.3 mg/dL   CBC and Auto Differential   Result Value Ref Range    WBC 6.7 4.4 - 11.3 x10*3/uL    nRBC 0.0 0.0 - 0.0 /100 WBCs    RBC 4.49 4.00 - 5.20 x10*6/uL    Hemoglobin 14.0 12.0 - 16.0 g/dL    Hematocrit 41.0 36.0 - 46.0 %    MCV 91 80 - 100 fL    MCH 31.2 26.0 - 34.0 pg    MCHC 34.1 32.0 - 36.0 g/dL    RDW 15.6 (H) 11.5 - 14.5 %    Platelets 339 150 - 450 x10*3/uL    Neutrophils % 60.4 40.0 - 80.0 %    Immature Granulocytes %, Automated 0.7 0.0 - 0.9 %    Lymphocytes % 27.1 13.0 - 44.0 %    Monocytes % 10.5 2.0 - 10.0 %    Eosinophils % 0.9 0.0 - 6.0 %    Basophils % 0.4 0.0 - 2.0 %    Neutrophils Absolute 4.03 1.20 - 7.70 x10*3/uL    Immature Granulocytes Absolute, Automated 0.05 0.00 - 0.70 x10*3/uL    Lymphocytes Absolute 1.81 1.20 - 4.80 x10*3/uL    Monocytes Absolute 0.70 0.10 - 1.00 x10*3/uL    Eosinophils Absolute 0.06 0.00 - 0.70 x10*3/uL    Basophils Absolute 0.03  0.00 - 0.10 x10*3/uL   Basic metabolic panel   Result Value Ref Range    Glucose 92 74 - 99 mg/dL    Sodium 132 (L) 136 - 145 mmol/L    Potassium 3.7 3.5 - 5.3 mmol/L    Chloride 101 98 - 107 mmol/L    Bicarbonate 22 21 - 32 mmol/L    Anion Gap 13 10 - 20 mmol/L    Urea Nitrogen 31 (H) 6 - 23 mg/dL    Creatinine 1.47 (H) 0.50 - 1.05 mg/dL    eGFR 40 (L) >60 mL/min/1.73m*2    Calcium 8.3 (L) 8.6 - 10.3 mg/dL               Assessment & Plan  Pyelonephritis    Hypokalemia    Nausea    Rectal mass    COPD (chronic obstructive pulmonary disease) (Multi)    Depression with anxiety    Diarrhea    Benign essential HTN    Hyponatremia      Pyelonephritis  -Zosyn 2.25 g IV every 6 hrs.   - Blood Cultures - Pending  - Given Vanco x 2 doses in the ER  - Continue to monitor kidney function if getting worse Dr. Peter would like patient transferred  For a nephrostomy tube. 30/1.5 -> 31/1.47  -GRF -> 39 -> 40  -CT abd/pelvis:    Distal sigmoid/rectal mass, compatible with the provided history of  cancer. Retroperitoneal lymphadenopathy, compatible with metastatic  disease, not significantly changed. Left ureteral stent is in place  but the proximal and distal pigtail portions of the catheter are  encased with large dense calcifications/encrustations. Persistent  severe left-sided hydronephrosis. Urothelial enhancement as well as  perinephric and periureteral stranding on the left, suggesting  infection. Thickening of the walls of the urinary bladder with  surrounding inflammation, compatible with cystitis. Numerous  additional unchanged findings as above.  -UA:    Leuks 500   Ketones Trace   Blood 1.0 (3+)   Protein 100 (2+)   WBCs > 50  -Lactate - 1.8    Hypokalemia   2 > 3> 3> 3.7  Patient was given 40 IV of K+ in the ER and 20 K+ p.o.  On the floor she was given another 20 of K+ in 1L of NS  40 K+ TID x 3 doses  -Continue to monitor, BMP ordered    Nausea   - Zofran 4 mg IV prn    Rectal mass    Patient is not currently being  treated. She does have appointments set up with rad onc and med onc.     Diarrhea   C-diff and stool pathogen and both negative. Is incontinent of stool. Active diarrhea today.     Hyponatremia    130> 131> 132  1L NS ordered at 125 ml/hr continuous  -Continue to monitor, labs ordered    COPD (chronic obstructive pulmonary disease) (Multi)  - Pulmicort   -CTA/chest:   No evidence of a pulmonary embolism.  Stable pulmonary nodules measuring up to 8 mm in size in the left  upper lobe. No new or enlarging nodules. Continued surveillance  recommended.  -BNP - 111    Depression with anxiety  -  is currently on hospice    Benign essential HTN  -Norvasc 10 mg every day   -Metoprolol 25 mg BID   - Trop - 8    GERD  -Protonix 40 mg at bedtime     Full Code  *Patient requires inpatient stay at this time.       Ramana Quispe, LAN-CNP  Attending Attestation:    Patient was seen and examined face to face, history and physical was taken personally at bedside the APRN-CNP, was present for the whole duration of the exam who participated in the documentation of this note. I performed the medical decision-making components (assessment and plan of care). I have reviewed the documentation and verified the findings in the note as written with additions or exceptions as stated in the body of this note.  She is my office patient with rectal cancer, she is supposed to have radiation therapy, however she is not driving, her  is not able to help her, becoming difficult for all medical appointment, she does have double-J stent in left ureteral system, because of significant hydronephrosis, she came with nausea vomiting started having diarrhea while in the hospital, she was found to have significant hyponatremia, hypokalemia, and hypomagnesemia, urinary tract infection with pyelonephritis of the right side, hydronephrosis unchanged as compared with before, it is reported that patient has calcification at both and of pigtail  stent.  She does have creatinine of 1.77.  Apparently emergency room contacted urology who felt patient is okay to be here to be treated for urinary tract infection.  On exam patient is in no significant distress, tearful, she is current daily smoker, there are rhonchi in the lungs bilaterally, heart regular, abdomen soft bowel sounds are present, positive CVA tenderness on the right side, lower extremity no edema.  Patient with diarrhea nausea vomiting possible viral gastroenteritis however she does have colorectal cancer, she does not remember taking any antibiotics in the last couple of months.  Will check's stool for C. difficile electrolytes has been replaced, will continue be more aggressive on replacement by mouth, will continue to hydrate patient, continue with Zosyn, renal dose.  Patient does need to have stent removed by urology, most likely in operating room with some mild sedation, she does need to have nephrostomy tube placed or another J-tube based on urologist discretion, at this point we will continue with treatment for catheter related urinary tract infection, monitoring renal function test, if kidney function test worsen patient needs to be transfer for urology consultation.    Dr. Ewelina Love MD  Internal Medicine        [1]   Past Medical History:  Diagnosis Date    Abdominal pain 07/24/2023    Abnormal electrocardiogram (ECG) (EKG) 12/16/2015    Abnormal ECG    KARLY (acute kidney injury) 09/19/2024    Anxiety     Anxiety disorder, unspecified 08/31/2015    Anxiety    Asthma     Complicated UTI (urinary tract infection) 07/24/2023    COPD (chronic obstructive pulmonary disease) (Multi)     Delayed emergence from general anesthesia     Disease of thyroid gland     Encounter for preprocedural cardiovascular examination 10/25/2015    Pre-operative cardiovascular examination    Flank pain 07/24/2023    Hypertension     Hypomagnesemia 10/14/2015    Hypomagnesemia    Personal history of other  diseases of the circulatory system 08/31/2015    History of chronic ischemic heart disease    Personal history of other diseases of the female genital tract 10/28/2015    History of ovarian cyst    Personal history of other diseases of urinary system 01/04/2016    History of hematuria    Personal history of other diseases of urinary system 10/16/2015    History of kidney disease    Personal history of other specified conditions 10/16/2015    History of pelvic mass    Personal history of other specified conditions 09/30/2015    History of fatigue    Personal history of pneumonia (recurrent) 02/26/2019    History of pneumonia    Right hip pain 07/24/2023    Strain of muscle, fascia and tendon of abdomen, initial encounter 09/17/2015    Abdominal muscle strain   [2]   Past Surgical History:  Procedure Laterality Date    APPENDECTOMY  08/31/2015    Appendectomy    FOOT SURGERY  08/31/2015    Foot Surgery    ORIF ANKLE FRACTURE     [3]   Family History  Problem Relation Name Age of Onset    No Known Problems Mother      No Known Problems Father      Breast cancer Sister     [4] amLODIPine, 10 mg, oral, Daily  budesonide, 0.5 mg, nebulization, BID   And  formoterol, 20 mcg, nebulization, BID  ezetimibe, 10 mg, oral, Daily  fluticasone, 2 spray, Each Nostril, Daily  levothyroxine, 25 mcg, oral, Daily  metoprolol tartrate, 25 mg, oral, BID  pantoprazole, 40 mg, oral, Daily before breakfast  piperacillin-tazobactam, 2.25 g, intravenous, q6h  pneumoc 20-elida conj-dip cr(PF), 0.5 mL, intramuscular, During hospitalization  potassium chloride CR, 40 mEq, oral, TID  [5] potassium chloride in 0.9%NaCl, 100 mL/hr, Last Rate: 100 mL/hr (07/06/25 0852)  sodium chloride 0.9%, 10 mL/hr  [6] PRN medications: acetaminophen, alum-mag hydroxide-simeth, benzocaine-menthol, loperamide, melatonin, ondansetron, polyethylene glycol, sodium chloride 0.9%

## 2025-07-06 NOTE — CARE PLAN
"The patient's goals for the shift include  \"treat infection and low Potassium\"    The clinical goals for the shift include pt to tolerate IVF/ Potassium replacement; telemetry will remain NSR this shift    PO Potassium replaced today; IVF changed now NS @ 125.  Cdiff/ stool pathogens negative, PRN Immodium given.  Pleasant with staff.  IVF/ IVATB tolerated  Problem: Chronic Conditions and Co-morbidities  Goal: Patient's chronic conditions and co-morbidity symptoms are monitored and maintained or improved  Outcome: Progressing  Flowsheets (Taken 7/6/2025 1727)  Care Plan - Patient's Chronic Conditions and Co-Morbidity Symptoms are Monitored and Maintained or Improved: Monitor and assess patient's chronic conditions and comorbid symptoms for stability, deterioration, or improvement     Problem: Discharge Planning  Goal: Discharge to home or other facility with appropriate resources  Outcome: Progressing  Flowsheets (Taken 7/6/2025 1727)  Discharge to home or other facility with appropriate resources: Identify barriers to discharge with patient and caregiver    well.  AM K - 3.0; repeat @ noon 3.7      "

## 2025-07-06 NOTE — CONSULTS
"  Patients name: Hortensia Columbus  Room # 216  Do you have any home inhalers? Yes, Symbicort and Albuterol     Do you get relief when using it? Pt states she does      Spacer education and have them teach back. Pt was given spacer and instructed during last COPD consult on 9/19/24  If using home inhaler do you rinse your mouth? Yes  Any barriers? No  When were you diagnosed with COPD? Pt does not recall but states it was \"a couple years ago\"  Any previous PFTs? Pt does not know   If so, when? N/a   explain the importance of a PFT. Importance of PFT explained.  Do you have a pulmonary Dr.? Pt does not believe so, and does not seem to be one when looked through pt chart   Name: N/a  Phone number: N/a  Date of last appt: N/a  Pulmonary cards given. Pulmonary cards given, and RT encouraged the importance of having a pulmonary Dr. As was encouraged on last COPD eval on 9/19/24  Do you currently smoke or vape or have you ever? Yes, pt currently smokes    Quit date or planning to quit? Pt states it is a goal to quit but does not have a date set   How long have you smoked for? 37 Years   PPD? Pt states she is down to less than .5ppd    Smoking education given and class information given. Smoking education and class information given in packet.  Get orders for nicotine supplement. Pt does not want nicotine supplement.  Do you have a Primary Dr.? Yes  Name: Dr. Peter  Phone number: [phone]  Date of last appt: 6/10/25  Do you have any home O2 or CPAP/BiPAP?  Pt states she has neither.  Settings for CPAP/BiPAP: N/a  What company? N/a               How much O2 at home? N/a  Last time 6mwt was done? Pt does not know   Educate on acceptable O2 levels and the importance of monitoring O2 at home. Complete home O2 evaluation if needed. Pt educated on acceptable O2 levels and the importance of monitoring O2 at home. Home O2 eval will be completed upon discharge if needed.      Kim Aguila, RRT    "

## 2025-07-06 NOTE — CARE PLAN
The patient's goals for the shift include      The clinical goals for the shift include nausea will be under control    Pt rested in bed through remainder of shift after admission. Pt did ambulate to bathroom with a steady gait, had several bowel movements loose and musousy, pt placed on cantact plus isolation precautions and a stool specimen was obtained and sent to lab for c-deiff and pathogen testing. Pt denies any further nausea, tolerated iv antibiotic infusions and continuous saline with KCL infusion, did have a potassium level of 3.0 and dr. Chavira was notified and ordered po potassium replacement 20 merrick.

## 2025-07-07 VITALS
DIASTOLIC BLOOD PRESSURE: 66 MMHG | RESPIRATION RATE: 18 BRPM | BODY MASS INDEX: 23.3 KG/M2 | OXYGEN SATURATION: 96 % | SYSTOLIC BLOOD PRESSURE: 107 MMHG | HEART RATE: 75 BPM | HEIGHT: 66 IN | WEIGHT: 145 LBS | TEMPERATURE: 97.9 F

## 2025-07-07 PROBLEM — E87.1 HYPONATREMIA: Status: ACTIVE | Noted: 2025-07-07

## 2025-07-07 LAB
ANION GAP SERPL CALC-SCNC: 10 MMOL/L (ref 10–20)
BUN SERPL-MCNC: 25 MG/DL (ref 6–23)
CALCIUM SERPL-MCNC: 8.1 MG/DL (ref 8.6–10.3)
CHLORIDE SERPL-SCNC: 108 MMOL/L (ref 98–107)
CO2 SERPL-SCNC: 22 MMOL/L (ref 21–32)
CREAT SERPL-MCNC: 1.24 MG/DL (ref 0.5–1.05)
EGFRCR SERPLBLD CKD-EPI 2021: 50 ML/MIN/1.73M*2
ERYTHROCYTE [DISTWIDTH] IN BLOOD BY AUTOMATED COUNT: 16.3 % (ref 11.5–14.5)
GLUCOSE SERPL-MCNC: 81 MG/DL (ref 74–99)
HCT VFR BLD AUTO: 37.9 % (ref 36–46)
HGB BLD-MCNC: 12.7 G/DL (ref 12–16)
MAGNESIUM SERPL-MCNC: 1.72 MG/DL (ref 1.6–2.4)
MCH RBC QN AUTO: 31.7 PG (ref 26–34)
MCHC RBC AUTO-ENTMCNC: 33.5 G/DL (ref 32–36)
MCV RBC AUTO: 95 FL (ref 80–100)
NRBC BLD-RTO: 0 /100 WBCS (ref 0–0)
PLATELET # BLD AUTO: 309 X10*3/UL (ref 150–450)
POTASSIUM SERPL-SCNC: 4.1 MMOL/L (ref 3.5–5.3)
RBC # BLD AUTO: 4.01 X10*6/UL (ref 4–5.2)
SODIUM SERPL-SCNC: 136 MMOL/L (ref 136–145)
WBC # BLD AUTO: 8.6 X10*3/UL (ref 4.4–11.3)

## 2025-07-07 PROCEDURE — 83735 ASSAY OF MAGNESIUM: CPT | Mod: IPSPLIT | Performed by: NURSE PRACTITIONER

## 2025-07-07 PROCEDURE — 85027 COMPLETE CBC AUTOMATED: CPT | Mod: IPSPLIT | Performed by: NURSE PRACTITIONER

## 2025-07-07 PROCEDURE — 2500000001 HC RX 250 WO HCPCS SELF ADMINISTERED DRUGS (ALT 637 FOR MEDICARE OP): Mod: SE,IPSPLIT | Performed by: HOSPITALIST

## 2025-07-07 PROCEDURE — 1200000002 HC GENERAL ROOM WITH TELEMETRY DAILY: Mod: IPSPLIT

## 2025-07-07 PROCEDURE — 2500000004 HC RX 250 GENERAL PHARMACY W/ HCPCS (ALT 636 FOR OP/ED): Mod: SE,IPSPLIT | Performed by: NURSE PRACTITIONER

## 2025-07-07 PROCEDURE — 94640 AIRWAY INHALATION TREATMENT: CPT | Mod: IPSPLIT

## 2025-07-07 PROCEDURE — 2500000002 HC RX 250 W HCPCS SELF ADMINISTERED DRUGS (ALT 637 FOR MEDICARE OP, ALT 636 FOR OP/ED): Mod: SE,IPSPLIT | Performed by: NURSE PRACTITIONER

## 2025-07-07 PROCEDURE — 80048 BASIC METABOLIC PNL TOTAL CA: CPT | Mod: IPSPLIT | Performed by: NURSE PRACTITIONER

## 2025-07-07 PROCEDURE — 36415 COLL VENOUS BLD VENIPUNCTURE: CPT | Mod: IPSPLIT | Performed by: NURSE PRACTITIONER

## 2025-07-07 PROCEDURE — 99233 SBSQ HOSP IP/OBS HIGH 50: CPT | Performed by: NURSE PRACTITIONER

## 2025-07-07 PROCEDURE — 2500000002 HC RX 250 W HCPCS SELF ADMINISTERED DRUGS (ALT 637 FOR MEDICARE OP, ALT 636 FOR OP/ED): Mod: SE,IPSPLIT | Performed by: HOSPITALIST

## 2025-07-07 PROCEDURE — 2500000001 HC RX 250 WO HCPCS SELF ADMINISTERED DRUGS (ALT 637 FOR MEDICARE OP): Mod: SE,IPSPLIT | Performed by: NURSE PRACTITIONER

## 2025-07-07 PROCEDURE — S4991 NICOTINE PATCH NONLEGEND: HCPCS | Mod: SE,IPSPLIT | Performed by: NURSE PRACTITIONER

## 2025-07-07 PROCEDURE — 94664 DEMO&/EVAL PT USE INHALER: CPT | Mod: IPSPLIT

## 2025-07-07 PROCEDURE — 94760 N-INVAS EAR/PLS OXIMETRY 1: CPT | Mod: IPSPLIT

## 2025-07-07 RX ORDER — LOPERAMIDE HYDROCHLORIDE 2 MG/1
4 CAPSULE ORAL
Status: DISCONTINUED | OUTPATIENT
Start: 2025-07-07 | End: 2025-07-09

## 2025-07-07 RX ORDER — DIAZEPAM 5 MG/1
5 TABLET ORAL EVERY 8 HOURS PRN
Status: DISCONTINUED | OUTPATIENT
Start: 2025-07-07 | End: 2025-07-14 | Stop reason: HOSPADM

## 2025-07-07 RX ORDER — DOXYLAMINE SUCCINATE 25 MG
TABLET ORAL 2 TIMES DAILY
Status: DISCONTINUED | OUTPATIENT
Start: 2025-07-07 | End: 2025-07-14 | Stop reason: HOSPADM

## 2025-07-07 RX ORDER — IBUPROFEN 200 MG
1 TABLET ORAL DAILY
Status: DISCONTINUED | OUTPATIENT
Start: 2025-07-07 | End: 2025-07-14 | Stop reason: HOSPADM

## 2025-07-07 RX ORDER — PHENAZOPYRIDINE HYDROCHLORIDE 100 MG/1
95 TABLET, FILM COATED ORAL 2 TIMES DAILY
Status: COMPLETED | OUTPATIENT
Start: 2025-07-07 | End: 2025-07-08

## 2025-07-07 RX ADMIN — DIAZEPAM 5 MG: 5 TABLET ORAL at 15:26

## 2025-07-07 RX ADMIN — PIPERACILLIN SODIUM AND TAZOBACTAM SODIUM 2.25 G: 2; .25 INJECTION, POWDER, LYOPHILIZED, FOR SOLUTION INTRAVENOUS at 04:02

## 2025-07-07 RX ADMIN — BUDESONIDE 0.5 MG: 0.5 INHALANT RESPIRATORY (INHALATION) at 10:00

## 2025-07-07 RX ADMIN — ACETAMINOPHEN 650 MG: 325 TABLET, FILM COATED ORAL at 17:54

## 2025-07-07 RX ADMIN — LOPERAMIDE HYDROCHLORIDE 4 MG: 2 CAPSULE ORAL at 21:52

## 2025-07-07 RX ADMIN — SODIUM CHLORIDE 125 ML/HR: 9 INJECTION, SOLUTION INTRAVENOUS at 06:51

## 2025-07-07 RX ADMIN — PANTOPRAZOLE SODIUM 40 MG: 40 TABLET, DELAYED RELEASE ORAL at 06:52

## 2025-07-07 RX ADMIN — FORMOTEROL FUMARATE DIHYDRATE 20 MCG: 20 SOLUTION RESPIRATORY (INHALATION) at 10:01

## 2025-07-07 RX ADMIN — PHENAZOPYRIDINE 100 MG: 100 TABLET ORAL at 21:52

## 2025-07-07 RX ADMIN — PIPERACILLIN SODIUM AND TAZOBACTAM SODIUM 3.38 G: 3; .375 INJECTION, SOLUTION INTRAVENOUS at 21:52

## 2025-07-07 RX ADMIN — PIPERACILLIN SODIUM AND TAZOBACTAM SODIUM 3.38 G: 3; .375 INJECTION, SOLUTION INTRAVENOUS at 17:58

## 2025-07-07 RX ADMIN — SODIUM CHLORIDE 125 ML/HR: 9 INJECTION, SOLUTION INTRAVENOUS at 02:17

## 2025-07-07 RX ADMIN — LOPERAMIDE HYDROCHLORIDE 4 MG: 2 CAPSULE ORAL at 17:55

## 2025-07-07 RX ADMIN — PIPERACILLIN SODIUM AND TAZOBACTAM SODIUM 3.38 G: 3; .375 INJECTION, SOLUTION INTRAVENOUS at 10:34

## 2025-07-07 RX ADMIN — METOPROLOL TARTRATE 25 MG: 25 TABLET, FILM COATED ORAL at 08:33

## 2025-07-07 RX ADMIN — FORMOTEROL FUMARATE DIHYDRATE 20 MCG: 20 SOLUTION RESPIRATORY (INHALATION) at 21:37

## 2025-07-07 RX ADMIN — FLUTICASONE PROPIONATE 2 SPRAY: 50 SPRAY, METERED NASAL at 08:36

## 2025-07-07 RX ADMIN — EZETIMIBE 10 MG: 10 TABLET ORAL at 08:33

## 2025-07-07 RX ADMIN — LEVOTHYROXINE SODIUM 25 MCG: 25 TABLET ORAL at 08:33

## 2025-07-07 RX ADMIN — AMLODIPINE BESYLATE 10 MG: 10 TABLET ORAL at 08:33

## 2025-07-07 RX ADMIN — BUDESONIDE 0.5 MG: 0.5 INHALANT RESPIRATORY (INHALATION) at 21:37

## 2025-07-07 RX ADMIN — ACETAMINOPHEN 650 MG: 325 TABLET, FILM COATED ORAL at 08:33

## 2025-07-07 RX ADMIN — PHENAZOPYRIDINE 100 MG: 100 TABLET ORAL at 15:26

## 2025-07-07 ASSESSMENT — COGNITIVE AND FUNCTIONAL STATUS - GENERAL
MOBILITY SCORE: 22
DAILY ACTIVITIY SCORE: 22
WALKING IN HOSPITAL ROOM: A LITTLE
TOILETING: A LITTLE
HELP NEEDED FOR BATHING: A LITTLE
CLIMB 3 TO 5 STEPS WITH RAILING: A LITTLE

## 2025-07-07 ASSESSMENT — ACTIVITIES OF DAILY LIVING (ADL): LACK_OF_TRANSPORTATION: NO

## 2025-07-07 ASSESSMENT — PAIN DESCRIPTION - LOCATION: LOCATION: HEAD

## 2025-07-07 ASSESSMENT — PAIN SCALES - GENERAL
PAINLEVEL_OUTOF10: 0 - NO PAIN
PAINLEVEL_OUTOF10: 3
PAINLEVEL_OUTOF10: 0 - NO PAIN
PAINLEVEL_OUTOF10: 3

## 2025-07-07 ASSESSMENT — PAIN - FUNCTIONAL ASSESSMENT
PAIN_FUNCTIONAL_ASSESSMENT: 0-10

## 2025-07-07 ASSESSMENT — PAIN DESCRIPTION - DESCRIPTORS: DESCRIPTORS: ACHING

## 2025-07-07 NOTE — PROGRESS NOTES
Pharmacy Medication History Review    Hortensia Rodriguez is a 59 y.o. female admitted for Pyelonephritis. Pharmacy reviewed the patient's [id number] medications and allergies for accuracy.    Sources used to confirm medication list: Informant interview and Medication Dispense History  Informant: Patient  Informant credibility: Good (Able to recall medication, indication, strength, frequency, and/or prescriber for >75% of medications).    Total number of adjustments: 0     The list below reflectives the updated PTA list. Please review each medication in order reconciliation for additional clarification and justification.  Medications Prior to Admission   Medication Sig Dispense Refill Last Dose/Taking    amLODIPine (Norvasc) 10 mg tablet Take 1 tablet (10 mg) by mouth once daily. 30 tablet 0 Past Week    ezetimibe (Zetia) 10 mg tablet Take 1 tablet (10 mg) by mouth once daily. 30 tablet 0 Past Week    fluticasone (Flonase) 50 mcg/actuation nasal spray Administer 2 sprays into each nostril once daily. Shake gently. Before first use, prime pump. After use, clean tip and replace cap. 16 g 12 Past Week    levothyroxine (Synthroid, Levoxyl) 25 mcg tablet Take 1 tablet (25 mcg) by mouth once daily. 30 tablet 0 Past Week    metoprolol tartrate (Lopressor) 25 mg tablet Take 1 tablet (25 mg) by mouth 2 times a day. 60 tablet 0 Past Week    pantoprazole (ProtoNix) 40 mg EC tablet Take 1 tablet (40 mg) by mouth once daily in the morning. Take before meals. Do not crush, chew, or split. 30 tablet 0 Past Week    acetaminophen (Tylenol) 325 mg tablet Take 2 tablets (650 mg) by mouth every 6 hours if needed for mild pain (1 - 3) or moderate pain (4 - 6). 30 tablet 0 Unknown    albuterol (ProAir HFA) 90 mcg/actuation inhaler Inhale 1 puff every 6 hours if needed for wheezing. 18 g 1 Unknown    diaper,brief,adult,disposable (Select Disposable Briefs) misc Disposable pull up underwear. Uses approximately 4 per day.  Size  medium 120 each 2 Unknown    loperamide (Imodium) 2 mg capsule Take 1 capsule (2 mg) by mouth 4 times a day as needed for diarrhea. 30 capsule 0     melatonin 3 mg tablet Take 2 tablets (6 mg) by mouth once daily at bedtime. 60 tablet 0 Unknown    Symbicort 160-4.5 mcg/actuation inhaler Inhale 2 puffs 2 times a day. 10.2 g 0 Unknown        The list below reflectives the updated allergy list. Please review each documented allergy for additional clarification and justification.  Allergies  Reviewed by Polly Solis RN on 7/7/2025        Severity Reactions Comments    Ace Inhibitors Not Specified Other     Lisinopril Not Specified Other             Below are additional concerns with the patient's PTA list.  Spoke w/pt at bedside. Reviewed PTA and allergy list. Please review changes made to the PTA list in the chart above. Pt reports that she just ran out of all of her medications on Thursday. LF for almost all of her medications was 05/16/2025 for 30 DS. Pt reported that she will need refills on all of her medications prior to discharge. She stated that her  was placed on Hospice in May and she forgot to take care of herself.    Jennifer Mcclelland CPhT  Medication History Pharmacy Technician  NED 8-4:30  Available via [employer] Secure Chat  OR  [phone]

## 2025-07-07 NOTE — CARE PLAN
The patient's goals for the shift include      The clinical goals for the shift include pt will tolerate iv infusions, will report decreased loose stools, will maintain potassium levels in am labs.    Pt tolerating iv antibiotic infusions, has had incontinence of bowel, clear watery and thick mucous  several times. Pt is able to ambulate to bathroom and can clean self up. Pt also reports and is visualised some blooy shreds in mucus. Pt states that dr. Peter has referred her to oncology for the rectal adenocarcinoma but treatment has not been scheduled as of yet. Directed pt to talk with dr. Peter at rounds

## 2025-07-07 NOTE — DISCHARGE INSTR - OTHER ORDERS
211 is a free community service that provides information about social health and government resources 24 hours a day.  It provides Human services agencies, food and shelter providers,  resources, special services for senior and volunteer opportunities.  [email or web address]      Thank you for choosing [facility] for your Health Care needs.  Also, thank you for allowing us to take you and your families preferences into account when determining your discharge plan.  Stay well!     You may receive a survey in the mail within the next couple weeks. Please take the time to complete it and return it. Your input is ALWAYS important to us. Thank you!  Your Care Transition Team - Roseanne Kilpatrick  & Concepcion      For questions about your medications listed on your discharge instructions, please call the Nurses Station at [phone].

## 2025-07-07 NOTE — PROGRESS NOTES
"Hortensia Rodriguez is a 59 y.o. female on day 2 of admission presenting with Pyelonephritis.    Subjective   She is sitting up in bed today eating breakfast, she states she has burning and itching vaginally and with urination, also she is having loose stools and is requesting something for that.  She is adamant about speaking with case management or social work today regarding \"something very important\".  We discussed her lab results and possible discharge tomorrow, all questions answered.     Objective     Physical Exam  Constitutional:       General: She is not in acute distress.     Appearance: Normal appearance. She is not toxic-appearing.   HENT:      Head: Normocephalic and atraumatic.      Mouth/Throat:      Mouth: Mucous membranes are moist.   Eyes:      Extraocular Movements: Extraocular movements intact.      Pupils: Pupils are equal, round, and reactive to light.   Cardiovascular:      Rate and Rhythm: Normal rate and regular rhythm.      Pulses: Normal pulses.      Heart sounds: Normal heart sounds. No murmur heard.     No gallop.   Pulmonary:      Effort: Pulmonary effort is normal. No respiratory distress.      Breath sounds: Normal breath sounds. No wheezing, rhonchi or rales.   Abdominal:      General: Bowel sounds are normal. There is no distension.      Palpations: Abdomen is soft.      Tenderness: There is no abdominal tenderness. There is no guarding or rebound.   Genitourinary:     Comments: Vaginal burning, itching  Musculoskeletal:         General: No swelling, tenderness, deformity or signs of injury. Normal range of motion.      Cervical back: Normal range of motion and neck supple.   Skin:     General: Skin is warm and dry.      Capillary Refill: Capillary refill takes less than 2 seconds.      Coloration: Skin is not jaundiced.      Findings: No bruising or rash.   Neurological:      General: No focal deficit present.      Mental Status: She is alert and oriented to person, place, and " "time. Mental status is at baseline.      Cranial Nerves: No cranial nerve deficit.      Sensory: No sensory deficit.      Motor: No weakness.      Gait: Gait normal.   Psychiatric:         Mood and Affect: Mood normal.         Behavior: Behavior normal.         Thought Content: Thought content normal.         Judgment: Judgment normal.       Last Recorded Vitals  Blood pressure 124/73, pulse 71, temperature 36.3 °C (97.3 °F), temperature source Temporal, resp. rate 18, height 1.676 m (5' 6\"), weight 67 kg (147 lb 9.6 oz), SpO2 97%.  Intake/Output last 3 Shifts:  I/O last 3 completed shifts:  In: 4245.4 (63.4 mL/kg) [P.O.:360; I.V.:3635.4 (54.3 mL/kg); IV Piggyback:250]  Out: 350 (5.2 mL/kg) [Urine:350 (0.1 mL/kg/hr)]  Weight: 67 kg     Scheduled medications  Scheduled Medications[1]  Continuous medications  Continuous Medications[2]  PRN medications  PRN Medications[3]    Relevant Results  CT abdomen pelvis w IV contrast  Result Date: 7/5/2025  Distal sigmoid/rectal mass, compatible with the provided history of cancer. Retroperitoneal lymphadenopathy, compatible with metastatic disease, not significantly changed. Left ureteral stent is in place but the proximal and distal pigtail portions of the catheter are encased with large dense calcifications/encrustations. Persistent severe left-sided hydronephrosis. Urothelial enhancement as well as perinephric and periureteral stranding on the left, suggesting infection. Thickening of the walls of the urinary bladder with surrounding inflammation, compatible with cystitis. Numerous additional unchanged findings as above.   MACRO: None.   Signed by: Dean Crocker 7/5/2025 4:15 PM Dictation workstation:   [id number]    CT angio chest for pulmonary embolism  Result Date: 7/5/2025  No evidence of a pulmonary embolism. Stable pulmonary nodules measuring up to 8 mm in size in the left upper lobe. No new or enlarging nodules. Continued surveillance recommended.   MACRO: None.   " Signed by: Dean Crocker 7/5/2025 3:57 PM Dictation workstation:   [id number]     Latest Reference Range & Units 07/05/25 21:28 07/06/25 07:28 07/06/25 12:08 07/07/25 06:17   GLUCOSE 74 - 99 mg/dL 106 (H) 85 92 81   SODIUM 136 - 145 mmol/L 130 (L) 131 (L) 132 (L) 136   POTASSIUM 3.5 - 5.3 mmol/L 3.0 (L) 3.0 (L) 3.7 4.1   CHLORIDE 98 - 107 mmol/L 95 (L) 99 101 108 (H)   Bicarbonate 21 - 32 mmol/L 23 21 22 22   Anion Gap 10 - 20 mmol/L 15 14 13 10   Blood Urea Nitrogen 6 - 23 mg/dL 36 (H) 30 (H) 31 (H) 25 (H)   Creatinine 0.50 - 1.05 mg/dL 1.77 (H) 1.50 (H) 1.47 (H) 1.24 (H)   EGFR >60 mL/min/1.73m*2 32 (L) 39 (L) 40 (L) 50 (L)   Calcium 8.6 - 10.3 mg/dL 8.6 8.3 (L) 8.3 (L) 8.1 (L)   MAGNESIUM 1.60 - 2.40 mg/dL    1.72   WBC 4.4 - 11.3 x10*3/uL  6.7  8.6   nRBC 0.0 - 0.0 /100 WBCs  0.0  0.0   RBC 4.00 - 5.20 x10*6/uL  4.49  4.01   HEMOGLOBIN 12.0 - 16.0 g/dL  14.0  12.7   HEMATOCRIT 36.0 - 46.0 %  41.0  37.9   MCV 80 - 100 fL  91  95   MCH 26.0 - 34.0 pg  31.2  31.7   MCHC 32.0 - 36.0 g/dL  34.1  33.5   RED CELL DISTRIBUTION WIDTH 11.5 - 14.5 %  15.6 (H)  16.3 (H)   Platelets 150 - 450 x10*3/uL  339  309     Assessment & Plan  Pyelonephritis    Hypokalemia    Nausea    Rectal mass    COPD (chronic obstructive pulmonary disease) (Multi)    Depression with anxiety    Diarrhea    Benign essential HTN    Hyponatremia      Pyelonephritis  - Given Vanco x 2 doses in the ER  - Zosyn 2.25 g IV every 6 hrs.   - Blood Cultures in progress  - Bun/creat 30/1.5 > 31/1.47 > 25/1.24  - GRF 32 > 50  - CT a/p:  Distal sigmoid/rectal mass, compatible with the provided history of cancer. Retroperitoneal lymphadenopathy, compatible with metastatic disease, not significantly changed. Left ureteral stent is in place  but the proximal and distal pigtail portions of the catheter are encased with large dense calcifications/ encrustations. Persistent severe left-sided hydronephrosis. Urothelial enhancement as well as perinephric and  periureteral stranding on the left, suggesting infection. Thickening of the walls of the urinary bladder with surrounding inflammation, compatible with cystitis. Numerous additional unchanged findings as above.  - UA: 500 LE, 3+ blood 2+ protein, >50 WBC, >20 RBC  - Lactate 1.8    Hypokalemia   Hyponatremia   - K  3 > 3.7 > 4.1  - replaced with IV/oral multiple times  - Continue to monitor, BMP ordered  - Na 130 > 131 > 132 > 136  - 1L NS ordered at 125 ml/hr continuous  - Continue to monitor, labs ordered    Nausea   Diarrhea   - C-diff and stool pathogen: both negative. Is incontinent of stool, diarrhea today  - Zofran 4 mg IV prn  - imodium prn, encouraged to use it with every meal    Rectal mass    - Patient is not currently being treated. She does have appointments set up with rad onc and med onc     COPD (chronic obstructive pulmonary disease) (Multi)  - CTA/chest: No evidence of a pulmonary embolism. Stable pulmonary nodules measuring up to 8 mm in size in the left upper lobe. No new or enlarging nodules. Continued surveillance recommended.  - continue pulmicort   -     Depression with anxiety  -  is currently on hospice  - monitor    Benign essential HTN  - continue norvasc, metoprolol     GERD  - Protonix 40 mg at bedtime     GI ppx: PPI  DVT ppx: ambulation  Fluids: as ordered  Electrolytes: replace as needed  Nutrition: reg  Adjuncts: PIV  Code Status: full code  Pt requires inpatient stay at this time.    Zoe Monaco, LAN-CNP         [1] amLODIPine, 10 mg, oral, Daily  budesonide, 0.5 mg, nebulization, BID   And  formoterol, 20 mcg, nebulization, BID  ezetimibe, 10 mg, oral, Daily  fluticasone, 2 spray, Each Nostril, Daily  levothyroxine, 25 mcg, oral, Daily  metoprolol tartrate, 25 mg, oral, BID  pantoprazole, 40 mg, oral, Daily before breakfast  piperacillin-tazobactam, 3.375 g, intravenous, q6h  pneumoc 20-elida conj-dip cr(PF), 0.5 mL, intramuscular, During hospitalization     [2]  sodium chloride 0.9%, 10 mL/hr  sodium chloride 0.9%, 125 mL/hr, Last Rate: 125 mL/hr (07/07/25 0651)     [3] PRN medications: acetaminophen, alum-mag hydroxide-simeth, benzocaine-menthol, hydrocortisone, loperamide, melatonin, ondansetron, polyethylene glycol, sodium chloride 0.9%

## 2025-07-07 NOTE — CARE PLAN
"The patient's goals for the shift include  \"stop loose stools\"    The clinical goals for the shift include pt to tolerate IVATB, remain afebrile this shift    IVATB cont per order.  Immodium now scheduled.  Had increased anxiety episode r/t family/ home issues, see notes; PRN Diazepam given.  Refused Nicotine patch.  Had shower.        Problem: Discharge Planning  Goal: Discharge to home or other facility with appropriate resources  Outcome: Progressing     Problem: Chronic Conditions and Co-morbidities  Goal: Patient's chronic conditions and co-morbidity symptoms are monitored and maintained or improved  Outcome: Progressing     "

## 2025-07-07 NOTE — NURSING NOTE
"Assumed care of pt, amb from restroom to chair.  IVATB infusing.  Pt looking forward to shower this afternoon.    1526 pt rang call bell, requesting \"to see nurse now\".  In to talk with pt --  anyone free to talk to 216; high anxiety, just found out her electricity has been turned off. she's demanding a ciggarette attempted explain no smoking policy and we didn't have ciggarettes laying around for pt use. simply not settling down.   New orders rec'd: PO Diazepam given, refused Nicotine patch  Cont to monitor    1609 shower  "

## 2025-07-07 NOTE — PROGRESS NOTES
07/07/25 1112   Discharge Planning   Living Arrangements Spouse/significant other;Children   Support Systems Children;Family members   Assistance Needed Patient lives in a mobile home with her son and daughter in law.  Her  is with hospice. Patient says she is independent with ADLs, IADLs, ambulation and doesn't drive.  She does not use home oxygen, CPAP/BiPAP or DME.   She is on food stamps and goes to the food bank.  She is with [employer].   She will have transportation  after 4 pm (DIL works/drives)   Type of Residence Private residence   Number of Stairs to Enter Residence 5   Number of Stairs Within Residence 0   Do you have animals or pets at home? No   Home or Post Acute Services None   Expected Discharge Disposition Home   Does the patient need discharge transport arranged? No   Financial Resource Strain   How hard is it for you to pay for the very basics like food, housing, medical care, and heating? Somewhat   Housing Stability   In the last 12 months, was there a time when you were not able to pay the mortgage or rent on time? N   In the past 12 months, how many times have you moved where you were living? 0   At any time in the past 12 months, were you homeless or living in a shelter (including now)? N   Transportation Needs   In the past 12 months, has lack of transportation kept you from medical appointments or from getting medications? no   In the past 12 months, has lack of transportation kept you from meetings, work, or from getting things needed for daily living? No   Stroke Family Assessment   Stroke Family Assessment Needed No   Intensity of Service   Intensity of Service >30 min     Confirmed address and pharmacy.  PCP is .Dr. Ewelina Love.  Patient requesting form sent to [employer] [facility] FAX # [phone]- completed for patient.    DC PLAN:  Home

## 2025-07-08 LAB
ANION GAP SERPL CALC-SCNC: 12 MMOL/L (ref 10–20)
BUN SERPL-MCNC: 21 MG/DL (ref 6–23)
CALCIUM SERPL-MCNC: 8.1 MG/DL (ref 8.6–10.3)
CHLORIDE SERPL-SCNC: 107 MMOL/L (ref 98–107)
CO2 SERPL-SCNC: 23 MMOL/L (ref 21–32)
CREAT SERPL-MCNC: 1.25 MG/DL (ref 0.5–1.05)
EGFRCR SERPLBLD CKD-EPI 2021: 49 ML/MIN/1.73M*2
ERYTHROCYTE [DISTWIDTH] IN BLOOD BY AUTOMATED COUNT: 16.6 % (ref 11.5–14.5)
GLUCOSE SERPL-MCNC: 92 MG/DL (ref 74–99)
HCT VFR BLD AUTO: 36.2 % (ref 36–46)
HGB BLD-MCNC: 11.8 G/DL (ref 12–16)
MCH RBC QN AUTO: 31 PG (ref 26–34)
MCHC RBC AUTO-ENTMCNC: 32.6 G/DL (ref 32–36)
MCV RBC AUTO: 95 FL (ref 80–100)
NRBC BLD-RTO: 0 /100 WBCS (ref 0–0)
PLATELET # BLD AUTO: 306 X10*3/UL (ref 150–450)
POTASSIUM SERPL-SCNC: 4.3 MMOL/L (ref 3.5–5.3)
RBC # BLD AUTO: 3.81 X10*6/UL (ref 4–5.2)
SODIUM SERPL-SCNC: 138 MMOL/L (ref 136–145)
WBC # BLD AUTO: 6.3 X10*3/UL (ref 4.4–11.3)

## 2025-07-08 PROCEDURE — 85027 COMPLETE CBC AUTOMATED: CPT | Mod: IPSPLIT | Performed by: NURSE PRACTITIONER

## 2025-07-08 PROCEDURE — 36415 COLL VENOUS BLD VENIPUNCTURE: CPT | Mod: IPSPLIT | Performed by: NURSE PRACTITIONER

## 2025-07-08 PROCEDURE — 2500000001 HC RX 250 WO HCPCS SELF ADMINISTERED DRUGS (ALT 637 FOR MEDICARE OP): Mod: SE,IPSPLIT | Performed by: HOSPITALIST

## 2025-07-08 PROCEDURE — 1200000002 HC GENERAL ROOM WITH TELEMETRY DAILY: Mod: IPSPLIT

## 2025-07-08 PROCEDURE — 94640 AIRWAY INHALATION TREATMENT: CPT | Mod: IPSPLIT

## 2025-07-08 PROCEDURE — 2500000004 HC RX 250 GENERAL PHARMACY W/ HCPCS (ALT 636 FOR OP/ED): Mod: SE,IPSPLIT | Performed by: INTERNAL MEDICINE

## 2025-07-08 PROCEDURE — 94760 N-INVAS EAR/PLS OXIMETRY 1: CPT | Mod: IPSPLIT

## 2025-07-08 PROCEDURE — 80048 BASIC METABOLIC PNL TOTAL CA: CPT | Mod: IPSPLIT | Performed by: NURSE PRACTITIONER

## 2025-07-08 PROCEDURE — 99233 SBSQ HOSP IP/OBS HIGH 50: CPT | Performed by: NURSE PRACTITIONER

## 2025-07-08 PROCEDURE — 2500000001 HC RX 250 WO HCPCS SELF ADMINISTERED DRUGS (ALT 637 FOR MEDICARE OP): Mod: SE,IPSPLIT | Performed by: NURSE PRACTITIONER

## 2025-07-08 PROCEDURE — 2500000004 HC RX 250 GENERAL PHARMACY W/ HCPCS (ALT 636 FOR OP/ED): Mod: SE,IPSPLIT | Performed by: NURSE PRACTITIONER

## 2025-07-08 PROCEDURE — 2500000002 HC RX 250 W HCPCS SELF ADMINISTERED DRUGS (ALT 637 FOR MEDICARE OP, ALT 636 FOR OP/ED): Mod: SE,IPSPLIT | Performed by: HOSPITALIST

## 2025-07-08 RX ORDER — CIPROFLOXACIN 500 MG/1
500 TABLET, FILM COATED ORAL EVERY 12 HOURS SCHEDULED
Status: DISCONTINUED | OUTPATIENT
Start: 2025-07-08 | End: 2025-07-11

## 2025-07-08 RX ADMIN — BUDESONIDE 0.5 MG: 0.5 INHALANT RESPIRATORY (INHALATION) at 21:18

## 2025-07-08 RX ADMIN — METOPROLOL TARTRATE 25 MG: 25 TABLET, FILM COATED ORAL at 20:59

## 2025-07-08 RX ADMIN — PANTOPRAZOLE SODIUM 40 MG: 40 TABLET, DELAYED RELEASE ORAL at 06:09

## 2025-07-08 RX ADMIN — PIPERACILLIN SODIUM AND TAZOBACTAM SODIUM 3.38 G: 3; .375 INJECTION, SOLUTION INTRAVENOUS at 04:03

## 2025-07-08 RX ADMIN — LOPERAMIDE HYDROCHLORIDE 4 MG: 2 CAPSULE ORAL at 06:09

## 2025-07-08 RX ADMIN — FORMOTEROL FUMARATE DIHYDRATE 20 MCG: 20 SOLUTION RESPIRATORY (INHALATION) at 09:25

## 2025-07-08 RX ADMIN — FORMOTEROL FUMARATE DIHYDRATE 20 MCG: 20 SOLUTION RESPIRATORY (INHALATION) at 21:18

## 2025-07-08 RX ADMIN — METOPROLOL TARTRATE 25 MG: 25 TABLET, FILM COATED ORAL at 08:32

## 2025-07-08 RX ADMIN — BUDESONIDE 0.5 MG: 0.5 INHALANT RESPIRATORY (INHALATION) at 09:25

## 2025-07-08 RX ADMIN — PIPERACILLIN SODIUM AND TAZOBACTAM SODIUM 3.38 G: 3; .375 INJECTION, SOLUTION INTRAVENOUS at 10:43

## 2025-07-08 RX ADMIN — SODIUM CHLORIDE 10 ML/HR: 0.9 INJECTION, SOLUTION INTRAVENOUS at 04:04

## 2025-07-08 RX ADMIN — PHENAZOPYRIDINE 100 MG: 100 TABLET ORAL at 20:59

## 2025-07-08 RX ADMIN — AMLODIPINE BESYLATE 10 MG: 10 TABLET ORAL at 08:32

## 2025-07-08 RX ADMIN — CIPROFLOXACIN 500 MG: 500 TABLET ORAL at 16:43

## 2025-07-08 RX ADMIN — LOPERAMIDE HYDROCHLORIDE 4 MG: 2 CAPSULE ORAL at 10:43

## 2025-07-08 RX ADMIN — LEVOTHYROXINE SODIUM 25 MCG: 25 TABLET ORAL at 08:31

## 2025-07-08 RX ADMIN — FLUTICASONE PROPIONATE 2 SPRAY: 50 SPRAY, METERED NASAL at 08:32

## 2025-07-08 RX ADMIN — PHENAZOPYRIDINE 100 MG: 100 TABLET ORAL at 08:31

## 2025-07-08 RX ADMIN — EZETIMIBE 10 MG: 10 TABLET ORAL at 08:32

## 2025-07-08 ASSESSMENT — PAIN SCALES - GENERAL
PAINLEVEL_OUTOF10: 0 - NO PAIN
PAINLEVEL_OUTOF10: 0 - NO PAIN

## 2025-07-08 ASSESSMENT — COGNITIVE AND FUNCTIONAL STATUS - GENERAL
HELP NEEDED FOR BATHING: A LITTLE
TOILETING: A LITTLE
WALKING IN HOSPITAL ROOM: A LITTLE
MOBILITY SCORE: 22
CLIMB 3 TO 5 STEPS WITH RAILING: A LITTLE
DAILY ACTIVITIY SCORE: 22

## 2025-07-08 ASSESSMENT — PAIN - FUNCTIONAL ASSESSMENT
PAIN_FUNCTIONAL_ASSESSMENT: 0-10
PAIN_FUNCTIONAL_ASSESSMENT: 0-10

## 2025-07-08 NOTE — NURSING NOTE
Assumed care of patient. BSSR done, call light within reach, bed in lowest position, patient up ambulating to bathroom, will cont to monitor and medicate per MD orders.

## 2025-07-08 NOTE — NURSING NOTE
Assumed care of patient. BSSR done, call light within reach, bed in lowest position, alarm on, will cont to monitor and medicate per MD orders.

## 2025-07-08 NOTE — CARE PLAN
The patient's goals for the shift include      The clinical goals for the shift include Have a decrease in loose stools this shift    Over the shift, the patient did not make progress toward the following goals. Patient has 4-5 episodes of loose stools this shift. Patient now takes Imodium as per orders.

## 2025-07-08 NOTE — CARE PLAN
The patient's goals for the shift include  decrease in stools    The clinical goals for the shift include pt will tolerate IV ATB and remain afebrile    Over the shift, the patient did make progress toward the following goals. Tolerated IV ATB and remained afebrile. Pt continued to have loose stools. Refused imodium x1 dose. No c/o anxiety this shift.       Problem: Discharge Planning  Goal: Discharge to home or other facility with appropriate resources  Outcome: Progressing     Problem: Chronic Conditions and Co-morbidities  Goal: Patient's chronic conditions and co-morbidity symptoms are monitored and maintained or improved  Outcome: Progressing     Problem: Nutrition  Goal: Nutrient intake appropriate for maintaining nutritional needs  Outcome: Progressing

## 2025-07-08 NOTE — PROGRESS NOTES
Hortensia Rodriguez is a 59 y.o. female on day 3 of admission presenting with Pyelonephritis.    Subjective   She is eating breakfast this morning, alert and oriented.  She is upset about her daughter-in-law and her electricity being turned off.  We discussed using Imodium to slow down her loose stools and likely discharge tomorrow if she continues to improve.  Will continue to monitor.     Objective     Physical Exam  Constitutional:       General: She is not in acute distress.     Appearance: Normal appearance. She is not toxic-appearing.   HENT:      Head: Normocephalic and atraumatic.      Mouth/Throat:      Mouth: Mucous membranes are moist.   Eyes:      Extraocular Movements: Extraocular movements intact.      Pupils: Pupils are equal, round, and reactive to light.   Cardiovascular:      Rate and Rhythm: Normal rate and regular rhythm.      Pulses: Normal pulses.      Heart sounds: Normal heart sounds. No murmur heard.     No gallop.   Pulmonary:      Effort: Pulmonary effort is normal. No respiratory distress.      Breath sounds: Normal breath sounds. No wheezing, rhonchi or rales.   Abdominal:      General: Bowel sounds are normal. There is no distension.      Palpations: Abdomen is soft.      Tenderness: There is no abdominal tenderness. There is no guarding or rebound.   Genitourinary:     Comments: Vaginal burning, itching  Musculoskeletal:         General: No swelling, tenderness, deformity or signs of injury. Normal range of motion.      Cervical back: Normal range of motion and neck supple.   Skin:     General: Skin is warm and dry.      Capillary Refill: Capillary refill takes less than 2 seconds.      Coloration: Skin is not jaundiced.      Findings: No bruising or rash.   Neurological:      General: No focal deficit present.      Mental Status: She is alert and oriented to person, place, and time. Mental status is at baseline.      Cranial Nerves: No cranial nerve deficit.      Sensory: No sensory  "deficit.      Motor: No weakness.      Gait: Gait normal.   Psychiatric:         Mood and Affect: Mood normal.         Behavior: Behavior normal.         Thought Content: Thought content normal.         Judgment: Judgment normal.       Last Recorded Vitals  Blood pressure 115/72, pulse 71, temperature 36.2 °C (97.2 °F), temperature source Temporal, resp. rate 18, height 1.676 m (5' 6\"), weight 67 kg (147 lb 9.6 oz), SpO2 96%.  Intake/Output last 3 Shifts:  I/O last 3 completed shifts:  In: 2996.7 (44.8 mL/kg) [P.O.:480; I.V.:2216.7 (33.1 mL/kg); IV Piggyback:300]  Out: 150 (2.2 mL/kg) [Urine:150 (0.1 mL/kg/hr)]  Weight: 67 kg     Scheduled medications  Scheduled Medications[1]  Continuous medications  Continuous Medications[2]  PRN medications  PRN Medications[3]    Relevant Results  CT abdomen pelvis w IV contrast  Result Date: 7/5/2025  Distal sigmoid/rectal mass, compatible with the provided history of cancer. Retroperitoneal lymphadenopathy, compatible with metastatic disease, not significantly changed. Left ureteral stent is in place but the proximal and distal pigtail portions of the catheter are encased with large dense calcifications/encrustations. Persistent severe left-sided hydronephrosis. Urothelial enhancement as well as perinephric and periureteral stranding on the left, suggesting infection. Thickening of the walls of the urinary bladder with surrounding inflammation, compatible with cystitis. Numerous additional unchanged findings as above.   MACRO: None.   Signed by: Dean Crocker 7/5/2025 4:15 PM Dictation workstation:   [id number]    CT angio chest for pulmonary embolism  Result Date: 7/5/2025  No evidence of a pulmonary embolism. Stable pulmonary nodules measuring up to 8 mm in size in the left upper lobe. No new or enlarging nodules. Continued surveillance recommended.   MACRO: None.   Signed by: Dean Crocker 7/5/2025 3:57 PM Dictation workstation:   [id number]     Latest Reference Range & Units " 07/06/25 07:28 07/06/25 12:08 07/07/25 06:17 07/08/25 06:23   GLUCOSE 74 - 99 mg/dL 85 92 81 92   SODIUM 136 - 145 mmol/L 131 (L) 132 (L) 136 138   POTASSIUM 3.5 - 5.3 mmol/L 3.0 (L) 3.7 4.1 4.3   CHLORIDE 98 - 107 mmol/L 99 101 108 (H) 107   Bicarbonate 21 - 32 mmol/L 21 22 22 23   Anion Gap 10 - 20 mmol/L 14 13 10 12   Blood Urea Nitrogen 6 - 23 mg/dL 30 (H) 31 (H) 25 (H) 21   Creatinine 0.50 - 1.05 mg/dL 1.50 (H) 1.47 (H) 1.24 (H) 1.25 (H)   EGFR >60 mL/min/1.73m*2 39 (L) 40 (L) 50 (L) 49 (L)   Calcium 8.6 - 10.3 mg/dL 8.3 (L) 8.3 (L) 8.1 (L) 8.1 (L)   MAGNESIUM 1.60 - 2.40 mg/dL   1.72    WBC 4.4 - 11.3 x10*3/uL 6.7  8.6 6.3   nRBC 0.0 - 0.0 /100 WBCs 0.0  0.0 0.0   RBC 4.00 - 5.20 x10*6/uL 4.49  4.01 3.81 (L)   HEMOGLOBIN 12.0 - 16.0 g/dL 14.0  12.7 11.8 (L)   HEMATOCRIT 36.0 - 46.0 % 41.0  37.9 36.2   MCV 80 - 100 fL 91  95 95   MCH 26.0 - 34.0 pg 31.2  31.7 31.0   MCHC 32.0 - 36.0 g/dL 34.1  33.5 32.6   RED CELL DISTRIBUTION WIDTH 11.5 - 14.5 % 15.6 (H)  16.3 (H) 16.6 (H)   Platelets 150 - 450 x10*3/uL 339  309 306     Assessment & Plan  Pyelonephritis    Hypokalemia    Nausea    Rectal mass    COPD (chronic obstructive pulmonary disease) (Multi)    Depression with anxiety    Diarrhea    Benign essential HTN    Hyponatremia      Pyelonephritis  - Given Vanco x 2 doses in the ER  - Zosyn 2.25 g IV every 6 hrs.   - Blood Cultures in progress  - Bun/creat 30/1.5 > 31/1.47 > 25/1.24 > 21/1.25  - GRF 32 > 50  - CT a/p:  Distal sigmoid/rectal mass, compatible with the provided history of cancer. Retroperitoneal lymphadenopathy, compatible with metastatic disease, not significantly changed. Left ureteral stent is in place but the proximal and distal pigtail portions of the catheter are encased with large dense calcifications/ encrustations (from 2023). Persistent severe left-sided hydronephrosis. Urothelial enhancement as well as perinephric and periureteral stranding on the left, suggesting infection. Thickening of  the walls of the urinary bladder with surrounding inflammation, compatible with cystitis. Numerous additional unchanged findings as above.  - UA: 500 LE, 3+ blood 2+ protein, >50 WBC, >20 RBC  - Lactate 1.8  - was to f/up with Urology after admission in May for same issue    Hypokalemia   Hyponatremia   - K  3 > 3.7 > 4.1 > 4.3  - replaced with IV/oral multiple times  - Continue to monitor, BMP ordered  - Na 130 > 131 > 132 > 138  - 1L NS ordered at 125 ml/hr continuous, dc'd  - Continue to monitor, labs ordered    Nausea   Diarrhea   - C-diff and stool pathogen: both negative  - incontinent of stool, diarrhea today  - Zofran 4 mg IV prn  - imodium prn, encouraged to use it with every meal    Rectal mass    - Patient is not currently being treated. She does have appointments set up with rad onc and med onc   - had flex sig with biopsy of rectal mass in May, no f/up yet  - plan was made for IR para-aortic LN, no f/up yet    COPD (chronic obstructive pulmonary disease) (Multi)  - CTA/chest: No evidence of a pulmonary embolism. Stable pulmonary nodules measuring up to 8 mm in size in the left upper lobe. No new or enlarging nodules. Continued surveillance recommended.  - continue pulmicort   -     Depression with anxiety  -  is currently on hospice  - monitor    Benign essential HTN  - continue norvasc, metoprolol     GERD  - Protonix 40 mg at bedtime     GI ppx: PPI  DVT ppx: ambulation  Fluids: as ordered  Electrolytes: replace as needed  Nutrition: reg  Adjuncts: PIV  Code Status: full code  Pt requires inpatient stay at this time.    Zoe Monaco, LAN-CNP           [1] amLODIPine, 10 mg, oral, Daily  budesonide, 0.5 mg, nebulization, BID   And  formoterol, 20 mcg, nebulization, BID  ciprofloxacin, 500 mg, oral, q12h ENEDINA  ezetimibe, 10 mg, oral, Daily  fluticasone, 2 spray, Each Nostril, Daily  levothyroxine, 25 mcg, oral, Daily  loperamide, 4 mg, oral, Before meals & nightly  metoprolol  tartrate, 25 mg, oral, BID  miconazole, , Topical, BID  nicotine, 1 patch, transdermal, Daily  pantoprazole, 40 mg, oral, Daily before breakfast  phenazopyridine, 100 mg, oral, BID  pneumoc 20-elida conj-dip cr(PF), 0.5 mL, intramuscular, During hospitalization     [2] sodium chloride 0.9%, 10 mL/hr, Last Rate: Stopped (07/08/25 5289)     [3] PRN medications: acetaminophen, alum-mag hydroxide-simeth, benzocaine-menthol, diazePAM, hydrocortisone, melatonin, ondansetron, polyethylene glycol, sodium chloride 0.9%

## 2025-07-09 LAB
ANION GAP SERPL CALC-SCNC: 10 MMOL/L (ref 10–20)
APPEARANCE UR: ABNORMAL
ATRIAL RATE: 132 BPM
BACTERIA #/AREA URNS AUTO: ABNORMAL /HPF
BILIRUB UR STRIP.AUTO-MCNC: NEGATIVE MG/DL
BUN SERPL-MCNC: 18 MG/DL (ref 6–23)
CALCIUM SERPL-MCNC: 7.9 MG/DL (ref 8.6–10.3)
CHLORIDE SERPL-SCNC: 109 MMOL/L (ref 98–107)
CO2 SERPL-SCNC: 22 MMOL/L (ref 21–32)
COLOR UR: ABNORMAL
CREAT SERPL-MCNC: 1.07 MG/DL (ref 0.5–1.05)
EGFRCR SERPLBLD CKD-EPI 2021: 59 ML/MIN/1.73M*2
ERYTHROCYTE [DISTWIDTH] IN BLOOD BY AUTOMATED COUNT: 17 % (ref 11.5–14.5)
GLUCOSE SERPL-MCNC: 98 MG/DL (ref 74–99)
GLUCOSE UR STRIP.AUTO-MCNC: NORMAL MG/DL
HCT VFR BLD AUTO: 35 % (ref 36–46)
HGB BLD-MCNC: 11.4 G/DL (ref 12–16)
KETONES UR STRIP.AUTO-MCNC: NEGATIVE MG/DL
LEUKOCYTE ESTERASE UR QL STRIP.AUTO: ABNORMAL
MCH RBC QN AUTO: 31.3 PG (ref 26–34)
MCHC RBC AUTO-ENTMCNC: 32.6 G/DL (ref 32–36)
MCV RBC AUTO: 96 FL (ref 80–100)
MUCOUS THREADS #/AREA URNS AUTO: ABNORMAL /LPF
NITRITE UR QL STRIP.AUTO: ABNORMAL
NRBC BLD-RTO: 0 /100 WBCS (ref 0–0)
P AXIS: 78 DEGREES
P OFFSET: 190 MS
P ONSET: 143 MS
PH UR STRIP.AUTO: 6.5 [PH]
PLATELET # BLD AUTO: 283 X10*3/UL (ref 150–450)
POTASSIUM SERPL-SCNC: 4.2 MMOL/L (ref 3.5–5.3)
PR INTERVAL: 130 MS
PROT UR STRIP.AUTO-MCNC: ABNORMAL MG/DL
Q ONSET: 198 MS
QRS COUNT: 22 BEATS
QRS DURATION: 114 MS
QT INTERVAL: 334 MS
QTC CALCULATION(BAZETT): 494 MS
QTC FREDERICIA: 434 MS
R AXIS: 267 DEGREES
RBC # BLD AUTO: 3.64 X10*6/UL (ref 4–5.2)
RBC # UR STRIP.AUTO: ABNORMAL MG/DL
RBC #/AREA URNS AUTO: >20 /HPF
SODIUM SERPL-SCNC: 137 MMOL/L (ref 136–145)
SP GR UR STRIP.AUTO: 1.01
T AXIS: 73 DEGREES
T OFFSET: 365 MS
UROBILINOGEN UR STRIP.AUTO-MCNC: NORMAL MG/DL
VENTRICULAR RATE: 132 BPM
WBC # BLD AUTO: 6.6 X10*3/UL (ref 4.4–11.3)
WBC #/AREA URNS AUTO: >50 /HPF

## 2025-07-09 PROCEDURE — 2500000004 HC RX 250 GENERAL PHARMACY W/ HCPCS (ALT 636 FOR OP/ED): Mod: SE,IPSPLIT | Performed by: HOSPITALIST

## 2025-07-09 PROCEDURE — 80048 BASIC METABOLIC PNL TOTAL CA: CPT | Mod: IPSPLIT | Performed by: NURSE PRACTITIONER

## 2025-07-09 PROCEDURE — 81001 URINALYSIS AUTO W/SCOPE: CPT | Mod: IPSPLIT | Performed by: NURSE PRACTITIONER

## 2025-07-09 PROCEDURE — 2500000001 HC RX 250 WO HCPCS SELF ADMINISTERED DRUGS (ALT 637 FOR MEDICARE OP): Mod: SE,IPSPLIT | Performed by: NURSE PRACTITIONER

## 2025-07-09 PROCEDURE — 2500000002 HC RX 250 W HCPCS SELF ADMINISTERED DRUGS (ALT 637 FOR MEDICARE OP, ALT 636 FOR OP/ED): Mod: SE,IPSPLIT | Performed by: HOSPITALIST

## 2025-07-09 PROCEDURE — 2500000002 HC RX 250 W HCPCS SELF ADMINISTERED DRUGS (ALT 637 FOR MEDICARE OP, ALT 636 FOR OP/ED): Mod: SE,IPSPLIT | Performed by: NURSE PRACTITIONER

## 2025-07-09 PROCEDURE — 94640 AIRWAY INHALATION TREATMENT: CPT | Mod: IPSPLIT

## 2025-07-09 PROCEDURE — 87086 URINE CULTURE/COLONY COUNT: CPT | Mod: GENLAB | Performed by: NURSE PRACTITIONER

## 2025-07-09 PROCEDURE — 1200000002 HC GENERAL ROOM WITH TELEMETRY DAILY: Mod: IPSPLIT

## 2025-07-09 PROCEDURE — 36415 COLL VENOUS BLD VENIPUNCTURE: CPT | Mod: IPSPLIT | Performed by: NURSE PRACTITIONER

## 2025-07-09 PROCEDURE — 94760 N-INVAS EAR/PLS OXIMETRY 1: CPT | Mod: IPSPLIT

## 2025-07-09 PROCEDURE — 99233 SBSQ HOSP IP/OBS HIGH 50: CPT | Performed by: NURSE PRACTITIONER

## 2025-07-09 PROCEDURE — 85027 COMPLETE CBC AUTOMATED: CPT | Mod: IPSPLIT | Performed by: NURSE PRACTITIONER

## 2025-07-09 PROCEDURE — 2500000001 HC RX 250 WO HCPCS SELF ADMINISTERED DRUGS (ALT 637 FOR MEDICARE OP): Mod: SE,IPSPLIT | Performed by: HOSPITALIST

## 2025-07-09 RX ORDER — DIPHENOXYLATE HYDROCHLORIDE AND ATROPINE SULFATE 2.5; .025 MG/1; MG/1
1 TABLET ORAL 4 TIMES DAILY
Status: DISCONTINUED | OUTPATIENT
Start: 2025-07-09 | End: 2025-07-14 | Stop reason: HOSPADM

## 2025-07-09 RX ORDER — IPRATROPIUM BROMIDE AND ALBUTEROL SULFATE 2.5; .5 MG/3ML; MG/3ML
3 SOLUTION RESPIRATORY (INHALATION) EVERY 2 HOUR PRN
Status: DISCONTINUED | OUTPATIENT
Start: 2025-07-09 | End: 2025-07-14 | Stop reason: HOSPADM

## 2025-07-09 RX ORDER — BUDESONIDE 0.5 MG/2ML
0.5 INHALANT ORAL
Status: DISCONTINUED | OUTPATIENT
Start: 2025-07-09 | End: 2025-07-14 | Stop reason: HOSPADM

## 2025-07-09 RX ORDER — FORMOTEROL FUMARATE 20 UG/2ML
20 SOLUTION RESPIRATORY (INHALATION)
Status: DISCONTINUED | OUTPATIENT
Start: 2025-07-09 | End: 2025-07-14 | Stop reason: HOSPADM

## 2025-07-09 RX ADMIN — FLUTICASONE PROPIONATE 2 SPRAY: 50 SPRAY, METERED NASAL at 08:24

## 2025-07-09 RX ADMIN — BUDESONIDE 0.5 MG: 0.5 INHALANT RESPIRATORY (INHALATION) at 21:05

## 2025-07-09 RX ADMIN — CIPROFLOXACIN 500 MG: 500 TABLET ORAL at 08:24

## 2025-07-09 RX ADMIN — LEVOTHYROXINE SODIUM 25 MCG: 25 TABLET ORAL at 08:24

## 2025-07-09 RX ADMIN — CIPROFLOXACIN 500 MG: 500 TABLET ORAL at 20:50

## 2025-07-09 RX ADMIN — DIPHENOXYLATE HYDROCHLORIDE AND ATROPINE SULFATE 1 TABLET: .025; 2.5 TABLET ORAL at 17:55

## 2025-07-09 RX ADMIN — FORMOTEROL FUMARATE DIHYDRATE 20 MCG: 20 SOLUTION RESPIRATORY (INHALATION) at 08:42

## 2025-07-09 RX ADMIN — DIPHENOXYLATE HYDROCHLORIDE AND ATROPINE SULFATE 1 TABLET: .025; 2.5 TABLET ORAL at 13:51

## 2025-07-09 RX ADMIN — DIPHENOXYLATE HYDROCHLORIDE AND ATROPINE SULFATE 1 TABLET: .025; 2.5 TABLET ORAL at 09:55

## 2025-07-09 RX ADMIN — METOPROLOL TARTRATE 25 MG: 25 TABLET, FILM COATED ORAL at 20:51

## 2025-07-09 RX ADMIN — FORMOTEROL FUMARATE DIHYDRATE 20 MCG: 20 SOLUTION RESPIRATORY (INHALATION) at 21:05

## 2025-07-09 RX ADMIN — ONDANSETRON 4 MG: 2 INJECTION INTRAMUSCULAR; INTRAVENOUS at 07:43

## 2025-07-09 RX ADMIN — DIPHENOXYLATE HYDROCHLORIDE AND ATROPINE SULFATE 1 TABLET: .025; 2.5 TABLET ORAL at 20:51

## 2025-07-09 RX ADMIN — AMLODIPINE BESYLATE 10 MG: 10 TABLET ORAL at 08:24

## 2025-07-09 RX ADMIN — MICONAZOLE NITRATE: 20 CREAM TOPICAL at 20:51

## 2025-07-09 RX ADMIN — EZETIMIBE 10 MG: 10 TABLET ORAL at 08:24

## 2025-07-09 RX ADMIN — METOPROLOL TARTRATE 25 MG: 25 TABLET, FILM COATED ORAL at 08:24

## 2025-07-09 RX ADMIN — BUDESONIDE 0.5 MG: 0.5 INHALANT RESPIRATORY (INHALATION) at 08:43

## 2025-07-09 RX ADMIN — ACETAMINOPHEN 650 MG: 325 TABLET, FILM COATED ORAL at 15:41

## 2025-07-09 ASSESSMENT — PAIN - FUNCTIONAL ASSESSMENT
PAIN_FUNCTIONAL_ASSESSMENT: 0-10
PAIN_FUNCTIONAL_ASSESSMENT: 0-10

## 2025-07-09 ASSESSMENT — COGNITIVE AND FUNCTIONAL STATUS - GENERAL
DAILY ACTIVITIY SCORE: 24
MOBILITY SCORE: 24

## 2025-07-09 ASSESSMENT — PAIN SCALES - GENERAL
PAINLEVEL_OUTOF10: 4
PAINLEVEL_OUTOF10: 0 - NO PAIN
PAINLEVEL_OUTOF10: 0 - NO PAIN

## 2025-07-09 ASSESSMENT — PAIN DESCRIPTION - LOCATION: LOCATION: HEAD

## 2025-07-09 NOTE — PROGRESS NOTES
Hortensia Rodriguez is a 59 y.o. female on day 4 of admission presenting with Pyelonephritis.    Subjective    She is sitting up in bed this morning.  States she feels nauseated.  She is still having loose bowel movements.  We discussed her refusal of Imodium, she thought it was making her diarrhea worse.  Lomotil ordered and she is encouraged to use it at least 4 times today.  However, her diarrhea is chronic for at least 1 year, this may be a continued chronic issue which we discussed.  She still does not have electricity to her home and we talked about discharging home when her electricity comes back on, hopefully today.    Objective     Physical Exam  Constitutional:       General: She is not in acute distress.     Appearance: Normal appearance. She is not toxic-appearing.   HENT:      Head: Normocephalic and atraumatic.      Mouth/Throat:      Mouth: Mucous membranes are moist.   Eyes:      Extraocular Movements: Extraocular movements intact.      Pupils: Pupils are equal, round, and reactive to light.   Cardiovascular:      Rate and Rhythm: Normal rate and regular rhythm.      Pulses: Normal pulses.      Heart sounds: Normal heart sounds. No murmur heard.     No gallop.   Pulmonary:      Effort: Pulmonary effort is normal. No respiratory distress.      Breath sounds: Normal breath sounds. No wheezing, rhonchi or rales.   Abdominal:      General: Bowel sounds are normal. There is no distension.      Palpations: Abdomen is soft.      Tenderness: There is no abdominal tenderness. There is no guarding or rebound.   Genitourinary:     Comments: Vaginal burning, itching  Musculoskeletal:         General: No swelling, tenderness, deformity or signs of injury. Normal range of motion.      Cervical back: Normal range of motion and neck supple.   Skin:     General: Skin is warm and dry.      Capillary Refill: Capillary refill takes less than 2 seconds.      Coloration: Skin is not jaundiced.      Findings: No bruising  "or rash.   Neurological:      General: No focal deficit present.      Mental Status: She is alert and oriented to person, place, and time. Mental status is at baseline.      Cranial Nerves: No cranial nerve deficit.      Sensory: No sensory deficit.      Motor: No weakness.      Gait: Gait normal.   Psychiatric:         Mood and Affect: Mood normal.         Behavior: Behavior normal.         Thought Content: Thought content normal.         Judgment: Judgment normal.       Last Recorded Vitals  Blood pressure 111/67, pulse 69, temperature 36.3 °C (97.3 °F), temperature source Temporal, resp. rate 18, height 1.676 m (5' 6\"), weight 67 kg (147 lb 9.6 oz), SpO2 95%.  Intake/Output last 3 Shifts:  I/O last 3 completed shifts:  In: 739.2 (11 mL/kg) [P.O.:480; I.V.:109.2 (1.6 mL/kg); IV Piggyback:150]  Out: 400 (6 mL/kg) [Urine:400 (0.2 mL/kg/hr)]  Weight: 67 kg     Scheduled medications  Scheduled Medications[1]  Continuous medications  Continuous Medications[2]  PRN medications  PRN Medications[3]    Relevant Results  CT abdomen pelvis w IV contrast  Result Date: 7/5/2025  Distal sigmoid/rectal mass, compatible with the provided history of cancer. Retroperitoneal lymphadenopathy, compatible with metastatic disease, not significantly changed. Left ureteral stent is in place but the proximal and distal pigtail portions of the catheter are encased with large dense calcifications/encrustations. Persistent severe left-sided hydronephrosis. Urothelial enhancement as well as perinephric and periureteral stranding on the left, suggesting infection. Thickening of the walls of the urinary bladder with surrounding inflammation, compatible with cystitis. Numerous additional unchanged findings as above.   MACRO: None.   Signed by: Dean Crocker 7/5/2025 4:15 PM Dictation workstation:   [id number]    CT angio chest for pulmonary embolism  Result Date: 7/5/2025  No evidence of a pulmonary embolism. Stable pulmonary nodules measuring up " to 8 mm in size in the left upper lobe. No new or enlarging nodules. Continued surveillance recommended.   MACRO: None.   Signed by: Dean Crocker 7/5/2025 3:57 PM Dictation workstation:   [id number]     Latest Reference Range & Units 07/06/25 12:08 07/07/25 06:17 07/08/25 06:23 07/09/25 06:33   GLUCOSE 74 - 99 mg/dL 92 81 92 98   SODIUM 136 - 145 mmol/L 132 (L) 136 138 137   POTASSIUM 3.5 - 5.3 mmol/L 3.7 4.1 4.3 4.2   CHLORIDE 98 - 107 mmol/L 101 108 (H) 107 109 (H)   Bicarbonate 21 - 32 mmol/L 22 22 23 22   Anion Gap 10 - 20 mmol/L 13 10 12 10   Blood Urea Nitrogen 6 - 23 mg/dL 31 (H) 25 (H) 21 18   Creatinine 0.50 - 1.05 mg/dL 1.47 (H) 1.24 (H) 1.25 (H) 1.07 (H)   EGFR >60 mL/min/1.73m*2 40 (L) 50 (L) 49 (L) 59 (L)   Calcium 8.6 - 10.3 mg/dL 8.3 (L) 8.1 (L) 8.1 (L) 7.9 (L)   MAGNESIUM 1.60 - 2.40 mg/dL  1.72     WBC 4.4 - 11.3 x10*3/uL  8.6 6.3 6.6   nRBC 0.0 - 0.0 /100 WBCs  0.0 0.0 0.0   RBC 4.00 - 5.20 x10*6/uL  4.01 3.81 (L) 3.64 (L)   HEMOGLOBIN 12.0 - 16.0 g/dL  12.7 11.8 (L) 11.4 (L)   HEMATOCRIT 36.0 - 46.0 %  37.9 36.2 35.0 (L)   MCV 80 - 100 fL  95 95 96   MCH 26.0 - 34.0 pg  31.7 31.0 31.3   MCHC 32.0 - 36.0 g/dL  33.5 32.6 32.6   RED CELL DISTRIBUTION WIDTH 11.5 - 14.5 %  16.3 (H) 16.6 (H) 17.0 (H)   Platelets 150 - 450 x10*3/uL  309 306 283     Assessment & Plan  Pyelonephritis    Hypokalemia    Nausea    Rectal mass    COPD (chronic obstructive pulmonary disease) (Multi)    Depression with anxiety    Diarrhea    Benign essential HTN    Hyponatremia      Pyelonephritis  - Given Vanco x 2 doses in the ER  - Zosyn 2.25 g IV every 6 hrs.   - Blood Cultures iNGTD  - Bun/creat 30/1.5 > 31/1.47 > 25/1.24 > 21/1.25 > 18/1.07  - GRF 32 > 50 > 59  - CT a/p:  Distal sigmoid/rectal mass, compatible with the provided history of cancer. Retroperitoneal lymphadenopathy, compatible with metastatic disease, not significantly changed. Left ureteral stent is in place but the proximal and distal pigtail portions of the  catheter are encased with large dense calcifications/ encrustations (from 2023). Persistent severe left-sided hydronephrosis. Urothelial enhancement as well as perinephric and periureteral stranding on the left, suggesting infection. Thickening of the walls of the urinary bladder with surrounding inflammation, compatible with cystitis. Numerous additional unchanged findings as above.  - UA: 500 LE, 3+ blood 2+ protein, >50 WBC, >20 RBC  - Lactate 1.8  - was to f/up with Urology after admission in May for same issue    Hypokalemia   Hyponatremia   - K  3 > 3.7 > 4.1 > 4.2  - replaced with IV/oral multiple times  - Continue to monitor, BMP ordered  - Na 130 > 131 > 132 > 138  - 1L NS ordered at 125 ml/hr continuous, dc'd  - Continue to monitor, labs ordered    Nausea   Diarrhea   - C-diff and stool pathogen: both negative  - incontinent of stool, diarrhea daily at times  - Zofran 4 mg IV prn  - imodium prn, encouraged to use it with every meal, changed to lomotil    Rectal mass    - Patient is not currently being treated. She does have appointments set up with rad onc and med onc   - had flex sig with biopsy of rectal mass in May, no f/up yet  - plan was made for IR para-aortic LN, no f/up yet    COPD (chronic obstructive pulmonary disease) (Multi)  - CTA/chest: No evidence of a pulmonary embolism. Stable pulmonary nodules measuring up to 8 mm in size in the left upper lobe. No new or enlarging nodules. Continued surveillance recommended.  - continue pulmicort   -     Depression with anxiety  -  is currently on hospice  - monitor    Benign essential HTN  - continue norvasc, metoprolol     GERD  - Protonix 40 mg at bedtime     Social Issue: her electricity was shut off due to nonpayment.  Her son and daughter-in-law live with her.  Her  is on hospice.  She is attempting to find out if they can be turned back on.    GI ppx: PPI  DVT ppx: ambulation  Fluids: as ordered  Electrolytes: replace as  needed  Nutrition: reg  Adjuncts: PIV  Code Status: full code  Pt requires inpatient stay at this time.    Zoe Monaco, APRN-CNP             [1] amLODIPine, 10 mg, oral, Daily  budesonide, 0.5 mg, nebulization, BID   And  formoterol, 20 mcg, nebulization, BID  ciprofloxacin, 500 mg, oral, q12h ENEDINA  ezetimibe, 10 mg, oral, Daily  fluticasone, 2 spray, Each Nostril, Daily  levothyroxine, 25 mcg, oral, Daily  loperamide, 4 mg, oral, Before meals & nightly  metoprolol tartrate, 25 mg, oral, BID  miconazole, , Topical, BID  nicotine, 1 patch, transdermal, Daily  pantoprazole, 40 mg, oral, Daily before breakfast  pneumoc 20-elida conj-dip cr(PF), 0.5 mL, intramuscular, During hospitalization     [2] sodium chloride 0.9%, 10 mL/hr, Last Rate: Stopped (07/08/25 1459)     [3] PRN medications: acetaminophen, alum-mag hydroxide-simeth, benzocaine-menthol, diazePAM, hydrocortisone, melatonin, ondansetron, polyethylene glycol, sodium chloride 0.9%

## 2025-07-09 NOTE — CARE PLAN
The patient's goals for the shift include  decrease in stool    The clinical goals for the shift include pt will tolerate PO ATB and remain afebrile this shift    Over the shift, the patient did make progress toward the following goals. Tolerated PO ATB and remained afebrile. Pt continued to have loose stools, but decreased from yesterday per pt. Lomotil started and imodium discontinued d/t no effect. Good appetite, given Zofran for nausea.       Problem: Discharge Planning  Goal: Discharge to home or other facility with appropriate resources  Outcome: Progressing     Problem: Chronic Conditions and Co-morbidities  Goal: Patient's chronic conditions and co-morbidity symptoms are monitored and maintained or improved  Outcome: Progressing     Problem: Nutrition  Goal: Nutrient intake appropriate for maintaining nutritional needs  Outcome: Progressing     Problem: Skin  Goal: Decreased wound size/increased tissue granulation at next dressing change  Outcome: Progressing  Flowsheets (Taken 7/9/2025 1731)  Decreased wound size/increased tissue granulation at next dressing change: Promote sleep for wound healing  Goal: Participates in plan/prevention/treatment measures  Outcome: Progressing  Flowsheets (Taken 7/9/2025 1731)  Participates in plan/prevention/treatment measures:   Elevate heels   Increase activity/out of bed for meals  Goal: Prevent/manage excess moisture  Outcome: Progressing  Flowsheets (Taken 7/9/2025 1731)  Prevent/manage excess moisture:   Monitor for/manage infection if present   Cleanse incontinence/protect with barrier cream  Goal: Prevent/minimize sheer/friction injuries  Outcome: Progressing  Flowsheets (Taken 7/9/2025 1731)  Prevent/minimize sheer/friction injuries:   HOB 30 degrees or less   Turn/reposition every 2 hours/use positioning/transfer devices  Goal: Promote/optimize nutrition  Outcome: Progressing  Flowsheets (Taken 7/9/2025 1731)  Promote/optimize nutrition: Consume > 50%  meals/supplements  Goal: Promote skin healing  Outcome: Progressing  Flowsheets (Taken 7/9/2025 1731)  Promote skin healing:   Protective dressings over bony prominences   Turn/reposition every 2 hours/use positioning/transfer devices

## 2025-07-09 NOTE — PROGRESS NOTES
Patient stating her electricity was shut off.  Phone numbers to [employer] and [employer] [facility] given to patient to call. She is going to call to try to fix things.         07/09/25 1218   Discharge Planning   Living Arrangements Spouse/significant other;Children   Support Systems Children;Family members   Assistance Needed [facility] informed that patient lives in a mobile home with her son and daughter in law. Her  is with hospice in a facility. Patient says she is independent with ADLs, IADLs, ambulation and doesn't drive. She does not use home oxygen, CPAP/BiPAP or DME. She is on food stamps and goes to the food bank. She is with [employer]. She will have transportation after 4 pm (DIL works/drives)   Type of Residence Private residence   Number of Stairs to Enter Residence 5   Number of Stairs Within Residence 0   Do you have animals or pets at home? No   Home or Post Acute Services None   Expected Discharge Disposition Home   Does the patient need discharge transport arranged? No   Stroke Family Assessment   Stroke Family Assessment Needed No

## 2025-07-09 NOTE — CARE PLAN
The patient's goals for the shift include      The clinical goals for the shift include have decrease in loose stools this shift    Over the shift, the patient did make progress toward the following goals. Patient refused to take her Loperamide throughout shift.

## 2025-07-10 LAB
ANION GAP SERPL CALC-SCNC: 11 MMOL/L (ref 10–20)
BACTERIA BLD CULT: NORMAL
BACTERIA BLD CULT: NORMAL
BACTERIA UR CULT: NORMAL
BUN SERPL-MCNC: 18 MG/DL (ref 6–23)
CALCIUM SERPL-MCNC: 8.2 MG/DL (ref 8.6–10.3)
CHLORIDE SERPL-SCNC: 110 MMOL/L (ref 98–107)
CO2 SERPL-SCNC: 24 MMOL/L (ref 21–32)
CREAT SERPL-MCNC: 0.98 MG/DL (ref 0.5–1.05)
EGFRCR SERPLBLD CKD-EPI 2021: 66 ML/MIN/1.73M*2
ERYTHROCYTE [DISTWIDTH] IN BLOOD BY AUTOMATED COUNT: 17.2 % (ref 11.5–14.5)
GLUCOSE SERPL-MCNC: 93 MG/DL (ref 74–99)
HCT VFR BLD AUTO: 35.6 % (ref 36–46)
HGB BLD-MCNC: 11.6 G/DL (ref 12–16)
HOLD SPECIMEN: NORMAL
MCH RBC QN AUTO: 31.5 PG (ref 26–34)
MCHC RBC AUTO-ENTMCNC: 32.6 G/DL (ref 32–36)
MCV RBC AUTO: 97 FL (ref 80–100)
NRBC BLD-RTO: 0 /100 WBCS (ref 0–0)
PLATELET # BLD AUTO: 253 X10*3/UL (ref 150–450)
POTASSIUM SERPL-SCNC: 4.7 MMOL/L (ref 3.5–5.3)
RBC # BLD AUTO: 3.68 X10*6/UL (ref 4–5.2)
SODIUM SERPL-SCNC: 140 MMOL/L (ref 136–145)
WBC # BLD AUTO: 8.3 X10*3/UL (ref 4.4–11.3)

## 2025-07-10 PROCEDURE — 85027 COMPLETE CBC AUTOMATED: CPT | Mod: IPSPLIT | Performed by: NURSE PRACTITIONER

## 2025-07-10 PROCEDURE — 80048 BASIC METABOLIC PNL TOTAL CA: CPT | Mod: IPSPLIT | Performed by: NURSE PRACTITIONER

## 2025-07-10 PROCEDURE — 94640 AIRWAY INHALATION TREATMENT: CPT | Mod: IPSPLIT

## 2025-07-10 PROCEDURE — 2500000002 HC RX 250 W HCPCS SELF ADMINISTERED DRUGS (ALT 637 FOR MEDICARE OP, ALT 636 FOR OP/ED): Mod: SE,IPSPLIT | Performed by: NURSE PRACTITIONER

## 2025-07-10 PROCEDURE — 1200000002 HC GENERAL ROOM WITH TELEMETRY DAILY: Mod: IPSPLIT

## 2025-07-10 PROCEDURE — 2500000002 HC RX 250 W HCPCS SELF ADMINISTERED DRUGS (ALT 637 FOR MEDICARE OP, ALT 636 FOR OP/ED): Mod: SE,IPSPLIT | Performed by: HOSPITALIST

## 2025-07-10 PROCEDURE — 2500000004 HC RX 250 GENERAL PHARMACY W/ HCPCS (ALT 636 FOR OP/ED): Mod: SE,IPSPLIT | Performed by: HOSPITALIST

## 2025-07-10 PROCEDURE — 2500000001 HC RX 250 WO HCPCS SELF ADMINISTERED DRUGS (ALT 637 FOR MEDICARE OP): Mod: SE,IPSPLIT | Performed by: NURSE PRACTITIONER

## 2025-07-10 PROCEDURE — 36415 COLL VENOUS BLD VENIPUNCTURE: CPT | Mod: IPSPLIT | Performed by: NURSE PRACTITIONER

## 2025-07-10 PROCEDURE — 2500000001 HC RX 250 WO HCPCS SELF ADMINISTERED DRUGS (ALT 637 FOR MEDICARE OP): Mod: SE,IPSPLIT | Performed by: HOSPITALIST

## 2025-07-10 PROCEDURE — 94760 N-INVAS EAR/PLS OXIMETRY 1: CPT | Mod: IPSPLIT

## 2025-07-10 PROCEDURE — 99233 SBSQ HOSP IP/OBS HIGH 50: CPT | Performed by: NURSE PRACTITIONER

## 2025-07-10 RX ADMIN — CIPROFLOXACIN 500 MG: 500 TABLET ORAL at 09:53

## 2025-07-10 RX ADMIN — DIPHENOXYLATE HYDROCHLORIDE AND ATROPINE SULFATE 1 TABLET: .025; 2.5 TABLET ORAL at 15:04

## 2025-07-10 RX ADMIN — CIPROFLOXACIN 500 MG: 500 TABLET ORAL at 20:52

## 2025-07-10 RX ADMIN — DIPHENOXYLATE HYDROCHLORIDE AND ATROPINE SULFATE 1 TABLET: .025; 2.5 TABLET ORAL at 20:52

## 2025-07-10 RX ADMIN — BUDESONIDE 0.5 MG: 0.5 INHALANT RESPIRATORY (INHALATION) at 10:18

## 2025-07-10 RX ADMIN — AMLODIPINE BESYLATE 10 MG: 10 TABLET ORAL at 09:53

## 2025-07-10 RX ADMIN — DIPHENOXYLATE HYDROCHLORIDE AND ATROPINE SULFATE 1 TABLET: .025; 2.5 TABLET ORAL at 17:55

## 2025-07-10 RX ADMIN — FORMOTEROL FUMARATE DIHYDRATE 20 MCG: 20 SOLUTION RESPIRATORY (INHALATION) at 20:41

## 2025-07-10 RX ADMIN — METOPROLOL TARTRATE 25 MG: 25 TABLET, FILM COATED ORAL at 20:52

## 2025-07-10 RX ADMIN — EZETIMIBE 10 MG: 10 TABLET ORAL at 09:53

## 2025-07-10 RX ADMIN — FORMOTEROL FUMARATE DIHYDRATE 20 MCG: 20 SOLUTION RESPIRATORY (INHALATION) at 10:17

## 2025-07-10 RX ADMIN — ONDANSETRON 4 MG: 2 INJECTION INTRAMUSCULAR; INTRAVENOUS at 09:53

## 2025-07-10 RX ADMIN — FLUTICASONE PROPIONATE 2 SPRAY: 50 SPRAY, METERED NASAL at 09:57

## 2025-07-10 RX ADMIN — BUDESONIDE 0.5 MG: 0.5 INHALANT RESPIRATORY (INHALATION) at 20:39

## 2025-07-10 RX ADMIN — ONDANSETRON 4 MG: 2 INJECTION INTRAMUSCULAR; INTRAVENOUS at 05:50

## 2025-07-10 RX ADMIN — LEVOTHYROXINE SODIUM 25 MCG: 25 TABLET ORAL at 09:53

## 2025-07-10 RX ADMIN — METOPROLOL TARTRATE 25 MG: 25 TABLET, FILM COATED ORAL at 09:53

## 2025-07-10 ASSESSMENT — COGNITIVE AND FUNCTIONAL STATUS - GENERAL
DAILY ACTIVITIY SCORE: 24
MOBILITY SCORE: 24

## 2025-07-10 ASSESSMENT — PAIN SCALES - GENERAL: PAINLEVEL_OUTOF10: 0 - NO PAIN

## 2025-07-10 NOTE — CARE PLAN
The patient's goals for the shift include      The clinical goals for the shift include Maintain safety.  Tolerate PO ABX    Pt alert and oriented.  Resting in bed.  Tolerating po intake well without c/o n/v.   Tolerating po antibiotic without difficulty.  HS medications given without difficulty.  Voiding without difficulty.  Safety maintained.  Bed alarms on.

## 2025-07-11 LAB
ANION GAP SERPL CALC-SCNC: 12 MMOL/L (ref 10–20)
BUN SERPL-MCNC: 16 MG/DL (ref 6–23)
CALCIUM SERPL-MCNC: 8.4 MG/DL (ref 8.6–10.3)
CHLORIDE SERPL-SCNC: 106 MMOL/L (ref 98–107)
CO2 SERPL-SCNC: 23 MMOL/L (ref 21–32)
CREAT SERPL-MCNC: 0.96 MG/DL (ref 0.5–1.05)
EGFRCR SERPLBLD CKD-EPI 2021: 67 ML/MIN/1.73M*2
ERYTHROCYTE [DISTWIDTH] IN BLOOD BY AUTOMATED COUNT: 17.2 % (ref 11.5–14.5)
GLUCOSE SERPL-MCNC: 102 MG/DL (ref 74–99)
HCT VFR BLD AUTO: 36.6 % (ref 36–46)
HGB BLD-MCNC: 12 G/DL (ref 12–16)
MCH RBC QN AUTO: 31.8 PG (ref 26–34)
MCHC RBC AUTO-ENTMCNC: 32.8 G/DL (ref 32–36)
MCV RBC AUTO: 97 FL (ref 80–100)
NRBC BLD-RTO: 0 /100 WBCS (ref 0–0)
PLATELET # BLD AUTO: 270 X10*3/UL (ref 150–450)
POTASSIUM SERPL-SCNC: 4.3 MMOL/L (ref 3.5–5.3)
RBC # BLD AUTO: 3.77 X10*6/UL (ref 4–5.2)
SODIUM SERPL-SCNC: 137 MMOL/L (ref 136–145)
WBC # BLD AUTO: 7.8 X10*3/UL (ref 4.4–11.3)

## 2025-07-11 PROCEDURE — 2500000002 HC RX 250 W HCPCS SELF ADMINISTERED DRUGS (ALT 637 FOR MEDICARE OP, ALT 636 FOR OP/ED): Mod: SE,IPSPLIT | Performed by: NURSE PRACTITIONER

## 2025-07-11 PROCEDURE — 2500000001 HC RX 250 WO HCPCS SELF ADMINISTERED DRUGS (ALT 637 FOR MEDICARE OP): Mod: SE,IPSPLIT | Performed by: HOSPITALIST

## 2025-07-11 PROCEDURE — 2500000004 HC RX 250 GENERAL PHARMACY W/ HCPCS (ALT 636 FOR OP/ED): Mod: SE,IPSPLIT | Performed by: HOSPITALIST

## 2025-07-11 PROCEDURE — 2500000002 HC RX 250 W HCPCS SELF ADMINISTERED DRUGS (ALT 637 FOR MEDICARE OP, ALT 636 FOR OP/ED): Mod: SE,IPSPLIT | Performed by: HOSPITALIST

## 2025-07-11 PROCEDURE — 99232 SBSQ HOSP IP/OBS MODERATE 35: CPT | Performed by: NURSE PRACTITIONER

## 2025-07-11 PROCEDURE — 80048 BASIC METABOLIC PNL TOTAL CA: CPT | Mod: IPSPLIT | Performed by: NURSE PRACTITIONER

## 2025-07-11 PROCEDURE — 36415 COLL VENOUS BLD VENIPUNCTURE: CPT | Mod: IPSPLIT | Performed by: NURSE PRACTITIONER

## 2025-07-11 PROCEDURE — 2500000001 HC RX 250 WO HCPCS SELF ADMINISTERED DRUGS (ALT 637 FOR MEDICARE OP): Mod: SE,IPSPLIT | Performed by: NURSE PRACTITIONER

## 2025-07-11 PROCEDURE — 1200000002 HC GENERAL ROOM WITH TELEMETRY DAILY: Mod: IPSPLIT

## 2025-07-11 PROCEDURE — 94760 N-INVAS EAR/PLS OXIMETRY 1: CPT | Mod: IPSPLIT

## 2025-07-11 PROCEDURE — 85027 COMPLETE CBC AUTOMATED: CPT | Mod: IPSPLIT | Performed by: NURSE PRACTITIONER

## 2025-07-11 PROCEDURE — 94640 AIRWAY INHALATION TREATMENT: CPT | Mod: IPSPLIT

## 2025-07-11 RX ORDER — CHOLESTYRAMINE 4 G/9G
4 POWDER, FOR SUSPENSION ORAL
Status: DISCONTINUED | OUTPATIENT
Start: 2025-07-11 | End: 2025-07-14 | Stop reason: HOSPADM

## 2025-07-11 RX ADMIN — FORMOTEROL FUMARATE DIHYDRATE 20 MCG: 20 SOLUTION RESPIRATORY (INHALATION) at 08:24

## 2025-07-11 RX ADMIN — CHOLESTYRAMINE 4 G: 4 POWDER, FOR SUSPENSION ORAL at 21:14

## 2025-07-11 RX ADMIN — ONDANSETRON 4 MG: 2 INJECTION INTRAMUSCULAR; INTRAVENOUS at 06:12

## 2025-07-11 RX ADMIN — BUDESONIDE 0.5 MG: 0.5 INHALANT RESPIRATORY (INHALATION) at 21:36

## 2025-07-11 RX ADMIN — FORMOTEROL FUMARATE DIHYDRATE 20 MCG: 20 SOLUTION RESPIRATORY (INHALATION) at 21:36

## 2025-07-11 RX ADMIN — DIPHENOXYLATE HYDROCHLORIDE AND ATROPINE SULFATE 1 TABLET: .025; 2.5 TABLET ORAL at 16:37

## 2025-07-11 RX ADMIN — DIPHENOXYLATE HYDROCHLORIDE AND ATROPINE SULFATE 1 TABLET: .025; 2.5 TABLET ORAL at 08:49

## 2025-07-11 RX ADMIN — CHOLESTYRAMINE 4 G: 4 POWDER, FOR SUSPENSION ORAL at 16:37

## 2025-07-11 RX ADMIN — DIPHENOXYLATE HYDROCHLORIDE AND ATROPINE SULFATE 1 TABLET: .025; 2.5 TABLET ORAL at 21:16

## 2025-07-11 RX ADMIN — FLUTICASONE PROPIONATE 2 SPRAY: 50 SPRAY, METERED NASAL at 08:53

## 2025-07-11 RX ADMIN — LEVOTHYROXINE SODIUM 25 MCG: 25 TABLET ORAL at 08:49

## 2025-07-11 RX ADMIN — BUDESONIDE 0.5 MG: 0.5 INHALANT RESPIRATORY (INHALATION) at 08:24

## 2025-07-11 RX ADMIN — CHOLESTYRAMINE 4 G: 4 POWDER, FOR SUSPENSION ORAL at 12:59

## 2025-07-11 RX ADMIN — METOPROLOL TARTRATE 25 MG: 25 TABLET, FILM COATED ORAL at 21:16

## 2025-07-11 RX ADMIN — PANTOPRAZOLE SODIUM 40 MG: 40 TABLET, DELAYED RELEASE ORAL at 08:50

## 2025-07-11 RX ADMIN — EZETIMIBE 10 MG: 10 TABLET ORAL at 08:50

## 2025-07-11 RX ADMIN — DIPHENOXYLATE HYDROCHLORIDE AND ATROPINE SULFATE 1 TABLET: .025; 2.5 TABLET ORAL at 12:59

## 2025-07-11 RX ADMIN — CIPROFLOXACIN 500 MG: 500 TABLET ORAL at 08:50

## 2025-07-11 ASSESSMENT — COGNITIVE AND FUNCTIONAL STATUS - GENERAL
TOILETING: A LITTLE
PERSONAL GROOMING: A LITTLE
DAILY ACTIVITIY SCORE: 22
TOILETING: A LITTLE
CLIMB 3 TO 5 STEPS WITH RAILING: A LITTLE
CLIMB 3 TO 5 STEPS WITH RAILING: A LITTLE
DAILY ACTIVITIY SCORE: 23
WALKING IN HOSPITAL ROOM: A LITTLE
MOBILITY SCORE: 22
WALKING IN HOSPITAL ROOM: A LITTLE
MOBILITY SCORE: 22

## 2025-07-11 ASSESSMENT — PAIN - FUNCTIONAL ASSESSMENT
PAIN_FUNCTIONAL_ASSESSMENT: 0-10
PAIN_FUNCTIONAL_ASSESSMENT: 0-10

## 2025-07-11 ASSESSMENT — PAIN SCALES - GENERAL
PAINLEVEL_OUTOF10: 4
PAINLEVEL_OUTOF10: 0 - NO PAIN

## 2025-07-11 NOTE — CARE PLAN
The patient's goals for the shift include      The clinical goals for the shift include Maintain safety.  Tolerate PO ABX    Alert and oriented.  Resting in bed.  Tolerating po intake well without c/o n/v.  HS medications given without difficulty.  Voiding without difficulty.  Safety maintained.  Bed alarms on.

## 2025-07-11 NOTE — CARE PLAN
The patient's goals for the shift include      The clinical goals for the shift include Patient to be free from injury throughout this shift      Problem: Discharge Planning  Goal: Discharge to home or other facility with appropriate resources  Outcome: Progressing     Problem: Chronic Conditions and Co-morbidities  Goal: Patient's chronic conditions and co-morbidity symptoms are monitored and maintained or improved  Outcome: Progressing     Problem: Nutrition  Goal: Nutrient intake appropriate for maintaining nutritional needs  Outcome: Progressing     Problem: Skin  Goal: Decreased wound size/increased tissue granulation at next dressing change  Outcome: Progressing  Goal: Participates in plan/prevention/treatment measures  Outcome: Progressing  Goal: Prevent/manage excess moisture  Outcome: Progressing  Goal: Prevent/minimize sheer/friction injuries  Outcome: Progressing  Goal: Promote/optimize nutrition  Outcome: Progressing  Goal: Promote skin healing  Outcome: Progressing     Problem: Fall/Injury  Goal: Not fall by end of shift  Outcome: Progressing  Goal: Be free from injury by end of the shift  Outcome: Progressing  Goal: Verbalize understanding of personal risk factors for fall in the hospital  Outcome: Progressing  Goal: Verbalize understanding of risk factor reduction measures to prevent injury from fall in the home  Outcome: Progressing  Goal: Use assistive devices by end of the shift  Outcome: Progressing  Goal: Pace activities to prevent fatigue by end of the shift  Outcome: Progressing

## 2025-07-11 NOTE — PROGRESS NOTES
Hortensia Rodriguez is a 59 y.o. female on day 5 of admission presenting with Pyelonephritis.    Subjective   Patient sitting up in bed this morning, alert and conversant.  Patient very tearful stating that she has worsening loss of appetite, worsening nausea, has not been able to sleep all night.  Patient states that she was feeling a little better yesterday, but now feels worse than she did yesterday.  She denies dysuria or abdominal pain. She denies headache, dizziness, chest pain or shortness of breath.      Patient very tearful states that her  is on hospice.  She states that she does not want to be a burden to her children either.  That they must be able to live their lives because they are in their early 30s and should not have responsibilities to take care of her or her .  She wishes that she could be with her , but is too sick.  She states that she just needs to get better so that she can go be with her  and not burden her children.       Objective     Physical Exam  Constitutional:       Appearance: She is ill-appearing.   HENT:      Head: Normocephalic and atraumatic.      Mouth/Throat:      Mouth: Mucous membranes are moist.   Eyes:      General: No scleral icterus.        Right eye: No discharge.         Left eye: No discharge.   Cardiovascular:      Rate and Rhythm: Normal rate and regular rhythm.      Pulses: Normal pulses.      Heart sounds: Normal heart sounds.   Pulmonary:      Effort: Pulmonary effort is normal.      Breath sounds: Normal breath sounds.   Abdominal:      General: Abdomen is flat. There is no distension.      Palpations: Abdomen is soft.      Tenderness: There is no abdominal tenderness.   Musculoskeletal:         General: No swelling or tenderness.   Skin:     General: Skin is warm.      Capillary Refill: Capillary refill takes less than 2 seconds.      Coloration: Skin is pale.   Neurological:      General: No focal deficit present.      Mental  "Status: She is alert and oriented to person, place, and time.   Psychiatric:         Mood and Affect: Mood is anxious.      Comments: tearful         Last Recorded Vitals  Blood pressure 114/65, pulse 74, temperature 36.3 °C (97.3 °F), temperature source Temporal, resp. rate 17, height 1.676 m (5' 6\"), weight 67 kg (147 lb 9.6 oz), SpO2 95%.  Intake/Output last 3 Shifts:  I/O last 3 completed shifts:  In: 620 (9.3 mL/kg) [P.O.:620]  Out: - (0 mL/kg)   Weight: 67 kg     Relevant Results  Scheduled medications  amLODIPine, 10 mg, oral, Daily  budesonide, 0.5 mg, nebulization, BID   And  formoterol, 20 mcg, nebulization, BID  ciprofloxacin, 500 mg, oral, q12h ENEDINA  diphenoxylate-atropine, 1 tablet, oral, 4x daily  ezetimibe, 10 mg, oral, Daily  fluticasone, 2 spray, Each Nostril, Daily  levothyroxine, 25 mcg, oral, Daily  metoprolol tartrate, 25 mg, oral, BID  miconazole, , Topical, BID  nicotine, 1 patch, transdermal, Daily  pantoprazole, 40 mg, oral, Daily before breakfast  pneumoc 20-elida conj-dip cr(PF), 0.5 mL, intramuscular, During hospitalization      Continuous medications  sodium chloride 0.9%, 10 mL/hr, Last Rate: Stopped (07/08/25 1459)      PRN medications  PRN medications: acetaminophen, alum-mag hydroxide-simeth, benzocaine-menthol, diazePAM, hydrocortisone, ipratropium-albuteroL, melatonin, ondansetron, polyethylene glycol, sodium chloride 0.9%     Latest Reference Range & Units 07/09/25 06:33 07/10/25 06:19   GLUCOSE 74 - 99 mg/dL 98 93   SODIUM 136 - 145 mmol/L 137 140   POTASSIUM 3.5 - 5.3 mmol/L 4.2 4.7   CHLORIDE 98 - 107 mmol/L 109 (H) 110 (H)   Bicarbonate 21 - 32 mmol/L 22 24   Anion Gap 10 - 20 mmol/L 10 11   Blood Urea Nitrogen 6 - 23 mg/dL 18 18   Creatinine 0.50 - 1.05 mg/dL 1.07 (H) 0.98   EGFR >60 mL/min/1.73m*2 59 (L) 66   Calcium 8.6 - 10.3 mg/dL 7.9 (L) 8.2 (L)   WBC 4.4 - 11.3 x10*3/uL 6.6 8.3   nRBC 0.0 - 0.0 /100 WBCs 0.0 0.0   RBC 4.00 - 5.20 x10*6/uL 3.64 (L) 3.68 (L)   HEMOGLOBIN " 12.0 - 16.0 g/dL 11.4 (L) 11.6 (L)   HEMATOCRIT 36.0 - 46.0 % 35.0 (L) 35.6 (L)   MCV 80 - 100 fL 96 97   MCH 26.0 - 34.0 pg 31.3 31.5   MCHC 32.0 - 36.0 g/dL 32.6 32.6   RED CELL DISTRIBUTION WIDTH 11.5 - 14.5 % 17.0 (H) 17.2 (H)   Platelets 150 - 450 x10*3/uL 283 253   (H): Data is abnormally high  (L): Data is abnormally low    CT abdomen pelvis w IV contrast  Result Date: 7/5/2025  Distal sigmoid/rectal mass, compatible with the provided history of cancer. Retroperitoneal lymphadenopathy, compatible with metastatic disease, not significantly changed. Left ureteral stent is in place but the proximal and distal pigtail portions of the catheter are encased with large dense calcifications/encrustations. Persistent severe left-sided hydronephrosis. Urothelial enhancement as well as perinephric and periureteral stranding on the left, suggesting infection. Thickening of the walls of the urinary bladder with surrounding inflammation, compatible with cystitis. Numerous additional unchanged findings as above.   MACRO: None.   Signed by: Dean Crocker 7/5/2025 4:15 PM Dictation workstation:   [id number]    CT angio chest for pulmonary embolism  Result Date: 7/5/2025  No evidence of a pulmonary embolism. Stable pulmonary nodules measuring up to 8 mm in size in the left upper lobe. No new or enlarging nodules. Continued surveillance recommended.   MACRO: None.   Signed by: Dean Crocker 7/5/2025 3:57 PM Dictation workstation:   [id number]                                  Assessment & Plan  Pyelonephritis    Hypokalemia    Nausea    Rectal mass    COPD (chronic obstructive pulmonary disease) (Multi)    Depression with anxiety    Diarrhea    Benign essential HTN    Hyponatremia    Pyelonephritis  - Given Vanco x 2 doses in the ER  - Zosyn 2.25 g IV completed  - Blood Cultures Negative   - Bun/creat 30/1.5 > 31/1.47 > 25/1.24 > 21/1.25 > 18/1.07  - GRF 32 > 50 > 59  - CT a/p:  Distal sigmoid/rectal mass, compatible with the  provided history of cancer. Retroperitoneal lymphadenopathy, compatible with metastatic disease, not significantly changed. Left ureteral stent is in place but the proximal and distal pigtail portions of the catheter are encased with large dense calcifications/ encrustations (from 2023). Persistent severe left-sided hydronephrosis. Urothelial enhancement as well as perinephric and periureteral stranding on the left, suggesting infection. Thickening of the walls of the urinary bladder with surrounding inflammation, compatible with cystitis. Numerous additional unchanged findings as above.  - UA: 500 LE, 3+ blood 2+ protein, >50 WBC, >20 RBC  - Lactate 1.8  - Cipro 500mg PO BID ends 7/13  - Urine culture pending   - was to f/up with Urology after admission in May for same issue     Hypokalemia   Hyponatremia   - K  3 > 3.7 > 4.1 > 4.2 > 4.7  - replaced with IV/oral multiple times  - Continue to monitor, BMP ordered  - Na 130 > 131 > 132 > 138 > 140  - 1L NS ordered at 125 ml/hr continuous, dc'd  - Continue to monitor, labs ordered     Nausea   Diarrhea   - C-diff and stool pathogen: both negative  - incontinent of stool, diarrhea daily at times  - Zofran 4 mg IV prn  - imodium prn, encouraged to use it with every meal, changed to lomotil     Rectal mass    - Patient is not currently being treated. She does have appointments set up with rad onc and med onc   - had flex sig with biopsy of rectal mass in May, no f/up yet  - plan was made for IR para-aortic LN, no f/up yet     COPD (chronic obstructive pulmonary disease) (Multi)  - CTA/chest: No evidence of a pulmonary embolism. Stable pulmonary nodules measuring up to 8 mm in size in the left upper lobe. No new or enlarging nodules. Continued surveillance recommended.  - continue pulmicort   -      Depression with anxiety  -  is currently on hospice  - Does not want to be a burden on her children  - monitor     Benign essential HTN  - continue norvasc,  "metoprolol      GERD  - Protonix 40 mg at bedtime        From previous note   \"Social Issue: her electricity was shut off due to nonpayment.  Her son and daughter-in-law live with her.  Her  is on hospice.  She is attempting to find out if they can be turned back on.\"     GI ppx: PPI  DVT ppx: ambulation  Fluids: as ordered  Electrolytes: replace as needed  Nutrition: reg  Adjuncts: PIV  Code Status: full code  Pt requires inpatient stay at this time          Malissa Schaefer, APRN-CNP    "

## 2025-07-11 NOTE — PROGRESS NOTES
Hortensia Rodriguez is a 59 y.o. female on day 6 of admission presenting with Pyelonephritis.      Subjective   Patient assessed at bedside; sitting up in bed. She complained of nausea and continued diarrhea. Her POC was discussed. She denies SOB, fever, chills.       Objective     Last Recorded Vitals  /66 (BP Location: Right arm, Patient Position: Lying)   Pulse 80   Temp 36.6 °C (97.9 °F) (Temporal)   Resp 19   Wt 67 kg (147 lb 9.6 oz)   SpO2 97%   Intake/Output last 3 Shifts:    Intake/Output Summary (Last 24 hours) at 7/11/2025 1307  Last data filed at 7/10/2025 1700  Gross per 24 hour   Intake 260 ml   Output --   Net 260 ml       Admission Weight  Weight: 65.8 kg (145 lb) (07/05/25 1253)    Daily Weight  07/07/25 : 67 kg (147 lb 9.6 oz)    Image Results  ECG 12 lead  Sinus tachycardia  Possible Left atrial enlargement  Right superior axis deviation  Pulmonary disease pattern  Incomplete right bundle branch block  Right ventricular hypertrophy  Nonspecific ST abnormality  Abnormal ECG  When compared with ECG of 13-MAY-2025 13:27,  ST now depressed in Inferior leads  Nonspecific T wave abnormality no longer evident in Lateral leads      Physical Exam  Constitutional:       Appearance: She is ill-appearing.   HENT:      Head: Normocephalic and atraumatic.      Mouth/Throat:      Mouth: Mucous membranes are moist.   Eyes:      General: No scleral icterus.        Right eye: No discharge.         Left eye: No discharge.   Cardiovascular:      Rate and Rhythm: Normal rate and regular rhythm.      Pulses: Normal pulses.      Heart sounds: Normal heart sounds.   Pulmonary:      Effort: Pulmonary effort is normal.      Breath sounds: Normal breath sounds.   Abdominal:      General: Abdomen is flat. There is no distension.      Palpations: Abdomen is soft.      Tenderness: There is no abdominal tenderness.   Musculoskeletal:         General: No swelling or tenderness.   Skin:     General: Skin is warm.       Capillary Refill: Capillary refill takes less than 2 seconds.      Coloration: Skin is pale.   Neurological:      General: No focal deficit present.      Mental Status: She is alert and oriented to person, place, and time.   Psychiatric:         Mood and Affect: Mood is anxious.      Comments: tearful        Scheduled medications  Scheduled Medications[1]  Continuous medications  Continuous Medications[2]  PRN medications  PRN Medications[3]    Relevant Results                    Assessment & Plan  Pyelonephritis    Hypokalemia    Nausea    Rectal mass    COPD (chronic obstructive pulmonary disease) (Multi)    Depression with anxiety    Diarrhea    Benign essential HTN    Hyponatremia    Pyelonephritis  - Given Vanco x 2 doses in the ER  - Zosyn 2.25 g IV completed  - Blood Cultures Negative   - Bun/creat 30/1.5 > 31/1.47 > 25/1.24 > 21/1.25 > 18/1.07  - GRF 32 > 50 > 59  - CT a/p:  Distal sigmoid/rectal mass, compatible with the provided history of cancer. Retroperitoneal lymphadenopathy, compatible with metastatic disease, not significantly changed. Left ureteral stent is in place but the proximal and distal pigtail portions of the catheter are encased with large dense calcifications/ encrustations (from 2023). Persistent severe left-sided hydronephrosis. Urothelial enhancement as well as perinephric and periureteral stranding on the left, suggesting infection. Thickening of the walls of the urinary bladder with surrounding inflammation, compatible with cystitis. Numerous additional unchanged findings as above.  - UA: 500 LE, 3+ blood 2+ protein, >50 WBC, >20 RBC  - Lactate 1.8  - discontinued Cipro 500mg PO BID ends 7/13  - Urine culture: no growth  - was to f/up with Urology after admission in May for same issue     Hypokalemia, resolved  Hyponatremia, resolved  - K  3 > 3.7 > 4.1 > 4.2 > 4.7  - replaced with IV/oral multiple times  - Continue to monitor, BMP ordered  - Na 130 > 131 > 132 > 138 > 140  - 1L NS  "ordered at 125 ml/hr continuous, dc'd  - Continue to monitor, labs ordered     Nausea   Diarrhea   - C-diff and stool pathogen: both negative  - incontinent of stool, diarrhea daily at times  - continue Zofran 4 mg IV prn  - discontinued imodium prn, encouraged to use it with every meal  - continue lotomil 2.5-0.025 mg QID  - started cholestyramine 4 g QID     Rectal mass    - Patient is not currently being treated. She does have appointments set up with rad onc and med onc   - had flex sig with biopsy of rectal mass in May, no f/up yet  - plan was made for IR para-aortic LN, no f/up yet     COPD without exacerbation  - CTA/chest: No evidence of a pulmonary embolism. Stable pulmonary nodules measuring up to 8 mm in size in the left upper lobe. No new or enlarging nodules. Continued surveillance recommended.  - continue pulmicort 0.5 mg BID, perforomist 20 mcg BID  -      Depression with anxiety  -  is currently on hospice  - Does not want to be a burden on her children  - Social Issue: her electricity was shut off due to nonpayment.  Her son and daughter-in-law live with her.  She is attempting to find out if they can be turned back on.\"   - continue diazepam 5 mg q8h, prn  - monitor     Benign essential HTN  - continue amlodipine 10 mg daily, metoprolol tartrate 25 mg BID  - monitor BP     Chronic GERD  - continue pantoprazole 40 mg at bedtime      DVT ppx: ambulation     Code Status: full code    Disposition: Pt requires inpatient stay at this time           Sierra Kat, APRN-CNP           [1] amLODIPine, 10 mg, oral, Daily  budesonide, 0.5 mg, nebulization, BID   And  formoterol, 20 mcg, nebulization, BID  cholestyramine, 4 g, oral, With meals & nightly  ciprofloxacin, 500 mg, oral, q12h ENEDINA  diphenoxylate-atropine, 1 tablet, oral, 4x daily  ezetimibe, 10 mg, oral, Daily  fluticasone, 2 spray, Each Nostril, Daily  levothyroxine, 25 mcg, oral, Daily  metoprolol tartrate, 25 mg, oral, " BID  miconazole, , Topical, BID  nicotine, 1 patch, transdermal, Daily  pantoprazole, 40 mg, oral, Daily before breakfast  pneumoc 20-elida conj-dip cr(PF), 0.5 mL, intramuscular, During hospitalization  [2] sodium chloride 0.9%, 10 mL/hr, Last Rate: Stopped (07/08/25 9658)  [3] PRN medications: acetaminophen, alum-mag hydroxide-simeth, benzocaine-menthol, diazePAM, hydrocortisone, ipratropium-albuteroL, melatonin, ondansetron, polyethylene glycol, sodium chloride 0.9%

## 2025-07-11 NOTE — PROGRESS NOTES
07/11/25 1125   Discharge Planning   Living Arrangements Spouse/significant other;Children   Support Systems Children;Family members   Assistance Needed [facility] informed that patient lives in a mobile home with her son and daughter in law. Her  is with hospice in a facility. Patient says she is independent with ADLs, IADLs, ambulation and doesn't drive. She does not use home oxygen, CPAP/BiPAP or DME. She is on food stamps and goes to the food bank. She is with [employer]. She will have transportation after 4 pm (DIL works/drives)   Type of Residence Private residence   Number of Stairs to Enter Residence 5   Number of Stairs Within Residence 0   Do you have animals or pets at home? No   Home or Post Acute Services None   Expected Discharge Disposition Home   Does the patient need discharge transport arranged? No   Intensity of Service   Intensity of Service 0-30 min     DC PLAN:  Home when medically stable

## 2025-07-12 LAB
ANION GAP SERPL CALC-SCNC: 9 MMOL/L (ref 10–20)
BUN SERPL-MCNC: 15 MG/DL (ref 6–23)
CALCIUM SERPL-MCNC: 8.4 MG/DL (ref 8.6–10.3)
CHLORIDE SERPL-SCNC: 106 MMOL/L (ref 98–107)
CO2 SERPL-SCNC: 29 MMOL/L (ref 21–32)
CREAT SERPL-MCNC: 0.97 MG/DL (ref 0.5–1.05)
EGFRCR SERPLBLD CKD-EPI 2021: 67 ML/MIN/1.73M*2
ERYTHROCYTE [DISTWIDTH] IN BLOOD BY AUTOMATED COUNT: 17 % (ref 11.5–14.5)
GLUCOSE SERPL-MCNC: 108 MG/DL (ref 74–99)
HCT VFR BLD AUTO: 34.9 % (ref 36–46)
HGB BLD-MCNC: 11.1 G/DL (ref 12–16)
MCH RBC QN AUTO: 30.8 PG (ref 26–34)
MCHC RBC AUTO-ENTMCNC: 31.8 G/DL (ref 32–36)
MCV RBC AUTO: 97 FL (ref 80–100)
NRBC BLD-RTO: 0 /100 WBCS (ref 0–0)
PLATELET # BLD AUTO: 239 X10*3/UL (ref 150–450)
POTASSIUM SERPL-SCNC: 5.2 MMOL/L (ref 3.5–5.3)
RBC # BLD AUTO: 3.6 X10*6/UL (ref 4–5.2)
SODIUM SERPL-SCNC: 139 MMOL/L (ref 136–145)
WBC # BLD AUTO: 6.2 X10*3/UL (ref 4.4–11.3)

## 2025-07-12 PROCEDURE — 1100000001 HC PRIVATE ROOM DAILY: Mod: IPSPLIT

## 2025-07-12 PROCEDURE — 99232 SBSQ HOSP IP/OBS MODERATE 35: CPT | Performed by: NURSE PRACTITIONER

## 2025-07-12 PROCEDURE — 94760 N-INVAS EAR/PLS OXIMETRY 1: CPT | Mod: IPSPLIT

## 2025-07-12 PROCEDURE — 2500000002 HC RX 250 W HCPCS SELF ADMINISTERED DRUGS (ALT 637 FOR MEDICARE OP, ALT 636 FOR OP/ED): Mod: SE,IPSPLIT | Performed by: NURSE PRACTITIONER

## 2025-07-12 PROCEDURE — 94640 AIRWAY INHALATION TREATMENT: CPT | Mod: IPSPLIT

## 2025-07-12 PROCEDURE — 85027 COMPLETE CBC AUTOMATED: CPT | Mod: IPSPLIT | Performed by: NURSE PRACTITIONER

## 2025-07-12 PROCEDURE — 80048 BASIC METABOLIC PNL TOTAL CA: CPT | Mod: IPSPLIT | Performed by: NURSE PRACTITIONER

## 2025-07-12 PROCEDURE — 2500000001 HC RX 250 WO HCPCS SELF ADMINISTERED DRUGS (ALT 637 FOR MEDICARE OP): Mod: SE,IPSPLIT | Performed by: NURSE PRACTITIONER

## 2025-07-12 PROCEDURE — 2500000001 HC RX 250 WO HCPCS SELF ADMINISTERED DRUGS (ALT 637 FOR MEDICARE OP): Mod: SE,IPSPLIT | Performed by: HOSPITALIST

## 2025-07-12 PROCEDURE — 36415 COLL VENOUS BLD VENIPUNCTURE: CPT | Mod: IPSPLIT | Performed by: NURSE PRACTITIONER

## 2025-07-12 PROCEDURE — 2500000002 HC RX 250 W HCPCS SELF ADMINISTERED DRUGS (ALT 637 FOR MEDICARE OP, ALT 636 FOR OP/ED): Mod: SE,IPSPLIT | Performed by: HOSPITALIST

## 2025-07-12 RX ADMIN — BUDESONIDE 0.5 MG: 0.5 INHALANT RESPIRATORY (INHALATION) at 10:02

## 2025-07-12 RX ADMIN — CHOLESTYRAMINE 4 G: 4 POWDER, FOR SUSPENSION ORAL at 12:37

## 2025-07-12 RX ADMIN — METOPROLOL TARTRATE 25 MG: 25 TABLET, FILM COATED ORAL at 08:45

## 2025-07-12 RX ADMIN — CHOLESTYRAMINE 4 G: 4 POWDER, FOR SUSPENSION ORAL at 16:13

## 2025-07-12 RX ADMIN — DIPHENOXYLATE HYDROCHLORIDE AND ATROPINE SULFATE 1 TABLET: .025; 2.5 TABLET ORAL at 12:37

## 2025-07-12 RX ADMIN — PANTOPRAZOLE SODIUM 40 MG: 40 TABLET, DELAYED RELEASE ORAL at 06:31

## 2025-07-12 RX ADMIN — FLUTICASONE PROPIONATE 2 SPRAY: 50 SPRAY, METERED NASAL at 08:45

## 2025-07-12 RX ADMIN — DIPHENOXYLATE HYDROCHLORIDE AND ATROPINE SULFATE 1 TABLET: .025; 2.5 TABLET ORAL at 21:23

## 2025-07-12 RX ADMIN — CHOLESTYRAMINE 4 G: 4 POWDER, FOR SUSPENSION ORAL at 21:23

## 2025-07-12 RX ADMIN — FORMOTEROL FUMARATE DIHYDRATE 20 MCG: 20 SOLUTION RESPIRATORY (INHALATION) at 20:01

## 2025-07-12 RX ADMIN — BUDESONIDE 0.5 MG: 0.5 INHALANT RESPIRATORY (INHALATION) at 20:01

## 2025-07-12 RX ADMIN — FORMOTEROL FUMARATE DIHYDRATE 20 MCG: 20 SOLUTION RESPIRATORY (INHALATION) at 10:02

## 2025-07-12 RX ADMIN — AMLODIPINE BESYLATE 10 MG: 10 TABLET ORAL at 08:45

## 2025-07-12 RX ADMIN — CHOLESTYRAMINE 4 G: 4 POWDER, FOR SUSPENSION ORAL at 08:45

## 2025-07-12 RX ADMIN — DIPHENOXYLATE HYDROCHLORIDE AND ATROPINE SULFATE 1 TABLET: .025; 2.5 TABLET ORAL at 16:13

## 2025-07-12 RX ADMIN — EZETIMIBE 10 MG: 10 TABLET ORAL at 08:45

## 2025-07-12 RX ADMIN — LEVOTHYROXINE SODIUM 25 MCG: 25 TABLET ORAL at 08:45

## 2025-07-12 RX ADMIN — METOPROLOL TARTRATE 25 MG: 25 TABLET, FILM COATED ORAL at 21:23

## 2025-07-12 RX ADMIN — DIPHENOXYLATE HYDROCHLORIDE AND ATROPINE SULFATE 1 TABLET: .025; 2.5 TABLET ORAL at 06:31

## 2025-07-12 ASSESSMENT — COGNITIVE AND FUNCTIONAL STATUS - GENERAL
DAILY ACTIVITIY SCORE: 24
WALKING IN HOSPITAL ROOM: A LITTLE
CLIMB 3 TO 5 STEPS WITH RAILING: A LITTLE
MOBILITY SCORE: 22

## 2025-07-12 ASSESSMENT — PAIN - FUNCTIONAL ASSESSMENT: PAIN_FUNCTIONAL_ASSESSMENT: 0-10

## 2025-07-12 ASSESSMENT — PAIN SCALES - GENERAL
PAINLEVEL_OUTOF10: 0 - NO PAIN
PAINLEVEL_OUTOF10: 3

## 2025-07-12 NOTE — CARE PLAN
The patient's goals for the shift include  To get some rest    The clinical goals for the shift include to continue to have improvement with urination.    2350 Assumed care of patient. Assessment completed as charted. Patient ambulating in room. Denies pain or complaints at this time. States Lomotil is helping with diarrhea. Denies urination symptoms. Call light within reach.    0100 Patient sleeping. No signs or symptoms of distress. Call light within reach.    0300 Patient sleeping. No signs or symptoms of distress. Call light within reach.    0630 In to give am meds. Denies complaints.

## 2025-07-12 NOTE — CARE PLAN
The clinical goals for the shift include Patient will tolerate meals and have less frequent BMs this shift.    Over the shift, the patient tolerated meals well and had no BMs. Vitals remained stable, mild generalized pain reported but pt denied interventions. Patient was up to the bathroom this shift with staff standing by and rested in bed the remainder of the time. Currently in bed with call light in reach, denies any needs at this time.

## 2025-07-12 NOTE — PROGRESS NOTES
Hortensia Rodriguez is a 59 y.o. female on day 7 of admission presenting with Pyelonephritis.      Subjective   Hortensia is seen in her room in no distress.   She expressed that she is still having diarrhea.   Would request hat to be placed in commode to monitor her bowel movements.        Objective     Last Recorded Vitals  /66 (BP Location: Right arm, Patient Position: Sitting)   Pulse 75   Temp 36.4 °C (97.5 °F) (Temporal)   Resp 17   Wt 67 kg (147 lb 9.6 oz)   SpO2 96%   Intake/Output last 3 Shifts:    Intake/Output Summary (Last 24 hours) at 7/12/2025 0949  Last data filed at 7/11/2025 2140  Gross per 24 hour   Intake 480 ml   Output --   Net 480 ml       Admission Weight  Weight: 65.8 kg (145 lb) (07/05/25 1253)    Daily Weight  07/07/25 : 67 kg (147 lb 9.6 oz)    Image Results  ECG 12 lead  Sinus tachycardia  Possible Left atrial enlargement  Right superior axis deviation  Pulmonary disease pattern  Incomplete right bundle branch block  Right ventricular hypertrophy  Nonspecific ST abnormality  Abnormal ECG  When compared with ECG of 13-MAY-2025 13:27,  ST now depressed in Inferior leads  Nonspecific T wave abnormality no longer evident in Lateral leads    Results for orders placed or performed during the hospital encounter of 07/05/25 (from the past 24 hours)   Basic Metabolic Panel   Result Value Ref Range    Glucose 108 (H) 74 - 99 mg/dL    Sodium 139 136 - 145 mmol/L    Potassium 5.2 3.5 - 5.3 mmol/L    Chloride 106 98 - 107 mmol/L    Bicarbonate 29 21 - 32 mmol/L    Anion Gap 9 (L) 10 - 20 mmol/L    Urea Nitrogen 15 6 - 23 mg/dL    Creatinine 0.97 0.50 - 1.05 mg/dL    eGFR 67 >60 mL/min/1.73m*2    Calcium 8.4 (L) 8.6 - 10.3 mg/dL   CBC   Result Value Ref Range    WBC 6.2 4.4 - 11.3 x10*3/uL    nRBC 0.0 0.0 - 0.0 /100 WBCs    RBC 3.60 (L) 4.00 - 5.20 x10*6/uL    Hemoglobin 11.1 (L) 12.0 - 16.0 g/dL    Hematocrit 34.9 (L) 36.0 - 46.0 %    MCV 97 80 - 100 fL    MCH 30.8 26.0 - 34.0 pg    MCHC  31.8 (L) 32.0 - 36.0 g/dL    RDW 17.0 (H) 11.5 - 14.5 %    Platelets 239 150 - 450 x10*3/uL     Physical Exam  Constitutional:       Appearance: Normal  HENT:      Head: Normocephalic and atraumatic.      Mouth/Throat:      Mouth: Mucous membranes are moist.   Eyes:      General: No scleral icterus.        Right eye: No discharge.         Left eye: No discharge.   Cardiovascular:      Rate and Rhythm: Normal rate and regular rhythm.      Pulses: Normal pulses.      Heart sounds: Normal heart sounds.   Pulmonary:      Effort: Pulmonary effort is normal.      Breath sounds: Normal breath sounds.   Abdominal:      General: Abdomen is flat. There is no distension.      Palpations: Abdomen is soft.      Tenderness: There is no abdominal tenderness.   Musculoskeletal:         General: No swelling or tenderness.   Skin:     General: Skin is warm.      Capillary Refill: Capillary refill takes less than 2 seconds.      Coloration: Skin is pale.   Neurological:      General: No focal deficit present.      Mental Status: She is alert and oriented to person, place, and time.   Psychiatric:         Mood and Affect: Mood is anxious.      Comments:     Scheduled medications  Scheduled Medications[1]  Continuous medications  Continuous Medications[2]  PRN medications  PRN Medications[3]      Assessment & Plan  Pyelonephritis    Hypokalemia    Nausea    Rectal mass    COPD (chronic obstructive pulmonary disease) (Multi)    Depression with anxiety    Diarrhea    Benign essential HTN    Hyponatremia      Nausea   Diarrhea   Likely 2/2 rectal mass  - C-diff and stool pathogen: both negative  - incontinent of stool, diarrhea daily at times  - continue Zofran 4 mg IV prn  - discontinued imodium prn, encouraged to use it with every meal  - continue lotomil 2.5-0.025 mg QID  - started cholestyramine 4 g QID  -nursing to place hat in toilet    Rectal mass    - Patient is not currently being treated. She does have appointments set up with  rad onc and med onc   - had flex sig with biopsy of rectal mass in May, no f/up yet  - plan was made for IR para-aortic LN, no f/up yet      Pyelonephritis-culture no growth  - Given Vanco x 2 doses in the ER  - Zosyn 2.25 g IV completed  - Blood Cultures Negative   - Bun/creat 30/1.5 > 31/1.47 > 25/1.24 > 21/1.25 > 18/1.07  - GRF 32 > 50 > 59  - CT a/p:  Distal sigmoid/rectal mass, compatible with the provided history of cancer. Retroperitoneal lymphadenopathy, compatible with metastatic disease, not significantly changed. Left ureteral stent is in place but the proximal and distal pigtail portions of the catheter are encased with large dense calcifications/ encrustations (from 2023). Persistent severe left-sided hydronephrosis. Urothelial enhancement as well as perinephric and periureteral stranding on the left, suggesting infection. Thickening of the walls of the urinary bladder with surrounding inflammation, compatible with cystitis. Numerous additional unchanged findings as above.  - UA: 500 LE, 3+ blood 2+ protein, >50 WBC, >20 RBC  - Lactate 1.8  - discontinued Cipro 500mg PO BID ends 7/13  - Urine culture: no growth  - was to f/up with Urology after admission in May for same issue     Hypokalemia, resolved  Hyponatremia, resolved  - K  3 > 3.7 > 4.1 > 4.2 > 4.7  - replaced with IV/oral multiple times  - Continue to monitor, BMP ordered  - Na 130 > 131 > 132 > 138 > 140  - 1L NS ordered at 125 ml/hr continuous, dc'd  - Continue to monitor, labs ordered       COPD without exacerbation  - CTA/chest: No evidence of a pulmonary embolism. Stable pulmonary nodules measuring up to 8 mm in size in the left upper lobe. No new or enlarging nodules. Continued surveillance recommended.  - continue pulmicort 0.5 mg BID, perforomist 20 mcg BID  -      Depression with anxiety  -  is currently on hospice  - Does not want to be a burden on her children  - Social Issue: her electricity was shut off due to  "nonpayment.  Her son and daughter-in-law live with her.  She is attempting to find out if they can be turned back on.\"   - continue diazepam 5 mg q8h, prn  - monitor     Benign essential HTN  - continue amlodipine 10 mg daily, metoprolol tartrate 25 mg BID  - monitor BP     Chronic GERD  - continue pantoprazole 40 mg at bedtime      DVT ppx: ambulation     Code Status: full code     Disposition: Pt requires inpatient stay at this time           Fadumo Bradley, APRN-CNP           [1] amLODIPine, 10 mg, oral, Daily  budesonide, 0.5 mg, nebulization, BID   And  formoterol, 20 mcg, nebulization, BID  cholestyramine, 4 g, oral, With meals & nightly  diphenoxylate-atropine, 1 tablet, oral, 4x daily  ezetimibe, 10 mg, oral, Daily  fluticasone, 2 spray, Each Nostril, Daily  levothyroxine, 25 mcg, oral, Daily  metoprolol tartrate, 25 mg, oral, BID  miconazole, , Topical, BID  nicotine, 1 patch, transdermal, Daily  pantoprazole, 40 mg, oral, Daily before breakfast  pneumoc 20-elida conj-dip cr(PF), 0.5 mL, intramuscular, During hospitalization  [2] sodium chloride 0.9%, 10 mL/hr, Last Rate: Stopped (07/08/25 7779)  [3] PRN medications: acetaminophen, alum-mag hydroxide-simeth, benzocaine-menthol, diazePAM, hydrocortisone, ipratropium-albuteroL, melatonin, ondansetron, polyethylene glycol, sodium chloride 0.9%    "

## 2025-07-13 LAB
ANION GAP SERPL CALC-SCNC: 9 MMOL/L (ref 10–20)
BUN SERPL-MCNC: 15 MG/DL (ref 6–23)
CALCIUM SERPL-MCNC: 8.6 MG/DL (ref 8.6–10.3)
CHLORIDE SERPL-SCNC: 105 MMOL/L (ref 98–107)
CO2 SERPL-SCNC: 28 MMOL/L (ref 21–32)
CREAT SERPL-MCNC: 1.04 MG/DL (ref 0.5–1.05)
EGFRCR SERPLBLD CKD-EPI 2021: 61 ML/MIN/1.73M*2
ERYTHROCYTE [DISTWIDTH] IN BLOOD BY AUTOMATED COUNT: 16.7 % (ref 11.5–14.5)
GLUCOSE SERPL-MCNC: 97 MG/DL (ref 74–99)
HCT VFR BLD AUTO: 34 % (ref 36–46)
HGB BLD-MCNC: 11.2 G/DL (ref 12–16)
MCH RBC QN AUTO: 31.6 PG (ref 26–34)
MCHC RBC AUTO-ENTMCNC: 32.9 G/DL (ref 32–36)
MCV RBC AUTO: 96 FL (ref 80–100)
NRBC BLD-RTO: 0 /100 WBCS (ref 0–0)
PLATELET # BLD AUTO: 231 X10*3/UL (ref 150–450)
POTASSIUM SERPL-SCNC: 4.7 MMOL/L (ref 3.5–5.3)
RBC # BLD AUTO: 3.54 X10*6/UL (ref 4–5.2)
SODIUM SERPL-SCNC: 137 MMOL/L (ref 136–145)
WBC # BLD AUTO: 6.7 X10*3/UL (ref 4.4–11.3)

## 2025-07-13 PROCEDURE — 36415 COLL VENOUS BLD VENIPUNCTURE: CPT | Mod: IPSPLIT | Performed by: NURSE PRACTITIONER

## 2025-07-13 PROCEDURE — 1100000001 HC PRIVATE ROOM DAILY: Mod: IPSPLIT

## 2025-07-13 PROCEDURE — 85027 COMPLETE CBC AUTOMATED: CPT | Mod: IPSPLIT | Performed by: NURSE PRACTITIONER

## 2025-07-13 PROCEDURE — 2500000002 HC RX 250 W HCPCS SELF ADMINISTERED DRUGS (ALT 637 FOR MEDICARE OP, ALT 636 FOR OP/ED): Mod: SE,IPSPLIT | Performed by: NURSE PRACTITIONER

## 2025-07-13 PROCEDURE — 2500000002 HC RX 250 W HCPCS SELF ADMINISTERED DRUGS (ALT 637 FOR MEDICARE OP, ALT 636 FOR OP/ED): Mod: SE,IPSPLIT | Performed by: HOSPITALIST

## 2025-07-13 PROCEDURE — 2500000001 HC RX 250 WO HCPCS SELF ADMINISTERED DRUGS (ALT 637 FOR MEDICARE OP): Mod: SE,IPSPLIT | Performed by: HOSPITALIST

## 2025-07-13 PROCEDURE — 2500000001 HC RX 250 WO HCPCS SELF ADMINISTERED DRUGS (ALT 637 FOR MEDICARE OP): Mod: SE,IPSPLIT | Performed by: NURSE PRACTITIONER

## 2025-07-13 PROCEDURE — 99233 SBSQ HOSP IP/OBS HIGH 50: CPT | Performed by: NURSE PRACTITIONER

## 2025-07-13 PROCEDURE — 94760 N-INVAS EAR/PLS OXIMETRY 1: CPT | Mod: IPSPLIT

## 2025-07-13 PROCEDURE — 94640 AIRWAY INHALATION TREATMENT: CPT | Mod: IPSPLIT

## 2025-07-13 PROCEDURE — 80048 BASIC METABOLIC PNL TOTAL CA: CPT | Mod: IPSPLIT | Performed by: NURSE PRACTITIONER

## 2025-07-13 RX ORDER — DIPHENOXYLATE HYDROCHLORIDE AND ATROPINE SULFATE 2.5; .025 MG/1; MG/1
1 TABLET ORAL 4 TIMES DAILY
Qty: 40 TABLET | Refills: 0 | Status: SHIPPED | OUTPATIENT
Start: 2025-07-13 | End: 2025-07-23

## 2025-07-13 RX ORDER — CHOLESTYRAMINE 4 G/9G
4 POWDER, FOR SUSPENSION ORAL 2 TIMES DAILY
Qty: 60 PACKET | Refills: 0 | Status: SHIPPED | OUTPATIENT
Start: 2025-07-13 | End: 2025-08-12

## 2025-07-13 RX ADMIN — LEVOTHYROXINE SODIUM 25 MCG: 25 TABLET ORAL at 08:21

## 2025-07-13 RX ADMIN — DIPHENOXYLATE HYDROCHLORIDE AND ATROPINE SULFATE 1 TABLET: .025; 2.5 TABLET ORAL at 12:12

## 2025-07-13 RX ADMIN — METOPROLOL TARTRATE 25 MG: 25 TABLET, FILM COATED ORAL at 08:21

## 2025-07-13 RX ADMIN — EZETIMIBE 10 MG: 10 TABLET ORAL at 08:21

## 2025-07-13 RX ADMIN — BUDESONIDE 0.5 MG: 0.5 INHALANT RESPIRATORY (INHALATION) at 09:40

## 2025-07-13 RX ADMIN — DIPHENOXYLATE HYDROCHLORIDE AND ATROPINE SULFATE 1 TABLET: .025; 2.5 TABLET ORAL at 21:19

## 2025-07-13 RX ADMIN — AMLODIPINE BESYLATE 10 MG: 10 TABLET ORAL at 08:21

## 2025-07-13 RX ADMIN — FORMOTEROL FUMARATE DIHYDRATE 20 MCG: 20 SOLUTION RESPIRATORY (INHALATION) at 21:38

## 2025-07-13 RX ADMIN — CHOLESTYRAMINE 4 G: 4 POWDER, FOR SUSPENSION ORAL at 12:12

## 2025-07-13 RX ADMIN — METOPROLOL TARTRATE 25 MG: 25 TABLET, FILM COATED ORAL at 21:19

## 2025-07-13 RX ADMIN — CHOLESTYRAMINE 4 G: 4 POWDER, FOR SUSPENSION ORAL at 21:19

## 2025-07-13 RX ADMIN — CHOLESTYRAMINE 4 G: 4 POWDER, FOR SUSPENSION ORAL at 17:26

## 2025-07-13 RX ADMIN — FLUTICASONE PROPIONATE 2 SPRAY: 50 SPRAY, METERED NASAL at 08:21

## 2025-07-13 RX ADMIN — FORMOTEROL FUMARATE DIHYDRATE 20 MCG: 20 SOLUTION RESPIRATORY (INHALATION) at 09:40

## 2025-07-13 RX ADMIN — CHOLESTYRAMINE 4 G: 4 POWDER, FOR SUSPENSION ORAL at 08:21

## 2025-07-13 RX ADMIN — DIPHENOXYLATE HYDROCHLORIDE AND ATROPINE SULFATE 1 TABLET: .025; 2.5 TABLET ORAL at 17:26

## 2025-07-13 RX ADMIN — DIPHENOXYLATE HYDROCHLORIDE AND ATROPINE SULFATE 1 TABLET: .025; 2.5 TABLET ORAL at 06:35

## 2025-07-13 RX ADMIN — PANTOPRAZOLE SODIUM 40 MG: 40 TABLET, DELAYED RELEASE ORAL at 06:35

## 2025-07-13 RX ADMIN — BUDESONIDE 0.5 MG: 0.5 INHALANT RESPIRATORY (INHALATION) at 21:38

## 2025-07-13 ASSESSMENT — PAIN SCALES - GENERAL
PAINLEVEL_OUTOF10: 0 - NO PAIN
PAINLEVEL_OUTOF10: 0 - NO PAIN

## 2025-07-13 ASSESSMENT — PAIN - FUNCTIONAL ASSESSMENT: PAIN_FUNCTIONAL_ASSESSMENT: 0-10

## 2025-07-13 NOTE — PROGRESS NOTES
Hortensia Rodriguez is a 59 y.o. female on day 8 of admission presenting with Pyelonephritis.    Subjective   Diarrhea has resolved, urinary symptoms have resolved, her appetite is good, she is able to move around the room with mild shortness of breath.  We discussed discharging home today due to her stability.  She states her car is in the shop and she has no way to get home.  She will stay until tomorrow morning when she can take the free shuttle home.     Objective     Last Recorded Vitals  /64 (BP Location: Right arm, Patient Position: Sitting)   Pulse 72   Temp 36.2 °C (97.2 °F) (Temporal)   Resp 18   Wt 67 kg (147 lb 9.6 oz)   SpO2 96%   Intake/Output last 3 Shifts:    Intake/Output Summary (Last 24 hours) at 7/13/2025 1310  Last data filed at 7/13/2025 0824  Gross per 24 hour   Intake 780 ml   Output --   Net 780 ml     Admission Weight  Weight: 65.8 kg (145 lb) (07/05/25 1253)    Daily Weight  07/07/25 : 67 kg (147 lb 9.6 oz)    Physical Exam  Constitutional:       Appearance: She is ill-appearing.   HENT:      Head: Normocephalic and atraumatic.      Mouth/Throat:      Mouth: Mucous membranes are moist.   Eyes:      General: No scleral icterus.        Right eye: No discharge.         Left eye: No discharge.   Cardiovascular:      Rate and Rhythm: Normal rate and regular rhythm.      Pulses: Normal pulses.      Heart sounds: Normal heart sounds.   Pulmonary:      Effort: Pulmonary effort is normal.      Breath sounds: Normal breath sounds.   Abdominal:      General: Abdomen is flat. There is no distension.      Palpations: Abdomen is soft.      Tenderness: There is no abdominal tenderness.   Musculoskeletal:         General: No swelling or tenderness.   Skin:     General: Skin is warm.      Capillary Refill: Capillary refill takes less than 2 seconds.      Coloration: Skin is pale.   Neurological:      General: No focal deficit present.      Mental Status: She is alert and oriented to person,  place, and time.   Psychiatric:         Mood and Affect: Mood is normal     Scheduled medications  Scheduled Medications[1]  Continuous medications  Continuous Medications[2]  PRN medications  PRN Medications[3]    Relevant Results  ECG 12 lead  Result Date: 7/9/2025  Sinus tachycardia Possible Left atrial enlargement Right superior axis deviation Pulmonary disease pattern Incomplete right bundle branch block Right ventricular hypertrophy Nonspecific ST abnormality Abnormal ECG When compared with ECG of 13-MAY-2025 13:27, ST now depressed in Inferior leads Nonspecific T wave abnormality no longer evident in Lateral leads    CT abdomen pelvis w IV contrast  Result Date: 7/5/2025  Distal sigmoid/rectal mass, compatible with the provided history of cancer. Retroperitoneal lymphadenopathy, compatible with metastatic disease, not significantly changed. Left ureteral stent is in place but the proximal and distal pigtail portions of the catheter are encased with large dense calcifications/encrustations. Persistent severe left-sided hydronephrosis. Urothelial enhancement as well as perinephric and periureteral stranding on the left, suggesting infection. Thickening of the walls of the urinary bladder with surrounding inflammation, compatible with cystitis. Numerous additional unchanged findings as above.   MACRO: None.   Signed by: Dean Crocker 7/5/2025 4:15 PM Dictation workstation:   [id number]    CT angio chest for pulmonary embolism  Result Date: 7/5/2025  No evidence of a pulmonary embolism. Stable pulmonary nodules measuring up to 8 mm in size in the left upper lobe. No new or enlarging nodules. Continued surveillance recommended.   MACRO: None.   Signed by: Dean Crocker 7/5/2025 3:57 PM Dictation workstation:   [id number]     Latest Reference Range & Units 07/10/25 06:19 07/11/25 07:06 07/12/25 06:12 07/13/25 06:00   GLUCOSE 74 - 99 mg/dL 93 102 (H) 108 (H) 97   SODIUM 136 - 145 mmol/L 140 137 139 137   POTASSIUM 3.5  - 5.3 mmol/L 4.7 4.3 5.2 4.7   CHLORIDE 98 - 107 mmol/L 110 (H) 106 106 105   Bicarbonate 21 - 32 mmol/L 24 23 29 28   Anion Gap 10 - 20 mmol/L 11 12 9 (L) 9 (L)   Blood Urea Nitrogen 6 - 23 mg/dL 18 16 15 15   Creatinine 0.50 - 1.05 mg/dL 0.98 0.96 0.97 1.04   EGFR >60 mL/min/1.73m*2 66 67 67 61   Calcium 8.6 - 10.3 mg/dL 8.2 (L) 8.4 (L) 8.4 (L) 8.6   WBC 4.4 - 11.3 x10*3/uL 8.3 7.8 6.2 6.7   nRBC 0.0 - 0.0 /100 WBCs 0.0 0.0 0.0 0.0   RBC 4.00 - 5.20 x10*6/uL 3.68 (L) 3.77 (L) 3.60 (L) 3.54 (L)   HEMOGLOBIN 12.0 - 16.0 g/dL 11.6 (L) 12.0 11.1 (L) 11.2 (L)   HEMATOCRIT 36.0 - 46.0 % 35.6 (L) 36.6 34.9 (L) 34.0 (L)   MCV 80 - 100 fL 97 97 97 96   MCH 26.0 - 34.0 pg 31.5 31.8 30.8 31.6   MCHC 32.0 - 36.0 g/dL 32.6 32.8 31.8 (L) 32.9   RED CELL DISTRIBUTION WIDTH 11.5 - 14.5 % 17.2 (H) 17.2 (H) 17.0 (H) 16.7 (H)   Platelets 150 - 450 x10*3/uL 253 270 239 231     Assessment & Plan  Pyelonephritis    Hypokalemia    Nausea    Rectal mass    COPD (chronic obstructive pulmonary disease) (Multi)    Depression with anxiety    Diarrhea    Benign essential HTN    Hyponatremia    Pyelonephritis  - Given Vanco x 2 doses in the ER  - Zosyn 2.25 g IV completed  - Blood Cultures Negative   - Bun/creat 30/1.5 > 31/1.47 > 25/1.24 > 21/1.25 > 18/1.07  - GRF 32 > 50 > 59  - CT a/p:  Distal sigmoid/rectal mass, compatible with the provided history of cancer. Retroperitoneal lymphadenopathy, compatible with metastatic disease, not significantly changed. Left ureteral stent is in place but the proximal and distal pigtail portions of the catheter are encased with large dense calcifications/ encrustations (from 2023). Persistent severe left-sided hydronephrosis. Urothelial enhancement as well as perinephric and periureteral stranding on the left, suggesting infection. Thickening of the walls of the urinary bladder with surrounding inflammation, compatible with cystitis. Numerous additional unchanged findings as above.  - UA: 500 LE, 3+ blood  2+ protein, >50 WBC, >20 RBC  - Lactate 1.8  - discontinued Cipro 500mg PO BID ends 7/13  - Urine culture: no growth  - was to f/up with Urology after admission in May for same issue     Hypokalemia, resolved  Hyponatremia, resolved  - K  3 > 3.7 > 4.1 > 4.2 > 4.7  - replaced with IV/oral multiple times  - Continue to monitor, BMP ordered  - Na 130 > 131 > 132 > 138 > 140 > 137  - 1L NS ordered at 125 ml/hr continuous, dc'd  - Continue to monitor, labs ordered     Nausea   Diarrhea   - C-diff and stool pathogen: both negative  - incontinent of stool, diarrhea daily at times, resolved  - continue Zofran 4 mg IV prn  - discontinued imodium prn, encouraged to use it with every meal  - continue lotomil 2.5-0.025 mg QID  - started cholestyramine 4 g QID     Rectal mass    - Patient is not currently being treated. She does have appointments set up with rad onc and med onc   - had flex sig with biopsy of rectal mass in May, no f/up yet  - plan was made for IR para-aortic LN, no f/up yet     COPD without exacerbation  - CTA/chest: No evidence of a pulmonary embolism. Stable pulmonary nodules measuring up to 8 mm in size in the left upper lobe. No new or enlarging nodules. Continued surveillance recommended.  - continue pulmicort 0.5 mg BID, perforomist 20 mcg BID  -      Depression with anxiety  -  is currently on hospice  - Does not want to be a burden on her children  - Social Issue: her electricity was shut off due to nonpayment.  Her son and daughter-in-law live with her.  She is attempting to find out if they can be turned back on.  - continue diazepam 5 mg q8h, prn  - monitor     Benign essential HTN  - continue amlodipine 10 mg daily, metoprolol tartrate 25 mg BID  - monitor BP     Chronic GERD  - continue pantoprazole 40 mg at bedtime      DVT ppx: ambulation   Code Status: full code  Disposition: Pt requires inpatient stay at this time     LAN Doran-CNP             [1] amLODIPine, 10  mg, oral, Daily  budesonide, 0.5 mg, nebulization, BID   And  formoterol, 20 mcg, nebulization, BID  cholestyramine, 4 g, oral, With meals & nightly  diphenoxylate-atropine, 1 tablet, oral, 4x daily  ezetimibe, 10 mg, oral, Daily  fluticasone, 2 spray, Each Nostril, Daily  levothyroxine, 25 mcg, oral, Daily  metoprolol tartrate, 25 mg, oral, BID  miconazole, , Topical, BID  nicotine, 1 patch, transdermal, Daily  pantoprazole, 40 mg, oral, Daily before breakfast  pneumoc 20-elida conj-dip cr(PF), 0.5 mL, intramuscular, During hospitalization     [2] sodium chloride 0.9%, 10 mL/hr, Last Rate: Stopped (07/08/25 6183)     [3] PRN medications: acetaminophen, alum-mag hydroxide-simeth, benzocaine-menthol, diazePAM, hydrocortisone, ipratropium-albuteroL, melatonin, ondansetron, polyethylene glycol, sodium chloride 0.9%

## 2025-07-13 NOTE — CARE PLAN
The clinical goals for the shift include Patient will remain free from nausea/vomiting this shift.    Over the shift, the patient has not reported any nausea, vomiting, diarrhea, or abdominal discomfort. Vitals were stable, no pain. Appetite good this morning.

## 2025-07-13 NOTE — CARE PLAN
The clinical goals for the shift include Patient will remain free from nausea/vomiting this shift.    Over the shift, the patient has remained free from from nausea, vomiting, and diarrhea. Patient has been tolerating meals well. Vitals were stable, no reports of pain. Has been resting in bed most of the shift but has been up to the edge at times and ambulated to the bathroom with staff standing by. Currently in bed with call light in reach, no needs reported at this time.

## 2025-07-13 NOTE — CARE PLAN
The patient's goals for the shift include  watch tv and rest    The clinical goals for the shift include Pt will have no c/o diarrhea or N/V throughout shift.    Over the shift, the patient did make progress toward the following goals.      Problem: Discharge Planning  Goal: Discharge to home or other facility with appropriate resources  Outcome: Progressing     Problem: Chronic Conditions and Co-morbidities  Goal: Patient's chronic conditions and co-morbidity symptoms are monitored and maintained or improved  Outcome: Progressing     Problem: Nutrition  Goal: Nutrient intake appropriate for maintaining nutritional needs  Outcome: Progressing     Problem: Skin  Goal: Decreased wound size/increased tissue granulation at next dressing change  Outcome: Progressing  Goal: Participates in plan/prevention/treatment measures  Outcome: Progressing  Goal: Prevent/manage excess moisture  Outcome: Progressing  Goal: Prevent/minimize sheer/friction injuries  Outcome: Progressing  Goal: Promote/optimize nutrition  Outcome: Progressing  Goal: Promote skin healing  Outcome: Progressing     Problem: Fall/Injury  Goal: Not fall by end of shift  Outcome: Progressing  Goal: Be free from injury by end of the shift  Outcome: Progressing  Goal: Verbalize understanding of personal risk factors for fall in the hospital  Outcome: Progressing  Goal: Verbalize understanding of risk factor reduction measures to prevent injury from fall in the home  Outcome: Progressing  Goal: Use assistive devices by end of the shift  Outcome: Progressing  Goal: Pace activities to prevent fatigue by end of the shift  Outcome: Progressing     Problem: Pain  Goal: Takes deep breaths with improved pain control throughout the shift  Outcome: Progressing  Goal: Turns in bed with improved pain control throughout the shift  Outcome: Progressing  Goal: Walks with improved pain control throughout the shift  Outcome: Progressing  Goal: Performs ADL's with improved pain  control throughout shift  Outcome: Progressing  Goal: Participates in PT with improved pain control throughout the shift  Outcome: Progressing  Goal: Free from opioid side effects throughout the shift  Outcome: Progressing  Goal: Free from acute confusion related to pain meds throughout the shift  Outcome: Progressing

## 2025-07-13 NOTE — H&P (VIEW-ONLY)
Hortensia Rodriguez is a 59 y.o. female on day 8 of admission presenting with Pyelonephritis.    Subjective   Diarrhea has resolved, urinary symptoms have resolved, her appetite is good, she is able to move around the room with mild shortness of breath.  We discussed discharging home today due to her stability.  She states her car is in the shop and she has no way to get home.  She will stay until tomorrow morning when she can take the free shuttle home.     Objective     Last Recorded Vitals  /64 (BP Location: Right arm, Patient Position: Sitting)   Pulse 72   Temp 36.2 °C (97.2 °F) (Temporal)   Resp 18   Wt 67 kg (147 lb 9.6 oz)   SpO2 96%   Intake/Output last 3 Shifts:    Intake/Output Summary (Last 24 hours) at 7/13/2025 1310  Last data filed at 7/13/2025 0824  Gross per 24 hour   Intake 780 ml   Output --   Net 780 ml     Admission Weight  Weight: 65.8 kg (145 lb) (07/05/25 1253)    Daily Weight  07/07/25 : 67 kg (147 lb 9.6 oz)    Physical Exam  Constitutional:       Appearance: She is ill-appearing.   HENT:      Head: Normocephalic and atraumatic.      Mouth/Throat:      Mouth: Mucous membranes are moist.   Eyes:      General: No scleral icterus.        Right eye: No discharge.         Left eye: No discharge.   Cardiovascular:      Rate and Rhythm: Normal rate and regular rhythm.      Pulses: Normal pulses.      Heart sounds: Normal heart sounds.   Pulmonary:      Effort: Pulmonary effort is normal.      Breath sounds: Normal breath sounds.   Abdominal:      General: Abdomen is flat. There is no distension.      Palpations: Abdomen is soft.      Tenderness: There is no abdominal tenderness.   Musculoskeletal:         General: No swelling or tenderness.   Skin:     General: Skin is warm.      Capillary Refill: Capillary refill takes less than 2 seconds.      Coloration: Skin is pale.   Neurological:      General: No focal deficit present.      Mental Status: She is alert and oriented to person,  place, and time.   Psychiatric:         Mood and Affect: Mood is normal     Scheduled medications  Scheduled Medications[1]  Continuous medications  Continuous Medications[2]  PRN medications  PRN Medications[3]    Relevant Results  ECG 12 lead  Result Date: 7/9/2025  Sinus tachycardia Possible Left atrial enlargement Right superior axis deviation Pulmonary disease pattern Incomplete right bundle branch block Right ventricular hypertrophy Nonspecific ST abnormality Abnormal ECG When compared with ECG of 13-MAY-2025 13:27, ST now depressed in Inferior leads Nonspecific T wave abnormality no longer evident in Lateral leads    CT abdomen pelvis w IV contrast  Result Date: 7/5/2025  Distal sigmoid/rectal mass, compatible with the provided history of cancer. Retroperitoneal lymphadenopathy, compatible with metastatic disease, not significantly changed. Left ureteral stent is in place but the proximal and distal pigtail portions of the catheter are encased with large dense calcifications/encrustations. Persistent severe left-sided hydronephrosis. Urothelial enhancement as well as perinephric and periureteral stranding on the left, suggesting infection. Thickening of the walls of the urinary bladder with surrounding inflammation, compatible with cystitis. Numerous additional unchanged findings as above.   MACRO: None.   Signed by: Dean Crocker 7/5/2025 4:15 PM Dictation workstation:   [id number]    CT angio chest for pulmonary embolism  Result Date: 7/5/2025  No evidence of a pulmonary embolism. Stable pulmonary nodules measuring up to 8 mm in size in the left upper lobe. No new or enlarging nodules. Continued surveillance recommended.   MACRO: None.   Signed by: Dean Crocker 7/5/2025 3:57 PM Dictation workstation:   [id number]     Latest Reference Range & Units 07/10/25 06:19 07/11/25 07:06 07/12/25 06:12 07/13/25 06:00   GLUCOSE 74 - 99 mg/dL 93 102 (H) 108 (H) 97   SODIUM 136 - 145 mmol/L 140 137 139 137   POTASSIUM 3.5  - 5.3 mmol/L 4.7 4.3 5.2 4.7   CHLORIDE 98 - 107 mmol/L 110 (H) 106 106 105   Bicarbonate 21 - 32 mmol/L 24 23 29 28   Anion Gap 10 - 20 mmol/L 11 12 9 (L) 9 (L)   Blood Urea Nitrogen 6 - 23 mg/dL 18 16 15 15   Creatinine 0.50 - 1.05 mg/dL 0.98 0.96 0.97 1.04   EGFR >60 mL/min/1.73m*2 66 67 67 61   Calcium 8.6 - 10.3 mg/dL 8.2 (L) 8.4 (L) 8.4 (L) 8.6   WBC 4.4 - 11.3 x10*3/uL 8.3 7.8 6.2 6.7   nRBC 0.0 - 0.0 /100 WBCs 0.0 0.0 0.0 0.0   RBC 4.00 - 5.20 x10*6/uL 3.68 (L) 3.77 (L) 3.60 (L) 3.54 (L)   HEMOGLOBIN 12.0 - 16.0 g/dL 11.6 (L) 12.0 11.1 (L) 11.2 (L)   HEMATOCRIT 36.0 - 46.0 % 35.6 (L) 36.6 34.9 (L) 34.0 (L)   MCV 80 - 100 fL 97 97 97 96   MCH 26.0 - 34.0 pg 31.5 31.8 30.8 31.6   MCHC 32.0 - 36.0 g/dL 32.6 32.8 31.8 (L) 32.9   RED CELL DISTRIBUTION WIDTH 11.5 - 14.5 % 17.2 (H) 17.2 (H) 17.0 (H) 16.7 (H)   Platelets 150 - 450 x10*3/uL 253 270 239 231     Assessment & Plan  Pyelonephritis    Hypokalemia    Nausea    Rectal mass    COPD (chronic obstructive pulmonary disease) (Multi)    Depression with anxiety    Diarrhea    Benign essential HTN    Hyponatremia    Pyelonephritis  - Given Vanco x 2 doses in the ER  - Zosyn 2.25 g IV completed  - Blood Cultures Negative   - Bun/creat 30/1.5 > 31/1.47 > 25/1.24 > 21/1.25 > 18/1.07  - GRF 32 > 50 > 59  - CT a/p:  Distal sigmoid/rectal mass, compatible with the provided history of cancer. Retroperitoneal lymphadenopathy, compatible with metastatic disease, not significantly changed. Left ureteral stent is in place but the proximal and distal pigtail portions of the catheter are encased with large dense calcifications/ encrustations (from 2023). Persistent severe left-sided hydronephrosis. Urothelial enhancement as well as perinephric and periureteral stranding on the left, suggesting infection. Thickening of the walls of the urinary bladder with surrounding inflammation, compatible with cystitis. Numerous additional unchanged findings as above.  - UA: 500 LE, 3+ blood  2+ protein, >50 WBC, >20 RBC  - Lactate 1.8  - discontinued Cipro 500mg PO BID ends 7/13  - Urine culture: no growth  - was to f/up with Urology after admission in May for same issue     Hypokalemia, resolved  Hyponatremia, resolved  - K  3 > 3.7 > 4.1 > 4.2 > 4.7  - replaced with IV/oral multiple times  - Continue to monitor, BMP ordered  - Na 130 > 131 > 132 > 138 > 140 > 137  - 1L NS ordered at 125 ml/hr continuous, dc'd  - Continue to monitor, labs ordered     Nausea   Diarrhea   - C-diff and stool pathogen: both negative  - incontinent of stool, diarrhea daily at times, resolved  - continue Zofran 4 mg IV prn  - discontinued imodium prn, encouraged to use it with every meal  - continue lotomil 2.5-0.025 mg QID  - started cholestyramine 4 g QID     Rectal mass    - Patient is not currently being treated. She does have appointments set up with rad onc and med onc   - had flex sig with biopsy of rectal mass in May, no f/up yet  - plan was made for IR para-aortic LN, no f/up yet     COPD without exacerbation  - CTA/chest: No evidence of a pulmonary embolism. Stable pulmonary nodules measuring up to 8 mm in size in the left upper lobe. No new or enlarging nodules. Continued surveillance recommended.  - continue pulmicort 0.5 mg BID, perforomist 20 mcg BID  -      Depression with anxiety  -  is currently on hospice  - Does not want to be a burden on her children  - Social Issue: her electricity was shut off due to nonpayment.  Her son and daughter-in-law live with her.  She is attempting to find out if they can be turned back on.  - continue diazepam 5 mg q8h, prn  - monitor     Benign essential HTN  - continue amlodipine 10 mg daily, metoprolol tartrate 25 mg BID  - monitor BP     Chronic GERD  - continue pantoprazole 40 mg at bedtime      DVT ppx: ambulation   Code Status: full code  Disposition: Pt requires inpatient stay at this time     LAN Doran-CNP             [1] amLODIPine, 10  mg, oral, Daily  budesonide, 0.5 mg, nebulization, BID   And  formoterol, 20 mcg, nebulization, BID  cholestyramine, 4 g, oral, With meals & nightly  diphenoxylate-atropine, 1 tablet, oral, 4x daily  ezetimibe, 10 mg, oral, Daily  fluticasone, 2 spray, Each Nostril, Daily  levothyroxine, 25 mcg, oral, Daily  metoprolol tartrate, 25 mg, oral, BID  miconazole, , Topical, BID  nicotine, 1 patch, transdermal, Daily  pantoprazole, 40 mg, oral, Daily before breakfast  pneumoc 20-elida conj-dip cr(PF), 0.5 mL, intramuscular, During hospitalization     [2] sodium chloride 0.9%, 10 mL/hr, Last Rate: Stopped (07/08/25 4507)     [3] PRN medications: acetaminophen, alum-mag hydroxide-simeth, benzocaine-menthol, diazePAM, hydrocortisone, ipratropium-albuteroL, melatonin, ondansetron, polyethylene glycol, sodium chloride 0.9%

## 2025-07-14 VITALS
WEIGHT: 147.6 LBS | BODY MASS INDEX: 23.72 KG/M2 | TEMPERATURE: 97.7 F | OXYGEN SATURATION: 97 % | HEART RATE: 78 BPM | RESPIRATION RATE: 16 BRPM | HEIGHT: 66 IN | SYSTOLIC BLOOD PRESSURE: 117 MMHG | DIASTOLIC BLOOD PRESSURE: 70 MMHG

## 2025-07-14 VITALS
SYSTOLIC BLOOD PRESSURE: 110 MMHG | BODY MASS INDEX: 23.72 KG/M2 | WEIGHT: 147.6 LBS | HEART RATE: 66 BPM | OXYGEN SATURATION: 96 % | TEMPERATURE: 97.2 F | HEIGHT: 66 IN | RESPIRATION RATE: 16 BRPM | DIASTOLIC BLOOD PRESSURE: 67 MMHG

## 2025-07-14 LAB
ANION GAP SERPL CALC-SCNC: 9 MMOL/L (ref 10–20)
BUN SERPL-MCNC: 22 MG/DL (ref 6–23)
CALCIUM SERPL-MCNC: 8.6 MG/DL (ref 8.6–10.3)
CHLORIDE SERPL-SCNC: 106 MMOL/L (ref 98–107)
CO2 SERPL-SCNC: 27 MMOL/L (ref 21–32)
CREAT SERPL-MCNC: 1 MG/DL (ref 0.5–1.05)
EGFRCR SERPLBLD CKD-EPI 2021: 64 ML/MIN/1.73M*2
ERYTHROCYTE [DISTWIDTH] IN BLOOD BY AUTOMATED COUNT: 16.7 % (ref 11.5–14.5)
GLUCOSE SERPL-MCNC: 91 MG/DL (ref 74–99)
HCT VFR BLD AUTO: 34.9 % (ref 36–46)
HGB BLD-MCNC: 11.4 G/DL (ref 12–16)
MCH RBC QN AUTO: 31.6 PG (ref 26–34)
MCHC RBC AUTO-ENTMCNC: 32.7 G/DL (ref 32–36)
MCV RBC AUTO: 97 FL (ref 80–100)
NRBC BLD-RTO: 0 /100 WBCS (ref 0–0)
PLATELET # BLD AUTO: 224 X10*3/UL (ref 150–450)
POTASSIUM SERPL-SCNC: 5 MMOL/L (ref 3.5–5.3)
RBC # BLD AUTO: 3.61 X10*6/UL (ref 4–5.2)
SODIUM SERPL-SCNC: 137 MMOL/L (ref 136–145)
WBC # BLD AUTO: 6.9 X10*3/UL (ref 4.4–11.3)

## 2025-07-14 PROCEDURE — 2500000002 HC RX 250 W HCPCS SELF ADMINISTERED DRUGS (ALT 637 FOR MEDICARE OP, ALT 636 FOR OP/ED): Mod: SE,IPSPLIT | Performed by: NURSE PRACTITIONER

## 2025-07-14 PROCEDURE — 94640 AIRWAY INHALATION TREATMENT: CPT | Mod: IPSPLIT

## 2025-07-14 PROCEDURE — 99239 HOSP IP/OBS DSCHRG MGMT >30: CPT | Performed by: NURSE PRACTITIONER

## 2025-07-14 PROCEDURE — 85027 COMPLETE CBC AUTOMATED: CPT | Mod: IPSPLIT | Performed by: NURSE PRACTITIONER

## 2025-07-14 PROCEDURE — 82374 ASSAY BLOOD CARBON DIOXIDE: CPT | Mod: IPSPLIT | Performed by: NURSE PRACTITIONER

## 2025-07-14 PROCEDURE — 36415 COLL VENOUS BLD VENIPUNCTURE: CPT | Mod: IPSPLIT | Performed by: NURSE PRACTITIONER

## 2025-07-14 PROCEDURE — 2500000001 HC RX 250 WO HCPCS SELF ADMINISTERED DRUGS (ALT 637 FOR MEDICARE OP): Mod: SE,IPSPLIT | Performed by: HOSPITALIST

## 2025-07-14 PROCEDURE — 2500000002 HC RX 250 W HCPCS SELF ADMINISTERED DRUGS (ALT 637 FOR MEDICARE OP, ALT 636 FOR OP/ED): Mod: SE,IPSPLIT | Performed by: HOSPITALIST

## 2025-07-14 PROCEDURE — 2500000001 HC RX 250 WO HCPCS SELF ADMINISTERED DRUGS (ALT 637 FOR MEDICARE OP): Mod: SE,IPSPLIT | Performed by: NURSE PRACTITIONER

## 2025-07-14 PROCEDURE — 94760 N-INVAS EAR/PLS OXIMETRY 1: CPT | Mod: IPSPLIT

## 2025-07-14 RX ADMIN — CHOLESTYRAMINE 4 G: 4 POWDER, FOR SUSPENSION ORAL at 12:20

## 2025-07-14 RX ADMIN — EZETIMIBE 10 MG: 10 TABLET ORAL at 08:51

## 2025-07-14 RX ADMIN — DIPHENOXYLATE HYDROCHLORIDE AND ATROPINE SULFATE 1 TABLET: .025; 2.5 TABLET ORAL at 06:15

## 2025-07-14 RX ADMIN — FORMOTEROL FUMARATE DIHYDRATE 20 MCG: 20 SOLUTION RESPIRATORY (INHALATION) at 09:34

## 2025-07-14 RX ADMIN — AMLODIPINE BESYLATE 10 MG: 10 TABLET ORAL at 08:53

## 2025-07-14 RX ADMIN — CHOLESTYRAMINE 4 G: 4 POWDER, FOR SUSPENSION ORAL at 08:50

## 2025-07-14 RX ADMIN — DIPHENOXYLATE HYDROCHLORIDE AND ATROPINE SULFATE 1 TABLET: .025; 2.5 TABLET ORAL at 12:19

## 2025-07-14 RX ADMIN — METOPROLOL TARTRATE 25 MG: 25 TABLET, FILM COATED ORAL at 08:51

## 2025-07-14 RX ADMIN — LEVOTHYROXINE SODIUM 25 MCG: 25 TABLET ORAL at 08:51

## 2025-07-14 RX ADMIN — BUDESONIDE 0.5 MG: 0.5 INHALANT RESPIRATORY (INHALATION) at 09:33

## 2025-07-14 RX ADMIN — FLUTICASONE PROPIONATE 2 SPRAY: 50 SPRAY, METERED NASAL at 08:51

## 2025-07-14 RX ADMIN — PANTOPRAZOLE SODIUM 40 MG: 40 TABLET, DELAYED RELEASE ORAL at 06:15

## 2025-07-14 ASSESSMENT — PAIN SCALES - GENERAL: PAINLEVEL_OUTOF10: 0 - NO PAIN

## 2025-07-14 ASSESSMENT — PAIN - FUNCTIONAL ASSESSMENT: PAIN_FUNCTIONAL_ASSESSMENT: 0-10

## 2025-07-14 NOTE — CARE PLAN
The patient's goals for the shift include      The clinical goals for the shift include No c/o N/V this shift    Over the shift, the patient did make progress toward the following goals. Patient had zero c/o N/V or Diarrhea this shift.

## 2025-07-14 NOTE — CARE PLAN
The patient's goals for the shift include      The clinical goals for the shift include N/V/D to resolve by ADOD    Reports one BM today. Tolerated regular breakfast and lunch without nausea. Reviewed discharge instructions. Medications and follow up appointments. All questions answered.

## 2025-07-14 NOTE — PROGRESS NOTES
07/14/25 0816   Discharge Planning   Living Arrangements Spouse/significant other;Children   Support Systems Children;Family members   Assistance Needed [facility] informed that patient lives in a mobile home with her son and daughter in law. Her  is with hospice in a facility. Patient says she is independent with ADLs, IADLs, ambulation and doesn't drive. She does not use home oxygen, CPAP/BiPAP or DME. She is on food stamps and goes to the food bank. She is with [employer]. She will have transportation after 4 pm (DIL works/drives)   Type of Residence Private residence   Number of Stairs to Enter Residence 5   Number of Stairs Within Residence 0   Do you have animals or pets at home? No   Home or Post Acute Services None   Expected Discharge Disposition Home   Does the patient need discharge transport arranged? No   Intensity of Service   Intensity of Service 0-30 min     Patient medically ready for discharge.  Patient follow up information on discharge instructions.  Patient will be returning home. Patient is in agreement with discharge plan.      DC PLAN:  Home

## 2025-07-15 ENCOUNTER — PATIENT OUTREACH (OUTPATIENT)
Dept: PRIMARY CARE | Facility: CLINIC | Age: 60
End: 2025-07-15
Payer: COMMERCIAL

## 2025-07-15 NOTE — DISCHARGE SUMMARY
Discharge Diagnosis  Pyelonephritis     Discharge Meds     Medication List      START taking these medications     cholestyramine 4 gram packet; Commonly known as: Questran; Take 1 packet   (4 g) by mouth 2 times a day.   diphenoxylate-atropine 2.5-0.025 mg tablet; Commonly known as: Lomotil;   Take 1 tablet by mouth 4 times a day for 10 days.     CONTINUE taking these medications     acetaminophen 325 mg tablet; Commonly known as: Tylenol; Take 2 tablets   (650 mg) by mouth every 6 hours if needed for mild pain (1 - 3) or   moderate pain (4 - 6).   albuterol 90 mcg/actuation inhaler; Commonly known as: ProAir HFA;   Inhale 1 puff every 6 hours if needed for wheezing.   amLODIPine 10 mg tablet; Commonly known as: Norvasc; Take 1 tablet (10   mg) by mouth once daily.   ezetimibe 10 mg tablet; Commonly known as: Zetia; Take 1 tablet (10 mg)   by mouth once daily.   fluticasone 50 mcg/actuation nasal spray; Commonly known as: Flonase;   Administer 2 sprays into each nostril once daily. Shake gently. Before   first use, prime pump. After use, clean tip and replace cap.   levothyroxine 25 mcg tablet; Commonly known as: Synthroid, Levoxyl; Take   1 tablet (25 mcg) by mouth once daily.   loperamide 2 mg capsule; Commonly known as: Imodium; Take 1 capsule (2   mg) by mouth 4 times a day as needed for diarrhea.   melatonin 3 mg tablet; Take 2 tablets (6 mg) by mouth once daily at   bedtime.   metoprolol tartrate 25 mg tablet; Commonly known as: Lopressor; Take 1   tablet (25 mg) by mouth 2 times a day.   pantoprazole 40 mg EC tablet; Commonly known as: ProtoNix; Take 1 tablet   (40 mg) by mouth once daily in the morning. Take before meals. Do not   crush, chew, or split.   Select Disposable Briefs misc; Generic drug:   diaper,brief,adult,disposable; Disposable pull up underwear. Uses   approximately 4 per day. Size medium   Symbicort 160-4.5 mcg/actuation inhaler; Generic drug:   budesonide-formoterol; Inhale 2 puffs 2 times  "a day.       Test Results Pending At Discharge  Pending Labs       No current pending labs.            Hospital Course   HPI: \"Hortensia Rodriguez is a 59 y.o. female  with past medical history of COPD , metastatic colon cancer/rectal, hypertension, hypothyroidism, hyperlipidemia presenting with SOB, weakness, nausea/vomiting.\"    Hortensia was admitted to Medicine and treated for pyelonephritis as seen on CT abdomen.  She had a left ureteral stent in place without follow-up removal causing severe left-sided hydronephrosis and infection.  She was treated with IV antibiotics and IV fluids.  Electrolytes were monitored and replaced as needed.  She has also been having nausea and chronic diarrhea in the setting of a rectal mass that she has not gotten treatment for.  Please see oncology note for further information.  Nausea and diarrhea were treated with Zofran, Imodium and Lomotil as well as cholestyramine.  She was discharged home in stable condition to follow-up as needed with her PCP and oncology.   Chronic medical conditions were also addressed and monitored. PT/OT evals were done. Recommendations were made to follow up with pt's PCP in 1-2 weeks. See full inpatient plan below.     Problem list:  Pyelonephritis  - Given Vanco x 2 doses in the ER  - Zosyn 2.25 g IV completed  - Blood Cultures Negative   - Bun/creat 30/1.5 > 31/1.47 > 25/1.24 > 21/1.25 > 18/1.07  - GRF 32 > 50 > 59  - CT a/p:  Distal sigmoid/rectal mass, compatible with the provided history of cancer. Retroperitoneal lymphadenopathy, compatible with metastatic disease, not significantly changed. Left ureteral stent is in place but the proximal and distal pigtail portions of the catheter are encased with large dense calcifications/ encrustations (from 2023). Persistent severe left-sided hydronephrosis. Urothelial enhancement as well as perinephric and periureteral stranding on the left, suggesting infection. Thickening of the walls of the urinary " bladder with surrounding inflammation, compatible with cystitis. Numerous additional unchanged findings as above.  - UA: 500 LE, 3+ blood 2+ protein, >50 WBC, >20 RBC  - Lactate 1.8  - discontinued Cipro 500mg PO BID ends 7/13  - Urine culture: no growth  ---f/up with Urology after admission in May for same issue  ---continue meds listed above     Hypokalemia, resolved  Hyponatremia, resolved     Nausea   Diarrhea   - C-diff and stool pathogen: both negative  - incontinent of stool, diarrhea daily at times, resolved  - continue Zofran 4 mg IV prn  - discontinued imodium prn, encouraged to use it with every meal  - continue lotomil 2.5-0.025 mg QID  - started cholestyramine 4 g QID     Rectal mass    - Patient is not currently being treated. She does have appointments set up with rad onc and med onc   - had flex sig with biopsy of rectal mass in May, no f/up yet  - plan was made for IR para-aortic LN, no f/up yet  ---f/up with Oncology     COPD without exacerbation  Depression with anxiety  Benign essential HTN  Chronic GERD  ---Continue all chronic meds  ---Follow up as needed with PCP      Pertinent Physical Exam At Time of Discharge  Physical Exam  Constitutional:       Appearance: She is ill-appearing.   HENT:      Head: Normocephalic and atraumatic.      Mouth/Throat:      Mouth: Mucous membranes are moist.   Eyes:      General: No scleral icterus.        Right eye: No discharge.         Left eye: No discharge.   Cardiovascular:      Rate and Rhythm: Normal rate and regular rhythm.      Pulses: Normal pulses.      Heart sounds: Normal heart sounds.   Pulmonary:      Effort: Pulmonary effort is normal.      Breath sounds: Normal breath sounds.   Abdominal:      General: Abdomen is flat. There is no distension.      Palpations: Abdomen is soft.      Tenderness: There is no abdominal tenderness.   Musculoskeletal:         General: No swelling or tenderness.   Skin:     General: Skin is warm.      Capillary Refill:  Capillary refill takes less than 2 seconds.      Coloration: Skin is pale.   Neurological:      General: No focal deficit present.      Mental Status: She is alert and oriented to person, place, and time.   Psychiatric:         Mood and Affect: Mood is normal    Stable for discharge to home.  Total cumulative time spent in preparation of this discharge including documentation review, coordination of care with the medical team including PT/SW/care coordinators and treating consultants, discussion with patient and pertinent family members and finalization of prescriptions, follow-up appointments, and this discharge summary was approximately 45 minutes.    Outpatient Follow-Up  Future Appointments   Date Time Provider Department Center   7/23/2025  1:00 PM Ewelina Love MD [id number] [facility]   7/24/2025  9:30 AM NOAH CT 2 W[facility]CT [facility]   8/5/2025 11:30 AM Farhat Sharp MD [id number] [facility]   9/24/2025 10:00 AM Grady Bernardo MD [id number] [facility]   10/14/2025  2:30 PM Ewelina Love MD [id number] [facility]         Zoe Monaco, APRN-CNP

## 2025-07-15 NOTE — PROGRESS NOTES
Discharge Facility: [facility]   Discharge Diagnosis:   Pyelonephritis  Sepsis, due to unspecified organism, unspecified whether acute organ dysfunction present (Multi)  Hypokalemia  Hyponatremia  Acute kidney injury  Diarrhea, unspecified type  Admission Date: 7/5/25  Discharge Date: 7/14/25    PCP Appointment Date: 7/23/25  Specialist Appointment Date:  Appointment with Analy Ansari RN; Alexys Navarrete MD (07/24/2025) - CT   Hospital Encounter and Summary Linked: ED to Hosp-Admission (Discharged) with Isma Berman MD (07/05/2025)     ED Provider Notes by Quita Michael PA-C (07/05/2025 13:49)   See discharge assessment below for further details    Wrap Up  Wrap Up Additional Comments: Pt. Reports that she is doing well at home. She denies any Back pain, dysuria. She states she is in need of refills of all medications at follow up appt. TCM Call ended with brief conversation. Denies further questions/concerns at this time. This callers contact information for non-emergent questions/concerns. (7/15/2025 10:45 AM)    Engagement  Call Start Time: -- (Call completed with Hortensia) (7/15/2025 10:45 AM)    Medications  Medications reviewed with patient/caregiver?: Yes (7/15/2025 10:45 AM)  Is the patient having any side effects they believe may be caused by any medication additions or changes?: No (7/15/2025 10:45 AM)  Does the patient have all medications ordered at discharge?: Yes (7/15/2025 10:45 AM)  Care Management Interventions: No intervention needed (7/15/2025 10:45 AM)  Prescription Comments: START taking: cholestyramine (Questran) diphenoxylate-atropine (Lomotil) (7/15/2025 10:45 AM)  Is the patient taking all medications as directed (includes completed medication regime)?: Yes (7/15/2025 10:45 AM)  Care Management Interventions: Provided patient education (7/15/2025 10:45 AM)  Medication Comments: Pt. denies any med concerns during this TCM Call (7/15/2025 10:45 AM)    Appointments  Does the patient have a  primary care provider?: Yes (7/15/2025 10:45 AM)  Care Management Interventions: Verified appointment date/time/provider; Educated patient on importance of making appointment (7/15/2025 10:45 AM)  Has the patient kept scheduled appointments due by today?: Yes (7/15/2025 10:45 AM)  Care Management Interventions: Advised patient to keep appointment (7/15/2025 10:45 AM)    Self Management  What is the home health agency?: n/a  denies any home care needs (7/15/2025 10:45 AM)  Has home health visited the patient within 72 hours of discharge?: Not applicable (7/15/2025 10:45 AM)  What Durable Medical Equipment (DME) was ordered?: n/a (7/15/2025 10:45 AM)    Patient Teaching  Does the patient have access to their discharge instructions?: Yes (7/15/2025 10:45 AM)  Care Management Interventions: Reviewed instructions with patient (7/15/2025 10:45 AM)  What is the patient's perception of their health status since discharge?: Improving (7/15/2025 10:45 AM)  Is the patient/caregiver able to teach back the hierarchy of who to call/visit for symptoms/problems? PCP, Specialist, Home Health nurse, Urgent Care, ED, 911: Yes (7/15/2025 10:45 AM)  Patient/Caregiver Education Comments: Pt. denies any new concerns post hosptial discharge (7/15/2025 10:45 AM)

## 2025-07-23 ENCOUNTER — APPOINTMENT (OUTPATIENT)
Dept: PRIMARY CARE | Facility: CLINIC | Age: 60
End: 2025-07-23
Payer: COMMERCIAL

## 2025-07-23 DIAGNOSIS — J44.1 CHRONIC OBSTRUCTIVE PULMONARY DISEASE WITH ACUTE EXACERBATION (MULTI): Primary | ICD-10-CM

## 2025-07-24 ENCOUNTER — HOSPITAL ENCOUNTER (OUTPATIENT)
Dept: RADIOLOGY | Facility: HOSPITAL | Age: 60
Discharge: HOME | End: 2025-07-24
Payer: COMMERCIAL

## 2025-07-24 VITALS
HEART RATE: 93 BPM | SYSTOLIC BLOOD PRESSURE: 151 MMHG | TEMPERATURE: 98 F | DIASTOLIC BLOOD PRESSURE: 102 MMHG | RESPIRATION RATE: 20 BRPM | OXYGEN SATURATION: 97 %

## 2025-07-24 DIAGNOSIS — K62.89 RECTAL MASS: ICD-10-CM

## 2025-07-24 LAB
POCT INTERNATIONAL NORMALIZATION RATIO: 1
POCT PROTHROMBIN TIME: 12.2 SECONDS

## 2025-07-24 ASSESSMENT — PAIN SCALES - GENERAL
PAINLEVEL_OUTOF10: 0 - NO PAIN
PAINLEVEL_OUTOF10: 0 - NO PAIN

## 2025-07-24 ASSESSMENT — PAIN - FUNCTIONAL ASSESSMENT: PAIN_FUNCTIONAL_ASSESSMENT: 0-10

## 2025-07-24 NOTE — Clinical Note
Dr Navarrete spoke with pt in length.  Procedure cancelled.  Dr Navarrete went to go talk to pt daughter n law about why this procedure was cancelled

## 2025-07-24 NOTE — POST-PROCEDURE NOTE
"Interventional Radiology Brief Postprocedure Note    Attending: Alexys Navarrete MD     Diagnosis: Rectal mass    Description of procedure: None     Anesthesia:  {ANESTHESIA TYPES W/ SEDATION:24512}    Complications: {Mis complications:83257}    Estimated Blood Loss: {JOSUE - ESTIMATED BLOOD LOSS:74348}    Medications (Filter: Administrations occurring from 0940 to 1042 on 07/24/25)      None          No specimens collected      See detailed result report with images in PACS.    The patient tolerated the procedure well without incident or complication and is in {stable/unstable:30552::\"stable\"} condition.     "

## 2025-07-24 NOTE — DISCHARGE INSTRUCTIONS
Patient and Family Education  What is a Biopsy or Aspiration?  A biopsy or aspiration is used to help doctors diagnose disease. Small pieces of tissue or cells  are taken from the area using a special kind of needle. The tissue is sent to the lab to be looked  at under a microscope. The procedure can take up to 1 hour.  Before the Test:  ? If you take blood thinning medications, you Must ask your doctor when you should stop  taking them. You may need to stop taking the medicine up to seven (7) days prior to the  test.  ? Do Not Drink or Eat Anything after midnight the day before the test. You may take  medications with a small amount of clear liquid.  ? If you take daily oral diabetic medications, contact your Radiologist or your Radiology  Nurse to determine if you should take your medicine before the test or adjust the dosage.  ? Make plans for a ride home if you are an outpatient.  ? If you have an allergy to iodine or iodinated contrast, your doctor may prescribe special  pills to take before the test.  During the Test:  ? You will lie on a padded X-Ray table.  ? You may be given medicine that will make you drowsy.  ? You will also be given a local anesthetic to numb the biopsy site.  ? You will feel pressure during the biopsy.  After the Test:  ? You will remain on bed rest for 1 to 4 hours.  ? Your blood pressure, pulse and biopsy site will be checked often.  ? If a large sample of tissue needs to be taken, you may be admitted to the hospital for  observation.  Following these instructions for a safer recovery:     Page 2 of 2  Activity:  ? Limit your activity for 24 hours after the test.  ? Do not drive for 24 hours.  ? Do not do any heavy lifting, such as groceries, for 24 hours.  ? Avoid intense exercise and contact sports for 24 hours.  Diet:  ? You may resume your normal diet.  Medicines:  ? If you take medications to thin your blood, ask your doctor when you should start taking  them after the test.  ?  You may take your other medicines as ordered by your doctor.  Call your Doctor if you have:  ? Redness, swelling or pus-like drainage at the biopsy site.  ? Temperature of 100.4 F degrees or higher.  ? Increased pain or tenderness at the biopsy site.  ? Any questions.  Call your Doctor Right Away if you have any of the Signs:  ? Increase in pain in the biopsy site.  ? Dizziness or fainting.  ? Shortness of breath or trouble breathing.  ? Bleeding from the biopsy site.  If you are not able to contact your doctor, call 911 and/or go to the nearest hospital.     How to Reach your Doctor:  Call Dr. Love at [phone] with problems or questions.

## 2025-07-25 DIAGNOSIS — C20 RECTAL ADENOCARCINOMA (MULTI): Primary | ICD-10-CM

## 2025-07-29 ENCOUNTER — PATIENT OUTREACH (OUTPATIENT)
Dept: PRIMARY CARE | Facility: CLINIC | Age: 60
End: 2025-07-29
Payer: COMMERCIAL

## 2025-07-29 LAB
ATRIAL RATE: 132 BPM
P AXIS: 78 DEGREES
P OFFSET: 190 MS
P ONSET: 143 MS
PR INTERVAL: 130 MS
Q ONSET: 198 MS
QRS COUNT: 22 BEATS
QRS DURATION: 114 MS
QT INTERVAL: 334 MS
QTC CALCULATION(BAZETT): 494 MS
QTC FREDERICIA: 434 MS
R AXIS: 267 DEGREES
T AXIS: 73 DEGREES
T OFFSET: 365 MS
VENTRICULAR RATE: 132 BPM

## 2025-08-21 ENCOUNTER — HOSPITAL ENCOUNTER (OUTPATIENT)
Dept: RADIOLOGY | Facility: HOSPITAL | Age: 60
Discharge: HOME | End: 2025-08-21
Payer: COMMERCIAL

## 2025-08-21 VITALS
BODY MASS INDEX: 24.11 KG/M2 | HEART RATE: 99 BPM | RESPIRATION RATE: 16 BRPM | WEIGHT: 150 LBS | SYSTOLIC BLOOD PRESSURE: 151 MMHG | TEMPERATURE: 98.8 F | HEIGHT: 66 IN | OXYGEN SATURATION: 98 % | DIASTOLIC BLOOD PRESSURE: 95 MMHG

## 2025-08-21 DIAGNOSIS — C20 RECTAL ADENOCARCINOMA (MULTI): ICD-10-CM

## 2025-08-21 LAB
ERYTHROCYTE [DISTWIDTH] IN BLOOD BY AUTOMATED COUNT: 16.5 % (ref 11.5–14.5)
HCT VFR BLD AUTO: 39.9 % (ref 36–46)
HGB BLD-MCNC: 13.6 G/DL (ref 12–16)
INR PPP: 0.9 (ref 0.9–1.2)
MCH RBC QN AUTO: 31.5 PG (ref 26–34)
MCHC RBC AUTO-ENTMCNC: 34.1 G/DL (ref 32–36)
MCV RBC AUTO: 92 FL (ref 80–100)
NRBC BLD-RTO: 0 /100 WBCS (ref 0–0)
PLATELET # BLD AUTO: 375 X10*3/UL (ref 150–450)
PROTHROMBIN TIME: 10.3 SECONDS (ref 9.3–12.7)
RBC # BLD AUTO: 4.32 X10*6/UL (ref 4–5.2)
WBC # BLD AUTO: 8.1 X10*3/UL (ref 4.4–11.3)

## 2025-08-21 PROCEDURE — 99223 1ST HOSP IP/OBS HIGH 75: CPT | Performed by: NURSE PRACTITIONER

## 2025-08-21 PROCEDURE — 2720000007 HC OR 272 NO HCPCS

## 2025-08-21 PROCEDURE — 7100000009 HC PHASE TWO TIME - INITIAL BASE CHARGE

## 2025-08-21 PROCEDURE — 77012 CT SCAN FOR NEEDLE BIOPSY: CPT

## 2025-08-21 PROCEDURE — 85610 PROTHROMBIN TIME: CPT | Performed by: NURSE PRACTITIONER

## 2025-08-21 PROCEDURE — 99153 MOD SED SAME PHYS/QHP EA: CPT

## 2025-08-21 PROCEDURE — 85027 COMPLETE CBC AUTOMATED: CPT | Performed by: NURSE PRACTITIONER

## 2025-08-21 PROCEDURE — 49180 BIOPSY ABDOMINAL MASS: CPT

## 2025-08-21 PROCEDURE — 99152 MOD SED SAME PHYS/QHP 5/>YRS: CPT

## 2025-08-21 PROCEDURE — 2500000004 HC RX 250 GENERAL PHARMACY W/ HCPCS (ALT 636 FOR OP/ED): Performed by: RADIOLOGY

## 2025-08-21 PROCEDURE — 36415 COLL VENOUS BLD VENIPUNCTURE: CPT | Performed by: NURSE PRACTITIONER

## 2025-08-21 PROCEDURE — 7100000010 HC PHASE TWO TIME - EACH INCREMENTAL 1 MINUTE

## 2025-08-21 RX ORDER — LIDOCAINE HYDROCHLORIDE 10 MG/ML
INJECTION, SOLUTION EPIDURAL; INFILTRATION; INTRACAUDAL; PERINEURAL
Status: COMPLETED | OUTPATIENT
Start: 2025-08-21 | End: 2025-08-21

## 2025-08-21 RX ORDER — MIDAZOLAM HYDROCHLORIDE 1 MG/ML
INJECTION, SOLUTION INTRAMUSCULAR; INTRAVENOUS
Status: COMPLETED | OUTPATIENT
Start: 2025-08-21 | End: 2025-08-21

## 2025-08-21 RX ORDER — FENTANYL CITRATE 50 UG/ML
INJECTION, SOLUTION INTRAMUSCULAR; INTRAVENOUS
Status: COMPLETED | OUTPATIENT
Start: 2025-08-21 | End: 2025-08-21

## 2025-08-21 RX ADMIN — LIDOCAINE HYDROCHLORIDE 5 ML: 10 INJECTION, SOLUTION EPIDURAL; INFILTRATION; INTRACAUDAL; PERINEURAL at 10:53

## 2025-08-21 RX ADMIN — MIDAZOLAM 2 MG: 1 INJECTION INTRAMUSCULAR; INTRAVENOUS at 10:24

## 2025-08-21 RX ADMIN — LIDOCAINE HYDROCHLORIDE 4 ML: 10 INJECTION, SOLUTION EPIDURAL; INFILTRATION; INTRACAUDAL; PERINEURAL at 10:33

## 2025-08-21 RX ADMIN — LIDOCAINE HYDROCHLORIDE 5 ML: 10 INJECTION, SOLUTION EPIDURAL; INFILTRATION; INTRACAUDAL; PERINEURAL at 10:31

## 2025-08-21 RX ADMIN — FENTANYL CITRATE 50 MCG: 0.05 INJECTION, SOLUTION INTRAMUSCULAR; INTRAVENOUS at 10:47

## 2025-08-21 RX ADMIN — MIDAZOLAM 1 MG: 1 INJECTION INTRAMUSCULAR; INTRAVENOUS at 10:47

## 2025-08-21 RX ADMIN — FENTANYL CITRATE 50 MCG: 0.05 INJECTION, SOLUTION INTRAMUSCULAR; INTRAVENOUS at 10:24

## 2025-08-21 ASSESSMENT — ENCOUNTER SYMPTOMS
HEMATOLOGIC/LYMPHATIC NEGATIVE: 1
FATIGUE: 1
MUSCULOSKELETAL NEGATIVE: 1
ENDOCRINE NEGATIVE: 1
CARDIOVASCULAR NEGATIVE: 1
EYES NEGATIVE: 1
SHORTNESS OF BREATH: 1
COUGH: 1
ALLERGIC/IMMUNOLOGIC NEGATIVE: 1
GASTROINTESTINAL NEGATIVE: 1
PSYCHIATRIC NEGATIVE: 1
NEUROLOGICAL NEGATIVE: 1

## 2025-08-21 ASSESSMENT — PAIN SCALES - GENERAL
PAINLEVEL_OUTOF10: 0 - NO PAIN
PAINLEVEL_OUTOF10: 2
PAINLEVEL_OUTOF10: 2
PAINLEVEL_OUTOF10: 0 - NO PAIN
PAINLEVEL_OUTOF10: 2
PAINLEVEL_OUTOF10: 0 - NO PAIN

## 2025-08-21 ASSESSMENT — PAIN - FUNCTIONAL ASSESSMENT: PAIN_FUNCTIONAL_ASSESSMENT: 0-10

## 2025-08-28 LAB
CELL COUNT (BLOOD): 0.03 X10*3/UL
CELL POPULATIONS: NORMAL
DIAGNOSIS: NORMAL
FLOW DIFFERENTIAL: NORMAL
FLOW TEST ORDERED: NORMAL
LAB TEST METHOD: NORMAL
NUMBER OF CELLS COLLECTED: NORMAL
PATH REPORT.TOTAL CANCER: NORMAL
SIGNATURE COMMENT: NORMAL
SPECIMEN VIABILITY: NORMAL

## 2025-09-02 LAB
LAB AP ASR DISCLAIMER: NORMAL
LABORATORY COMMENT REPORT: NORMAL
PATH REPORT.FINAL DX SPEC: NORMAL
PATH REPORT.GROSS SPEC: NORMAL
PATH REPORT.RELEVANT HX SPEC: NORMAL
PATH REPORT.TOTAL CANCER: NORMAL

## 2025-09-03 DIAGNOSIS — C20 RECTAL ADENOCARCINOMA (MULTI): Primary | ICD-10-CM

## 2025-09-05 ENCOUNTER — HOSPITAL ENCOUNTER (INPATIENT)
Facility: HOSPITAL | Age: 60
End: 2025-09-05
Attending: EMERGENCY MEDICINE | Admitting: INTERNAL MEDICINE
Payer: COMMERCIAL

## 2025-09-05 DIAGNOSIS — E87.6 HYPOKALEMIA: Primary | ICD-10-CM

## 2025-09-05 DIAGNOSIS — R00.0 TACHYCARDIA: ICD-10-CM

## 2025-09-05 DIAGNOSIS — N30.00 ACUTE CYSTITIS WITHOUT HEMATURIA: ICD-10-CM

## 2025-09-05 LAB
ALBUMIN SERPL BCP-MCNC: 4.5 G/DL (ref 3.4–5)
ALP SERPL-CCNC: 129 U/L (ref 33–136)
ALT SERPL W P-5'-P-CCNC: 7 U/L (ref 7–45)
ANION GAP SERPL CALC-SCNC: 22 MMOL/L (ref 10–20)
APPEARANCE UR: ABNORMAL
AST SERPL W P-5'-P-CCNC: 12 U/L (ref 9–39)
BACTERIA #/AREA URNS AUTO: ABNORMAL /HPF
BASOPHILS # BLD AUTO: 0.03 X10*3/UL (ref 0–0.1)
BASOPHILS NFR BLD AUTO: 0.4 %
BILIRUB SERPL-MCNC: 0.6 MG/DL (ref 0–1.2)
BILIRUB UR STRIP.AUTO-MCNC: NEGATIVE MG/DL
BNP SERPL-MCNC: 37 PG/ML (ref 0–99)
BUN SERPL-MCNC: 23 MG/DL (ref 6–23)
CALCIUM SERPL-MCNC: 9.8 MG/DL (ref 8.6–10.3)
CARDIAC TROPONIN I PNL SERPL HS: 6 NG/L (ref 0–13)
CARDIAC TROPONIN I PNL SERPL HS: 6 NG/L (ref 0–13)
CHLORIDE SERPL-SCNC: 89 MMOL/L (ref 98–107)
CO2 SERPL-SCNC: 24 MMOL/L (ref 21–32)
COLOR UR: ABNORMAL
CREAT SERPL-MCNC: 1.48 MG/DL (ref 0.5–1.05)
EGFRCR SERPLBLD CKD-EPI 2021: 40 ML/MIN/1.73M*2
EOSINOPHIL # BLD AUTO: 0.03 X10*3/UL (ref 0–0.7)
EOSINOPHIL NFR BLD AUTO: 0.4 %
ERYTHROCYTE [DISTWIDTH] IN BLOOD BY AUTOMATED COUNT: 15.9 % (ref 11.5–14.5)
GLUCOSE SERPL-MCNC: 162 MG/DL (ref 74–99)
GLUCOSE UR STRIP.AUTO-MCNC: NORMAL MG/DL
HCT VFR BLD AUTO: 41.5 % (ref 36–46)
HGB BLD-MCNC: 14.5 G/DL (ref 12–16)
IMM GRANULOCYTES # BLD AUTO: 0.04 X10*3/UL (ref 0–0.7)
IMM GRANULOCYTES NFR BLD AUTO: 0.5 % (ref 0–0.9)
KETONES UR STRIP.AUTO-MCNC: NEGATIVE MG/DL
LEUKOCYTE ESTERASE UR QL STRIP.AUTO: ABNORMAL
LYMPHOCYTES # BLD AUTO: 2.18 X10*3/UL (ref 1.2–4.8)
LYMPHOCYTES NFR BLD AUTO: 27.3 %
MAGNESIUM SERPL-MCNC: 2.18 MG/DL (ref 1.6–2.4)
MCH RBC QN AUTO: 31.7 PG (ref 26–34)
MCHC RBC AUTO-ENTMCNC: 34.9 G/DL (ref 32–36)
MCV RBC AUTO: 91 FL (ref 80–100)
MONOCYTES # BLD AUTO: 0.83 X10*3/UL (ref 0.1–1)
MONOCYTES NFR BLD AUTO: 10.4 %
NEUTROPHILS # BLD AUTO: 4.87 X10*3/UL (ref 1.2–7.7)
NEUTROPHILS NFR BLD AUTO: 61 %
NITRITE UR QL STRIP.AUTO: ABNORMAL
NRBC BLD-RTO: 0 /100 WBCS (ref 0–0)
PH UR STRIP.AUTO: 7.5 [PH]
PLATELET # BLD AUTO: 382 X10*3/UL (ref 150–450)
POTASSIUM SERPL-SCNC: 2 MMOL/L (ref 3.5–5.3)
PROT SERPL-MCNC: 8.1 G/DL (ref 6.4–8.2)
PROT UR STRIP.AUTO-MCNC: ABNORMAL MG/DL
RBC # BLD AUTO: 4.58 X10*6/UL (ref 4–5.2)
RBC # UR STRIP.AUTO: ABNORMAL MG/DL
RBC #/AREA URNS AUTO: >20 /HPF
SODIUM SERPL-SCNC: 133 MMOL/L (ref 136–145)
SP GR UR STRIP.AUTO: 1.02
UROBILINOGEN UR STRIP.AUTO-MCNC: NORMAL MG/DL
WBC # BLD AUTO: 8 X10*3/UL (ref 4.4–11.3)
WBC #/AREA URNS AUTO: >50 /HPF

## 2025-09-05 PROCEDURE — 36415 COLL VENOUS BLD VENIPUNCTURE: CPT | Performed by: PHYSICIAN ASSISTANT

## 2025-09-05 PROCEDURE — 2500000004 HC RX 250 GENERAL PHARMACY W/ HCPCS (ALT 636 FOR OP/ED): Mod: SE | Performed by: PHYSICIAN ASSISTANT

## 2025-09-05 PROCEDURE — 84484 ASSAY OF TROPONIN QUANT: CPT | Performed by: EMERGENCY MEDICINE

## 2025-09-05 PROCEDURE — 83735 ASSAY OF MAGNESIUM: CPT | Performed by: PHYSICIAN ASSISTANT

## 2025-09-05 PROCEDURE — 2500000002 HC RX 250 W HCPCS SELF ADMINISTERED DRUGS (ALT 637 FOR MEDICARE OP, ALT 636 FOR OP/ED): Mod: SE | Performed by: NURSE PRACTITIONER

## 2025-09-05 PROCEDURE — 81001 URINALYSIS AUTO W/SCOPE: CPT | Performed by: PHYSICIAN ASSISTANT

## 2025-09-05 PROCEDURE — 36415 COLL VENOUS BLD VENIPUNCTURE: CPT | Performed by: EMERGENCY MEDICINE

## 2025-09-05 PROCEDURE — 84075 ASSAY ALKALINE PHOSPHATASE: CPT | Performed by: PHYSICIAN ASSISTANT

## 2025-09-05 PROCEDURE — G0378 HOSPITAL OBSERVATION PER HR: HCPCS

## 2025-09-05 PROCEDURE — 94640 AIRWAY INHALATION TREATMENT: CPT

## 2025-09-05 PROCEDURE — 2500000001 HC RX 250 WO HCPCS SELF ADMINISTERED DRUGS (ALT 637 FOR MEDICARE OP): Mod: SE | Performed by: NURSE PRACTITIONER

## 2025-09-05 PROCEDURE — 83880 ASSAY OF NATRIURETIC PEPTIDE: CPT | Performed by: EMERGENCY MEDICINE

## 2025-09-05 PROCEDURE — 84484 ASSAY OF TROPONIN QUANT: CPT | Performed by: PHYSICIAN ASSISTANT

## 2025-09-05 PROCEDURE — 87086 URINE CULTURE/COLONY COUNT: CPT | Mod: GENLAB | Performed by: PHYSICIAN ASSISTANT

## 2025-09-05 PROCEDURE — 80053 COMPREHEN METABOLIC PANEL: CPT | Performed by: EMERGENCY MEDICINE

## 2025-09-05 PROCEDURE — 96372 THER/PROPH/DIAG INJ SC/IM: CPT | Performed by: NURSE PRACTITIONER

## 2025-09-05 PROCEDURE — 94760 N-INVAS EAR/PLS OXIMETRY 1: CPT

## 2025-09-05 PROCEDURE — 2550000001 HC RX 255 CONTRASTS: Mod: SE | Performed by: EMERGENCY MEDICINE

## 2025-09-05 PROCEDURE — 99285 EMERGENCY DEPT VISIT HI MDM: CPT | Mod: 25 | Performed by: EMERGENCY MEDICINE

## 2025-09-05 PROCEDURE — 2500000002 HC RX 250 W HCPCS SELF ADMINISTERED DRUGS (ALT 637 FOR MEDICARE OP, ALT 636 FOR OP/ED): Mod: SE | Performed by: PHYSICIAN ASSISTANT

## 2025-09-05 PROCEDURE — 85025 COMPLETE CBC W/AUTO DIFF WBC: CPT | Performed by: EMERGENCY MEDICINE

## 2025-09-05 PROCEDURE — 2500000004 HC RX 250 GENERAL PHARMACY W/ HCPCS (ALT 636 FOR OP/ED): Mod: SE | Performed by: NURSE PRACTITIONER

## 2025-09-05 PROCEDURE — 94664 DEMO&/EVAL PT USE INHALER: CPT

## 2025-09-05 PROCEDURE — 83880 ASSAY OF NATRIURETIC PEPTIDE: CPT | Performed by: PHYSICIAN ASSISTANT

## 2025-09-05 RX ORDER — METOPROLOL TARTRATE 25 MG/1
25 TABLET, FILM COATED ORAL 2 TIMES DAILY
Status: DISPENSED | OUTPATIENT
Start: 2025-09-05

## 2025-09-05 RX ORDER — LEVOTHYROXINE SODIUM 25 UG/1
25 TABLET ORAL DAILY
Status: DISPENSED | OUTPATIENT
Start: 2025-09-05

## 2025-09-05 RX ORDER — LOPERAMIDE HYDROCHLORIDE 2 MG/1
2 CAPSULE ORAL 4 TIMES DAILY PRN
Status: DISPENSED | OUTPATIENT
Start: 2025-09-05

## 2025-09-05 RX ORDER — AMOXICILLIN 250 MG
1 CAPSULE ORAL NIGHTLY PRN
Status: ACTIVE | OUTPATIENT
Start: 2025-09-05

## 2025-09-05 RX ORDER — ACETAMINOPHEN 500 MG
5 TABLET ORAL NIGHTLY PRN
Status: ACTIVE | OUTPATIENT
Start: 2025-09-05

## 2025-09-05 RX ORDER — ALBUTEROL SULFATE 0.83 MG/ML
3 SOLUTION RESPIRATORY (INHALATION) EVERY 6 HOURS PRN
Status: ACTIVE | OUTPATIENT
Start: 2025-09-05

## 2025-09-05 RX ORDER — AMLODIPINE BESYLATE 10 MG/1
10 TABLET ORAL DAILY
Status: DISPENSED | OUTPATIENT
Start: 2025-09-05

## 2025-09-05 RX ORDER — CHOLESTYRAMINE 4 G/9G
4 POWDER, FOR SUSPENSION ORAL 2 TIMES DAILY
Status: DISPENSED | OUTPATIENT
Start: 2025-09-05

## 2025-09-05 RX ORDER — PANTOPRAZOLE SODIUM 40 MG/1
40 TABLET, DELAYED RELEASE ORAL
Status: DISPENSED | OUTPATIENT
Start: 2025-09-06

## 2025-09-05 RX ORDER — FLUTICASONE PROPIONATE 50 MCG
2 SPRAY, SUSPENSION (ML) NASAL DAILY
Status: DISPENSED | OUTPATIENT
Start: 2025-09-06

## 2025-09-05 RX ORDER — ONDANSETRON HYDROCHLORIDE 2 MG/ML
4 INJECTION, SOLUTION INTRAVENOUS EVERY 8 HOURS PRN
Status: ACTIVE | OUTPATIENT
Start: 2025-09-05

## 2025-09-05 RX ORDER — POTASSIUM CHLORIDE 14.9 MG/ML
20 INJECTION INTRAVENOUS
Status: COMPLETED | OUTPATIENT
Start: 2025-09-05 | End: 2025-09-05

## 2025-09-05 RX ORDER — ENOXAPARIN SODIUM 100 MG/ML
40 INJECTION SUBCUTANEOUS EVERY 24 HOURS
Status: DISPENSED | OUTPATIENT
Start: 2025-09-05

## 2025-09-05 RX ORDER — FORMOTEROL FUMARATE 20 UG/2ML
20 SOLUTION RESPIRATORY (INHALATION)
Status: DISPENSED | OUTPATIENT
Start: 2025-09-05

## 2025-09-05 RX ORDER — POTASSIUM CHLORIDE 1.5 G/1.58G
20 POWDER, FOR SOLUTION ORAL DAILY
Status: ON HOLD | COMMUNITY

## 2025-09-05 RX ORDER — CALCIUM CARBONATE 200(500)MG
500 TABLET,CHEWABLE ORAL 4 TIMES DAILY PRN
Status: ACTIVE | OUTPATIENT
Start: 2025-09-05

## 2025-09-05 RX ORDER — ACETAMINOPHEN 325 MG/1
650 TABLET ORAL EVERY 4 HOURS PRN
Status: DISPENSED | OUTPATIENT
Start: 2025-09-05

## 2025-09-05 RX ORDER — CEFTRIAXONE 1 G/50ML
1 INJECTION, SOLUTION INTRAVENOUS ONCE
Status: COMPLETED | OUTPATIENT
Start: 2025-09-05 | End: 2025-09-05

## 2025-09-05 RX ORDER — PANTOPRAZOLE SODIUM 40 MG/10ML
40 INJECTION, POWDER, LYOPHILIZED, FOR SOLUTION INTRAVENOUS
Status: ACTIVE | OUTPATIENT
Start: 2025-09-06

## 2025-09-05 RX ORDER — EZETIMIBE 10 MG/1
10 TABLET ORAL DAILY
Status: DISPENSED | OUTPATIENT
Start: 2025-09-05

## 2025-09-05 RX ORDER — ONDANSETRON 4 MG/1
4 TABLET, FILM COATED ORAL EVERY 8 HOURS PRN
Status: ACTIVE | OUTPATIENT
Start: 2025-09-05

## 2025-09-05 RX ORDER — CEFTRIAXONE 1 G/50ML
1 INJECTION, SOLUTION INTRAVENOUS EVERY 24 HOURS
Status: DISCONTINUED | OUTPATIENT
Start: 2025-09-06 | End: 2025-09-06

## 2025-09-05 RX ORDER — POTASSIUM CHLORIDE 14.9 MG/ML
20 INJECTION INTRAVENOUS ONCE
Status: COMPLETED | OUTPATIENT
Start: 2025-09-05 | End: 2025-09-06

## 2025-09-05 RX ORDER — BUDESONIDE 0.5 MG/2ML
0.5 INHALANT ORAL
Status: DISPENSED | OUTPATIENT
Start: 2025-09-05

## 2025-09-05 RX ORDER — POTASSIUM CHLORIDE 20 MEQ/1
40 TABLET, EXTENDED RELEASE ORAL ONCE
Status: COMPLETED | OUTPATIENT
Start: 2025-09-05 | End: 2025-09-05

## 2025-09-05 RX ADMIN — AMLODIPINE BESYLATE 10 MG: 10 TABLET ORAL at 20:32

## 2025-09-05 RX ADMIN — CEFTRIAXONE 1 G: 1 INJECTION, SOLUTION INTRAVENOUS at 17:58

## 2025-09-05 RX ADMIN — FORMOTEROL FUMARATE DIHYDRATE 20 MCG: 20 SOLUTION RESPIRATORY (INHALATION) at 20:27

## 2025-09-05 RX ADMIN — IOHEXOL 75 ML: 350 INJECTION, SOLUTION INTRAVENOUS at 16:24

## 2025-09-05 RX ADMIN — CHOLESTYRAMINE 4 G: 4 POWDER, FOR SUSPENSION ORAL at 20:32

## 2025-09-05 RX ADMIN — POTASSIUM CHLORIDE 20 MEQ: 14.9 INJECTION, SOLUTION INTRAVENOUS at 19:58

## 2025-09-05 RX ADMIN — SODIUM CHLORIDE 1000 ML: 9 INJECTION, SOLUTION INTRAVENOUS at 14:32

## 2025-09-05 RX ADMIN — SODIUM CHLORIDE 500 ML: 0.9 INJECTION, SOLUTION INTRAVENOUS at 14:34

## 2025-09-05 RX ADMIN — EZETIMIBE 10 MG: 10 TABLET ORAL at 20:32

## 2025-09-05 RX ADMIN — POTASSIUM CHLORIDE 40 MEQ: 1500 TABLET, EXTENDED RELEASE ORAL at 14:31

## 2025-09-05 RX ADMIN — POTASSIUM CHLORIDE 20 MEQ: 14.9 INJECTION, SOLUTION INTRAVENOUS at 14:35

## 2025-09-05 RX ADMIN — ENOXAPARIN SODIUM 40 MG: 40 INJECTION SUBCUTANEOUS at 20:32

## 2025-09-05 RX ADMIN — POTASSIUM CHLORIDE 20 MEQ: 14.9 INJECTION, SOLUTION INTRAVENOUS at 17:01

## 2025-09-05 RX ADMIN — METOPROLOL TARTRATE 25 MG: 25 TABLET, FILM COATED ORAL at 20:32

## 2025-09-05 RX ADMIN — BUDESONIDE 0.5 MG: 0.5 INHALANT RESPIRATORY (INHALATION) at 20:27

## 2025-09-05 SDOH — SOCIAL STABILITY: SOCIAL NETWORK: HOW OFTEN DO YOU GET TOGETHER WITH FRIENDS OR RELATIVES?: MORE THAN THREE TIMES A WEEK

## 2025-09-05 SDOH — SOCIAL STABILITY: SOCIAL INSECURITY: ARE YOU MARRIED, WIDOWED, DIVORCED, SEPARATED, NEVER MARRIED, OR LIVING WITH A PARTNER?: WIDOWED

## 2025-09-05 SDOH — ECONOMIC STABILITY: INCOME INSECURITY: IN THE PAST 12 MONTHS HAS THE ELECTRIC, GAS, OIL, OR WATER COMPANY THREATENED TO SHUT OFF SERVICES IN YOUR HOME?: NO

## 2025-09-05 SDOH — SOCIAL STABILITY: SOCIAL NETWORK
IN A TYPICAL WEEK, HOW MANY TIMES DO YOU TALK ON THE PHONE WITH FAMILY, FRIENDS, OR NEIGHBORS?: MORE THAN THREE TIMES A WEEK

## 2025-09-05 SDOH — SOCIAL STABILITY: SOCIAL INSECURITY: WITHIN THE LAST YEAR, HAVE YOU BEEN HUMILIATED OR EMOTIONALLY ABUSED IN OTHER WAYS BY YOUR PARTNER OR EX-PARTNER?: NO

## 2025-09-05 SDOH — SOCIAL STABILITY: SOCIAL INSECURITY: ARE THERE ANY APPARENT SIGNS OF INJURIES/BEHAVIORS THAT COULD BE RELATED TO ABUSE/NEGLECT?: NO

## 2025-09-05 SDOH — SOCIAL STABILITY: SOCIAL INSECURITY: DO YOU FEEL UNSAFE GOING BACK TO THE PLACE WHERE YOU ARE LIVING?: NO

## 2025-09-05 SDOH — SOCIAL STABILITY: SOCIAL INSECURITY: WITHIN THE LAST YEAR, HAVE YOU BEEN AFRAID OF YOUR PARTNER OR EX-PARTNER?: NO

## 2025-09-05 SDOH — HEALTH STABILITY: PHYSICAL HEALTH: ON AVERAGE, HOW MANY DAYS PER WEEK DO YOU ENGAGE IN MODERATE TO STRENUOUS EXERCISE (LIKE A BRISK WALK)?: 0 DAYS

## 2025-09-05 SDOH — SOCIAL STABILITY: SOCIAL NETWORK: HOW OFTEN DO YOU ATTEND MEETINGS OF THE CLUBS OR ORGANIZATIONS YOU BELONG TO?: NEVER

## 2025-09-05 SDOH — SOCIAL STABILITY: SOCIAL NETWORK: HOW OFTEN DO YOU ATTEND CHURCH OR RELIGIOUS SERVICES?: NEVER

## 2025-09-05 SDOH — SOCIAL STABILITY: SOCIAL INSECURITY: WERE YOU ABLE TO COMPLETE ALL THE BEHAVIORAL HEALTH SCREENINGS?: YES

## 2025-09-05 SDOH — SOCIAL STABILITY: SOCIAL INSECURITY: HAVE YOU HAD THOUGHTS OF HARMING ANYONE ELSE?: NO

## 2025-09-05 SDOH — ECONOMIC STABILITY: FOOD INSECURITY: WITHIN THE PAST 12 MONTHS, THE FOOD YOU BOUGHT JUST DIDN'T LAST AND YOU DIDN'T HAVE MONEY TO GET MORE.: SOMETIMES TRUE

## 2025-09-05 SDOH — SOCIAL STABILITY: SOCIAL INSECURITY: DO YOU FEEL ANYONE HAS EXPLOITED OR TAKEN ADVANTAGE OF YOU FINANCIALLY OR OF YOUR PERSONAL PROPERTY?: NO

## 2025-09-05 SDOH — SOCIAL STABILITY: SOCIAL INSECURITY: HAS ANYONE EVER THREATENED TO HURT YOUR FAMILY OR YOUR PETS?: NO

## 2025-09-05 SDOH — SOCIAL STABILITY: SOCIAL INSECURITY: ARE YOU OR HAVE YOU BEEN THREATENED OR ABUSED PHYSICALLY, EMOTIONALLY, OR SEXUALLY BY ANYONE?: NO

## 2025-09-05 SDOH — SOCIAL STABILITY: SOCIAL INSECURITY: ABUSE: ADULT

## 2025-09-05 SDOH — SOCIAL STABILITY: SOCIAL INSECURITY: HAVE YOU HAD ANY THOUGHTS OF HARMING ANYONE ELSE?: NO

## 2025-09-05 SDOH — HEALTH STABILITY: PHYSICAL HEALTH: ON AVERAGE, HOW MANY MINUTES DO YOU ENGAGE IN EXERCISE AT THIS LEVEL?: 0 MIN

## 2025-09-05 SDOH — SOCIAL STABILITY: SOCIAL INSECURITY: DOES ANYONE TRY TO KEEP YOU FROM HAVING/CONTACTING OTHER FRIENDS OR DOING THINGS OUTSIDE YOUR HOME?: NO

## 2025-09-05 ASSESSMENT — LIFESTYLE VARIABLES
HOW OFTEN DO YOU HAVE 6 OR MORE DRINKS ON ONE OCCASION: NEVER
PRESCIPTION_ABUSE_PAST_12_MONTHS: NO
SKIP TO QUESTIONS 9-10: 1
SUBSTANCE_ABUSE_PAST_12_MONTHS: NO
HOW OFTEN DO YOU HAVE A DRINK CONTAINING ALCOHOL: MONTHLY OR LESS
HOW MANY STANDARD DRINKS CONTAINING ALCOHOL DO YOU HAVE ON A TYPICAL DAY: 1 OR 2
AUDIT-C TOTAL SCORE: 1
AUDIT-C TOTAL SCORE: 1

## 2025-09-05 ASSESSMENT — PAIN SCALES - GENERAL
PAINLEVEL_OUTOF10: 0 - NO PAIN

## 2025-09-05 ASSESSMENT — PAIN - FUNCTIONAL ASSESSMENT
PAIN_FUNCTIONAL_ASSESSMENT: 0-10
PAIN_FUNCTIONAL_ASSESSMENT: 0-10

## 2025-09-05 ASSESSMENT — COGNITIVE AND FUNCTIONAL STATUS - GENERAL
MOBILITY SCORE: 22
CLIMB 3 TO 5 STEPS WITH RAILING: A LITTLE
PATIENT BASELINE BEDBOUND: NO
DAILY ACTIVITIY SCORE: 24
WALKING IN HOSPITAL ROOM: A LITTLE

## 2025-09-05 ASSESSMENT — ACTIVITIES OF DAILY LIVING (ADL)
TOILETING: INDEPENDENT
ADEQUATE_TO_COMPLETE_ADL: YES
BATHING: INDEPENDENT
DRESSING YOURSELF: INDEPENDENT
HEARING - RIGHT EAR: FUNCTIONAL
FEEDING YOURSELF: INDEPENDENT
ASSISTIVE_DEVICE: EYEGLASSES
JUDGMENT_ADEQUATE_SAFELY_COMPLETE_DAILY_ACTIVITIES: YES
PATIENT'S MEMORY ADEQUATE TO SAFELY COMPLETE DAILY ACTIVITIES?: YES
GROOMING: INDEPENDENT
HEARING - LEFT EAR: FUNCTIONAL
WALKS IN HOME: INDEPENDENT
LACK_OF_TRANSPORTATION: NO

## 2025-09-06 LAB
ANION GAP SERPL CALC-SCNC: 14 MMOL/L (ref 10–20)
ANION GAP SERPL CALC-SCNC: 16 MMOL/L (ref 10–20)
BUN SERPL-MCNC: 18 MG/DL (ref 6–23)
BUN SERPL-MCNC: 20 MG/DL (ref 6–23)
CALCIUM SERPL-MCNC: 7.8 MG/DL (ref 8.6–10.3)
CALCIUM SERPL-MCNC: 8.6 MG/DL (ref 8.6–10.3)
CHLORIDE SERPL-SCNC: 107 MMOL/L (ref 98–107)
CHLORIDE SERPL-SCNC: 99 MMOL/L (ref 98–107)
CO2 SERPL-SCNC: 16 MMOL/L (ref 21–32)
CO2 SERPL-SCNC: 25 MMOL/L (ref 21–32)
CREAT SERPL-MCNC: 1.14 MG/DL (ref 0.5–1.05)
CREAT SERPL-MCNC: 1.25 MG/DL (ref 0.5–1.05)
EGFRCR SERPLBLD CKD-EPI 2021: 49 ML/MIN/1.73M*2
EGFRCR SERPLBLD CKD-EPI 2021: 55 ML/MIN/1.73M*2
ERYTHROCYTE [DISTWIDTH] IN BLOOD BY AUTOMATED COUNT: 16.2 % (ref 11.5–14.5)
GLUCOSE SERPL-MCNC: 106 MG/DL (ref 74–99)
GLUCOSE SERPL-MCNC: 95 MG/DL (ref 74–99)
HCT VFR BLD AUTO: 40.1 % (ref 36–46)
HGB BLD-MCNC: 14.2 G/DL (ref 12–16)
MCH RBC QN AUTO: 32.3 PG (ref 26–34)
MCHC RBC AUTO-ENTMCNC: 35.4 G/DL (ref 32–36)
MCV RBC AUTO: 91 FL (ref 80–100)
NRBC BLD-RTO: 0 /100 WBCS (ref 0–0)
PLATELET # BLD AUTO: 397 X10*3/UL (ref 150–450)
POTASSIUM SERPL-SCNC: 2.7 MMOL/L (ref 3.5–5.3)
POTASSIUM SERPL-SCNC: 5.8 MMOL/L (ref 3.5–5.3)
RBC # BLD AUTO: 4.39 X10*6/UL (ref 4–5.2)
SODIUM SERPL-SCNC: 133 MMOL/L (ref 136–145)
SODIUM SERPL-SCNC: 135 MMOL/L (ref 136–145)
WBC # BLD AUTO: 8.4 X10*3/UL (ref 4.4–11.3)

## 2025-09-06 PROCEDURE — 94760 N-INVAS EAR/PLS OXIMETRY 1: CPT | Mod: IPSPLIT

## 2025-09-06 PROCEDURE — 87075 CULTR BACTERIA EXCEPT BLOOD: CPT | Mod: GENLAB | Performed by: INTERNAL MEDICINE

## 2025-09-06 PROCEDURE — 2500000002 HC RX 250 W HCPCS SELF ADMINISTERED DRUGS (ALT 637 FOR MEDICARE OP, ALT 636 FOR OP/ED): Mod: SE | Performed by: STUDENT IN AN ORGANIZED HEALTH CARE EDUCATION/TRAINING PROGRAM

## 2025-09-06 PROCEDURE — 9420000001 HC RT PATIENT EDUCATION 5 MIN: Mod: IPSPLIT

## 2025-09-06 PROCEDURE — 2500000001 HC RX 250 WO HCPCS SELF ADMINISTERED DRUGS (ALT 637 FOR MEDICARE OP): Mod: SE | Performed by: NURSE PRACTITIONER

## 2025-09-06 PROCEDURE — 94640 AIRWAY INHALATION TREATMENT: CPT

## 2025-09-06 PROCEDURE — 2500000001 HC RX 250 WO HCPCS SELF ADMINISTERED DRUGS (ALT 637 FOR MEDICARE OP): Mod: SE,IPSPLIT | Performed by: NURSE PRACTITIONER

## 2025-09-06 PROCEDURE — 94664 DEMO&/EVAL PT USE INHALER: CPT

## 2025-09-06 PROCEDURE — 2500000004 HC RX 250 GENERAL PHARMACY W/ HCPCS (ALT 636 FOR OP/ED): Mod: SE,IPSPLIT | Performed by: NURSE PRACTITIONER

## 2025-09-06 PROCEDURE — 2500000002 HC RX 250 W HCPCS SELF ADMINISTERED DRUGS (ALT 637 FOR MEDICARE OP, ALT 636 FOR OP/ED): Mod: SE | Performed by: NURSE PRACTITIONER

## 2025-09-06 PROCEDURE — 85027 COMPLETE CBC AUTOMATED: CPT | Performed by: NURSE PRACTITIONER

## 2025-09-06 PROCEDURE — 2500000004 HC RX 250 GENERAL PHARMACY W/ HCPCS (ALT 636 FOR OP/ED): Mod: SE | Performed by: STUDENT IN AN ORGANIZED HEALTH CARE EDUCATION/TRAINING PROGRAM

## 2025-09-06 PROCEDURE — 36415 COLL VENOUS BLD VENIPUNCTURE: CPT | Performed by: NURSE PRACTITIONER

## 2025-09-06 PROCEDURE — 36415 COLL VENOUS BLD VENIPUNCTURE: CPT | Performed by: INTERNAL MEDICINE

## 2025-09-06 PROCEDURE — 1200000002 HC GENERAL ROOM WITH TELEMETRY DAILY: Mod: IPSPLIT

## 2025-09-06 PROCEDURE — 80048 BASIC METABOLIC PNL TOTAL CA: CPT | Performed by: NURSE PRACTITIONER

## 2025-09-06 PROCEDURE — 80048 BASIC METABOLIC PNL TOTAL CA: CPT | Mod: IPSPLIT | Performed by: NURSE PRACTITIONER

## 2025-09-06 PROCEDURE — 99223 1ST HOSP IP/OBS HIGH 75: CPT | Performed by: NURSE PRACTITIONER

## 2025-09-06 RX ORDER — PHENAZOPYRIDINE HYDROCHLORIDE 100 MG/1
95 TABLET, FILM COATED ORAL
Status: DISPENSED | OUTPATIENT
Start: 2025-09-06 | End: 2025-09-08

## 2025-09-06 RX ORDER — POTASSIUM CHLORIDE 14.9 MG/ML
20 INJECTION INTRAVENOUS
Status: COMPLETED | OUTPATIENT
Start: 2025-09-06 | End: 2025-09-06

## 2025-09-06 RX ORDER — POTASSIUM CHLORIDE 20 MEQ/1
40 TABLET, EXTENDED RELEASE ORAL ONCE
Status: COMPLETED | OUTPATIENT
Start: 2025-09-06 | End: 2025-09-06

## 2025-09-06 RX ORDER — MEROPENEM AND SODIUM CHLORIDE 1 G/50ML
1 INJECTION, SOLUTION INTRAVENOUS EVERY 8 HOURS
Status: DISPENSED | OUTPATIENT
Start: 2025-09-06

## 2025-09-06 RX ORDER — POTASSIUM CHLORIDE 20 MEQ/1
40 TABLET, EXTENDED RELEASE ORAL 2 TIMES DAILY
Status: COMPLETED | OUTPATIENT
Start: 2025-09-06 | End: 2025-09-06

## 2025-09-06 RX ADMIN — CHOLESTYRAMINE 4 G: 4 POWDER, FOR SUSPENSION ORAL at 10:12

## 2025-09-06 RX ADMIN — POTASSIUM CHLORIDE 40 MEQ: 1500 TABLET, EXTENDED RELEASE ORAL at 10:12

## 2025-09-06 RX ADMIN — POTASSIUM CHLORIDE 20 MEQ: 14.9 INJECTION, SOLUTION INTRAVENOUS at 08:00

## 2025-09-06 RX ADMIN — LOPERAMIDE HYDROCHLORIDE 2 MG: 2 CAPSULE ORAL at 13:40

## 2025-09-06 RX ADMIN — POTASSIUM CHLORIDE 20 MEQ: 14.9 INJECTION, SOLUTION INTRAVENOUS at 11:08

## 2025-09-06 RX ADMIN — METOPROLOL TARTRATE 25 MG: 25 TABLET, FILM COATED ORAL at 08:00

## 2025-09-06 RX ADMIN — PANTOPRAZOLE SODIUM 40 MG: 40 TABLET, DELAYED RELEASE ORAL at 06:12

## 2025-09-06 RX ADMIN — METOPROLOL TARTRATE 25 MG: 25 TABLET, FILM COATED ORAL at 21:22

## 2025-09-06 RX ADMIN — FORMOTEROL FUMARATE DIHYDRATE 20 MCG: 20 SOLUTION RESPIRATORY (INHALATION) at 18:33

## 2025-09-06 RX ADMIN — MEROPENEM AND SODIUM CHLORIDE 1 G: 1 INJECTION, SOLUTION INTRAVENOUS at 08:00

## 2025-09-06 RX ADMIN — LOPERAMIDE HYDROCHLORIDE 2 MG: 2 CAPSULE ORAL at 11:39

## 2025-09-06 RX ADMIN — LOPERAMIDE HYDROCHLORIDE 2 MG: 2 CAPSULE ORAL at 08:00

## 2025-09-06 RX ADMIN — FORMOTEROL FUMARATE DIHYDRATE 20 MCG: 20 SOLUTION RESPIRATORY (INHALATION) at 08:42

## 2025-09-06 RX ADMIN — CHOLESTYRAMINE 4 G: 4 POWDER, FOR SUSPENSION ORAL at 22:36

## 2025-09-06 RX ADMIN — POTASSIUM CHLORIDE 40 MEQ: 1500 TABLET, EXTENDED RELEASE ORAL at 08:01

## 2025-09-06 RX ADMIN — BUDESONIDE 0.5 MG: 0.5 INHALANT RESPIRATORY (INHALATION) at 18:33

## 2025-09-06 RX ADMIN — PHENAZOPYRIDINE 100 MG: 100 TABLET ORAL at 15:50

## 2025-09-06 RX ADMIN — ENOXAPARIN SODIUM 40 MG: 40 INJECTION SUBCUTANEOUS at 21:00

## 2025-09-06 RX ADMIN — ACETAMINOPHEN 650 MG: 325 TABLET, FILM COATED ORAL at 12:54

## 2025-09-06 RX ADMIN — AMLODIPINE BESYLATE 10 MG: 10 TABLET ORAL at 08:01

## 2025-09-06 RX ADMIN — EZETIMIBE 10 MG: 10 TABLET ORAL at 08:01

## 2025-09-06 RX ADMIN — MEROPENEM AND SODIUM CHLORIDE 1 G: 1 INJECTION, SOLUTION INTRAVENOUS at 15:47

## 2025-09-06 RX ADMIN — BUDESONIDE 0.5 MG: 0.5 INHALANT RESPIRATORY (INHALATION) at 08:42

## 2025-09-06 RX ADMIN — MEROPENEM AND SODIUM CHLORIDE 1 G: 1 INJECTION, SOLUTION INTRAVENOUS at 22:32

## 2025-09-06 RX ADMIN — FLUTICASONE PROPIONATE 2 SPRAY: 50 SPRAY, METERED NASAL at 08:00

## 2025-09-06 ASSESSMENT — PAIN - FUNCTIONAL ASSESSMENT
PAIN_FUNCTIONAL_ASSESSMENT: 0-10

## 2025-09-06 ASSESSMENT — ENCOUNTER SYMPTOMS
DIARRHEA: 0
EYES NEGATIVE: 1
COUGH: 1
HEMATOLOGIC/LYMPHATIC NEGATIVE: 1
CONSTITUTIONAL NEGATIVE: 1
SHORTNESS OF BREATH: 1
NEUROLOGICAL NEGATIVE: 1
GASTROINTESTINAL NEGATIVE: 1
ALLERGIC/IMMUNOLOGIC NEGATIVE: 1
FREQUENCY: 1
CARDIOVASCULAR NEGATIVE: 1
PSYCHIATRIC NEGATIVE: 1

## 2025-09-06 ASSESSMENT — PAIN DESCRIPTION - LOCATION: LOCATION: PERINEUM

## 2025-09-06 ASSESSMENT — PAIN SCALES - GENERAL
PAINLEVEL_OUTOF10: 0 - NO PAIN
PAINLEVEL_OUTOF10: 4
PAINLEVEL_OUTOF10: 0 - NO PAIN
PAINLEVEL_OUTOF10: 5 - MODERATE PAIN
PAINLEVEL_OUTOF10: 4
PAINLEVEL_OUTOF10: 0 - NO PAIN
PAINLEVEL_OUTOF10: 5 - MODERATE PAIN

## 2025-09-06 ASSESSMENT — COGNITIVE AND FUNCTIONAL STATUS - GENERAL
CLIMB 3 TO 5 STEPS WITH RAILING: A LITTLE
DAILY ACTIVITIY SCORE: 24
CLIMB 3 TO 5 STEPS WITH RAILING: A LITTLE
TOILETING: A LOT
WALKING IN HOSPITAL ROOM: A LITTLE
DAILY ACTIVITIY SCORE: 22
MOBILITY SCORE: 22
WALKING IN HOSPITAL ROOM: A LITTLE
MOBILITY SCORE: 22

## 2025-09-06 ASSESSMENT — PAIN DESCRIPTION - DESCRIPTORS: DESCRIPTORS: BURNING

## 2025-09-07 VITALS
OXYGEN SATURATION: 95 % | WEIGHT: 139.77 LBS | SYSTOLIC BLOOD PRESSURE: 99 MMHG | BODY MASS INDEX: 22.46 KG/M2 | TEMPERATURE: 97.5 F | RESPIRATION RATE: 18 BRPM | HEIGHT: 66 IN | HEART RATE: 73 BPM | DIASTOLIC BLOOD PRESSURE: 59 MMHG

## 2025-09-07 LAB
ANION GAP SERPL CALC-SCNC: 13 MMOL/L (ref 10–20)
BACTERIA BLD CULT: NORMAL
BACTERIA BLD CULT: NORMAL
BACTERIA UR CULT: NORMAL
BUN SERPL-MCNC: 18 MG/DL (ref 6–23)
CALCIUM SERPL-MCNC: 8.4 MG/DL (ref 8.6–10.3)
CHLORIDE SERPL-SCNC: 106 MMOL/L (ref 98–107)
CO2 SERPL-SCNC: 24 MMOL/L (ref 21–32)
CREAT SERPL-MCNC: 1.09 MG/DL (ref 0.5–1.05)
EGFRCR SERPLBLD CKD-EPI 2021: 58 ML/MIN/1.73M*2
ERYTHROCYTE [DISTWIDTH] IN BLOOD BY AUTOMATED COUNT: 17 % (ref 11.5–14.5)
GLUCOSE SERPL-MCNC: 88 MG/DL (ref 74–99)
HCT VFR BLD AUTO: 39.2 % (ref 36–46)
HGB BLD-MCNC: 13.1 G/DL (ref 12–16)
MAGNESIUM SERPL-MCNC: 1.96 MG/DL (ref 1.6–2.4)
MCH RBC QN AUTO: 31.4 PG (ref 26–34)
MCHC RBC AUTO-ENTMCNC: 33.4 G/DL (ref 32–36)
MCV RBC AUTO: 94 FL (ref 80–100)
NRBC BLD-RTO: 0 /100 WBCS (ref 0–0)
PLATELET # BLD AUTO: 320 X10*3/UL (ref 150–450)
POTASSIUM SERPL-SCNC: 3.5 MMOL/L (ref 3.5–5.3)
RBC # BLD AUTO: 4.17 X10*6/UL (ref 4–5.2)
SODIUM SERPL-SCNC: 139 MMOL/L (ref 136–145)
WBC # BLD AUTO: 6 X10*3/UL (ref 4.4–11.3)

## 2025-09-07 PROCEDURE — 2500000004 HC RX 250 GENERAL PHARMACY W/ HCPCS (ALT 636 FOR OP/ED): Mod: SE,IPSPLIT | Performed by: NURSE PRACTITIONER

## 2025-09-07 PROCEDURE — 82310 ASSAY OF CALCIUM: CPT | Mod: IPSPLIT | Performed by: NURSE PRACTITIONER

## 2025-09-07 PROCEDURE — 2500000002 HC RX 250 W HCPCS SELF ADMINISTERED DRUGS (ALT 637 FOR MEDICARE OP, ALT 636 FOR OP/ED): Mod: SE,IPSPLIT | Performed by: NURSE PRACTITIONER

## 2025-09-07 PROCEDURE — 36415 COLL VENOUS BLD VENIPUNCTURE: CPT | Mod: IPSPLIT | Performed by: NURSE PRACTITIONER

## 2025-09-07 PROCEDURE — 9420000001 HC RT PATIENT EDUCATION 5 MIN: Mod: IPSPLIT

## 2025-09-07 PROCEDURE — 2500000001 HC RX 250 WO HCPCS SELF ADMINISTERED DRUGS (ALT 637 FOR MEDICARE OP): Mod: SE,IPSPLIT | Performed by: NURSE PRACTITIONER

## 2025-09-07 PROCEDURE — 85027 COMPLETE CBC AUTOMATED: CPT | Mod: IPSPLIT | Performed by: NURSE PRACTITIONER

## 2025-09-07 PROCEDURE — 83735 ASSAY OF MAGNESIUM: CPT | Mod: IPSPLIT | Performed by: NURSE PRACTITIONER

## 2025-09-07 PROCEDURE — 1200000002 HC GENERAL ROOM WITH TELEMETRY DAILY: Mod: IPSPLIT

## 2025-09-07 PROCEDURE — 94760 N-INVAS EAR/PLS OXIMETRY 1: CPT | Mod: IPSPLIT

## 2025-09-07 PROCEDURE — 94640 AIRWAY INHALATION TREATMENT: CPT | Mod: IPSPLIT

## 2025-09-07 RX ORDER — POTASSIUM CHLORIDE 20 MEQ/1
20 TABLET, EXTENDED RELEASE ORAL ONCE
Status: COMPLETED | OUTPATIENT
Start: 2025-09-07 | End: 2025-09-07

## 2025-09-07 RX ORDER — POTASSIUM CHLORIDE 20 MEQ/1
20 TABLET, EXTENDED RELEASE ORAL DAILY
Status: ACTIVE | OUTPATIENT
Start: 2025-09-08

## 2025-09-07 RX ORDER — POTASSIUM CHLORIDE 20 MEQ/1
20 TABLET, EXTENDED RELEASE ORAL ONCE
Status: DISCONTINUED | OUTPATIENT
Start: 2025-09-07 | End: 2025-09-07

## 2025-09-07 RX ORDER — SODIUM CHLORIDE 9 MG/ML
INJECTION, SOLUTION INTRAVENOUS
Status: DISPENSED
Start: 2025-09-07 | End: 2025-09-08

## 2025-09-07 RX ORDER — GUAIFENESIN 600 MG/1
600 TABLET, EXTENDED RELEASE ORAL 2 TIMES DAILY
Status: DISPENSED | OUTPATIENT
Start: 2025-09-07

## 2025-09-07 RX ADMIN — LEVOTHYROXINE SODIUM 25 MCG: 25 TABLET ORAL at 06:31

## 2025-09-07 RX ADMIN — FLUTICASONE PROPIONATE 2 SPRAY: 50 SPRAY, METERED NASAL at 08:54

## 2025-09-07 RX ADMIN — AMLODIPINE BESYLATE 10 MG: 10 TABLET ORAL at 08:54

## 2025-09-07 RX ADMIN — CHOLESTYRAMINE 4 G: 4 POWDER, FOR SUSPENSION ORAL at 21:44

## 2025-09-07 RX ADMIN — EZETIMIBE 10 MG: 10 TABLET ORAL at 08:54

## 2025-09-07 RX ADMIN — PHENAZOPYRIDINE 100 MG: 100 TABLET ORAL at 08:54

## 2025-09-07 RX ADMIN — METOPROLOL TARTRATE 25 MG: 25 TABLET, FILM COATED ORAL at 08:54

## 2025-09-07 RX ADMIN — FORMOTEROL FUMARATE DIHYDRATE 20 MCG: 20 SOLUTION RESPIRATORY (INHALATION) at 20:50

## 2025-09-07 RX ADMIN — GUAIFENESIN 600 MG: 600 TABLET, EXTENDED RELEASE ORAL at 20:42

## 2025-09-07 RX ADMIN — BUDESONIDE 0.5 MG: 0.5 INHALANT RESPIRATORY (INHALATION) at 20:50

## 2025-09-07 RX ADMIN — METOPROLOL TARTRATE 25 MG: 25 TABLET, FILM COATED ORAL at 20:42

## 2025-09-07 RX ADMIN — POTASSIUM CHLORIDE 20 MEQ: 1500 TABLET, EXTENDED RELEASE ORAL at 10:30

## 2025-09-07 RX ADMIN — PHENAZOPYRIDINE 100 MG: 100 TABLET ORAL at 16:04

## 2025-09-07 RX ADMIN — MEROPENEM AND SODIUM CHLORIDE 1 G: 1 INJECTION, SOLUTION INTRAVENOUS at 22:47

## 2025-09-07 RX ADMIN — MEROPENEM AND SODIUM CHLORIDE 1 G: 1 INJECTION, SOLUTION INTRAVENOUS at 06:48

## 2025-09-07 RX ADMIN — ACETAMINOPHEN 650 MG: 325 TABLET, FILM COATED ORAL at 08:54

## 2025-09-07 RX ADMIN — ENOXAPARIN SODIUM 40 MG: 40 INJECTION SUBCUTANEOUS at 20:42

## 2025-09-07 RX ADMIN — PANTOPRAZOLE SODIUM 40 MG: 40 TABLET, DELAYED RELEASE ORAL at 06:31

## 2025-09-07 RX ADMIN — MEROPENEM AND SODIUM CHLORIDE 1 G: 1 INJECTION, SOLUTION INTRAVENOUS at 15:37

## 2025-09-07 RX ADMIN — PHENAZOPYRIDINE 100 MG: 100 TABLET ORAL at 12:12

## 2025-09-07 RX ADMIN — CHOLESTYRAMINE 4 G: 4 POWDER, FOR SUSPENSION ORAL at 09:33

## 2025-09-07 RX ADMIN — GUAIFENESIN 600 MG: 600 TABLET, EXTENDED RELEASE ORAL at 15:38

## 2025-09-07 RX ADMIN — BUDESONIDE 0.5 MG: 0.5 INHALANT RESPIRATORY (INHALATION) at 09:07

## 2025-09-07 RX ADMIN — FORMOTEROL FUMARATE DIHYDRATE 20 MCG: 20 SOLUTION RESPIRATORY (INHALATION) at 09:07

## 2025-09-07 ASSESSMENT — COGNITIVE AND FUNCTIONAL STATUS - GENERAL
DAILY ACTIVITIY SCORE: 24
MOBILITY SCORE: 24

## 2025-09-07 ASSESSMENT — PAIN SCALES - GENERAL
PAINLEVEL_OUTOF10: 0 - NO PAIN
PAINLEVEL_OUTOF10: 0 - NO PAIN
PAINLEVEL_OUTOF10: 5 - MODERATE PAIN
PAINLEVEL_OUTOF10: 0 - NO PAIN
PAINLEVEL_OUTOF10: 5 - MODERATE PAIN

## 2025-09-07 ASSESSMENT — PAIN - FUNCTIONAL ASSESSMENT
PAIN_FUNCTIONAL_ASSESSMENT: 0-10

## 2025-09-07 ASSESSMENT — PAIN DESCRIPTION - LOCATION: LOCATION: PERINEUM

## 2025-09-07 ASSESSMENT — PAIN DESCRIPTION - DESCRIPTORS: DESCRIPTORS: BURNING;OTHER (COMMENT)

## 2025-09-10 ENCOUNTER — APPOINTMENT (OUTPATIENT)
Dept: HEMATOLOGY/ONCOLOGY | Facility: HOSPITAL | Age: 60
End: 2025-09-10
Payer: COMMERCIAL

## 2025-10-14 ENCOUNTER — APPOINTMENT (OUTPATIENT)
Dept: PRIMARY CARE | Facility: CLINIC | Age: 60
End: 2025-10-14
Payer: COMMERCIAL

## (undated) DEVICE — MANIFOLD, 4 PORT NEPTUNE STANDARD

## (undated) DEVICE — GLOVE, SURGICAL, PROTEXIS,  6.5, PF, LATEX

## (undated) DEVICE — Device

## (undated) DEVICE — SPONGE, HEMOSTATIC, GELATIN, SURGIFOAM, 8 X 12.5 CM X 10 MM

## (undated) DEVICE — SPONGE, HEMOSTATIC, GELATIN, SURGIFOAM, 2 X 6 CM X 7 MM

## (undated) DEVICE — ADAPTER, LUER STUB, 16 G

## (undated) DEVICE — COVER, CART, 45 X 27 X 48 IN, CLEAR

## (undated) DEVICE — TOWEL, SURGICAL, NEURO, O/R, 16 X 26, BLUE, STERILE

## (undated) DEVICE — SYRINGE, 60 CC, LUER LOCK, MONOJECT, W/O CAP, LF

## (undated) DEVICE — DRESSING, GAUZE, 16 PLY, 4 X 4 IN, STERILE

## (undated) DEVICE — DRAPE, PAD, PREP, W/ 9 IN CUFF, 24 X 41, LF, NS